# Patient Record
Sex: MALE | Race: WHITE | NOT HISPANIC OR LATINO | Employment: OTHER | URBAN - METROPOLITAN AREA
[De-identification: names, ages, dates, MRNs, and addresses within clinical notes are randomized per-mention and may not be internally consistent; named-entity substitution may affect disease eponyms.]

---

## 2021-09-15 PROCEDURE — U0005 INFEC AGEN DETEC AMPLI PROBE: HCPCS | Performed by: INTERNAL MEDICINE

## 2021-09-15 PROCEDURE — U0003 INFECTIOUS AGENT DETECTION BY NUCLEIC ACID (DNA OR RNA); SEVERE ACUTE RESPIRATORY SYNDROME CORONAVIRUS 2 (SARS-COV-2) (CORONAVIRUS DISEASE [COVID-19]), AMPLIFIED PROBE TECHNIQUE, MAKING USE OF HIGH THROUGHPUT TECHNOLOGIES AS DESCRIBED BY CMS-2020-01-R: HCPCS | Performed by: INTERNAL MEDICINE

## 2021-09-16 ENCOUNTER — LAB REQUISITION (OUTPATIENT)
Dept: LAB | Facility: HOSPITAL | Age: 46
End: 2021-09-16
Payer: MEDICARE

## 2021-09-16 DIAGNOSIS — Z03.818 ENCOUNTER FOR OBSERVATION FOR SUSPECTED EXPOSURE TO OTHER BIOLOGICAL AGENTS RULED OUT: ICD-10-CM

## 2021-09-16 LAB — SARS-COV-2 RNA RESP QL NAA+PROBE: NEGATIVE

## 2022-05-10 ENCOUNTER — OFFICE VISIT (OUTPATIENT)
Dept: URGENT CARE | Facility: MEDICAL CENTER | Age: 47
End: 2022-05-10
Payer: MEDICARE

## 2022-05-10 VITALS
OXYGEN SATURATION: 96 % | WEIGHT: 315 LBS | TEMPERATURE: 98 F | RESPIRATION RATE: 18 BRPM | HEIGHT: 76 IN | HEART RATE: 82 BPM | BODY MASS INDEX: 38.36 KG/M2

## 2022-05-10 DIAGNOSIS — L03.115 CELLULITIS OF RIGHT FOOT: ICD-10-CM

## 2022-05-10 DIAGNOSIS — S91.301A OPEN WOUND OF RIGHT FOOT, INITIAL ENCOUNTER: Primary | ICD-10-CM

## 2022-05-10 DIAGNOSIS — L03.115 CELLULITIS OF RIGHT LEG: ICD-10-CM

## 2022-05-10 PROCEDURE — G0463 HOSPITAL OUTPT CLINIC VISIT: HCPCS | Performed by: PHYSICIAN ASSISTANT

## 2022-05-10 PROCEDURE — 99213 OFFICE O/P EST LOW 20 MIN: CPT | Performed by: PHYSICIAN ASSISTANT

## 2022-05-10 RX ORDER — TRAZODONE HYDROCHLORIDE 50 MG/1
TABLET ORAL
COMMUNITY
Start: 2022-05-09

## 2022-05-10 RX ORDER — LEVOTHYROXINE SODIUM 0.03 MG/1
TABLET ORAL
COMMUNITY
Start: 2022-05-09

## 2022-05-10 RX ORDER — CITALOPRAM 40 MG/1
TABLET ORAL
COMMUNITY
Start: 2022-05-09

## 2022-05-10 RX ORDER — LOPERAMIDE HYDROCHLORIDE 2 MG/1
CAPSULE ORAL
COMMUNITY
Start: 2022-04-18

## 2022-05-10 RX ORDER — CLONAZEPAM 1 MG/1
1 TABLET ORAL 3 TIMES DAILY
COMMUNITY
Start: 2022-03-30

## 2022-05-10 RX ORDER — HYDROCHLOROTHIAZIDE 12.5 MG/1
TABLET ORAL
COMMUNITY
Start: 2022-03-05 | End: 2022-07-18

## 2022-05-10 RX ORDER — POTASSIUM CHLORIDE 20 MEQ/1
TABLET, EXTENDED RELEASE ORAL
COMMUNITY
Start: 2022-05-09 | End: 2022-07-18

## 2022-05-10 RX ORDER — CLINDAMYCIN HYDROCHLORIDE 300 MG/1
300 CAPSULE ORAL 3 TIMES DAILY
Qty: 30 CAPSULE | Refills: 0 | Status: SHIPPED | OUTPATIENT
Start: 2022-05-10 | End: 2022-05-15

## 2022-05-10 RX ORDER — MIRTAZAPINE 7.5 MG/1
TABLET, FILM COATED ORAL
COMMUNITY
Start: 2022-02-12

## 2022-05-10 RX ORDER — ROSUVASTATIN CALCIUM 10 MG/1
TABLET, COATED ORAL
COMMUNITY
Start: 2022-02-28

## 2022-05-10 RX ORDER — METOPROLOL TARTRATE 50 MG/1
50 TABLET, FILM COATED ORAL 2 TIMES DAILY
COMMUNITY
Start: 2022-05-09

## 2022-05-10 RX ORDER — FUROSEMIDE 40 MG/1
40 TABLET ORAL DAILY
COMMUNITY
Start: 2022-03-30

## 2022-05-10 RX ORDER — BUSPIRONE HYDROCHLORIDE 10 MG/1
TABLET ORAL
COMMUNITY
Start: 2022-05-09 | End: 2022-06-20

## 2022-05-10 RX ORDER — ACETAMINOPHEN 160 MG
2000 TABLET,DISINTEGRATING ORAL DAILY
COMMUNITY
Start: 2022-03-30

## 2022-05-10 RX ORDER — PANTOPRAZOLE SODIUM 40 MG/1
TABLET, DELAYED RELEASE ORAL
COMMUNITY
Start: 2022-05-09

## 2022-05-10 RX ORDER — DOXYCYCLINE HYCLATE 100 MG
TABLET ORAL
COMMUNITY
Start: 2022-02-21 | End: 2022-05-15

## 2022-05-10 RX ORDER — PALIPERIDONE 6 MG/1
TABLET, EXTENDED RELEASE ORAL
COMMUNITY
Start: 2022-05-09

## 2022-05-10 RX ORDER — PHENOL 1.4 %
10 AEROSOL, SPRAY (ML) MUCOUS MEMBRANE DAILY
Status: ON HOLD | COMMUNITY
Start: 2022-05-09 | End: 2022-07-20

## 2022-05-10 RX ORDER — DIVALPROEX SODIUM 500 MG/1
TABLET, DELAYED RELEASE ORAL
COMMUNITY
Start: 2022-05-09 | End: 2022-06-20

## 2022-05-10 NOTE — PATIENT INSTRUCTIONS
1  Over-the-counter acetaminophen as needed for pain  2  Elevate both legs frequently throughout the course of the day  3  Follow-up with wound care as soon as possible: They should be calling you for an appointment as a referral was placed on your behalf  4  Go to the ER immediately for any worsening symptoms

## 2022-05-10 NOTE — LETTER
May 10, 2022     Patient: Milady Leonard   YOB: 1975   Date of Visit: 5/10/2022       To Whom it May Concern:    Morgan Tam was seen in my clinic on 5/10/2022  He is not to be assigned job duties where he has to work around crease or get his right leg dressing soiled until cleared by [address]       If you have any questions or concerns, please don't hesitate to call           Sincerely,          Luis Felipe Mohan PA-C        CC: Milady Leonard

## 2022-05-10 NOTE — PROGRESS NOTES
[facility]        NAME: Jimmie Browne is a 48 y o  male  : 1975    MRN: [id number]  DATE: May 10, 2022  TIME: 2:48 PM    Assessment and Plan   Open wound of right foot, initial encounter [S91 301A]  1  Open wound of right foot, initial encounter  mupirocin (BACTROBAN) 2 % ointment    Ambulatory Referral to Wound Care   2  Cellulitis of right foot  clindamycin (CLEOCIN) 300 MG capsule    Ambulatory Referral to Wound Care   3  Cellulitis of right leg  clindamycin (CLEOCIN) 300 MG capsule    Ambulatory Referral to Wound Care         Patient Instructions   1  Over-the-counter acetaminophen as needed for pain  2  Elevate both legs frequently throughout the course of the day  3  Follow-up with wound care as soon as possible: They should be calling you for an appointment as a referral was placed on your behalf  4  Go to the ER immediately for any worsening symptoms  Chief Complaint     Chief Complaint   Patient presents with    Blister     Pt  with a blister to the right inner foot from his shoes rubbing  History of Present Illness       47-year-old male patient with a known history of edematous leg states that he was wearing tight-fitting sneakers yesterday and walked for a considerable distance and at the end of the day noticed that he had a blister on the medial right foot  Today he woke up and was much more sore  He states that the redness in his lower leg and foot is much worse since this happened  And he has increasing pain in the area  He notes that his edema is worse and his right leg is weeping  Review of Systems   Review of Systems   Constitutional: Negative for chills and fever  HENT: Negative for ear pain and sore throat  Eyes: Negative for pain and visual disturbance  Respiratory: Negative for cough and shortness of breath  Cardiovascular: Positive for leg swelling  Negative for chest pain and palpitations     Gastrointestinal: Negative for abdominal pain and vomiting  Genitourinary: Negative for dysuria and hematuria  Musculoskeletal: Negative for arthralgias and back pain  Skin: Positive for color change and wound  Negative for rash  Neurological: Negative for seizures and syncope  All other systems reviewed and are negative          Current Medications       Current Outpatient Medications:     busPIRone (BUSPAR) 10 mg tablet, , Disp: , Rfl:     Cholecalciferol (Vitamin D3) 50 MCG (2000 UT) capsule, Take 2,000 Units by mouth daily, Disp: , Rfl:     citalopram (CeleXA) 40 mg tablet, , Disp: , Rfl:     clonazePAM (KlonoPIN) 1 mg tablet, Take 1 mg by mouth 3 (three) times a day, Disp: , Rfl:     divalproex sodium (DEPAKOTE) 500 mg EC tablet, , Disp: , Rfl:     doxycycline hyclate (VIBRA-TABS) 100 mg tablet, , Disp: , Rfl:     furosemide (LASIX) 40 mg tablet, Take 40 mg by mouth daily, Disp: , Rfl:     hydrochlorothiazide (HYDRODIURIL) 12 5 mg tablet, , Disp: , Rfl:     levothyroxine 25 mcg tablet, , Disp: , Rfl:     loperamide (IMODIUM) 2 mg capsule, TAKE 2 CAPSULES BY MOUTH TWO TIMES DAILY AS NEEDED FOR DIARRHEA , Disp: , Rfl:     Melatonin 10 MG TABS, Take 10 mg by mouth daily, Disp: , Rfl:     metoprolol tartrate (LOPRESSOR) 50 mg tablet, Take 50 mg by mouth 2 (two) times a day, Disp: , Rfl:     mirtazapine (REMERON) 7 5 MG tablet, , Disp: , Rfl:     paliperidone (INVEGA) 6 MG 24 hr tablet, , Disp: , Rfl:     pantoprazole (PROTONIX) 40 mg tablet, , Disp: , Rfl:     potassium chloride (K-DUR,KLOR-CON) 20 mEq tablet, , Disp: , Rfl:     rosuvastatin (CRESTOR) 10 MG tablet, , Disp: , Rfl:     traZODone (DESYREL) 50 mg tablet, , Disp: , Rfl:     clindamycin (CLEOCIN) 300 MG capsule, Take 1 capsule (300 mg total) by mouth 3 (three) times a day for 10 days, Disp: 30 capsule, Rfl: 0    mupirocin (BACTROBAN) 2 % ointment, Apply topically 3 (three) times a day Apply to right foot wound with dressing changes 2-3 times daily until healed, Disp: 22 g, Rfl: 0    Current Allergies     Allergies as of 05/10/2022 - Reviewed 05/10/2022   Allergen Reaction Noted    Mesalamine GI Intolerance 04/06/2015            The following portions of the patient's history were reviewed and updated as appropriate: allergies, current medications, past family history, past medical history, past social history, past surgical history and problem list      Past Medical History:   Diagnosis Date    Bipolar affective disorder ([facility] [address] )     Cellulitis     Hyperlipidemia     Hypertension        No past surgical history on file  No family history on file  Medications have been verified  Objective   Pulse 82   Temp 98 °F (36 7 °C)   Resp 18   Ht 6' 4" (1 93 m)   Wt (!) 215 kg (473 lb)   SpO2 96%   BMI 57 58 kg/m²        Physical Exam     Physical Exam  Vitals and nursing note reviewed  Constitutional:       Appearance: Normal appearance  HENT:      Head: Normocephalic  Nose: Nose normal       Mouth/Throat:      Mouth: Mucous membranes are dry  Pharynx: Oropharynx is clear  Eyes:      Conjunctiva/sclera: Conjunctivae normal       Pupils: Pupils are equal, round, and reactive to light  Cardiovascular:      Rate and Rhythm: Normal rate and regular rhythm  Pulses: Normal pulses  Pulmonary:      Effort: Pulmonary effort is normal       Breath sounds: Normal breath sounds  Musculoskeletal:         General: Normal range of motion  Cervical back: Normal range of motion and neck supple  Skin:     Comments: Large blister noted to the right medial foot  Right leg is swollen, red, warm from the knee down with consider amount of weeping  Right leg has 3+ nonpitting edema  Left side has 2+ nonpitting edema without weeping or cellulitic changes  Neurological:      General: No focal deficit present  Mental Status: He is alert and oriented to person, place, and time     Psychiatric:         Mood and Affect: Mood normal  Behavior: Behavior normal          Note:   Right foot blister was treated with bacitracin and Xeroform dressing was applied  Leg was wrapped with gauze and overwrapped with Ace wrap  Patient knows to go to the ER for any worsening symptoms  He knows to change the dressings once to twice daily  He knows to follow up with wound care and referral has been issued

## 2022-05-14 ENCOUNTER — OFFICE VISIT (OUTPATIENT)
Dept: URGENT CARE | Facility: MEDICAL CENTER | Age: 47
End: 2022-05-14
Payer: MEDICARE

## 2022-05-14 ENCOUNTER — APPOINTMENT (EMERGENCY)
Dept: CT IMAGING | Facility: HOSPITAL | Age: 47
DRG: 603 | End: 2022-05-14
Payer: MEDICARE

## 2022-05-14 ENCOUNTER — HOSPITAL ENCOUNTER (INPATIENT)
Facility: HOSPITAL | Age: 47
LOS: 1 days | Discharge: HOME | DRG: 603 | End: 2022-05-15
Attending: EMERGENCY MEDICINE | Admitting: INTERNAL MEDICINE
Payer: MEDICARE

## 2022-05-14 ENCOUNTER — HOSPITAL ENCOUNTER (INPATIENT)
Dept: VASCULAR ULTRASOUND | Facility: HOSPITAL | Age: 47
Discharge: HOME | DRG: 603 | End: 2022-05-14
Payer: MEDICARE

## 2022-05-14 VITALS
DIASTOLIC BLOOD PRESSURE: 65 MMHG | OXYGEN SATURATION: 95 % | TEMPERATURE: 97.2 F | RESPIRATION RATE: 20 BRPM | WEIGHT: 315 LBS | BODY MASS INDEX: 57.82 KG/M2 | SYSTOLIC BLOOD PRESSURE: 136 MMHG | HEART RATE: 93 BPM

## 2022-05-14 DIAGNOSIS — L03.119 CELLULITIS OF LOWER EXTREMITY, UNSPECIFIED LATERALITY: Primary | ICD-10-CM

## 2022-05-14 DIAGNOSIS — L03.115 CELLULITIS OF RIGHT LOWER EXTREMITY: ICD-10-CM

## 2022-05-14 DIAGNOSIS — I89.0 LYMPHEDEMA: Primary | ICD-10-CM

## 2022-05-14 PROBLEM — E03.9 HYPOTHYROIDISM: Chronic | Status: ACTIVE | Noted: 2022-05-14

## 2022-05-14 PROBLEM — F31.9 BIPOLAR DISORDER (HCC): Chronic | Status: ACTIVE | Noted: 2022-05-14

## 2022-05-14 PROBLEM — M79.89 PAIN AND SWELLING OF LOWER EXTREMITY: Status: ACTIVE | Noted: 2022-05-14

## 2022-05-14 PROBLEM — E66.01 MORBID OBESITY (HCC): Chronic | Status: ACTIVE | Noted: 2022-05-14

## 2022-05-14 PROBLEM — M79.606 PAIN AND SWELLING OF LOWER EXTREMITY: Status: ACTIVE | Noted: 2022-05-14

## 2022-05-14 PROBLEM — K50.90 CROHN'S DISEASE (HCC): Chronic | Status: ACTIVE | Noted: 2022-05-14

## 2022-05-14 PROBLEM — I10 PRIMARY HYPERTENSION: Chronic | Status: ACTIVE | Noted: 2022-05-14

## 2022-05-14 PROBLEM — E78.5 DYSLIPIDEMIA: Chronic | Status: ACTIVE | Noted: 2022-05-14

## 2022-05-14 LAB
ALBUMIN SERPL BCP-MCNC: 3.1 G/DL (ref 3.5–5)
ALP SERPL-CCNC: 56 U/L (ref 34–104)
ALT SERPL W P-5'-P-CCNC: 20 U/L (ref 7–52)
ANION GAP SERPL CALCULATED.3IONS-SCNC: 7 MMOL/L (ref 4–13)
AST SERPL W P-5'-P-CCNC: 15 U/L (ref 13–39)
BASOPHILS # BLD AUTO: 0.03 THOUSANDS/ΜL (ref 0–0.1)
BASOPHILS NFR BLD AUTO: 1 % (ref 0–1)
BILIRUB SERPL-MCNC: 0.93 MG/DL (ref 0.2–1)
BUN SERPL-MCNC: 17 MG/DL (ref 5–25)
CALCIUM ALBUM COR SERPL-MCNC: 8.7 MG/DL (ref 8.3–10.1)
CALCIUM SERPL-MCNC: 8 MG/DL (ref 8.4–10.2)
CHLORIDE SERPL-SCNC: 104 MMOL/L (ref 96–108)
CK SERPL-CCNC: 62 U/L (ref 39–308)
CO2 SERPL-SCNC: 26 MMOL/L (ref 21–32)
CREAT SERPL-MCNC: 1.06 MG/DL (ref 0.6–1.3)
D DIMER PPP FEU-MCNC: 1.43 UG/ML FEU
EOSINOPHIL # BLD AUTO: 0.11 THOUSAND/ΜL (ref 0–0.61)
EOSINOPHIL NFR BLD AUTO: 2 % (ref 0–6)
ERYTHROCYTE [DISTWIDTH] IN BLOOD BY AUTOMATED COUNT: 13.2 % (ref 11.6–15.1)
GFR SERPL CREATININE-BSD FRML MDRD: 82 ML/MIN/1.73SQ M
GLUCOSE SERPL-MCNC: 99 MG/DL (ref 65–140)
HCT VFR BLD AUTO: 37.7 % (ref 36.5–49.3)
HGB BLD-MCNC: 12.2 G/DL (ref 12–17)
IMM GRANULOCYTES # BLD AUTO: 0.04 THOUSAND/UL (ref 0–0.2)
IMM GRANULOCYTES NFR BLD AUTO: 1 % (ref 0–2)
LACTATE SERPL-SCNC: 1.2 MMOL/L (ref 0.5–2)
LYMPHOCYTES # BLD AUTO: 1.66 THOUSANDS/ΜL (ref 0.6–4.47)
LYMPHOCYTES NFR BLD AUTO: 32 % (ref 14–44)
MCH RBC QN AUTO: 31 PG (ref 26.8–34.3)
MCHC RBC AUTO-ENTMCNC: 32.4 G/DL (ref 31.4–37.4)
MCV RBC AUTO: 96 FL (ref 82–98)
MONOCYTES # BLD AUTO: 1.03 THOUSAND/ΜL (ref 0.17–1.22)
MONOCYTES NFR BLD AUTO: 20 % (ref 4–12)
NEUTROPHILS # BLD AUTO: 2.38 THOUSANDS/ΜL (ref 1.85–7.62)
NEUTS SEG NFR BLD AUTO: 44 % (ref 43–75)
NRBC BLD AUTO-RTO: 0 /100 WBCS
PLATELET # BLD AUTO: 231 THOUSANDS/UL (ref 149–390)
PMV BLD AUTO: 10.4 FL (ref 8.9–12.7)
POTASSIUM SERPL-SCNC: 4 MMOL/L (ref 3.5–5.3)
PROT SERPL-MCNC: 6.4 G/DL (ref 6.4–8.4)
RBC # BLD AUTO: 3.94 MILLION/UL (ref 3.88–5.62)
SODIUM SERPL-SCNC: 137 MMOL/L (ref 135–147)
TSH SERPL DL<=0.05 MIU/L-ACNC: 2.03 UIU/ML (ref 0.45–4.5)
WBC # BLD AUTO: 5.25 THOUSAND/UL (ref 4.31–10.16)

## 2022-05-14 PROCEDURE — 87040 BLOOD CULTURE FOR BACTERIA: CPT | Performed by: EMERGENCY MEDICINE

## 2022-05-14 PROCEDURE — G1004 CDSM NDSC: HCPCS

## 2022-05-14 PROCEDURE — 73700 CT LOWER EXTREMITY W/O DYE: CPT

## 2022-05-14 PROCEDURE — 93970 EXTREMITY STUDY: CPT

## 2022-05-14 PROCEDURE — 82550 ASSAY OF CK (CPK): CPT | Performed by: EMERGENCY MEDICINE

## 2022-05-14 PROCEDURE — 80053 COMPREHEN METABOLIC PANEL: CPT | Performed by: EMERGENCY MEDICINE

## 2022-05-14 PROCEDURE — 84443 ASSAY THYROID STIM HORMONE: CPT | Performed by: INTERNAL MEDICINE

## 2022-05-14 PROCEDURE — 85379 FIBRIN DEGRADATION QUANT: CPT | Performed by: EMERGENCY MEDICINE

## 2022-05-14 PROCEDURE — 99285 EMERGENCY DEPT VISIT HI MDM: CPT

## 2022-05-14 PROCEDURE — 99285 EMERGENCY DEPT VISIT HI MDM: CPT | Performed by: EMERGENCY MEDICINE

## 2022-05-14 PROCEDURE — 93005 ELECTROCARDIOGRAM TRACING: CPT

## 2022-05-14 PROCEDURE — 96374 THER/PROPH/DIAG INJ IV PUSH: CPT

## 2022-05-14 PROCEDURE — 85025 COMPLETE CBC W/AUTO DIFF WBC: CPT | Performed by: EMERGENCY MEDICINE

## 2022-05-14 PROCEDURE — 83605 ASSAY OF LACTIC ACID: CPT | Performed by: EMERGENCY MEDICINE

## 2022-05-14 PROCEDURE — G0463 HOSPITAL OUTPT CLINIC VISIT: HCPCS | Performed by: PHYSICIAN ASSISTANT

## 2022-05-14 PROCEDURE — 36415 COLL VENOUS BLD VENIPUNCTURE: CPT | Performed by: EMERGENCY MEDICINE

## 2022-05-14 PROCEDURE — 99212 OFFICE O/P EST SF 10 MIN: CPT | Performed by: PHYSICIAN ASSISTANT

## 2022-05-14 PROCEDURE — 99223 1ST HOSP IP/OBS HIGH 75: CPT | Performed by: INTERNAL MEDICINE

## 2022-05-14 RX ORDER — OXYCODONE HYDROCHLORIDE 5 MG/1
5 TABLET ORAL EVERY 4 HOURS PRN
Status: DISCONTINUED | OUTPATIENT
Start: 2022-05-14 | End: 2022-05-15 | Stop reason: HOSPADM

## 2022-05-14 RX ORDER — LOPERAMIDE HYDROCHLORIDE 2 MG/1
4 CAPSULE ORAL 2 TIMES DAILY PRN
Status: DISCONTINUED | OUTPATIENT
Start: 2022-05-14 | End: 2022-05-15 | Stop reason: HOSPADM

## 2022-05-14 RX ORDER — PRAVASTATIN SODIUM 40 MG
40 TABLET ORAL
Status: DISCONTINUED | OUTPATIENT
Start: 2022-05-14 | End: 2022-05-15 | Stop reason: HOSPADM

## 2022-05-14 RX ORDER — DIVALPROEX SODIUM 500 MG/1
1500 TABLET, DELAYED RELEASE ORAL
Status: DISCONTINUED | OUTPATIENT
Start: 2022-05-14 | End: 2022-05-15 | Stop reason: HOSPADM

## 2022-05-14 RX ORDER — CLONAZEPAM 1 MG/1
1 TABLET ORAL 3 TIMES DAILY
Status: DISCONTINUED | OUTPATIENT
Start: 2022-05-14 | End: 2022-05-15 | Stop reason: HOSPADM

## 2022-05-14 RX ORDER — ACETAMINOPHEN 325 MG/1
650 TABLET ORAL EVERY 6 HOURS PRN
Status: DISCONTINUED | OUTPATIENT
Start: 2022-05-14 | End: 2022-05-15 | Stop reason: HOSPADM

## 2022-05-14 RX ORDER — CITALOPRAM 20 MG/1
40 TABLET ORAL DAILY
Status: DISCONTINUED | OUTPATIENT
Start: 2022-05-14 | End: 2022-05-15 | Stop reason: HOSPADM

## 2022-05-14 RX ORDER — POTASSIUM CHLORIDE 20 MEQ/1
20 TABLET, EXTENDED RELEASE ORAL DAILY
Status: DISCONTINUED | OUTPATIENT
Start: 2022-05-14 | End: 2022-05-15 | Stop reason: HOSPADM

## 2022-05-14 RX ORDER — LEVOTHYROXINE SODIUM 0.03 MG/1
25 TABLET ORAL
Status: DISCONTINUED | OUTPATIENT
Start: 2022-05-14 | End: 2022-05-15 | Stop reason: HOSPADM

## 2022-05-14 RX ORDER — TRAZODONE HYDROCHLORIDE 50 MG/1
50 TABLET ORAL
Status: DISCONTINUED | OUTPATIENT
Start: 2022-05-14 | End: 2022-05-15 | Stop reason: HOSPADM

## 2022-05-14 RX ORDER — PALIPERIDONE 3 MG/1
12 TABLET, EXTENDED RELEASE ORAL DAILY
Status: DISCONTINUED | OUTPATIENT
Start: 2022-05-14 | End: 2022-05-15 | Stop reason: HOSPADM

## 2022-05-14 RX ORDER — OXYCODONE HYDROCHLORIDE 5 MG/1
2.5 TABLET ORAL EVERY 4 HOURS PRN
Status: DISCONTINUED | OUTPATIENT
Start: 2022-05-14 | End: 2022-05-15 | Stop reason: HOSPADM

## 2022-05-14 RX ORDER — MELATONIN
2000 DAILY
Status: DISCONTINUED | OUTPATIENT
Start: 2022-05-14 | End: 2022-05-15 | Stop reason: HOSPADM

## 2022-05-14 RX ORDER — ENOXAPARIN SODIUM 100 MG/ML
60 INJECTION SUBCUTANEOUS EVERY 12 HOURS SCHEDULED
Status: DISCONTINUED | OUTPATIENT
Start: 2022-05-14 | End: 2022-05-15 | Stop reason: HOSPADM

## 2022-05-14 RX ORDER — METOPROLOL TARTRATE 50 MG/1
50 TABLET, FILM COATED ORAL 2 TIMES DAILY
Status: DISCONTINUED | OUTPATIENT
Start: 2022-05-14 | End: 2022-05-15 | Stop reason: HOSPADM

## 2022-05-14 RX ORDER — FUROSEMIDE 10 MG/ML
40 INJECTION INTRAMUSCULAR; INTRAVENOUS
Status: DISCONTINUED | OUTPATIENT
Start: 2022-05-14 | End: 2022-05-15 | Stop reason: HOSPADM

## 2022-05-14 RX ORDER — LANOLIN ALCOHOL/MO/W.PET/CERES
9 CREAM (GRAM) TOPICAL DAILY
Status: DISCONTINUED | OUTPATIENT
Start: 2022-05-14 | End: 2022-05-15 | Stop reason: HOSPADM

## 2022-05-14 RX ORDER — BUSPIRONE HYDROCHLORIDE 5 MG/1
20 TABLET ORAL 3 TIMES DAILY
Status: DISCONTINUED | OUTPATIENT
Start: 2022-05-14 | End: 2022-05-15 | Stop reason: HOSPADM

## 2022-05-14 RX ORDER — DIVALPROEX SODIUM 500 MG/1
1000 TABLET, DELAYED RELEASE ORAL DAILY
Status: DISCONTINUED | OUTPATIENT
Start: 2022-05-14 | End: 2022-05-15 | Stop reason: HOSPADM

## 2022-05-14 RX ORDER — PANTOPRAZOLE SODIUM 40 MG/1
40 TABLET, DELAYED RELEASE ORAL
Status: DISCONTINUED | OUTPATIENT
Start: 2022-05-15 | End: 2022-05-15 | Stop reason: HOSPADM

## 2022-05-14 RX ORDER — MORPHINE SULFATE 4 MG/ML
4 INJECTION, SOLUTION INTRAMUSCULAR; INTRAVENOUS ONCE
Status: COMPLETED | OUTPATIENT
Start: 2022-05-14 | End: 2022-05-14

## 2022-05-14 RX ADMIN — CLONAZEPAM 1 MG: 1 TABLET ORAL at 16:55

## 2022-05-14 RX ADMIN — OXYCODONE HYDROCHLORIDE 5 MG: 5 TABLET ORAL at 18:40

## 2022-05-14 RX ADMIN — Medication 9 MG: at 21:21

## 2022-05-14 RX ADMIN — POTASSIUM CHLORIDE 20 MEQ: 1500 TABLET, EXTENDED RELEASE ORAL at 13:57

## 2022-05-14 RX ADMIN — OXYCODONE HYDROCHLORIDE 5 MG: 5 TABLET ORAL at 13:58

## 2022-05-14 RX ADMIN — MORPHINE SULFATE 4 MG: 4 INJECTION INTRAVENOUS at 10:17

## 2022-05-14 RX ADMIN — TRAZODONE HYDROCHLORIDE 50 MG: 50 TABLET ORAL at 21:22

## 2022-05-14 RX ADMIN — BUSPIRONE HYDROCHLORIDE 20 MG: 10 TABLET ORAL at 21:22

## 2022-05-14 RX ADMIN — ENOXAPARIN SODIUM 60 MG: 60 INJECTION SUBCUTANEOUS at 13:58

## 2022-05-14 RX ADMIN — VANCOMYCIN HYDROCHLORIDE 2000 MG: 1 INJECTION, POWDER, LYOPHILIZED, FOR SOLUTION INTRAVENOUS at 11:18

## 2022-05-14 RX ADMIN — BUSPIRONE HYDROCHLORIDE 20 MG: 10 TABLET ORAL at 16:55

## 2022-05-14 RX ADMIN — OXYCODONE HYDROCHLORIDE 5 MG: 5 TABLET ORAL at 23:07

## 2022-05-14 RX ADMIN — FUROSEMIDE 40 MG: 10 INJECTION, SOLUTION INTRAMUSCULAR; INTRAVENOUS at 16:56

## 2022-05-14 RX ADMIN — CLONAZEPAM 1 MG: 1 TABLET ORAL at 21:22

## 2022-05-14 RX ADMIN — PRAVASTATIN SODIUM 40 MG: 40 TABLET ORAL at 16:56

## 2022-05-14 RX ADMIN — ENOXAPARIN SODIUM 60 MG: 60 INJECTION SUBCUTANEOUS at 21:21

## 2022-05-14 RX ADMIN — DIVALPROEX SODIUM 1500 MG: 500 TABLET, DELAYED RELEASE ORAL at 21:21

## 2022-05-14 NOTE — ASSESSMENT & PLAN NOTE
· Patient on furosemide 40 mg once a day  · Since patient has significant bilateral lower extremity edema, I will order for IV Lasix 40 mg twice a day, for now

## 2022-05-14 NOTE — PLAN OF CARE
Problem: Potential for Falls  Goal: Patient will remain free of falls  Description: INTERVENTIONS:  - Educate patient/family on patient safety including physical limitations  - Instruct patient to call for assistance with activity   - Consult OT/PT to assist with strengthening/mobility   - Keep Call bell within reach  - Keep bed low and locked with side rails adjusted as appropriate  - Keep care items and personal belongings within reach  - Initiate and maintain comfort rounds  - Make Fall Risk Sign visible to staff  - Offer Toileting every  Hours, in advance of need  - Initiate/Maintain alarm  - Obtain necessary fall risk management equipment:   - Apply yellow socks and bracelet for high fall risk patients  - Consider moving patient to room near nurses station  Outcome: Progressing     Problem: PAIN - ADULT  Goal: Verbalizes/displays adequate comfort level or baseline comfort level  Description: Interventions:  - Encourage patient to monitor pain and request assistance  - Assess pain using appropriate pain scale  - Administer analgesics based on type and severity of pain and evaluate response  - Implement non-pharmacological measures as appropriate and evaluate response  - Consider cultural and social influences on pain and pain management  - Notify physician/advanced practitioner if interventions unsuccessful or patient reports new pain  Outcome: Progressing     Problem: INFECTION - ADULT  Goal: Absence or prevention of progression during hospitalization  Description: INTERVENTIONS:  - Assess and monitor for signs and symptoms of infection  - Monitor lab/diagnostic results  - Monitor all insertion sites, i e  indwelling lines, tubes, and drains  - Monitor endotracheal if appropriate and nasal secretions for changes in amount and color  - [place] appropriate cooling/warming therapies per order  - Administer medications as ordered  - Instruct and encourage patient and family to use good hand hygiene technique  - Identify and instruct in appropriate isolation precautions for identified infection/condition  Outcome: Progressing  Goal: Absence of fever/infection during neutropenic period  Description: INTERVENTIONS:  - Monitor WBC    Outcome: Progressing     Problem: SAFETY ADULT  Goal: Patient will remain free of falls  Description: INTERVENTIONS:  - Educate patient/family on patient safety including physical limitations  - Instruct patient to call for assistance with activity   - Consult OT/PT to assist with strengthening/mobility   - Keep Call bell within reach  - Keep bed low and locked with side rails adjusted as appropriate  - Keep care items and personal belongings within reach  - Initiate and maintain comfort rounds  - Make Fall Risk Sign visible to staff  - Offer Toileting every  Hours, in advance of need  - Initiate/Maintain alarm  - Obtain necessary fall risk management equipment:   - Apply yellow socks and bracelet for high fall risk patients  - Consider moving patient to room near nurses station  Outcome: Progressing  Goal: Maintain or return to baseline ADL function  Description: INTERVENTIONS:  -  Assess patient's ability to carry out ADLs; assess patient's baseline for ADL function and identify physical deficits which impact ability to perform ADLs (bathing, care of mouth/teeth, toileting, grooming, dressing, etc )  - Assess/evaluate cause of self-care deficits   - Assess range of motion  - Assess patient's mobility; develop plan if impaired  - Assess patient's need for assistive devices and provide as appropriate  - Encourage maximum independence but intervene and supervise when necessary  - Involve family in performance of ADLs  - Assess for home care needs following discharge   - Consider OT consult to assist with ADL evaluation and planning for discharge  - Provide patient education as appropriate  Outcome: Progressing  Goal: Maintains/Returns to pre admission functional level  Description: INTERVENTIONS:  - Perform BMAT or MOVE assessment daily    - Set and communicate daily mobility goal to care team and patient/family/caregiver  - Collaborate with rehabilitation services on mobility goals if consulted  - Perform Range of Motion  times a day  - Reposition patient every  hours  - Dangle patient  times a day  - Stand patient  times a day  - Ambulate patient  times a day  - Out of bed to chair  times a day   - Out of bed for meals  times a day  - Out of bed for toileting  - Record patient progress and toleration of activity level   Outcome: Progressing     Problem: DISCHARGE PLANNING  Goal: Discharge to home or other facility with appropriate resources  Description: INTERVENTIONS:  - Identify barriers to discharge w/patient and caregiver  - Arrange for needed discharge resources and transportation as appropriate  - Identify discharge learning needs (meds, wound care, etc )  - Arrange for interpretive services to assist at discharge as needed  - Refer to Case Management Department for coordinating discharge planning if the patient needs post-hospital services based on physician/advanced practitioner order or complex needs related to functional status, cognitive ability, or social support system  Outcome: Progressing     Problem: Knowledge Deficit  Goal: Patient/family/caregiver demonstrates understanding of disease process, treatment plan, medications, and discharge instructions  Description: Complete learning assessment and assess knowledge base    Interventions:  - Provide teaching at level of understanding  - Provide teaching via preferred learning methods  Outcome: Progressing     Problem: RESPIRATORY - ADULT  Goal: Achieves optimal ventilation and oxygenation  Description: INTERVENTIONS:  - Assess for changes in respiratory status  - Assess for changes in mentation and behavior  - Position to facilitate oxygenation and minimize respiratory effort  - Oxygen administered by appropriate delivery if ordered  - Initiate smoking cessation education as indicated  - Encourage broncho-pulmonary hygiene including cough, deep breathe, Incentive Spirometry  - Assess the need for suctioning and aspirate as needed  - Assess and instruct to report SOB or any respiratory difficulty  - Respiratory Therapy support as indicated  Outcome: Progressing     Problem: SKIN/TISSUE INTEGRITY - ADULT  Goal: Skin Integrity remains intact(Skin Breakdown Prevention)  Description: Assess:  -Perform Javier assessment every   -Clean and moisturize skin every   -Inspect skin when repositioning, toileting, and assisting with ADLS  -Assess under medical devices such as  every   -Assess extremities for adequate circulation and sensation     Bed Management:  -Have minimal linens on bed & keep smooth, unwrinkled  -Change linens as needed when moist or perspiring  -Avoid sitting or lying in one position for more than  hours while in bed  -Keep HOB at degrees     Toileting:  -Offer bedside commode  -Assess for incontinence every   -Use incontinent care products after each incontinent episode such as     Activity:  -Mobilize patient  times a day  -Encourage activity and walks on unit  -Encourage or provide ROM exercises   -Turn and reposition patient every  Hours  -Use appropriate equipment to lift or move patient in bed  -Instruct/ Assist with weight shifting every  when out of bed in chair  -Consider limitation of chair time  hour intervals    Skin Care:  -Avoid use of baby powder, tape, friction and shearing, hot water or constrictive clothing  -Relieve pressure over bony prominences using   -Do not massage red bony areas    Next Steps:  -Teach patient strategies to minimize risks such as    -Consider consults to  interdisciplinary teams such as   Outcome: Progressing  Goal: Incision(s), wounds(s) or drain site(s) healing without S/S of infection  Description: INTERVENTIONS  - Assess and document dressing, incision, wound bed, drain sites and surrounding tissue  - Provide patient and family education  - Perform skin care/dressing changes every   Outcome: Progressing  Goal: Pressure injury heals and does not worsen  Description: Interventions:  - Implement low air loss mattress or specialty surface (Criteria met)  - Apply silicone foam dressing  - Instruct/assist with weight shifting every  minutes when in chair   - Limit chair time to  hour intervals  - Use special pressure reducing interventions such as  when in chair   - Apply fecal or urinary incontinence containment device   - Perform passive or active ROM every   - Turn and reposition patient & offload bony prominences every  hours   - Utilize friction reducing device or surface for transfers   - Consider consults to  interdisciplinary teams such as   - Use incontinent care products after each incontinent episode such as   - Consider nutrition services referral as needed  Outcome: Progressing

## 2022-05-14 NOTE — ASSESSMENT & PLAN NOTE
· Patient came in with pain and swelling of bilateral lower extremities, right greater than the left  Due to the pain, patient has been having difficulty sleeping  · Patient has been having recurrent lower extremity cellulitis for which this has been treated at [facility] with several antibiotics including IV cefazolin, IV vancomycin, on oral clindamycin and doxycycline as well as mupirocin ointment  Patient also was prescribed with Medrol Dosepak at [facility]  · Thought to be bacterial cellulitis in the emergency room and thus was given IV vancomycin  Hold off further antibiotic at this time  This was discussed with the patient and okay with this plan  · D-dimer was elevated: For duplex ultrasound to rule out DVTs  · CT scan of bilateral lower extremities:  Revealed cellulitis versus venous stasis changes  No signs of possibility of a deeper infection such as an abscess or necrotizing fascitis  · I have a suspicion that this is more of venous stasis skin changes/dermatitis, instead of a bacterial cellulitis  Patient did not have any fever chills  No leukocytosis on patient's CBC  Examination of bilateral lower extremities did not reveal abnormal warmth and instead, on palpation, it was cold with weeping fluids from significant bilateral lower extremity edema  · Check procalcitonin  · Follow-up blood culture results  · Monitor CBC with differential count as well as temperature  · I will also order for echocardiogram, to rule out CHF, contributing to patient's severe bilateral lower extremity edema as patient also complained of having shortness of breath on exertion  No EKG done in the emergency room, I will get 1    · Elevate bilateral lower extremities  · Ace wraps  · Wound care consult for local wound care  · Pain control  · IV Lasix for patient's significant bilateral lower extremity edema  Low-salt diet  · Infectious disease doctor consult    · PT OT evaluation and management

## 2022-05-14 NOTE — H&P
[facility]  H&P- Erin Barrera 1975, 48 y o  male MRN: [id number]  Unit/Bed#: ED 17 Encounter: [id number]  Primary Care Provider: Luci Sevilla DO   Date and time admitted to hospital: 5/14/2022  9:13 AM    * Pain and swelling of lower extremity  Assessment & Plan  · Patient came in with pain and swelling of bilateral lower extremities, right greater than the left  Due to the pain, patient has been having difficulty sleeping  · Patient has been having recurrent lower extremity cellulitis for which this has been treated at [facility] with several antibiotics including IV cefazolin, IV vancomycin, on oral clindamycin and doxycycline as well as mupirocin ointment  Patient also was prescribed with Medrol Dosepak at [facility]  · Thought to be bacterial cellulitis in the emergency room and thus was given IV vancomycin  Hold off further antibiotic at this time  This was discussed with the patient and okay with this plan  · D-dimer was elevated: For duplex ultrasound to rule out DVTs  · CT scan of bilateral lower extremities:  Revealed cellulitis versus venous stasis changes  No signs of possibility of a deeper infection such as an abscess or necrotizing fascitis  · I have a suspicion that this is more of venous stasis skin changes/dermatitis, instead of a bacterial cellulitis  Patient did not have any fever chills  No leukocytosis on patient's CBC  Examination of bilateral lower extremities did not reveal abnormal warmth and instead, on palpation, it was cold with weeping fluids from significant bilateral lower extremity edema  · Check procalcitonin  · Follow-up blood culture results  · Monitor CBC with differential count as well as temperature  · I will also order for echocardiogram, to rule out CHF, contributing to patient's severe bilateral lower extremity edema as patient also complained of having shortness of breath on exertion  No EKG done in the emergency room, I will get 1    · Elevate bilateral lower extremities  · Ace wraps  · Wound care consult for local wound care  · Pain control  · IV Lasix for patient's significant bilateral lower extremity edema  Low-salt diet  · Infectious disease doctor consult  · PT OT evaluation and management  Hypothyroidism  Assessment & Plan  · Check TSH with reflex free T4  · Continue present levothyroxine    Morbid obesity ([facility] )  Assessment & Plan  · Needs to lose weight  · Lifestyle modification changes counseling prior to discharge  Bipolar disorder ([place] [address] )  Assessment & Plan  · Stable  · Continue patient's psychiatric medications  Crohn's disease ([place] [address] )  Assessment & Plan  · With history of chronic diarrhea due to Crohn's disease  · Continue patient's Imodium on as needed basis  Dyslipidemia  Assessment & Plan  · Continue statin medication    Primary hypertension  Assessment & Plan  · Patient on furosemide 40 mg once a day  · Since patient has significant bilateral lower extremity edema, I will order for IV Lasix 40 mg twice a day, for now  VTE Pharmacologic Prophylaxis: VTE Score: 4 Moderate Risk (Score 3-4) - Pharmacological DVT Prophylaxis Ordered: enoxaparin (Lovenox)  Code Status: Level 1 - Full Code   Discussion with family: Patient declined call to   Anticipated Length of Stay: Patient will be admitted on an inpatient basis with an anticipated length of stay of greater than 2 midnights secondary to Above findings and plans       Total Time for Visit, including Counseling / Coordination of Care: 70 minutes Greater than 50% of this total time spent on direct patient counseling and coordination of care  Chief Complaint:  I did sleep last night, due to pains in my right leg      History of Present Illness:  Chelsey Wallace is a 48 y o  male with a PMH significant for obesity, recurrent cellulitis, hypertension, bipolar disorder, who presents with right leg pain  According to the patient, approximately 2 weeks ago, he started having pains on his right leg  This pain has been getting progressively worse  Overnight, he told me, that the pain was significant to the point that he was not able to get any sleep  He told me that the pain was like stabbing/throbbing and excruciating pain  Thus he went to an urgent care center and when the doctor there saw his legs, patient was advised to go to the nearest emergency room  According to the patient, he had been having history of recurrent cellulitis and was on different antibiotics  He has been managed with this cellulitis at [facility]  Patient admits to bilateral leg swelling and pains for some time now  From review of patient's records, patient's cellulitis had been treated with several antibiotics with IV cefazolin, IV vancomycin, oral clindamycin and doxycycline as well as mupirocin ointment  Patient was also on Medrol Dosepak in the past for this  Patient denies any fever or chills  No other complaints  Review of Systems:  Review of Systems    Ten point review systems done and they were negative except for the ones I mentioned in my history of present illness and positive for shortness of breath on exertion, usually happens when he climbs a flight of stairs or when he is carrying a heavy object, with occasional chest pains, which according the patient, he had been having for some time now; patient also admits to chronic diarrhea due to his Crohn's disease for which he takes Imodium on as needed basis  Patient denies any headaches, dizziness or any loss of consciousness  Patient denies any cough or colds  Patient denies any nausea, vomiting or abdominal pains  Patient denies any urinary problems  For the other symptoms, please see notes above        Past Medical and Surgical History:   Past Medical History:   Diagnosis Date    Bipolar affective disorder ([facility] [address] )     Cellulitis     Hyperlipidemia     Hypertension        History reviewed  No pertinent surgical history  Meds/Allergies:  Prior to Admission medications    Medication Sig Start Date End Date Taking? Authorizing Provider   busPIRone (BUSPAR) 10 mg tablet  5/9/22   Historical Provider, MD   Cholecalciferol (Vitamin D3) 50 MCG (2000 UT) capsule Take 2,000 Units by mouth daily 3/30/22   Historical Provider, MD   citalopram (CeleXA) 40 mg tablet  5/9/22   Historical Provider, MD   clindamycin (CLEOCIN) 300 MG capsule Take 1 capsule (300 mg total) by mouth 3 (three) times a day for 10 days 5/10/22 5/20/22  Brandon Toussaint PA-C   clonazePAM (KlonoPIN) 1 mg tablet Take 1 mg by mouth 3 (three) times a day 3/30/22   Historical Provider, MD   divalproex sodium (DEPAKOTE) 500 mg EC tablet  5/9/22   Historical Provider, MD   doxycycline hyclate (VIBRA-TABS) 100 mg tablet  2/21/22   Historical Provider, MD   furosemide (LASIX) 40 mg tablet Take 40 mg by mouth daily 3/30/22   Historical Provider, MD   hydrochlorothiazide (HYDRODIURIL) 12 5 mg tablet  3/5/22   Historical Provider, MD   levothyroxine 25 mcg tablet  5/9/22   Historical Provider, MD   loperamide (IMODIUM) 2 mg capsule TAKE 2 CAPSULES BY MOUTH TWO TIMES DAILY AS NEEDED FOR DIARRHEA   4/18/22   Historical Provider, MD   Melatonin 10 MG TABS Take 10 mg by mouth daily 5/9/22   Historical Provider, MD   metoprolol tartrate (LOPRESSOR) 50 mg tablet Take 50 mg by mouth 2 (two) times a day 5/9/22   Historical Provider, MD   mirtazapine (REMERON) 7 5 MG tablet  2/12/22   Historical Provider, MD   mupirocin (BACTROBAN) 2 % ointment Apply topically 3 (three) times a day Apply to right foot wound with dressing changes 2-3 times daily until healed 5/10/22   Brandon Toussaint PA-C   paliperidone (INVEGA) 6 MG 24 hr tablet  5/9/22   Historical Provider, MD   pantoprazole (PROTONIX) 40 mg tablet  5/9/22   Historical Provider, MD   potassium chloride (K-DUR,KLOR-CON) 20 mEq tablet  5/9/22 Historical Provider, MD   rosuvastatin (CRESTOR) 10 MG tablet  2/28/22   Historical Provider, MD   traZODone (DESYREL) 50 mg tablet  5/9/22   Historical Provider, MD     I have reviewed home medications using recent Epic encounter  Allergies: Allergies   Allergen Reactions    Mesalamine GI Intolerance     Pt has had severe diarrhea and abdominal pain on mESALAMINE       Social History:  Marital Status: Single   Substance Use History:   Social History     Substance and Sexual Activity   Alcohol Use Never     Social History     Tobacco Use   Smoking Status Former Smoker   Smokeless Tobacco Never Used     Social History     Substance and Sexual Activity   Drug Use Never       Family History:  History reviewed  No pertinent family history  Physical Exam:     Vitals:   Blood Pressure: 125/60 (05/14/22 0910)  Pulse: 63 (05/14/22 0910)  Temperature: 98 6 °F (37 °C) (05/14/22 0910)  Temp Source: Oral (05/14/22 0910)  Respirations: 18 (05/14/22 0910)  Weight - Scale: (!) 215 kg (475 lb) (05/14/22 0910)  SpO2: 97 % (05/14/22 0910)    Physical Exam  Vitals and nursing note reviewed  Constitutional:       General: He is not in acute distress  Appearance: He is obese  He is not ill-appearing, toxic-appearing or diaphoretic  HENT:      Head: Normocephalic and atraumatic  Mouth/Throat:      Mouth: Mucous membranes are moist       Pharynx: Oropharynx is clear  No oropharyngeal exudate or posterior oropharyngeal erythema  Eyes:      General: No scleral icterus  Right eye: No discharge  Left eye: No discharge  Conjunctiva/sclera: Conjunctivae normal    Cardiovascular:      Rate and Rhythm: Normal rate and regular rhythm  Heart sounds: Normal heart sounds  No murmur heard  No friction rub  No gallop  Pulmonary:      Effort: Pulmonary effort is normal  No respiratory distress  Breath sounds: Normal breath sounds  No stridor  No wheezing, rhonchi or rales     Chest:      Chest wall: No tenderness  Abdominal:      General: Bowel sounds are normal  There is no distension  Palpations: Abdomen is soft  Tenderness: There is no abdominal tenderness  There is no guarding  Musculoskeletal:         General: No tenderness  Right lower leg: Edema present  Left lower leg: Edema present  Skin:     General: Skin is warm  Coloration: Skin is not pale  Findings: Erythema present  No rash  Comments: Positive for erythema on bilateral lower extremities, right greater than the left; positive for chronic venous stasis skin changes on bilateral lower extremities, right greater than the left  Please see pictures below  Neurological:      General: No focal deficit present  Mental Status: He is alert and oriented to person, place, and time  Psychiatric:         Mood and Affect: Mood normal          Behavior: Behavior normal          Thought Content: Thought content normal                                   Additional Data:     Lab Results:  Results from last 7 days   Lab Units 05/14/22  1013   WBC Thousand/uL 5 25   HEMOGLOBIN g/dL 12 2   HEMATOCRIT % 37 7   PLATELETS Thousands/uL 231   NEUTROS PCT % 44   LYMPHS PCT % 32   MONOS PCT % 20*   EOS PCT % 2     Results from last 7 days   Lab Units 05/14/22  1013   SODIUM mmol/L 137   POTASSIUM mmol/L 4 0   CHLORIDE mmol/L 104   CO2 mmol/L 26   BUN mg/dL 17   CREATININE mg/dL 1 06   ANION GAP mmol/L 7   CALCIUM mg/dL 8 0*   ALBUMIN g/dL 3 1*   TOTAL BILIRUBIN mg/dL 0 93   ALK PHOS U/L 56   ALT U/L 20   AST U/L 15   GLUCOSE RANDOM mg/dL 99                 Results from last 7 days   Lab Units 05/14/22  1013   LACTIC ACID mmol/L 1 2       Imaging: Reviewed radiology reports from this admission including: CT of bilateral lower extremities  CT lower extremity wo contrast left   Final Result by Zain Darling MD (05/14 5459)      Findings in the lower leg consistent with cellulitis or venous stasis changes    No evidence of abscess or subcutaneous gas to indicate necrotizing fasciitis  Workstation performed: [id number]         CT lower extremity wo contrast right   Final Result by Vincent Escudero MD (05/14 1048)      Findings in the lower leg consistent with cellulitis or venous stasis changes  No evidence of abscess or subcutaneous gas to indicate necrotizing fasciitis  Workstation performed: [id number]         VAS lower limb venous duplex study, complete bilateral    (Results Pending)       EKG and Other Studies Reviewed on Admission:   · EKG: No EKG obtained  ** Please Note: This note has been constructed using a voice recognition system   **

## 2022-05-14 NOTE — ASSESSMENT & PLAN NOTE
· With history of chronic diarrhea due to Crohn's disease  · Continue patient's Imodium on as needed basis

## 2022-05-14 NOTE — PROGRESS NOTES
[facility]        NAME: Jimmie Browne is a 48 y o  male  : 1975    MRN: [id number]  DATE: May 14, 2022  TIME: 8:39 AM    Assessment and Plan   Lymphedema [I89 0]  1  Lymphedema  Transfer to other facility   2  Cellulitis of right lower extremity  Transfer to other facility         Patient Instructions     1  Patient transferred to [facility] ED for further evaluation/treatment      Chief Complaint     Chief Complaint   Patient presents with    Cellulitis     Pain in right leg, was seen last week here referred to wound care and given an antibiotic         History of Present Illness       Galo Mejia is a 47-year-old male presents for re-evaluation of his right lower leg  Patient reports he was seen 4 days prior for a lower leg cellulitis and edema  Patient started taking 300 mg of clindamycin 3 times a day  He reports his pain is worsened over the past 24 hours  Patient reports his pain is stabbing and constant", he rates pain as an 8 on a scale 1-10  Patient denies any systemic symptoms including fever, chills or body aches  Review of Systems   Review of Systems   Constitutional: Negative  HENT: Negative  Respiratory: Negative  Cardiovascular: Positive for leg swelling  Musculoskeletal: Positive for gait problem           Current Medications       Current Outpatient Medications:     busPIRone (BUSPAR) 10 mg tablet, , Disp: , Rfl:     Cholecalciferol (Vitamin D3) 50 MCG (2000 UT) capsule, Take 2,000 Units by mouth daily, Disp: , Rfl:     citalopram (CeleXA) 40 mg tablet, , Disp: , Rfl:     clindamycin (CLEOCIN) 300 MG capsule, Take 1 capsule (300 mg total) by mouth 3 (three) times a day for 10 days, Disp: 30 capsule, Rfl: 0    clonazePAM (KlonoPIN) 1 mg tablet, Take 1 mg by mouth 3 (three) times a day, Disp: , Rfl:     divalproex sodium (DEPAKOTE) 500 mg EC tablet, , Disp: , Rfl:     doxycycline hyclate (VIBRA-TABS) 100 mg tablet, , Disp: , Rfl:     furosemide (LASIX) 40 mg tablet, Take 40 mg by mouth daily, Disp: , Rfl:     hydrochlorothiazide (HYDRODIURIL) 12 5 mg tablet, , Disp: , Rfl:     levothyroxine 25 mcg tablet, , Disp: , Rfl:     loperamide (IMODIUM) 2 mg capsule, TAKE 2 CAPSULES BY MOUTH TWO TIMES DAILY AS NEEDED FOR DIARRHEA , Disp: , Rfl:     Melatonin 10 MG TABS, Take 10 mg by mouth daily, Disp: , Rfl:     metoprolol tartrate (LOPRESSOR) 50 mg tablet, Take 50 mg by mouth 2 (two) times a day, Disp: , Rfl:     mirtazapine (REMERON) 7 5 MG tablet, , Disp: , Rfl:     mupirocin (BACTROBAN) 2 % ointment, Apply topically 3 (three) times a day Apply to right foot wound with dressing changes 2-3 times daily until healed, Disp: 22 g, Rfl: 0    paliperidone (INVEGA) 6 MG 24 hr tablet, , Disp: , Rfl:     pantoprazole (PROTONIX) 40 mg tablet, , Disp: , Rfl:     potassium chloride (K-DUR,KLOR-CON) 20 mEq tablet, , Disp: , Rfl:     rosuvastatin (CRESTOR) 10 MG tablet, , Disp: , Rfl:     traZODone (DESYREL) 50 mg tablet, , Disp: , Rfl:     Current Allergies     Allergies as of 05/14/2022 - Reviewed 05/14/2022   Allergen Reaction Noted    Mesalamine GI Intolerance 04/06/2015            The following portions of the patient's history were reviewed and updated as appropriate: allergies, current medications, past family history, past medical history, past social history, past surgical history and problem list      Past Medical History:   Diagnosis Date    Bipolar affective disorder ([facility] [address] )     Cellulitis     Hyperlipidemia     Hypertension        History reviewed  No pertinent surgical history  No family history on file  Medications have been verified  Objective   /65   Pulse 93   Temp (!) 97 2 °F (36 2 °C)   Resp 20   Wt (!) 215 kg (475 lb)   SpO2 95%   BMI 57 82 kg/m²   No LMP for male patient  Physical Exam     Physical Exam  Constitutional:       General: He is not in acute distress  Appearance: Normal appearance  Cardiovascular:      Rate and Rhythm: Normal rate and regular rhythm  Heart sounds: Normal heart sounds  No murmur heard  Pulmonary:      Effort: Pulmonary effort is normal       Breath sounds: Normal breath sounds  Musculoskeletal:      Right lower leg: Swelling, tenderness and bony tenderness present  3+ Pitting Edema present  Left lower leg: Tenderness present  2+ Pitting Edema present  Skin:     Comments: There is considerable full bilateral lower leg edema and erythema from the inferior aspect of the right proximal tibial region to the toes  There is considerable weeping of the skin with diffuse tenderness throughout the entire lower extremity  Pulses were unable to be palpated  Neurological:      Mental Status: He is alert

## 2022-05-14 NOTE — ED PROVIDER NOTES
History  Chief Complaint   Patient presents with    Leg Swelling     Right lower leg and foot pain/swelling/wound for the last year - painful and cannot sleep     HPI    49-year-old male presenting to the emergency department with severe pain in bilateral lower extremities  Past medical history significant for chronic wounds on lower extremities for the past year, hypertension, Crohn's disease reportedly on steroids, bipolar disorder, hyperlipidemia  Patient reports that he has had several months of seeing wound care and has been admitted in the past for progressive cellulitis in the lower extremities  Patient has been on Ancef, cefepime, vancomycin, Medrol Dosepaks with [facility] and since then, patient has only continually gotten worse  Patient presents today because the redness and swelling has persisted to a point where he cannot sleep due to pain  Patient was sent by urgent care after their evaluation  Patient also complains of diarrhea which he states is chronic for his Crohn's  Denies otherwise fever, chills, chest pain, shortness of breath, nausea, vomiting, abdominal pain, urinary symptoms  Prior to Admission Medications   Prescriptions Last Dose Informant Patient Reported? Taking?    Cholecalciferol (Vitamin D3) 50 MCG (2000 UT) capsule   Yes No   Sig: Take 2,000 Units by mouth daily   Melatonin 10 MG TABS   Yes No   Sig: Take 10 mg by mouth daily   busPIRone (BUSPAR) 10 mg tablet   Yes No   citalopram (CeleXA) 40 mg tablet   Yes No   clindamycin (CLEOCIN) 300 MG capsule   No No   Sig: Take 1 capsule (300 mg total) by mouth 3 (three) times a day for 10 days   clonazePAM (KlonoPIN) 1 mg tablet   Yes No   Sig: Take 1 mg by mouth 3 (three) times a day   divalproex sodium (DEPAKOTE) 500 mg EC tablet   Yes No   doxycycline hyclate (VIBRA-TABS) 100 mg tablet   Yes No   furosemide (LASIX) 40 mg tablet   Yes No   Sig: Take 40 mg by mouth daily   hydrochlorothiazide (HYDRODIURIL) 12 5 mg tablet   Yes No   levothyroxine 25 mcg tablet   Yes No   loperamide (IMODIUM) 2 mg capsule   Yes No   Sig: TAKE 2 CAPSULES BY MOUTH TWO TIMES DAILY AS NEEDED FOR DIARRHEA    metoprolol tartrate (LOPRESSOR) 50 mg tablet   Yes No   Sig: Take 50 mg by mouth 2 (two) times a day   mirtazapine (REMERON) 7 5 MG tablet   Yes No   mupirocin (BACTROBAN) 2 % ointment   No No   Sig: Apply topically 3 (three) times a day Apply to right foot wound with dressing changes 2-3 times daily until healed   paliperidone (INVEGA) 6 MG 24 hr tablet   Yes No   pantoprazole (PROTONIX) 40 mg tablet   Yes No   potassium chloride (K-DUR,KLOR-CON) 20 mEq tablet   Yes No   rosuvastatin (CRESTOR) 10 MG tablet   Yes No   traZODone (DESYREL) 50 mg tablet   Yes No      Facility-Administered Medications: None       Past Medical History:   Diagnosis Date    Bipolar affective disorder (HCC)     Cellulitis     Hyperlipidemia     Hypertension        History reviewed  No pertinent surgical history  History reviewed  No pertinent family history  I have reviewed and agree with the history as documented  E-Cigarette/Vaping    E-Cigarette Use Never User      E-Cigarette/Vaping Substances     Social History     Tobacco Use    Smoking status: Former Smoker    Smokeless tobacco: Never Used   Vaping Use    Vaping Use: Never used   Substance Use Topics    Alcohol use: Never    Drug use: Never       Review of Systems   Constitutional: Negative for activity change, chills and fever  HENT: Negative for sneezing and sore throat  Eyes: Negative for pain  Respiratory: Negative for apnea, cough, choking, chest tightness, shortness of breath, wheezing and stridor  Cardiovascular: Positive for leg swelling  Negative for chest pain and palpitations  Gastrointestinal: Positive for diarrhea  Negative for abdominal distention, abdominal pain, anal bleeding, blood in stool, constipation, nausea, rectal pain and vomiting     Genitourinary: Negative for dysuria and hematuria  Musculoskeletal: Negative for back pain, gait problem, myalgias, neck pain and neck stiffness  Skin: Positive for rash and wound  Negative for color change and pallor  Neurological: Negative for dizziness, tremors, seizures, syncope, facial asymmetry, speech difficulty, weakness, light-headedness, numbness and headaches  Physical Exam  Physical Exam  Vitals and nursing note reviewed  Constitutional:       General: He is not in acute distress  Appearance: He is well-developed  He is not ill-appearing, toxic-appearing or diaphoretic  Comments: BMI 57   HENT:      Head: Normocephalic and atraumatic  Right Ear: External ear normal       Left Ear: External ear normal       Nose: Nose normal    Eyes:      General:         Right eye: No discharge  Left eye: No discharge  Conjunctiva/sclera: Conjunctivae normal       Pupils: Pupils are equal, round, and reactive to light  Cardiovascular:      Rate and Rhythm: Normal rate and regular rhythm  Heart sounds: Normal heart sounds  No friction rub  No gallop  Pulmonary:      Effort: Pulmonary effort is normal  No respiratory distress  Breath sounds: Normal breath sounds  No stridor  No wheezing or rales  Chest:      Chest wall: No tenderness  Abdominal:      General: Bowel sounds are normal       Palpations: Abdomen is soft  Tenderness: There is no abdominal tenderness  There is no guarding  Musculoskeletal:         General: No tenderness or deformity  Normal range of motion  Cervical back: Normal range of motion and neck supple  Right lower leg: Edema present  Left lower leg: Edema present  Skin:     General: Skin is warm and dry  Capillary Refill: Capillary refill takes less than 2 seconds  Findings: Erythema, lesion and rash present  Comments: Weeping serous fluid from lower extremities, malodorous    Please see media tab for images   Neurological:      Mental Status: He is alert and oriented to person, place, and time  Psychiatric:         Behavior: Behavior is cooperative           Vital Signs  ED Triage Vitals [05/14/22 0910]   Temperature Pulse Respirations Blood Pressure SpO2   98 6 °F (37 °C) 63 18 125/60 97 %      Temp Source Heart Rate Source Patient Position - Orthostatic VS BP Location FiO2 (%)   Oral Monitor -- -- --      Pain Score       7           Vitals:    05/14/22 0910   BP: 125/60   Pulse: 63         Visual Acuity  Visual Acuity    Flowsheet Row Most Recent Value   L Pupil Size (mm) 3   R Pupil Size (mm) 3          ED Medications  Medications   vancomycin (VANCOCIN) 2,000 mg in sodium chloride 0 9 % 500 mL IVPB (has no administration in time range)   morphine (PF) 4 mg/mL injection 4 mg (4 mg Intravenous Given 5/14/22 1017)       Diagnostic Studies  Results Reviewed     Procedure Component Value Units Date/Time    Comprehensive metabolic panel [[id number]]  (Abnormal) Collected: 05/14/22 1013    Lab Status: Final result Specimen: Blood from Arm, Left Updated: 05/14/22 1050     Sodium 137 mmol/L      Potassium 4 0 mmol/L      Chloride 104 mmol/L      CO2 26 mmol/L      ANION GAP 7 mmol/L      BUN 17 mg/dL      Creatinine 1 06 mg/dL      Glucose 99 mg/dL      Calcium 8 0 mg/dL      Corrected Calcium 8 7 mg/dL      AST 15 U/L      ALT 20 U/L      Alkaline Phosphatase 56 U/L      Total Protein 6 4 g/dL      Albumin 3 1 g/dL      Total Bilirubin 0 93 mg/dL      eGFR 82 ml/min/1 73sq m     Narrative:      Hans guidelines for Chronic Kidney Disease (CKD):     Stage 1 with normal or high GFR (GFR > 90 mL/min/1 73 square meters)    Stage 2 Mild CKD (GFR = 60-89 mL/min/1 73 square meters)    Stage 3A Moderate CKD (GFR = 45-59 mL/min/1 73 square meters)    Stage 3B Moderate CKD (GFR = 30-44 mL/min/1 73 square meters)    Stage 4 Severe CKD (GFR = 15-29 mL/min/1 73 square meters)    Stage 5 End Stage CKD (GFR <15 mL/min/1 73 square meters)  Note: GFR calculation is accurate only with a steady state creatinine    CK (with reflex to MB) [[id number]]  (Normal) Collected: 05/14/22 1013    Lab Status: Final result Specimen: Blood from Arm, Left Updated: 05/14/22 1050     Total CK 62 U/L     Lactic acid, plasma [[id number]]  (Normal) Collected: 05/14/22 1013    Lab Status: Final result Specimen: Blood from Arm, Left Updated: 05/14/22 1049     LACTIC ACID 1 2 mmol/L     Narrative:      Result may be elevated if tourniquet was used during collection  D-dimer, quantitative [[id number]]  (Abnormal) Collected: 05/14/22 1015    Lab Status: Final result Specimen: Blood from Arm, Right Updated: 05/14/22 1046     D-Dimer, Quant 1 43 ug/ml FEU     CBC and differential [[id number]]  (Abnormal) Collected: 05/14/22 1013    Lab Status: Final result Specimen: Blood from Arm, Left Updated: 05/14/22 1039     WBC 5 25 Thousand/uL      RBC 3 94 Million/uL      Hemoglobin 12 2 g/dL      Hematocrit 37 7 %      MCV 96 fL      MCH 31 0 pg      MCHC 32 4 g/dL      RDW 13 2 %      MPV 10 4 fL      Platelets 460 Thousands/uL      nRBC 0 /100 WBCs      Neutrophils Relative 44 %      Immat GRANS % 1 %      Lymphocytes Relative 32 %      Monocytes Relative 20 %      Eosinophils Relative 2 %      Basophils Relative 1 %      Neutrophils Absolute 2 38 Thousands/µL      Immature Grans Absolute 0 04 Thousand/uL      Lymphocytes Absolute 1 66 Thousands/µL      Monocytes Absolute 1 03 Thousand/µL      Eosinophils Absolute 0 11 Thousand/µL      Basophils Absolute 0 03 Thousands/µL     Blood culture #1 [[id number]] Collected: 05/14/22 1013    Lab Status: In process Specimen: Blood from Arm, Left Updated: 05/14/22 1020    Blood culture #2 [[id number]] Collected: 05/14/22 1013    Lab Status:  In process Specimen: Blood from Arm, Right Updated: 05/14/22 1020                 CT lower extremity wo contrast left   Final Result by Patricia Rizzo MD (05/14 1049)      Findings in the lower leg consistent with cellulitis or venous stasis changes  No evidence of abscess or subcutaneous gas to indicate necrotizing fasciitis  Workstation performed: [id number]         CT lower extremity wo contrast right   Final Result by Beck Templeton MD (05/14 1048)      Findings in the lower leg consistent with cellulitis or venous stasis changes  No evidence of abscess or subcutaneous gas to indicate necrotizing fasciitis  Workstation performed: [id number]         VAS lower limb venous duplex study, complete bilateral    (Results Pending)              Procedures  Procedures         ED Course  ED Course as of 05/14/22 1118   Sat May 14, 2022   266 40-year-old male presenting to the emergency department with severe bilateral lower leg pain, redness, swelling in the setting of immunosuppression  Vital signs on arrival within normal limits  Physical exam is remarkable for bilateral erythematous, edematous, weeping, malodorous lower extremities  See media tab for images  Differential diagnosis includes cellulitis, necrotizing fasciitis, abscess, osteomyelitis, DVT, sepsis  Lab work and imaging was ordered  Patient was amenable to this plan and is requesting pain control  4 mg IV morphine was ordered for the patient  1040 On reassessment, patient's pain has greatly improved  Lab work is still pending at this time  1044 WBC: 5 25   1051 CT lower extremity wo contrast right  Cellulitis noted   1052 CT lower extremity wo contrast left  Cellulitis noted   1058 D-Dimer, Quant(!): 1 43   1058 CBC, CMP, lactic acid, CK grossly unremarkable  1113 Case discussed with General Medicine for admission  1117 Given lack of fever, leukocytosis and nonresponse antibiotics in findings of cellulitis versus venous stasis changes on CT  Bilateral duplex was ordered                                 SBIRT 22yo+    Flowsheet Row Most Recent Value   SBIRT (23 yo +)    In order to provide better care to our patients, we are screening all of our patients for alcohol and drug use  Would it be okay to ask you these screening questions? Yes Filed at: 05/14/2022 1598   Initial Alcohol Screen: US AUDIT-C     1  How often do you have a drink containing alcohol? 0 Filed at: 05/14/2022 0912   2  How many drinks containing alcohol do you have on a typical day you are drinking? 0 Filed at: 05/14/2022 0912   3a  Male UNDER 65: How often do you have five or more drinks on one occasion? 0 Filed at: 05/14/2022 0912   3b  FEMALE Any Age, or MALE 65+: How often do you have 4 or more drinks on one occassion? 0 Filed at: 05/14/2022 0912   Audit-C Score 0 Filed at: 05/14/2022 5211   BISMARK: How many times in the past year have you    Used an illegal drug or used a prescription medication for non-medical reasons? Never Filed at: 05/14/2022 7534                    MDM    Disposition  Final diagnoses:   Cellulitis of lower extremity, unspecified laterality     Time reflects when diagnosis was documented in both MDM as applicable and the Disposition within this note     Time User Action Codes Description Comment    5/14/2022 11:06 AM Letta Prudent Add [L03 [id number]] Cellulitis of lower extremity, unspecified laterality       ED Disposition     ED Disposition   Admit    Condition   Stable    Date/Time   Sat May 14, 2022 11:05 AM    Comment   Case was discussed with Dr Judson Alvarez and the patient's admission status was agreed to be Admission Status: inpatient status to the service of Dr Judson Alvarez   Follow-up Information    None         Patient's Medications   Discharge Prescriptions    No medications on file       No discharge procedures on file      PDMP Review     None          ED Provider  Electronically Signed by           Dax Limon MD  05/14/22 8701

## 2022-05-15 ENCOUNTER — APPOINTMENT (INPATIENT)
Dept: NON INVASIVE DIAGNOSTICS | Facility: HOSPITAL | Age: 47
DRG: 603 | End: 2022-05-15
Payer: MEDICARE

## 2022-05-15 VITALS
DIASTOLIC BLOOD PRESSURE: 67 MMHG | SYSTOLIC BLOOD PRESSURE: 119 MMHG | TEMPERATURE: 97.6 F | HEART RATE: 69 BPM | HEIGHT: 76 IN | RESPIRATION RATE: 20 BRPM | WEIGHT: 315 LBS | BODY MASS INDEX: 38.36 KG/M2 | OXYGEN SATURATION: 97 %

## 2022-05-15 LAB
ANION GAP SERPL CALCULATED.3IONS-SCNC: 6 MMOL/L (ref 4–13)
AORTIC ROOT: 2.9 CM
ASCENDING AORTA: 3 CM
BASOPHILS # BLD AUTO: 0.02 THOUSANDS/ΜL (ref 0–0.1)
BASOPHILS NFR BLD AUTO: 1 % (ref 0–1)
BUN SERPL-MCNC: 17 MG/DL (ref 5–25)
CALCIUM SERPL-MCNC: 7.9 MG/DL (ref 8.4–10.2)
CHLORIDE SERPL-SCNC: 105 MMOL/L (ref 96–108)
CO2 SERPL-SCNC: 27 MMOL/L (ref 21–32)
CREAT SERPL-MCNC: 0.97 MG/DL (ref 0.6–1.3)
E WAVE DECELERATION TIME: 346 MS
EOSINOPHIL # BLD AUTO: 0.12 THOUSAND/ΜL (ref 0–0.61)
EOSINOPHIL NFR BLD AUTO: 4 % (ref 0–6)
ERYTHROCYTE [DISTWIDTH] IN BLOOD BY AUTOMATED COUNT: 13.4 % (ref 11.6–15.1)
FRACTIONAL SHORTENING: 31 (ref 28–44)
GFR SERPL CREATININE-BSD FRML MDRD: 91 ML/MIN/1.73SQ M
GLUCOSE SERPL-MCNC: 117 MG/DL (ref 65–140)
HCT VFR BLD AUTO: 38.1 % (ref 36.5–49.3)
HGB BLD-MCNC: 12.3 G/DL (ref 12–17)
IMM GRANULOCYTES # BLD AUTO: 0.03 THOUSAND/UL (ref 0–0.2)
IMM GRANULOCYTES NFR BLD AUTO: 1 % (ref 0–2)
INTERVENTRICULAR SEPTUM IN DIASTOLE (PARASTERNAL SHORT AXIS VIEW): 1.2 CM
INTERVENTRICULAR SEPTUM: 1.2 CM (ref 0.6–1.1)
LAAS-AP4: 22.8 CM2
LEFT ATRIUM SIZE: 4.3 CM
LEFT INTERNAL DIMENSION IN SYSTOLE: 3.7 CM (ref 2.1–4)
LEFT VENTRICULAR INTERNAL DIMENSION IN DIASTOLE: 5.4 CM (ref 3.5–6)
LEFT VENTRICULAR POSTERIOR WALL IN END DIASTOLE: 1.2 CM
LEFT VENTRICULAR STROKE VOLUME: 85 ML
LVSV (TEICH): 85 ML
LYMPHOCYTES # BLD AUTO: 1.28 THOUSANDS/ΜL (ref 0.6–4.47)
LYMPHOCYTES NFR BLD AUTO: 41 % (ref 14–44)
MCH RBC QN AUTO: 31 PG (ref 26.8–34.3)
MCHC RBC AUTO-ENTMCNC: 32.3 G/DL (ref 31.4–37.4)
MCV RBC AUTO: 96 FL (ref 82–98)
MONOCYTES # BLD AUTO: 0.57 THOUSAND/ΜL (ref 0.17–1.22)
MONOCYTES NFR BLD AUTO: 19 % (ref 4–12)
MV E'TISSUE VEL-SEP: 9 CM/S
MV PEAK A VEL: 0.67 M/S
MV PEAK E VEL: 63 CM/S
MV STENOSIS PRESSURE HALF TIME: 100 MS
MV VALVE AREA P 1/2 METHOD: 2.2
NEUTROPHILS # BLD AUTO: 1.05 THOUSANDS/ΜL (ref 1.85–7.62)
NEUTS SEG NFR BLD AUTO: 34 % (ref 43–75)
NRBC BLD AUTO-RTO: 0 /100 WBCS
PLATELET # BLD AUTO: 210 THOUSANDS/UL (ref 149–390)
PMV BLD AUTO: 10.2 FL (ref 8.9–12.7)
POTASSIUM SERPL-SCNC: 4.1 MMOL/L (ref 3.5–5.3)
PROCALCITONIN SERPL-MCNC: 0.06 NG/ML
RBC # BLD AUTO: 3.97 MILLION/UL (ref 3.88–5.62)
RIGHT ATRIUM AREA SYSTOLE A4C: 24.1 CM2
SL CV LV EF: 60
SL CV PED ECHO LEFT VENTRICLE DIASTOLIC VOLUME (MOD BIPLANE) 2D: 143 ML
SL CV PED ECHO LEFT VENTRICLE SYSTOLIC VOLUME (MOD BIPLANE) 2D: 57 ML
SODIUM SERPL-SCNC: 138 MMOL/L (ref 135–147)
WBC # BLD AUTO: 3.07 THOUSAND/UL (ref 4.31–10.16)

## 2022-05-15 PROCEDURE — C8929 TTE W OR WO FOL WCON,DOPPLER: HCPCS

## 2022-05-15 PROCEDURE — 85025 COMPLETE CBC W/AUTO DIFF WBC: CPT | Performed by: INTERNAL MEDICINE

## 2022-05-15 PROCEDURE — 93970 EXTREMITY STUDY: CPT | Performed by: SURGERY

## 2022-05-15 PROCEDURE — 99223 1ST HOSP IP/OBS HIGH 75: CPT | Performed by: INTERNAL MEDICINE

## 2022-05-15 PROCEDURE — 84145 PROCALCITONIN (PCT): CPT | Performed by: INTERNAL MEDICINE

## 2022-05-15 PROCEDURE — 80048 BASIC METABOLIC PNL TOTAL CA: CPT | Performed by: INTERNAL MEDICINE

## 2022-05-15 PROCEDURE — 99239 HOSP IP/OBS DSCHRG MGMT >30: CPT | Performed by: NURSE PRACTITIONER

## 2022-05-15 PROCEDURE — 93306 TTE W/DOPPLER COMPLETE: CPT | Performed by: INTERNAL MEDICINE

## 2022-05-15 RX ADMIN — BUSPIRONE HYDROCHLORIDE 20 MG: 10 TABLET ORAL at 07:58

## 2022-05-15 RX ADMIN — FUROSEMIDE 40 MG: 10 INJECTION, SOLUTION INTRAMUSCULAR; INTRAVENOUS at 08:01

## 2022-05-15 RX ADMIN — LOPERAMIDE HYDROCHLORIDE 4 MG: 2 CAPSULE ORAL at 07:58

## 2022-05-15 RX ADMIN — LEVOTHYROXINE SODIUM 25 MCG: 25 TABLET ORAL at 05:35

## 2022-05-15 RX ADMIN — METOPROLOL TARTRATE 50 MG: 50 TABLET, FILM COATED ORAL at 07:58

## 2022-05-15 RX ADMIN — PANTOPRAZOLE SODIUM 40 MG: 40 TABLET, DELAYED RELEASE ORAL at 05:35

## 2022-05-15 RX ADMIN — PALIPERIDONE 12 MG: 3 TABLET, EXTENDED RELEASE ORAL at 08:02

## 2022-05-15 RX ADMIN — POTASSIUM CHLORIDE 20 MEQ: 1500 TABLET, EXTENDED RELEASE ORAL at 07:58

## 2022-05-15 RX ADMIN — CITALOPRAM HYDROBROMIDE 40 MG: 20 TABLET ORAL at 07:58

## 2022-05-15 RX ADMIN — ENOXAPARIN SODIUM 60 MG: 60 INJECTION SUBCUTANEOUS at 07:59

## 2022-05-15 RX ADMIN — DIVALPROEX SODIUM 1000 MG: 500 TABLET, DELAYED RELEASE ORAL at 07:58

## 2022-05-15 RX ADMIN — CHOLECALCIFEROL TAB 25 MCG (1000 UNIT) 2000 UNITS: 25 TAB at 07:58

## 2022-05-15 RX ADMIN — PERFLUTREN 1.5 ML/MIN: 6.52 INJECTION, SUSPENSION INTRAVENOUS at 08:55

## 2022-05-15 RX ADMIN — OXYCODONE HYDROCHLORIDE 5 MG: 5 TABLET ORAL at 05:35

## 2022-05-15 RX ADMIN — CLONAZEPAM 1 MG: 1 TABLET ORAL at 07:58

## 2022-05-15 NOTE — PLAN OF CARE
Problem: Potential for Falls  Goal: Patient will remain free of falls  Description: INTERVENTIONS:  - Educate patient/family on patient safety including physical limitations  - Instruct patient to call for assistance with activity   - Consult OT/PT to assist with strengthening/mobility   - Keep Call bell within reach  - Keep bed low and locked with side rails adjusted as appropriate  - Keep care items and personal belongings within reach  - Initiate and maintain comfort rounds  - Make Fall Risk Sign visible to staff  - Apply yellow socks and bracelet for high fall risk patients  - Consider moving patient to room near nurses station  Outcome: Progressing     Problem: PAIN - ADULT  Goal: Verbalizes/displays adequate comfort level or baseline comfort level  Description: Interventions:  - Encourage patient to monitor pain and request assistance  - Assess pain using appropriate pain scale  - Administer analgesics based on type and severity of pain and evaluate response  - Implement non-pharmacological measures as appropriate and evaluate response  - Consider cultural and social influences on pain and pain management  - Notify physician/advanced practitioner if interventions unsuccessful or patient reports new pain  Outcome: Progressing     Problem: INFECTION - ADULT  Goal: Absence or prevention of progression during hospitalization  Description: INTERVENTIONS:  - Assess and monitor for signs and symptoms of infection  - Monitor lab/diagnostic results  - Monitor all insertion sites, i e  indwelling lines, tubes, and drains  - Monitor endotracheal if appropriate and nasal secretions for changes in amount and color  - [place] appropriate cooling/warming therapies per order  - Administer medications as ordered  - Instruct and encourage patient and family to use good hand hygiene technique  - Identify and instruct in appropriate isolation precautions for identified infection/condition  Outcome: Progressing  Goal: Absence of fever/infection during neutropenic period  Description: INTERVENTIONS:  - Monitor WBC    Outcome: Progressing     Problem: SAFETY ADULT  Goal: Patient will remain free of falls  Description: INTERVENTIONS:  - Educate patient/family on patient safety including physical limitations  - Instruct patient to call for assistance with activity   - Consult OT/PT to assist with strengthening/mobility   - Keep Call bell within reach  - Keep bed low and locked with side rails adjusted as appropriate  - Keep care items and personal belongings within reach  - Initiate and maintain comfort rounds  - Make Fall Risk Sign visible to staff  - Apply yellow socks and bracelet for high fall risk patients  - Consider moving patient to room near nurses station  Outcome: Progressing  Goal: Maintain or return to baseline ADL function  Description: INTERVENTIONS:  -  Assess patient's ability to carry out ADLs; assess patient's baseline for ADL function and identify physical deficits which impact ability to perform ADLs (bathing, care of mouth/teeth, toileting, grooming, dressing, etc )  - Assess/evaluate cause of self-care deficits   - Assess range of motion  - Assess patient's mobility; develop plan if impaired  - Assess patient's need for assistive devices and provide as appropriate  - Encourage maximum independence but intervene and supervise when necessary  - Involve family in performance of ADLs  - Assess for home care needs following discharge   - Consider OT consult to assist with ADL evaluation and planning for discharge  - Provide patient education as appropriate  Outcome: Progressing  Goal: Maintains/Returns to pre admission functional level  Description: INTERVENTIONS:  - Perform BMAT or MOVE assessment daily    - Set and communicate daily mobility goal to care team and patient/family/caregiver     - Collaborate with rehabilitation services on mobility goals if consulted  - Record patient progress and toleration of activity level Outcome: Progressing     Problem: DISCHARGE PLANNING  Goal: Discharge to home or other facility with appropriate resources  Description: INTERVENTIONS:  - Identify barriers to discharge w/patient and caregiver  - Arrange for needed discharge resources and transportation as appropriate  - Identify discharge learning needs (meds, wound care, etc )  - Arrange for interpretive services to assist at discharge as needed  - Refer to Case Management Department for coordinating discharge planning if the patient needs post-hospital services based on physician/advanced practitioner order or complex needs related to functional status, cognitive ability, or social support system  Outcome: Progressing     Problem: Knowledge Deficit  Goal: Patient/family/caregiver demonstrates understanding of disease process, treatment plan, medications, and discharge instructions  Description: Complete learning assessment and assess knowledge base    Interventions:  - Provide teaching at level of understanding  - Provide teaching via preferred learning methods  Outcome: Progressing     Problem: RESPIRATORY - ADULT  Goal: Achieves optimal ventilation and oxygenation  Description: INTERVENTIONS:  - Assess for changes in respiratory status  - Assess for changes in mentation and behavior  - Position to facilitate oxygenation and minimize respiratory effort  - Oxygen administered by appropriate delivery if ordered  - Initiate smoking cessation education as indicated  - Encourage broncho-pulmonary hygiene including cough, deep breathe, Incentive Spirometry  - Assess the need for suctioning and aspirate as needed  - Assess and instruct to report SOB or any respiratory difficulty  - Respiratory Therapy support as indicated  Outcome: Progressing     Problem: SKIN/TISSUE INTEGRITY - ADULT  Goal: Skin Integrity remains intact(Skin Breakdown Prevention)  Description: Assess:  -Inspect skin when repositioning, toileting, and assisting with ADLS  -Assess extremities for adequate circulation and sensation     Bed Management:  -Have minimal linens on bed & keep smooth, unwrinkled  -Change linens as needed when moist or perspiring    Toileting:  -Offer bedside commode      Activity:  -Encourage activity and walks on unit  -Encourage or provide ROM exercises   -Use appropriate equipment to lift or move patient in bed    Skin Care:  -Avoid use of baby powder, tape, friction and shearing, hot water or constrictive clothing  -Do not massage red bony areas      Goal: Incision(s), wounds(s) or drain site(s) healing without S/S of infection  Description: INTERVENTIONS  - Assess and document dressing, incision, wound bed, drain sites and surrounding tissue  - Provide patient and family education    Outcome: Progressing  Goal: Pressure injury heals and does not worsen  Description: Interventions:  - Implement low air loss mattress or specialty surface (Criteria met)  - Apply silicone foam dressing  - Apply fecal or urinary incontinence containment device   - Utilize friction reducing device or surface for transfers   - Consider nutrition services referral as needed  Outcome: Progressing

## 2022-05-15 NOTE — DISCHARGE SUMMARY
[facility]  1975, 48 y o  male MRN: [id number]  Unit/Bed#: S -01 Encounter: [id number]  Primary Care Provider: Simeon Mcgill DO   Date and time admitted to hospital: 5/14/2022  9:13 AM    * Pain and swelling of lower extremity  Assessment & Plan  Background: Patient came in with pain and swelling of bilateral lower extremities, right greater than the left  Due to the pain, patient has been having difficulty sleeping  Patient has been having recurrent lower extremity cellulitis for which this has been treated at [facility] with several antibiotics including IV cefazolin, IV vancomycin, on oral clindamycin and doxycycline as well as mupirocin ointment  Patient also was prescribed with Medrol Dosepak at [facility]  · Thought to be bacterial cellulitis in the emergency room and thus was given IV vancomycin  · Hold off further antibiotic at this time  This was discussed with the patient and okay with this plan  · D-dimer was elevated: Duplex negative   · CT scan of bilateral lower extremities:  Revealed cellulitis versus venous stasis changes  No signs of possibility of a deeper infection such as an abscess or necrotizing fascitis  · I have a suspicion that this is more of venous stasis skin changes/dermatitis, instead of a bacterial cellulitis  Patient did not have any fever chills  No leukocytosis on patient's CBC  Examination of bilateral lower extremities did not reveal abnormal warmth and instead, on palpation, it was cold with weeping fluids from significant bilateral lower extremity edema  · Procal negative   · Blood cultures pending  · Echocardiogram  · Elevate bilateral lower extremities  · Ace wraps/compression  · Wound care consult for local wound care  · Pain control    · Highly suspect element of dietary indiscretion and high sodium diet as recently began working at [email or web address]  · Infectious disease consulted; no need for antibiotic therapy at this time cleared for discharge from Infectious Disease standpoint  · Patient requesting VNA cm to follow    Hypothyroidism  Assessment & Plan  · TSH unremarkable; continue PTA dosing    Morbid obesity ([facility] [address] )  Assessment & Plan  · Aggressive lifestyle modifications recommended    Bipolar disorder ([facility] [address] )  Assessment & Plan  · Stable  · Continue patient's psychiatric medications  Crohn's disease ([facility] [address] )  Assessment & Plan  · With history of chronic diarrhea due to Crohn's disease  · Continue patient's Imodium on as needed basis  Dyslipidemia  Assessment & Plan  · Continue statin medication    Primary hypertension  Assessment & Plan  · Continue furosemide 40 mg once a day  · Since patient has significant bilateral lower extremity edema; status post IV lasix in ED       Discharging Physician / Practitioner: Gini Bernheim, [address]  PCP: Lorri Barba DO  Admission Date:   Admission Orders (From admission, onward)     Ordered        05/14/22 1114  Inpatient Admission  Once                      Discharge Date: 05/15/22    Medical Problems             Resolved Problems  Date Reviewed: 5/15/2022   None                 Consultations During Hospital Stay:  · IP CONSULT TO INFECTIOUS DISEASES    Procedures Performed:   VAS lower limb venous duplex study, complete bilateral Result Date: 5/15/2022  · Narrative:  [facility] REPORT CLINICAL: Indications: Patient presents with bilateral lower extremity edema x few years  Operative History: Patient denies previous cardiovascular surgeries Risk Factors The patient has history of HTN and Hyperlipidemia  CONCLUSION:  Impression: RIGHT LOWER LIMB: No evidence of acute or chronic deep vein thrombosis  No evidence of superficial thrombophlebitis noted  Doppler evaluation shows a normal response to augmentation maneuvers  Popliteal, posterior tibial and anterior tibial arterial Doppler waveforms are triphasic    LEFT LOWER LIMB: No evidence of acute or chronic deep vein thrombosis  No evidence of superficial thrombophlebitis noted  Doppler evaluation shows a normal response to augmentation maneuvers  Popliteal, posterior tibial and anterior tibial arterial Doppler waveforms are triphasic  Technical findings were given to Megan Sanchez MD  SIGNATURE: Electronically Signed by: Mayra Lopez on 2022-05-15 09:25:29 AM  · CT lower extremity wo contrast left Result Date: 5/14/2022  · Narrative: CT left lower extremity without IV contrast INDICATION: r/o Nec Fac, abscess  Leg swelling  COMPARISON: None  TECHNIQUE: CT examination of the above was performed  This examination was performed without intravenous contrast in the context of the critical nationwide Omnipaque shortage  This examination, like all CT scans performed in the [facility], was performed utilizing techniques to minimize radiation dose exposure, including the use of iterative reconstruction and automated exposure control software  Sagittal and coronal two dimensional reconstructed images were also submitted for interpretation  Rad dose  1310 mGy-cm FINDINGS: OSSEOUS STRUCTURES:  No fracture, dislocation or destructive osseous lesion  VISUALIZED MUSCULATURE:  Unremarkable  SOFT TISSUES:  Diffuse subcutaneous edema with skin thickening in the lower leg  No evidence of subcutaneous gas  No discrete collection to indicate abscess  OTHER PERTINENT FINDINGS:  None  Impression: Findings in the lower leg consistent with cellulitis or venous stasis changes  No evidence of abscess or subcutaneous gas to indicate necrotizing fasciitis  Workstation performed: [id number]   · CT lower extremity wo contrast right Result Date: 5/14/2022  · Narrative: CT right lower extremity without IV contrast INDICATION: r/o Nec Fac, abscess  Leg swelling  COMPARISON: None  TECHNIQUE: CT examination of the above was performed    This examination was performed without intravenous contrast in the context of the critical nationwide Omnipaque shortage  This examination, like all CT scans performed in the [facility], was performed utilizing techniques to minimize radiation dose exposure, including the use of iterative reconstruction and automated exposure control software  Sagittal and coronal two dimensional reconstructed images were also submitted for interpretation  Rad dose  1392 mGy-cm FINDINGS: OSSEOUS STRUCTURES:  No fracture, dislocation or destructive osseous lesion  VISUALIZED MUSCULATURE:  Unremarkable  SOFT TISSUES:  Diffuse subcutaneous edema with skin thickening in the lower leg  No evidence of subcutaneous gas  No discrete collection to indicate abscess  OTHER PERTINENT FINDINGS:  None  Impression: Findings in the lower leg consistent with cellulitis or venous stasis changes  No evidence of abscess or subcutaneous gas to indicate necrotizing fasciitis  Workstation performed: [id number]     Significant Findings / Test Results:   · As noted above    Incidental Findings:   · None    Test Results Pending at Discharge (will require follow up): · None     Outpatient Tests Requested:  · None    Complications:  None    Past Medical History:   Diagnosis Date    Bipolar affective disorder ([facility] [address] )     Cellulitis     Hyperlipidemia     Hypertension        Reason for Admission: Leg Swelling (Right lower leg and foot pain/swelling/wound for the last year - painful and cannot sleep)       Hospital Course:     Naya Tate is a 48 y o  male patient with past medical history as noted in table above who originally presented to the hospital on 5/14/2022 due to Leg Swelling (Right lower leg and foot pain/swelling/wound for the last year - painful and cannot sleep)   Patient presented to the emergency department as was thought to have cellulitic infection    Of note has very longstanding complicated history with multiple rounds of antibiotic therapy with [facility] as well  Was admitted in seen in collaboration with infectious disease who did not feel active cellulitic infection and more in relation to morbid obesity and venous stasis  Duplex negative  Recommended aggressive lifestyle modifications with low-sodium diet, compression stockings, and elevation when sitting  Patient did request home services however unfortunately did not qualify however was made aware of wound care center as needed by case management  He did have an echocardiogram completed however is already on diuretic therapy and recommended continuing diuretic therapy and close follow-up with PCP for results of echocardiogram     Please see above list of diagnoses and related plan for additional information  Condition at Discharge: stable     Discharge Day Visit / Exam:     Subjective:  Patient does report bilateral lower extremity discomfort however no different from his baseline he reports  Vitals: Blood Pressure: 119/67 (05/15/22 0820)  Pulse: 69 (05/15/22 0820)  Temperature: 97 6 °F (36 4 °C) (05/15/22 0809)  Temp Source: Oral (05/15/22 0809)  Respirations: 20 (05/15/22 0809)  Height: 6' 4" (193 cm) (05/15/22 0820)  Weight - Scale: (!) 215 kg (473 lb) (05/15/22 0820)  SpO2: 97 % (05/15/22 0809)  Exam:   Physical Exam  Constitutional:       Appearance: He is morbidly obese  He is not ill-appearing  Cardiovascular:      Rate and Rhythm: Normal rate  Pulses: Normal pulses  Pulmonary:      Effort: Pulmonary effort is normal  No respiratory distress  Breath sounds: Normal breath sounds  No wheezing, rhonchi or rales  Abdominal:      General: Bowel sounds are normal       Palpations: Abdomen is soft  Musculoskeletal:         General: Swelling present  Right lower leg: Edema present  Left lower leg: Edema present  Skin:     General: Skin is warm and dry  Capillary Refill: Capillary refill takes less than 2 seconds     Neurological:      Mental Status: He is alert and oriented to person, place, and time  Psychiatric:         Mood and Affect: Mood normal          Behavior: Behavior is cooperative  Discussion with Family: declined     Discharge instructions/Information to patient and family:   See after visit summary for information provided to patient and family  Provisions for Follow-Up Care:  See after visit summary for information related to follow-up care and any pertinent home health orders  Disposition:     Home    Discharge Statement:  I spent 45 minutes discharging the patient  This time was spent on the day of discharge  I had direct contact with the patient on the day of discharge  Greater than 50% of the total time was spent examining patient, answering all patient questions, arranging and discussing plan of care with patient as well as directly providing post-discharge instructions  Additional time then spent on discharge activities  Discharge Medications:  See after visit summary for reconciled discharge medications provided to patient and family        ** Please Note: This note has been constructed using a voice recognition system **

## 2022-05-15 NOTE — PLAN OF CARE
Problem: Potential for Falls  Goal: Patient will remain free of falls  Description: INTERVENTIONS:  - Educate patient/family on patient safety including physical limitations  - Instruct patient to call for assistance with activity   - Consult OT/PT to assist with strengthening/mobility   - Keep Call bell within reach  - Keep bed low and locked with side rails adjusted as appropriate  - Keep care items and personal belongings within reach  - Initiate and maintain comfort rounds  - Make Fall Risk Sign visible to staff  - Offer Toileting every  Hours, in advance of need  - Initiate/Maintain alarm  - Obtain necessary fall risk management equipment:   - Apply yellow socks and bracelet for high fall risk patients  - Consider moving patient to room near nurses station  Outcome: Progressing     Problem: PAIN - ADULT  Goal: Verbalizes/displays adequate comfort level or baseline comfort level  Description: Interventions:  - Encourage patient to monitor pain and request assistance  - Assess pain using appropriate pain scale  - Administer analgesics based on type and severity of pain and evaluate response  - Implement non-pharmacological measures as appropriate and evaluate response  - Consider cultural and social influences on pain and pain management  - Notify physician/advanced practitioner if interventions unsuccessful or patient reports new pain  Outcome: Progressing     Problem: INFECTION - ADULT  Goal: Absence or prevention of progression during hospitalization  Description: INTERVENTIONS:  - Assess and monitor for signs and symptoms of infection  - Monitor lab/diagnostic results  - Monitor all insertion sites, i e  indwelling lines, tubes, and drains  - Monitor endotracheal if appropriate and nasal secretions for changes in amount and color  - [place] appropriate cooling/warming therapies per order  - Administer medications as ordered  - Instruct and encourage patient and family to use good hand hygiene technique  - Identify and instruct in appropriate isolation precautions for identified infection/condition  Outcome: Progressing     Problem: SAFETY ADULT  Goal: Patient will remain free of falls  Description: INTERVENTIONS:  - Educate patient/family on patient safety including physical limitations  - Instruct patient to call for assistance with activity   - Consult OT/PT to assist with strengthening/mobility   - Keep Call bell within reach  - Keep bed low and locked with side rails adjusted as appropriate  - Keep care items and personal belongings within reach  - Initiate and maintain comfort rounds  - Make Fall Risk Sign visible to staff  - Offer Toileting every  Hours, in advance of need  - Initiate/Maintain alarm  - Obtain necessary fall risk management equipment:   - Apply yellow socks and bracelet for high fall risk patients  - Consider moving patient to room near nurses station  Outcome: Progressing  Goal: Maintain or return to baseline ADL function  Description: INTERVENTIONS:  -  Assess patient's ability to carry out ADLs; assess patient's baseline for ADL function and identify physical deficits which impact ability to perform ADLs (bathing, care of mouth/teeth, toileting, grooming, dressing, etc )  - Assess/evaluate cause of self-care deficits   - Assess range of motion  - Assess patient's mobility; develop plan if impaired  - Assess patient's need for assistive devices and provide as appropriate  - Encourage maximum independence but intervene and supervise when necessary  - Involve family in performance of ADLs  - Assess for home care needs following discharge   - Consider OT consult to assist with ADL evaluation and planning for discharge  - Provide patient education as appropriate  Outcome: Progressing  Goal: Maintains/Returns to pre admission functional level  Description: INTERVENTIONS:  - Perform BMAT or MOVE assessment daily    - Set and communicate daily mobility goal to care team and patient/family/caregiver  - Collaborate with rehabilitation services on mobility goals if consulted  - Perform Range of Motion  times a day  - Reposition patient every  hours  - Dangle patient  times a day  - Stand patient  times a day  - Ambulate patient  times a day  - Out of bed to chair  times a day   - Out of bed for meals  times a day  - Out of bed for toileting  - Record patient progress and toleration of activity level   Outcome: Progressing     Problem: DISCHARGE PLANNING  Goal: Discharge to home or other facility with appropriate resources  Description: INTERVENTIONS:  - Identify barriers to discharge w/patient and caregiver  - Arrange for needed discharge resources and transportation as appropriate  - Identify discharge learning needs (meds, wound care, etc )  - Arrange for interpretive services to assist at discharge as needed  - Refer to Case Management Department for coordinating discharge planning if the patient needs post-hospital services based on physician/advanced practitioner order or complex needs related to functional status, cognitive ability, or social support system  Outcome: Progressing     Problem: Knowledge Deficit  Goal: Patient/family/caregiver demonstrates understanding of disease process, treatment plan, medications, and discharge instructions  Description: Complete learning assessment and assess knowledge base    Interventions:  - Provide teaching at level of understanding  - Provide teaching via preferred learning methods  Outcome: Progressing     Problem: RESPIRATORY - ADULT  Goal: Achieves optimal ventilation and oxygenation  Description: INTERVENTIONS:  - Assess for changes in respiratory status  - Assess for changes in mentation and behavior  - Position to facilitate oxygenation and minimize respiratory effort  - Oxygen administered by appropriate delivery if ordered  - Initiate smoking cessation education as indicated  - Encourage broncho-pulmonary hygiene including cough, deep breathe, Incentive Spirometry  - Assess the need for suctioning and aspirate as needed  - Assess and instruct to report SOB or any respiratory difficulty  - Respiratory Therapy support as indicated  Outcome: Progressing     Problem: SKIN/TISSUE INTEGRITY - ADULT  Goal: Skin Integrity remains intact(Skin Breakdown Prevention)  Description: Assess:  -Perform Javier assessment every   -Clean and moisturize skin every   -Inspect skin when repositioning, toileting, and assisting with ADLS  -Assess under medical devices such as  every   -Assess extremities for adequate circulation and sensation     Bed Management:  -Have minimal linens on bed & keep smooth, unwrinkled  -Change linens as needed when moist or perspiring  -Avoid sitting or lying in one position for more than  hours while in bed  -Keep HOB at degrees     Toileting:  -Offer bedside commode  -Assess for incontinence every   -Use incontinent care products after each incontinent episode such as     Activity:  -Mobilize patient  times a day  -Encourage activity and walks on unit  -Encourage or provide ROM exercises   -Turn and reposition patient every  Hours  -Use appropriate equipment to lift or move patient in bed  -Instruct/ Assist with weight shifting every  when out of bed in chair  -Consider limitation of chair time  hour intervals    Skin Care:  -Avoid use of baby powder, tape, friction and shearing, hot water or constrictive clothing  -Relieve pressure over bony prominences using   -Do not massage red bony areas    Next Steps:  -Teach patient strategies to minimize risks such as    -Consider consults to  interdisciplinary teams such as   Outcome: Progressing  Goal: Incision(s), wounds(s) or drain site(s) healing without S/S of infection  Description: INTERVENTIONS  - Assess and document dressing, incision, wound bed, drain sites and surrounding tissue  - Provide patient and family education  - Perform skin care/dressing changes every   Outcome: Progressing  Goal: Pressure injury heals and does not worsen  Description: Interventions:  - Implement low air loss mattress or specialty surface (Criteria met)  - Apply silicone foam dressing  - Instruct/assist with weight shifting every  minutes when in chair   - Limit chair time to hour intervals  - Use special pressure reducing interventions such as  when in chair   - Apply fecal or urinary incontinence containment device   - Perform passive or active ROM every   - Turn and reposition patient & offload bony prominences every  hours   - Utilize friction reducing device or surface for transfers   - Consider consults to  interdisciplinary teams such as   - Use incontinent care products after each incontinent episode such as   - Consider nutrition services referral as needed  Outcome: Progressing     Problem: Prexisting or High Potential for Compromised Skin Integrity  Goal: Skin integrity is maintained or improved  Description: INTERVENTIONS:  - Identify patients at risk for skin breakdown  - Assess and monitor skin integrity  - Assess and monitor nutrition and hydration status  - Monitor labs   - Assess for incontinence   - Turn and reposition patient  - Assist with mobility/ambulation  - Relieve pressure over bony prominences  - Avoid friction and shearing  - Provide appropriate hygiene as needed including keeping skin clean and dry  - Evaluate need for skin moisturizer/barrier cream  - Collaborate with interdisciplinary team   - Patient/family teaching  - Consider wound care consult   Outcome: Progressing

## 2022-05-15 NOTE — ASSESSMENT & PLAN NOTE
· Continue furosemide 40 mg once a day    · Since patient has significant bilateral lower extremity edema; status post IV lasix in ED

## 2022-05-15 NOTE — PLAN OF CARE
Problem: Potential for Falls  Goal: Patient will remain free of falls  Description: INTERVENTIONS:  - Educate patient/family on patient safety including physical limitations  - Instruct patient to call for assistance with activity   - Consult OT/PT to assist with strengthening/mobility   - Keep Call bell within reach  - Keep bed low and locked with side rails adjusted as appropriate  - Keep care items and personal belongings within reach  - Initiate and maintain comfort rounds  - Make Fall Risk Sign visible to staff  - Offer Toileting every  Hours, in advance of need  - Initiate/Maintain alarm  - Obtain necessary fall risk management equipment:   - Apply yellow socks and bracelet for high fall risk patients  - Consider moving patient to room near nurses station  5/15/2022 1154 by August Louis RN  Outcome: Completed  5/15/2022 0924 by August Louis RN  Outcome: Progressing     Problem: PAIN - ADULT  Goal: Verbalizes/displays adequate comfort level or baseline comfort level  Description: Interventions:  - Encourage patient to monitor pain and request assistance  - Assess pain using appropriate pain scale  - Administer analgesics based on type and severity of pain and evaluate response  - Implement non-pharmacological measures as appropriate and evaluate response  - Consider cultural and social influences on pain and pain management  - Notify physician/advanced practitioner if interventions unsuccessful or patient reports new pain  5/15/2022 1154 by August Louis RN  Outcome: Completed  5/15/2022 0924 by August Louis RN  Outcome: Progressing     Problem: INFECTION - ADULT  Goal: Absence or prevention of progression during hospitalization  Description: INTERVENTIONS:  - Assess and monitor for signs and symptoms of infection  - Monitor lab/diagnostic results  - Monitor all insertion sites, i e  indwelling lines, tubes, and drains  - Monitor endotracheal if appropriate and nasal secretions for changes in amount and color  - [place] appropriate cooling/warming therapies per order  - Administer medications as ordered  - Instruct and encourage patient and family to use good hand hygiene technique  - Identify and instruct in appropriate isolation precautions for identified infection/condition  5/15/2022 1154 by Wali Fonseca RN  Outcome: Completed  5/15/2022 0924 by Wali Fonseca RN  Outcome: Progressing  Goal: Absence of fever/infection during neutropenic period  Description: INTERVENTIONS:  - Monitor WBC    Outcome: Completed     Problem: SAFETY ADULT  Goal: Patient will remain free of falls  Description: INTERVENTIONS:  - Educate patient/family on patient safety including physical limitations  - Instruct patient to call for assistance with activity   - Consult OT/PT to assist with strengthening/mobility   - Keep Call bell within reach  - Keep bed low and locked with side rails adjusted as appropriate  - Keep care items and personal belongings within reach  - Initiate and maintain comfort rounds  - Make Fall Risk Sign visible to staff  - Offer Toileting every  Hours, in advance of need  - Initiate/Maintain alarm  - Obtain necessary fall risk management equipment:   - Apply yellow socks and bracelet for high fall risk patients  - Consider moving patient to room near nurses station  5/15/2022 1154 by Wali Fonseca RN  Outcome: Completed  5/15/2022 0924 by Wali Fonseca RN  Outcome: Progressing  Goal: Maintain or return to baseline ADL function  Description: INTERVENTIONS:  -  Assess patient's ability to carry out ADLs; assess patient's baseline for ADL function and identify physical deficits which impact ability to perform ADLs (bathing, care of mouth/teeth, toileting, grooming, dressing, etc )  - Assess/evaluate cause of self-care deficits   - Assess range of motion  - Assess patient's mobility; develop plan if impaired  - Assess patient's need for assistive devices and provide as appropriate  - Encourage maximum independence but intervene and supervise when necessary  - Involve family in performance of ADLs  - Assess for home care needs following discharge   - Consider OT consult to assist with ADL evaluation and planning for discharge  - Provide patient education as appropriate  5/15/2022 1154 by Ronnell Villanueva RN  Outcome: Completed  5/15/2022 0924 by Ronnell Villanueva RN  Outcome: Progressing  Goal: Maintains/Returns to pre admission functional level  Description: INTERVENTIONS:  - Perform BMAT or MOVE assessment daily    - Set and communicate daily mobility goal to care team and patient/family/caregiver  - Collaborate with rehabilitation services on mobility goals if consulted  - Perform Range of Motion  times a day  - Reposition patient every  hours    - Dangle patient  times a day  - Stand patient  times a day  - Ambulate patient  times a day  - Out of bed to chair  times a day   - Out of bed for meals  times a day  - Out of bed for toileting  - Record patient progress and toleration of activity level   5/15/2022 1154 by Ronnell Villanueva RN  Outcome: Completed  5/15/2022 0924 by Ronnell Villanueva RN  Outcome: Progressing     Problem: DISCHARGE PLANNING  Goal: Discharge to home or other facility with appropriate resources  Description: INTERVENTIONS:  - Identify barriers to discharge w/patient and caregiver  - Arrange for needed discharge resources and transportation as appropriate  - Identify discharge learning needs (meds, wound care, etc )  - Arrange for interpretive services to assist at discharge as needed  - Refer to Case Management Department for coordinating discharge planning if the patient needs post-hospital services based on physician/advanced practitioner order or complex needs related to functional status, cognitive ability, or social support system  5/15/2022 1154 by Ronnell Villanueva RN  Outcome: Completed  5/15/2022 0924 by Ronnell Villanueva RN  Outcome: Progressing     Problem: Knowledge Deficit  Goal: Patient/family/caregiver demonstrates understanding of disease process, treatment plan, medications, and discharge instructions  Description: Complete learning assessment and assess knowledge base    Interventions:  - Provide teaching at level of understanding  - Provide teaching via preferred learning methods  5/15/2022 1154 by Marv Devi RN  Outcome: Completed  5/15/2022 0924 by Marv Devi RN  Outcome: Progressing     Problem: RESPIRATORY - ADULT  Goal: Achieves optimal ventilation and oxygenation  Description: INTERVENTIONS:  - Assess for changes in respiratory status  - Assess for changes in mentation and behavior  - Position to facilitate oxygenation and minimize respiratory effort  - Oxygen administered by appropriate delivery if ordered  - Initiate smoking cessation education as indicated  - Encourage broncho-pulmonary hygiene including cough, deep breathe, Incentive Spirometry  - Assess the need for suctioning and aspirate as needed  - Assess and instruct to report SOB or any respiratory difficulty  - Respiratory Therapy support as indicated  5/15/2022 1154 by Marv Devi RN  Outcome: Completed  5/15/2022 0924 by Marv Devi RN  Outcome: Progressing     Problem: SKIN/TISSUE INTEGRITY - ADULT  Goal: Skin Integrity remains intact(Skin Breakdown Prevention)  Description: Assess:  -Perform Javier assessment every   -Clean and moisturize skin every   -Inspect skin when repositioning, toileting, and assisting with ADLS  -Assess under medical devices such as  every   -Assess extremities for adequate circulation and sensation     Bed Management:  -Have minimal linens on bed & keep smooth, unwrinkled  -Change linens as needed when moist or perspiring  -Avoid sitting or lying in one position for more than  hours while in bed  -Keep HOB at degrees     Toileting:  -Offer bedside commode  -Assess for incontinence every   -Use incontinent care products after each incontinent episode such as     Activity:  -Mobilize patient  times a day  -Encourage activity and walks on unit  -Encourage or provide ROM exercises   -Turn and reposition patient every  Hours  -Use appropriate equipment to lift or move patient in bed  -Instruct/ Assist with weight shifting every  when out of bed in chair  -Consider limitation of chair time  hour intervals    Skin Care:  -Avoid use of baby powder, tape, friction and shearing, hot water or constrictive clothing  -Relieve pressure over bony prominences using   -Do not massage red bony areas    Next Steps:  -Teach patient strategies to minimize risks such as    -Consider consults to  interdisciplinary teams such as   5/15/2022 1154 by Angela Mackay RN  Outcome: Completed  5/15/2022 0924 by Angela Mackay RN  Outcome: Progressing  Goal: Incision(s), wounds(s) or drain site(s) healing without S/S of infection  Description: INTERVENTIONS  - Assess and document dressing, incision, wound bed, drain sites and surrounding tissue  - Provide patient and family education  - Perform skin care/dressing changes every  5/15/2022 1154 by Angela Mackay RN  Outcome: Completed  5/15/2022 0924 by Angela Mackay RN  Outcome: Progressing  Goal: Pressure injury heals and does not worsen  Description: Interventions:  - Implement low air loss mattress or specialty surface (Criteria met)  - Apply silicone foam dressing  - Instruct/assist with weight shifting every  minutes when in chair   - Limit chair time to  hour intervals  - Use special pressure reducing interventions such as  when in chair   - Apply fecal or urinary incontinence containment device   - Perform passive or active ROM every   - Turn and reposition patient & offload bony prominences every  hours   - Utilize friction reducing device or surface for transfers   - Consider consults to  interdisciplinary teams such as   - Use incontinent care products after each incontinent episode such as   - Consider nutrition services referral as needed  5/15/2022 1154 by Angela Mackay RN  Outcome: Completed  5/15/2022 0924 by Aniceto Fernández RN  Outcome: Progressing     Problem: Prexisting or High Potential for Compromised Skin Integrity  Goal: Skin integrity is maintained or improved  Description: INTERVENTIONS:  - Identify patients at risk for skin breakdown  - Assess and monitor skin integrity  - Assess and monitor nutrition and hydration status  - Monitor labs   - Assess for incontinence   - Turn and reposition patient  - Assist with mobility/ambulation  - Relieve pressure over bony prominences  - Avoid friction and shearing  - Provide appropriate hygiene as needed including keeping skin clean and dry  - Evaluate need for skin moisturizer/barrier cream  - Collaborate with interdisciplinary team   - Patient/family teaching  - Consider wound care consult   5/15/2022 1154 by Aniceto Fernández RN  Outcome: Completed  5/15/2022 0924 by Aniceto Fernández RN  Outcome: Progressing

## 2022-05-15 NOTE — CASE MANAGEMENT
Case Management Progress Note    Patient name Orlin Butcher  Location S /S -89 MRN [id number]  : 1975 Date 5/15/2022       LOS (days): 1  Geometric Mean LOS (GMLOS) (days):   Days to [facility]:        OBJECTIVE:        Current admission status: Inpatient  Preferred Pharmacy:   [place] [address] [place] [id number]  Phone: [phone] Fax: [phone]    Primary Care Provider: Miriam Rapp DO    Primary Insurance: MEDICARE  Secondary Insurance:     PROGRESS NOTE:  CM met with Pt to discuss his request for home RN services  CM discussed the home bound criteria to received [facility] services  Pt reported he is currently employed and drives and therefore, not home bound  CM advise the Pt of the wound care center information  Pt reported his car at the hospital to drive himself home upon d/c  CM made Dr White aware

## 2022-05-15 NOTE — ASSESSMENT & PLAN NOTE
Background: Patient came in with pain and swelling of bilateral lower extremities, right greater than the left  Due to the pain, patient has been having difficulty sleeping  Patient has been having recurrent lower extremity cellulitis for which this has been treated at [facility] with several antibiotics including IV cefazolin, IV vancomycin, on oral clindamycin and doxycycline as well as mupirocin ointment  Patient also was prescribed with Medrol Dosepak at [facility]  · Thought to be bacterial cellulitis in the emergency room and thus was given IV vancomycin  · Hold off further antibiotic at this time  This was discussed with the patient and okay with this plan  · D-dimer was elevated: Duplex negative   · CT scan of bilateral lower extremities:  Revealed cellulitis versus venous stasis changes  No signs of possibility of a deeper infection such as an abscess or necrotizing fascitis  · I have a suspicion that this is more of venous stasis skin changes/dermatitis, instead of a bacterial cellulitis  Patient did not have any fever chills  No leukocytosis on patient's CBC  Examination of bilateral lower extremities did not reveal abnormal warmth and instead, on palpation, it was cold with weeping fluids from significant bilateral lower extremity edema  · Procal negative   · Blood cultures pending  · Echocardiogram  · Elevate bilateral lower extremities  · Ace wraps/compression  · Wound care consult for local wound care  · Pain control    · Highly suspect element of dietary indiscretion and high sodium diet as recently began working at [employer]  · Infectious disease consulted; no need for antibiotic therapy at this time cleared for discharge from Infectious Disease standpoint  · Patient requesting VNA cm to follow

## 2022-05-15 NOTE — CONSULTS
Consultation - Infectious Disease   Elisa Moore 48 y o  male MRN: [id number]  Unit/Bed#: S -01 Encounter: [id number]      IMPRESSION & RECOMMENDATIONS:   Impression/Recommendations:  1  Chronic R>L chronic venous stasis with ulceration  Recent antibiotic, steroid treatment  No clinical exam evidence of cellulitis  Remains clinically stable without sepsis  MO likely significant contributing factor  Dopplers negative  Rec:  · Continue to follow closely off antibiotics  · Needs edema control and LWC  · Follow up TTE    2   MO   BMI 57  Risk factor for above  Rec:  · Needs weight loss    3  Crohns  No immunosuppression  4   Bipolar disorder  On multiple medications  The above impression and plan was discussed in detail with CRYSTAL Lee with [facility]  The patient is stable from an ID standpoint for D/C  Antibiotics:  None    Thank you for this consultation  We will follow along with you  HISTORY OF PRESENT ILLNESS:  Reason for Consult: Cellulitis    HPI: Elisa Moore is a 48 y o  male with morbid obesity and chronic lower extremity venous stasis  For the past 2 weeks he has had increased pain and swelling of his lower extremities  He has been treated at [facility] in urgent care with both antibiotics and steroids  Presented to our emergency department yesterday for ongoing symptoms  Upon presentation he was noted to be afebrile with a normal WBC  He was given a dose of antibiotics and has been observed off  There was a concern for noninfectious venous stasis  We are asked to comment on further evaluation and management  In performing this consult, I have reviewed prior admission and outpatient visit records in detail  REVIEW OF SYSTEMS:  Denies fevers, chills, sweats, nausea, vomiting, or diarrhea  A complete system-based review of systems is otherwise negative      PAST MEDICAL HISTORY:  Past Medical History:   Diagnosis Date    Bipolar affective disorder ([facility] [address] )     Cellulitis     Hyperlipidemia     Hypertension      History reviewed  No pertinent surgical history  FAMILY HISTORY:  Non-contributory    SOCIAL HISTORY:  Social History     Substance and Sexual Activity   Alcohol Use Never     Social History     Substance and Sexual Activity   Drug Use Never     Social History     Tobacco Use   Smoking Status Former Smoker   Smokeless Tobacco Never Used       ALLERGIES:  Allergies   Allergen Reactions    Mesalamine GI Intolerance     Pt has had severe diarrhea and abdominal pain on mESALAMINE       MEDICATIONS:  All current active medications have been reviewed  PHYSICAL EXAM:  Vitals:  Temp:  [97 4 °F (36 3 °C)-98 5 °F (36 9 °C)] 97 6 °F (36 4 °C)  HR:  [52-71] 69  Resp:  [18-20] 20  BP: (114-132)/(57-76) 119/67  SpO2:  [95 %-98 %] 97 %  Temp (24hrs), Av 8 °F (36 6 °C), Min:97 4 °F (36 3 °C), Max:98 5 °F (36 9 °C)  Current: Temperature: 97 6 °F (36 4 °C)     Physical Exam:  General:  Well-nourished, well-developed, in no acute distress  Eyes:  Conjunctive clear with no hemorrhages or effusions  Oropharynx:  No ulcers, no lesions  Neck:  Supple, no lymphadenopathy  Lungs:  Normal respiratory excursion, no accessory muscle use  Cardiac:  Regular rate and rhythm, extremities well perfused  Abdomen:  Soft, non-tender, non-distended  Extremities:  No peripheral cyanosis, clubbing; bilateral feet and calves with Ace wraps intact  Wound photos reviewed:  Chronic venous stasis changes and posterior right calf ulceration without cellulitis  Skin:  No rashes  Neurological:  Moves all four extremities spontaneously, sensation grossly intact    LABS, IMAGING, & OTHER STUDIES:  Lab Results:  I have personally reviewed pertinent labs    Results from last 7 days   Lab Units 05/15/22  0514 22  1013   POTASSIUM mmol/L 4 1 4 0   CHLORIDE mmol/L 105 104   CO2 mmol/L 27 26   BUN mg/dL 17 17   CREATININE mg/dL 0 97 1 06   EGFR ml/min/1 73sq m 91 82   CALCIUM mg/dL 7 9* 8 0*   AST U/L  --  15   ALT U/L  --  20   ALK PHOS U/L  --  56     Results from last 7 days   Lab Units 05/15/22  0514 05/14/22  1013   WBC Thousand/uL 3 07* 5 25   HEMOGLOBIN g/dL 12 3 12 2   PLATELETS Thousands/uL 210 231     Results from last 7 days   Lab Units 05/14/22  1013   BLOOD CULTURE  Received in Microbiology Lab  Culture in Progress  Received in Microbiology Lab  Culture in Progress  Imaging Studies:   I have personally reviewed pertinent imaging study reports and images in PACS  EKG, Pathology, and Other Studies:   I have personally reviewed pertinent reports

## 2022-05-15 NOTE — DISCHARGE INSTR - AVS FIRST PAGE
[address] [facility] [address]  Dept: [phone]    May 15, 2022     Patient: Jos Mcelroy   YOB: 1975   Date of Visit: 5/14/2022       To Whom it May Concern:    Marcos Durand is under my professional care  He was seen in the hospital from 5/14/2022   to 05/15/22  He may return to work on 5/17/2022  without limitations  Any further work excuses will need to be brought to the attention of his primary care doctor  If you have any questions or concerns, please don't hesitate to call           Sincerely,          BRETT Alfonso

## 2022-05-16 LAB
ATRIAL RATE: 57 BPM
P AXIS: 24 DEGREES
PR INTERVAL: 160 MS
QRS AXIS: 48 DEGREES
QRSD INTERVAL: 94 MS
QT INTERVAL: 490 MS
QTC INTERVAL: 476 MS
T WAVE AXIS: 36 DEGREES
VENTRICULAR RATE: 57 BPM

## 2022-05-16 PROCEDURE — 93010 ELECTROCARDIOGRAM REPORT: CPT | Performed by: INTERNAL MEDICINE

## 2022-05-19 LAB
BACTERIA BLD CULT: NORMAL
BACTERIA BLD CULT: NORMAL

## 2022-05-23 NOTE — PHYSICIAN ADVISOR
Current patient class: Inpatient  The patient is currently on Hospital Day: 2 at [address]      The patient was admitted to the hospital  on 5/14/22 at 11:14 AM for the following diagnosis:  Cellulitis of lower extremity, unspecified laterality [X60 [id number]]     After review of the relevant documentation, labs, vital signs and test results, this is a PROVIDER LIABLE case  In this particular case the patient was admitted to the hospital as an inpatient  The patient however failed to satisfy the 2 midnight benchmark and closer scrutiny of the case was warranted  After review of the patient presentation and relevant labs the patient was most appropriate for observation or outpatient class at the time of admission  Given that this patient has already been discharged prior to this review they become a provider liable case  Rationale is as follows: The patient is a 48 yrs   Male who presented to the ED at 5/14/2022  9:13 AM with a chief complaint of Leg Swelling (Right lower leg and foot pain/swelling/wound for the last year - painful and cannot sleep) Patient admitted with recurrent cellulitis  Ct scan c/w changes of cellulitis vs venous stasis  He was seen by ID who felt this was likely related to venous stasis  No further antibiotics recommended  He was admitted inpatient dishcharged on hospital day 2  Given stability, I recommend part b correction      The patients vitals on arrival were   ED Triage Vitals   Temperature Pulse Respirations Blood Pressure SpO2   05/14/22 0910 05/14/22 0910 05/14/22 0910 05/14/22 0910 05/14/22 0910   98 6 °F (37 °C) 63 18 125/60 97 %      Temp Source Heart Rate Source Patient Position - Orthostatic VS BP Location FiO2 (%)   05/14/22 0910 05/14/22 0910 05/14/22 1556 05/14/22 1556 --   Oral Monitor Sitting Left arm       Pain Score       05/14/22 0910       7           Past Medical History:   Diagnosis Date    Bipolar affective disorder ([facility] [address] )     Cellulitis     Hyperlipidemia     Hypertension      History reviewed  No pertinent surgical history  Consults have been placed to:   IP CONSULT TO INFECTIOUS DISEASES    Vitals:    05/14/22 1746 05/14/22 2105 05/15/22 0809 05/15/22 0820   BP: 130/76 132/73 119/67 119/67   BP Location: Right arm Right arm Left arm    Pulse: 60 61 71 69   Resp: 18 18 20    Temp: (!) 97 4 °F (36 3 °C) 97 6 °F (36 4 °C) 97 6 °F (36 4 °C)    TempSrc: Oral Oral Oral    SpO2: 97% 95% 97%    Weight:    (!) 215 kg (473 lb)   Height:    6' 4" (1 93 m)       Most recent labs:    No results for input(s): WBC, HGB, HCT, PLT, K, NA, CALCIUM, BUN, CREATININE, LIPASE, AMYLASE, INR, TROPONINI, CKTOTAL, AST, ALT, ALKPHOS, BILITOT in the last 72 hours  Scheduled Meds:  Continuous Infusions:No current facility-administered medications for this encounter  PRN Meds:      Surgical procedures (if appropriate):

## 2022-06-15 ENCOUNTER — HOSPITAL ENCOUNTER (EMERGENCY)
Facility: HOSPITAL | Age: 47
Discharge: HOME | End: 2022-06-15
Attending: EMERGENCY MEDICINE | Admitting: EMERGENCY MEDICINE
Payer: COMMERCIAL

## 2022-06-15 VITALS
DIASTOLIC BLOOD PRESSURE: 55 MMHG | BODY MASS INDEX: 57.58 KG/M2 | SYSTOLIC BLOOD PRESSURE: 116 MMHG | RESPIRATION RATE: 18 BRPM | WEIGHT: 315 LBS | TEMPERATURE: 100 F | HEART RATE: 98 BPM | OXYGEN SATURATION: 92 %

## 2022-06-15 DIAGNOSIS — I87.8 VENOUS STASIS OF BOTH LOWER EXTREMITIES: ICD-10-CM
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
## 2022-06-15 PROCEDURE — 99282 EMERGENCY DEPT VISIT SF MDM: CPT | Performed by: EMERGENCY MEDICINE

## 2022-06-15 PROCEDURE — 99283 EMERGENCY DEPT VISIT LOW MDM: CPT
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
## 2022-06-15 NOTE — ED NOTES
Discussed with [facility] regarding appropriateness of LYFT for patient  They state patient is appropriate for LYFT XL  Lyft called for patient at this time   [facility] 2005      Esther Cramer RN  06/15/22 3172

## 2022-06-16 ENCOUNTER — HOSPITAL ENCOUNTER (INPATIENT)
Facility: HOSPITAL | Age: 47
LOS: 3 days | Discharge: HOME HEALTH | DRG: 300 | End: 2022-06-20
Attending: EMERGENCY MEDICINE | Admitting: STUDENT IN AN ORGANIZED HEALTH CARE EDUCATION/TRAINING PROGRAM
Payer: MEDICARE

## 2022-06-16 DIAGNOSIS — L03.90 CELLULITIS: Primary | ICD-10-CM

## 2022-06-16 DIAGNOSIS — F31.9 BIPOLAR AFFECTIVE DISORDER, REMISSION STATUS UNSPECIFIED (HCC): Chronic | ICD-10-CM

## 2022-06-16 DIAGNOSIS — I87.2 VENOUS STASIS DERMATITIS OF BOTH LOWER EXTREMITIES: ICD-10-CM

## 2022-06-16 DIAGNOSIS — I87.2 VENOUS INSUFFICIENCY OF BOTH LOWER EXTREMITIES: ICD-10-CM

## 2022-06-16 DIAGNOSIS — S81.809A MULTIPLE OPEN WOUNDS OF LOWER LEG: ICD-10-CM

## 2022-06-16 DIAGNOSIS — R60.0 BILATERAL LOWER EXTREMITY EDEMA: ICD-10-CM

## 2022-06-16 DIAGNOSIS — B35.1 ONYCHOMYCOSIS: ICD-10-CM

## 2022-06-16 PROBLEM — Z59.9 FINANCIAL DIFFICULTIES: Status: ACTIVE | Noted: 2020-10-26

## 2022-06-16 PROBLEM — U07.1 COVID-19 IN IMMUNOCOMPROMISED PATIENT (HCC): Status: ACTIVE | Noted: 2021-12-06

## 2022-06-16 PROBLEM — D84.9 COVID-19 IN IMMUNOCOMPROMISED PATIENT (HCC): Status: RESOLVED | Noted: 2021-12-06 | Resolved: 2022-06-16

## 2022-06-16 PROBLEM — Z91.11 DIETARY NONCOMPLIANCE: Status: ACTIVE | Noted: 2021-12-03

## 2022-06-16 PROBLEM — F41.9 ANXIETY: Status: ACTIVE | Noted: 2022-03-28

## 2022-06-16 PROBLEM — R60.9 PERIPHERAL EDEMA: Status: ACTIVE | Noted: 2022-04-08

## 2022-06-16 PROBLEM — R73.03 PRE-DIABETES: Status: ACTIVE | Noted: 2019-03-04

## 2022-06-16 PROBLEM — G47.33 OSA ON CPAP: Status: ACTIVE | Noted: 2019-05-20

## 2022-06-16 PROBLEM — L03.115 BILATERAL CELLULITIS OF LOWER LEG: Status: ACTIVE | Noted: 2022-04-08

## 2022-06-16 PROBLEM — Z86.14 HISTORY OF MRSA INFECTION: Status: ACTIVE | Noted: 2019-03-04

## 2022-06-16 PROBLEM — D84.9 COVID-19 IN IMMUNOCOMPROMISED PATIENT (HCC): Status: ACTIVE | Noted: 2021-12-06

## 2022-06-16 PROBLEM — F41.1 GAD (GENERALIZED ANXIETY DISORDER): Status: ACTIVE | Noted: 2022-06-16

## 2022-06-16 PROBLEM — Z91.119 DIETARY NONCOMPLIANCE: Status: ACTIVE | Noted: 2021-12-03

## 2022-06-16 PROBLEM — K76.0 FATTY LIVER: Status: ACTIVE | Noted: 2019-03-04

## 2022-06-16 PROBLEM — R26.2 AMBULATORY DYSFUNCTION: Status: ACTIVE | Noted: 2021-07-23

## 2022-06-16 PROBLEM — U07.1 COVID-19 IN IMMUNOCOMPROMISED PATIENT (HCC): Status: RESOLVED | Noted: 2021-12-06 | Resolved: 2022-06-16

## 2022-06-16 PROBLEM — E53.8 FOLIC ACID DEFICIENCY: Status: ACTIVE | Noted: 2019-05-31

## 2022-06-16 PROBLEM — R63.2 BINGE EATING: Status: ACTIVE | Noted: 2019-05-31

## 2022-06-16 PROBLEM — Z99.89 OSA ON CPAP: Status: ACTIVE | Noted: 2019-05-20

## 2022-06-16 PROBLEM — G62.9 PERIPHERAL POLYNEUROPATHY: Status: ACTIVE | Noted: 2017-08-03

## 2022-06-16 PROBLEM — L03.116 BILATERAL CELLULITIS OF LOWER LEG: Status: ACTIVE | Noted: 2022-04-08

## 2022-06-16 LAB
ALBUMIN SERPL BCP-MCNC: 3.3 G/DL (ref 3–5.2)
ALP SERPL-CCNC: 66 U/L (ref 43–122)
ALT SERPL W P-5'-P-CCNC: 20 U/L
ANION GAP SERPL CALCULATED.3IONS-SCNC: 5 MMOL/L (ref 5–14)
APTT PPP: 29 SECONDS (ref 23–37)
AST SERPL W P-5'-P-CCNC: 38 U/L (ref 17–59)
BACTERIA UR QL AUTO: ABNORMAL /HPF
BASOPHILS # BLD AUTO: 0.04 THOUSANDS/ΜL (ref 0–0.1)
BASOPHILS NFR BLD AUTO: 1 % (ref 0–1)
BILIRUB SERPL-MCNC: 1.03 MG/DL
BILIRUB UR QL STRIP: ABNORMAL
BUN SERPL-MCNC: 25 MG/DL (ref 5–25)
CALCIUM ALBUM COR SERPL-MCNC: 8.8 MG/DL (ref 8.3–10.1)
CALCIUM SERPL-MCNC: 8.2 MG/DL (ref 8.4–10.2)
CHLORIDE SERPL-SCNC: 103 MMOL/L (ref 97–108)
CLARITY UR: CLEAR
CO2 SERPL-SCNC: 31 MMOL/L (ref 22–30)
COLOR UR: ABNORMAL
CREAT SERPL-MCNC: 1.02 MG/DL (ref 0.7–1.5)
EOSINOPHIL # BLD AUTO: 0.07 THOUSAND/ΜL (ref 0–0.61)
EOSINOPHIL NFR BLD AUTO: 1 % (ref 0–6)
ERYTHROCYTE [DISTWIDTH] IN BLOOD BY AUTOMATED COUNT: 13 % (ref 11.6–15.1)
GFR SERPL CREATININE-BSD FRML MDRD: 86 ML/MIN/1.73SQ M
GLUCOSE SERPL-MCNC: 108 MG/DL (ref 65–140)
GLUCOSE SERPL-MCNC: 122 MG/DL (ref 70–99)
GLUCOSE UR STRIP-MCNC: NEGATIVE MG/DL
HCT VFR BLD AUTO: 40.6 % (ref 36.5–49.3)
HGB BLD-MCNC: 12.8 G/DL (ref 12–17)
HGB UR QL STRIP.AUTO: NEGATIVE
HYALINE CASTS #/AREA URNS LPF: ABNORMAL /LPF
IMM GRANULOCYTES # BLD AUTO: 0.1 THOUSAND/UL (ref 0–0.2)
IMM GRANULOCYTES NFR BLD AUTO: 2 % (ref 0–2)
INR PPP: 1.06 (ref 0.84–1.19)
KETONES UR STRIP-MCNC: ABNORMAL MG/DL
LACTATE SERPL-SCNC: 1.5 MMOL/L (ref 0.7–2)
LEUKOCYTE ESTERASE UR QL STRIP: 25
LYMPHOCYTES # BLD AUTO: 1.87 THOUSANDS/ΜL (ref 0.6–4.47)
LYMPHOCYTES NFR BLD AUTO: 28 % (ref 14–44)
MCH RBC QN AUTO: 30.5 PG (ref 26.8–34.3)
MCHC RBC AUTO-ENTMCNC: 31.5 G/DL (ref 31.4–37.4)
MCV RBC AUTO: 97 FL (ref 82–98)
MONOCYTES # BLD AUTO: 1.64 THOUSAND/ΜL (ref 0.17–1.22)
MONOCYTES NFR BLD AUTO: 24 % (ref 4–12)
MUCOUS THREADS UR QL AUTO: ABNORMAL
NEUTROPHILS # BLD AUTO: 3.02 THOUSANDS/ΜL (ref 1.85–7.62)
NEUTS SEG NFR BLD AUTO: 44 % (ref 43–75)
NITRITE UR QL STRIP: NEGATIVE
NON-SQ EPI CELLS URNS QL MICRO: ABNORMAL /HPF
NRBC BLD AUTO-RTO: 0 /100 WBCS
PH UR STRIP.AUTO: 5 [PH]
PLATELET # BLD AUTO: 214 THOUSANDS/UL (ref 149–390)
PMV BLD AUTO: 9.5 FL (ref 8.9–12.7)
POTASSIUM SERPL-SCNC: 4.4 MMOL/L (ref 3.6–5)
PROCALCITONIN SERPL-MCNC: 0.09 NG/ML
PROT SERPL-MCNC: 6.9 G/DL (ref 5.9–8.4)
PROT UR STRIP-MCNC: ABNORMAL MG/DL
PROTHROMBIN TIME: 13.4 SECONDS (ref 11.6–14.5)
RBC # BLD AUTO: 4.19 MILLION/UL (ref 3.88–5.62)
RBC #/AREA URNS AUTO: ABNORMAL /HPF
SODIUM SERPL-SCNC: 139 MMOL/L (ref 137–147)
SP GR UR STRIP.AUTO: 1.02 (ref 1–1.04)
UROBILINOGEN UA: 8 MG/DL
WBC # BLD AUTO: 6.74 THOUSAND/UL (ref 4.31–10.16)
WBC #/AREA URNS AUTO: ABNORMAL /HPF

## 2022-06-16 PROCEDURE — 96374 THER/PROPH/DIAG INJ IV PUSH: CPT

## 2022-06-16 PROCEDURE — 84145 PROCALCITONIN (PCT): CPT | Performed by: EMERGENCY MEDICINE

## 2022-06-16 PROCEDURE — 94760 N-INVAS EAR/PLS OXIMETRY 1: CPT

## 2022-06-16 PROCEDURE — 85025 COMPLETE CBC W/AUTO DIFF WBC: CPT | Performed by: EMERGENCY MEDICINE

## 2022-06-16 PROCEDURE — 85730 THROMBOPLASTIN TIME PARTIAL: CPT | Performed by: EMERGENCY MEDICINE

## 2022-06-16 PROCEDURE — 36415 COLL VENOUS BLD VENIPUNCTURE: CPT | Performed by: EMERGENCY MEDICINE

## 2022-06-16 PROCEDURE — 99285 EMERGENCY DEPT VISIT HI MDM: CPT | Performed by: EMERGENCY MEDICINE

## 2022-06-16 PROCEDURE — 81003 URINALYSIS AUTO W/O SCOPE: CPT | Performed by: EMERGENCY MEDICINE

## 2022-06-16 PROCEDURE — 99285 EMERGENCY DEPT VISIT HI MDM: CPT

## 2022-06-16 PROCEDURE — 80053 COMPREHEN METABOLIC PANEL: CPT | Performed by: EMERGENCY MEDICINE

## 2022-06-16 PROCEDURE — 83605 ASSAY OF LACTIC ACID: CPT | Performed by: EMERGENCY MEDICINE

## 2022-06-16 PROCEDURE — 81001 URINALYSIS AUTO W/SCOPE: CPT | Performed by: EMERGENCY MEDICINE

## 2022-06-16 PROCEDURE — 87040 BLOOD CULTURE FOR BACTERIA: CPT | Performed by: EMERGENCY MEDICINE

## 2022-06-16 PROCEDURE — 94002 VENT MGMT INPAT INIT DAY: CPT

## 2022-06-16 PROCEDURE — 82948 REAGENT STRIP/BLOOD GLUCOSE: CPT

## 2022-06-16 PROCEDURE — 85610 PROTHROMBIN TIME: CPT | Performed by: EMERGENCY MEDICINE

## 2022-06-16 RX ORDER — LANOLIN ALCOHOL/MO/W.PET/CERES
9 CREAM (GRAM) TOPICAL
Status: DISCONTINUED | OUTPATIENT
Start: 2022-06-16 | End: 2022-06-20 | Stop reason: HOSPADM

## 2022-06-16 RX ORDER — MELATONIN
2000 DAILY
Status: DISCONTINUED | OUTPATIENT
Start: 2022-06-17 | End: 2022-06-20 | Stop reason: HOSPADM

## 2022-06-16 RX ORDER — CLONAZEPAM 1 MG/1
1 TABLET ORAL 3 TIMES DAILY
Status: DISCONTINUED | OUTPATIENT
Start: 2022-06-16 | End: 2022-06-20 | Stop reason: HOSPADM

## 2022-06-16 RX ORDER — SODIUM CHLORIDE 9 MG/ML
3 INJECTION INTRAVENOUS
Status: DISCONTINUED | OUTPATIENT
Start: 2022-06-16 | End: 2022-06-20 | Stop reason: HOSPADM

## 2022-06-16 RX ORDER — PRAVASTATIN SODIUM 40 MG
80 TABLET ORAL
Status: DISCONTINUED | OUTPATIENT
Start: 2022-06-17 | End: 2022-06-20 | Stop reason: HOSPADM

## 2022-06-16 RX ORDER — INSULIN LISPRO 100 [IU]/ML
4-20 INJECTION, SOLUTION INTRAVENOUS; SUBCUTANEOUS
Status: DISCONTINUED | OUTPATIENT
Start: 2022-06-17 | End: 2022-06-20 | Stop reason: HOSPADM

## 2022-06-16 RX ORDER — FUROSEMIDE 10 MG/ML
40 INJECTION INTRAMUSCULAR; INTRAVENOUS
Status: DISCONTINUED | OUTPATIENT
Start: 2022-06-17 | End: 2022-06-20 | Stop reason: HOSPADM

## 2022-06-16 RX ORDER — PALIPERIDONE 6 MG/1
6 TABLET, EXTENDED RELEASE ORAL DAILY
Status: DISCONTINUED | OUTPATIENT
Start: 2022-06-17 | End: 2022-06-20 | Stop reason: HOSPADM

## 2022-06-16 RX ORDER — LOPERAMIDE HYDROCHLORIDE 2 MG/1
2 CAPSULE ORAL 3 TIMES DAILY PRN
Status: DISCONTINUED | OUTPATIENT
Start: 2022-06-16 | End: 2022-06-17

## 2022-06-16 RX ORDER — LEVOTHYROXINE SODIUM 0.03 MG/1
25 TABLET ORAL
Status: DISCONTINUED | OUTPATIENT
Start: 2022-06-17 | End: 2022-06-20 | Stop reason: HOSPADM

## 2022-06-16 RX ORDER — HYDROCHLOROTHIAZIDE 12.5 MG/1
12.5 TABLET ORAL DAILY
Status: DISCONTINUED | OUTPATIENT
Start: 2022-06-17 | End: 2022-06-20 | Stop reason: HOSPADM

## 2022-06-16 RX ORDER — PANTOPRAZOLE SODIUM 40 MG/1
40 TABLET, DELAYED RELEASE ORAL
Status: DISCONTINUED | OUTPATIENT
Start: 2022-06-17 | End: 2022-06-20 | Stop reason: HOSPADM

## 2022-06-16 RX ORDER — MIRTAZAPINE 7.5 MG/1
7.5 TABLET, FILM COATED ORAL
Status: DISCONTINUED | OUTPATIENT
Start: 2022-06-16 | End: 2022-06-20 | Stop reason: HOSPADM

## 2022-06-16 RX ORDER — CITALOPRAM 20 MG/1
40 TABLET ORAL DAILY
Status: DISCONTINUED | OUTPATIENT
Start: 2022-06-17 | End: 2022-06-20 | Stop reason: HOSPADM

## 2022-06-16 RX ORDER — METOPROLOL TARTRATE 50 MG/1
50 TABLET, FILM COATED ORAL 2 TIMES DAILY
Status: DISCONTINUED | OUTPATIENT
Start: 2022-06-16 | End: 2022-06-20 | Stop reason: HOSPADM

## 2022-06-16 RX ORDER — ONDANSETRON 2 MG/ML
4 INJECTION INTRAMUSCULAR; INTRAVENOUS EVERY 6 HOURS PRN
Status: DISCONTINUED | OUTPATIENT
Start: 2022-06-16 | End: 2022-06-20 | Stop reason: HOSPADM

## 2022-06-16 RX ORDER — ACETAMINOPHEN 325 MG/1
650 TABLET ORAL EVERY 6 HOURS PRN
Status: DISCONTINUED | OUTPATIENT
Start: 2022-06-16 | End: 2022-06-20 | Stop reason: HOSPADM

## 2022-06-16 RX ORDER — MORPHINE SULFATE 4 MG/ML
4 INJECTION, SOLUTION INTRAMUSCULAR; INTRAVENOUS ONCE
Status: COMPLETED | OUTPATIENT
Start: 2022-06-16 | End: 2022-06-16

## 2022-06-16 RX ORDER — POTASSIUM CHLORIDE 20 MEQ/1
20 TABLET, EXTENDED RELEASE ORAL DAILY
Status: DISCONTINUED | OUTPATIENT
Start: 2022-06-17 | End: 2022-06-20 | Stop reason: HOSPADM

## 2022-06-16 RX ORDER — INSULIN LISPRO 100 [IU]/ML
4-20 INJECTION, SOLUTION INTRAVENOUS; SUBCUTANEOUS
Status: DISCONTINUED | OUTPATIENT
Start: 2022-06-16 | End: 2022-06-20 | Stop reason: HOSPADM

## 2022-06-16 RX ORDER — HEPARIN SODIUM 5000 [USP'U]/ML
5000 INJECTION, SOLUTION INTRAVENOUS; SUBCUTANEOUS EVERY 8 HOURS SCHEDULED
Status: DISCONTINUED | OUTPATIENT
Start: 2022-06-16 | End: 2022-06-20 | Stop reason: HOSPADM

## 2022-06-16 RX ORDER — CEFAZOLIN SODIUM 2 G/50ML
2000 SOLUTION INTRAVENOUS ONCE
Status: COMPLETED | OUTPATIENT
Start: 2022-06-16 | End: 2022-06-16

## 2022-06-16 RX ADMIN — METOPROLOL TARTRATE 50 MG: 50 TABLET, FILM COATED ORAL at 22:10

## 2022-06-16 RX ADMIN — HEPARIN SODIUM 5000 UNITS: 5000 INJECTION INTRAVENOUS; SUBCUTANEOUS at 22:16

## 2022-06-16 RX ADMIN — CLONAZEPAM 1 MG: 1 TABLET ORAL at 22:10

## 2022-06-16 RX ADMIN — CEFAZOLIN SODIUM 2000 MG: 2 SOLUTION INTRAVENOUS at 19:41

## 2022-06-16 RX ADMIN — MIRTAZAPINE 7.5 MG: 7.5 TABLET, FILM COATED ORAL at 22:10

## 2022-06-16 RX ADMIN — MORPHINE SULFATE 4 MG: 4 INJECTION INTRAVENOUS at 18:29
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
## 2022-06-16 NOTE — SEPSIS NOTE
Sepsis Note   Varsha Hampton 48 y o  male MRN: [id number]  Unit/Bed#: ED 10 Encounter: [id number]       qSOFA     [address] Name 06/16/22 1726                Altered mental status GCS < 15 --        Respiratory Rate > / =18 1        Systolic BP < / =063 0        Q Sofa Score 1                   Initial Sepsis Screening     Row Name 06/16/22 1735                Is the patient's history suggestive of a new or worsening infection? --        Suspected source of infection soft tissue  -GH        Are two or more of the following signs & symptoms of infection both present and new to the patient? Yes (Proceed)  -GH        Indicate SIRS criteria Tachypnea > 20 resp per min; Tachycardia > 90 bpm  -GH        If the answer is yes to both questions, suspicion of sepsis is present --        If severe sepsis is present AND tissue hypoperfusion perists in the hour after fluid resuscitation or lactate > 4, the patient meets criteria for SEPTIC SHOCK --        Are any of the following organ dysfunction criteria present within 6 hours of suspected infection and SIRS criteria that are NOT considered to be chronic conditions? --        Organ dysfunction --        Date of presentation of severe sepsis --        Time of presentation of severe sepsis --        Tissue hypoperfusion persists in the hour after crystalloid fluid administration, evidenced, by either: --        Was hypotension present within one hour of the conclusion of crystalloid fluid administration? --        Date of presentation of septic shock --        Time of presentation of septic shock --              User Key  (r) = Recorded By, (t) = Taken By, (c) = Cosigned By    [address] Name Provider Type    Armin Lim MD Physician            lactic 1 5, wbc 6 7  Sepsis stopped

## 2022-06-17 ENCOUNTER — APPOINTMENT (INPATIENT)
Dept: NON INVASIVE DIAGNOSTICS | Facility: HOSPITAL | Age: 47
DRG: 300 | End: 2022-06-17
Payer: MEDICARE

## 2022-06-17 PROBLEM — L03.119 CELLULITIS OF LOWER EXTREMITY: Status: ACTIVE | Noted: 2022-04-08

## 2022-06-17 LAB
ANION GAP SERPL CALCULATED.3IONS-SCNC: 5 MMOL/L (ref 5–14)
BASOPHILS # BLD AUTO: 0.06 THOUSANDS/ΜL (ref 0–0.1)
BASOPHILS NFR BLD AUTO: 1 % (ref 0–1)
BUN SERPL-MCNC: 24 MG/DL (ref 5–25)
CALCIUM SERPL-MCNC: 8.1 MG/DL (ref 8.4–10.2)
CHLORIDE SERPL-SCNC: 107 MMOL/L (ref 97–108)
CO2 SERPL-SCNC: 26 MMOL/L (ref 22–30)
CREAT SERPL-MCNC: 0.95 MG/DL (ref 0.7–1.5)
EOSINOPHIL # BLD AUTO: 0.24 THOUSAND/ΜL (ref 0–0.61)
EOSINOPHIL NFR BLD AUTO: 4 % (ref 0–6)
ERYTHROCYTE [DISTWIDTH] IN BLOOD BY AUTOMATED COUNT: 13.6 % (ref 11.6–15.1)
GFR SERPL CREATININE-BSD FRML MDRD: 94 ML/MIN/1.73SQ M
GLUCOSE P FAST SERPL-MCNC: 103 MG/DL (ref 70–99)
GLUCOSE SERPL-MCNC: 103 MG/DL (ref 70–99)
GLUCOSE SERPL-MCNC: 120 MG/DL (ref 65–140)
GLUCOSE SERPL-MCNC: 133 MG/DL (ref 65–140)
GLUCOSE SERPL-MCNC: 137 MG/DL (ref 65–140)
GLUCOSE SERPL-MCNC: 176 MG/DL (ref 65–140)
HCT VFR BLD AUTO: 43.4 % (ref 36.5–49.3)
HGB BLD-MCNC: 13.4 G/DL (ref 12–17)
IMM GRANULOCYTES # BLD AUTO: 0.07 THOUSAND/UL (ref 0–0.2)
IMM GRANULOCYTES NFR BLD AUTO: 1 % (ref 0–2)
LYMPHOCYTES # BLD AUTO: 1.98 THOUSANDS/ΜL (ref 0.6–4.47)
LYMPHOCYTES NFR BLD AUTO: 35 % (ref 14–44)
MCH RBC QN AUTO: 30.5 PG (ref 26.8–34.3)
MCHC RBC AUTO-ENTMCNC: 30.9 G/DL (ref 31.4–37.4)
MCV RBC AUTO: 99 FL (ref 82–98)
MONOCYTES # BLD AUTO: 1.63 THOUSAND/ΜL (ref 0.17–1.22)
MONOCYTES NFR BLD AUTO: 29 % (ref 4–12)
NEUTROPHILS # BLD AUTO: 1.72 THOUSANDS/ΜL (ref 1.85–7.62)
NEUTS SEG NFR BLD AUTO: 30 % (ref 43–75)
NRBC BLD AUTO-RTO: 0 /100 WBCS
PLATELET # BLD AUTO: 204 THOUSANDS/UL (ref 149–390)
PMV BLD AUTO: 10.1 FL (ref 8.9–12.7)
POTASSIUM SERPL-SCNC: 4 MMOL/L (ref 3.6–5)
RBC # BLD AUTO: 4.4 MILLION/UL (ref 3.88–5.62)
SODIUM SERPL-SCNC: 138 MMOL/L (ref 137–147)
WBC # BLD AUTO: 5.7 THOUSAND/UL (ref 4.31–10.16)

## 2022-06-17 PROCEDURE — 94003 VENT MGMT INPAT SUBQ DAY: CPT

## 2022-06-17 PROCEDURE — 93971 EXTREMITY STUDY: CPT

## 2022-06-17 PROCEDURE — 80048 BASIC METABOLIC PNL TOTAL CA: CPT | Performed by: PHYSICIAN ASSISTANT

## 2022-06-17 PROCEDURE — 85025 COMPLETE CBC W/AUTO DIFF WBC: CPT | Performed by: PHYSICIAN ASSISTANT

## 2022-06-17 PROCEDURE — 99223 1ST HOSP IP/OBS HIGH 75: CPT | Performed by: STUDENT IN AN ORGANIZED HEALTH CARE EDUCATION/TRAINING PROGRAM

## 2022-06-17 PROCEDURE — 82948 REAGENT STRIP/BLOOD GLUCOSE: CPT

## 2022-06-17 PROCEDURE — 93971 EXTREMITY STUDY: CPT | Performed by: SURGERY

## 2022-06-17 RX ORDER — DIVALPROEX SODIUM 500 MG/1
500 TABLET, DELAYED RELEASE ORAL
Status: DISCONTINUED | OUTPATIENT
Start: 2022-06-17 | End: 2022-06-20 | Stop reason: HOSPADM

## 2022-06-17 RX ORDER — BUSPIRONE HYDROCHLORIDE 10 MG/1
10 TABLET ORAL 3 TIMES DAILY
Status: DISCONTINUED | OUTPATIENT
Start: 2022-06-17 | End: 2022-06-20 | Stop reason: HOSPADM

## 2022-06-17 RX ORDER — CEFAZOLIN SODIUM 2 G/50ML
2000 SOLUTION INTRAVENOUS ONCE
Status: DISCONTINUED | OUTPATIENT
Start: 2022-06-17 | End: 2022-06-17

## 2022-06-17 RX ORDER — CEFAZOLIN SODIUM 2 G/50ML
2000 SOLUTION INTRAVENOUS EVERY 8 HOURS
Status: DISCONTINUED | OUTPATIENT
Start: 2022-06-17 | End: 2022-06-17

## 2022-06-17 RX ORDER — GABAPENTIN 100 MG/1
100 CAPSULE ORAL 3 TIMES DAILY
Status: DISCONTINUED | OUTPATIENT
Start: 2022-06-17 | End: 2022-06-20 | Stop reason: HOSPADM

## 2022-06-17 RX ORDER — NYSTATIN 100000 [USP'U]/G
POWDER TOPICAL 2 TIMES DAILY
Status: DISCONTINUED | OUTPATIENT
Start: 2022-06-17 | End: 2022-06-20 | Stop reason: HOSPADM

## 2022-06-17 RX ORDER — DIVALPROEX SODIUM 500 MG/1
1000 TABLET, DELAYED RELEASE ORAL EVERY 12 HOURS
Status: DISCONTINUED | OUTPATIENT
Start: 2022-06-17 | End: 2022-06-20 | Stop reason: HOSPADM

## 2022-06-17 RX ORDER — HYDROCODONE BITARTRATE AND ACETAMINOPHEN 5; 325 MG/1; MG/1
1 TABLET ORAL EVERY 6 HOURS PRN
Status: DISCONTINUED | OUTPATIENT
Start: 2022-06-17 | End: 2022-06-20 | Stop reason: HOSPADM

## 2022-06-17 RX ORDER — VANCOMYCIN HYDROCHLORIDE 1 G/20ML
INJECTION, POWDER, LYOPHILIZED, FOR SOLUTION INTRAVENOUS
Status: DISPENSED
Start: 2022-06-17 | End: 2022-06-17

## 2022-06-17 RX ORDER — LOPERAMIDE HYDROCHLORIDE 2 MG/1
4 CAPSULE ORAL 3 TIMES DAILY PRN
Status: DISCONTINUED | OUTPATIENT
Start: 2022-06-17 | End: 2022-06-20 | Stop reason: HOSPADM

## 2022-06-17 RX ADMIN — LOPERAMIDE HYDROCHLORIDE 2 MG: 2 CAPSULE ORAL at 09:16

## 2022-06-17 RX ADMIN — GABAPENTIN 100 MG: 100 CAPSULE ORAL at 16:42

## 2022-06-17 RX ADMIN — LOPERAMIDE HYDROCHLORIDE 2 MG: 2 CAPSULE ORAL at 09:30

## 2022-06-17 RX ADMIN — BUSPIRONE HYDROCHLORIDE 10 MG: 10 TABLET ORAL at 16:42

## 2022-06-17 RX ADMIN — PANTOPRAZOLE SODIUM 40 MG: 40 TABLET, DELAYED RELEASE ORAL at 05:57

## 2022-06-17 RX ADMIN — FUROSEMIDE 40 MG: 10 INJECTION, SOLUTION INTRAVENOUS at 08:07

## 2022-06-17 RX ADMIN — PALIPERIDONE 6 MG: 6 TABLET, EXTENDED RELEASE ORAL at 08:07

## 2022-06-17 RX ADMIN — PRAVASTATIN SODIUM 80 MG: 40 TABLET ORAL at 16:42

## 2022-06-17 RX ADMIN — GABAPENTIN 100 MG: 100 CAPSULE ORAL at 08:07

## 2022-06-17 RX ADMIN — METOPROLOL TARTRATE 50 MG: 50 TABLET, FILM COATED ORAL at 17:10

## 2022-06-17 RX ADMIN — BUSPIRONE HYDROCHLORIDE 10 MG: 10 TABLET ORAL at 20:19

## 2022-06-17 RX ADMIN — MUPIROCIN: 20 OINTMENT TOPICAL at 08:13

## 2022-06-17 RX ADMIN — Medication 2000 UNITS: at 08:06

## 2022-06-17 RX ADMIN — MIRTAZAPINE 7.5 MG: 7.5 TABLET, FILM COATED ORAL at 21:50

## 2022-06-17 RX ADMIN — HYDROCODONE BITARTRATE AND ACETAMINOPHEN 1 TABLET: 5; 325 TABLET ORAL at 20:19

## 2022-06-17 RX ADMIN — VANCOMYCIN HYDROCHLORIDE 2000 MG: 1 INJECTION, POWDER, LYOPHILIZED, FOR SOLUTION INTRAVENOUS at 21:52

## 2022-06-17 RX ADMIN — HYDROCODONE BITARTRATE AND ACETAMINOPHEN 1 TABLET: 5; 325 TABLET ORAL at 14:04

## 2022-06-17 RX ADMIN — VANCOMYCIN HYDROCHLORIDE 2000 MG: 1 INJECTION, POWDER, LYOPHILIZED, FOR SOLUTION INTRAVENOUS at 13:55

## 2022-06-17 RX ADMIN — HEPARIN SODIUM 5000 UNITS: 5000 INJECTION INTRAVENOUS; SUBCUTANEOUS at 13:55

## 2022-06-17 RX ADMIN — ACETAMINOPHEN 650 MG: 325 TABLET ORAL at 02:20

## 2022-06-17 RX ADMIN — BUSPIRONE HYDROCHLORIDE 10 MG: 10 TABLET ORAL at 08:07

## 2022-06-17 RX ADMIN — CLONAZEPAM 1 MG: 1 TABLET ORAL at 20:19

## 2022-06-17 RX ADMIN — LEVOTHYROXINE SODIUM 25 MCG: 25 TABLET ORAL at 05:57

## 2022-06-17 RX ADMIN — HEPARIN SODIUM 5000 UNITS: 5000 INJECTION INTRAVENOUS; SUBCUTANEOUS at 05:57

## 2022-06-17 RX ADMIN — DIVALPROEX SODIUM 1000 MG: 500 TABLET, DELAYED RELEASE ORAL at 09:16

## 2022-06-17 RX ADMIN — HYDROCODONE BITARTRATE AND ACETAMINOPHEN 1 TABLET: 5; 325 TABLET ORAL at 06:21

## 2022-06-17 RX ADMIN — CITALOPRAM HYDROBROMIDE 40 MG: 20 TABLET ORAL at 08:07

## 2022-06-17 RX ADMIN — HYDROCHLOROTHIAZIDE 12.5 MG: 12.5 TABLET ORAL at 08:06

## 2022-06-17 RX ADMIN — ACETAMINOPHEN 650 MG: 325 TABLET ORAL at 20:19

## 2022-06-17 RX ADMIN — NYSTATIN: 100000 POWDER TOPICAL at 17:02

## 2022-06-17 RX ADMIN — ACETAMINOPHEN 650 MG: 325 TABLET ORAL at 14:04

## 2022-06-17 RX ADMIN — VANCOMYCIN HYDROCHLORIDE 2000 MG: 1 INJECTION, POWDER, LYOPHILIZED, FOR SOLUTION INTRAVENOUS at 06:05

## 2022-06-17 RX ADMIN — POTASSIUM CHLORIDE 20 MEQ: 1500 TABLET, EXTENDED RELEASE ORAL at 08:07

## 2022-06-17 RX ADMIN — CLONAZEPAM 1 MG: 1 TABLET ORAL at 08:07

## 2022-06-17 RX ADMIN — METOPROLOL TARTRATE 50 MG: 50 TABLET, FILM COATED ORAL at 08:07

## 2022-06-17 RX ADMIN — CLONAZEPAM 1 MG: 1 TABLET ORAL at 16:42

## 2022-06-17 RX ADMIN — HEPARIN SODIUM 5000 UNITS: 5000 INJECTION INTRAVENOUS; SUBCUTANEOUS at 21:52

## 2022-06-17 RX ADMIN — DIVALPROEX SODIUM 500 MG: 500 TABLET, DELAYED RELEASE ORAL at 21:51

## 2022-06-17 RX ADMIN — FUROSEMIDE 40 MG: 10 INJECTION, SOLUTION INTRAVENOUS at 17:02

## 2022-06-17 RX ADMIN — GABAPENTIN 100 MG: 100 CAPSULE ORAL at 21:50

## 2022-06-17 RX ADMIN — DIVALPROEX SODIUM 1000 MG: 500 TABLET, DELAYED RELEASE ORAL at 21:51

## 2022-06-17 NOTE — RESPIRATORY THERAPY NOTE
cpap setup per pt setting and comfort  Cpap was set at 10cm  And on room air    Pt comfortable with setting sat 99%

## 2022-06-17 NOTE — ASSESSMENT & PLAN NOTE
Place on [facility] step 2 diet, encourage healthy weight loss and supportive care

## 2022-06-17 NOTE — ASSESSMENT & PLAN NOTE
· Will place on [facility] step 2 diet  · In light of infection will obtain Accu-Cheks AC and HS with Humalog correction dose a c   And HS

## 2022-06-17 NOTE — PLAN OF CARE
Problem: PAIN - ADULT  Goal: Verbalizes/displays adequate comfort level or baseline comfort level  Description: Interventions:  - Encourage patient to monitor pain and request assistance  - Assess pain using appropriate pain scale  - Administer analgesics based on type and severity of pain and evaluate response  - Implement non-pharmacological measures as appropriate and evaluate response  - Consider cultural and social influences on pain and pain management  - Notify physician/advanced practitioner if interventions unsuccessful or patient reports new pain  Outcome: Progressing     Problem: INFECTION - ADULT  Goal: Absence or prevention of progression during hospitalization  Description: INTERVENTIONS:  - Assess and monitor for signs and symptoms of infection  - Monitor lab/diagnostic results  - Monitor all insertion sites, i e  indwelling lines, tubes, and drains  - Monitor endotracheal if appropriate and nasal secretions for changes in amount and color  - [place] appropriate cooling/warming therapies per order  - Administer medications as ordered  - Instruct and encourage patient and family to use good hand hygiene technique  - Identify and instruct in appropriate isolation precautions for identified infection/condition  Outcome: Progressing     Problem: INFECTION - ADULT  Goal: Absence of fever/infection during neutropenic period  Description: INTERVENTIONS:  - Monitor WBC    Outcome: Progressing     Problem: SKIN/TISSUE INTEGRITY - ADULT  Goal: Skin Integrity remains intact(Skin Breakdown Prevention)  Description: Assess:  -Perform Javier assessment   -Clean and moisturize skin   -Inspect skin when repositioning, toileting, and assisting with ADLS  -Assess under medical devices    -Assess extremities for adequate circulation and sensation     Bed Management:  -Have minimal linens on bed & keep smooth, unwrinkled  -Change linens as needed when moist or perspiring  -Avoid sitting or lying in one position for more  hours while in bed  -Keep HOB at degrees     Toileting:  -Offer bedside commode  -Assess for incontinence   -Use incontinent care products after each incontinent episode     Activity:  -Mobilize patient times a day  -Encourage activity and walks on unit  -Encourage or provide ROM exercises   -Turn and reposition patient every  Hours  -Use appropriate equipment to lift or move patient in bed  -Instruct/ Assist with weight shifting every  when out of bed in chair  -Consider limitation of chair time  hour intervals    Skin Care:  -Avoid use of baby powder, tape, friction and shearing, hot water or constrictive clothing  -Relieve pressure over bony prominences   -Do not massage red bony areas    Next Steps:  -Teach patient strategies to minimize risks    -Consider consults to  interdisciplinary teams   Outcome: Progressing     Problem: SKIN/TISSUE INTEGRITY - ADULT  Goal: Incision(s), wounds(s) or drain site(s) healing without S/S of infection  Description: INTERVENTIONS  - Assess and document dressing, incision, wound bed, drain sites and surrounding tissue  - Provide patient and family education  - Perform skin care/dressing changes   Outcome: Progressing

## 2022-06-17 NOTE — ASSESSMENT & PLAN NOTE
· Placed in observation Medicine  · This is been an ongoing problem for patient with recent admission on 05/14/2022 at [facility] with subsequent discharged on 05/15/2022  · Patient was subsequently admitted to [facility] on 05/17/2022 with subsequent discharge on 05/20/2022  · Patient states that he was discharged to a nursing home and had been there for approximately 1 month before being discharged 3 days ago  · Supposed to follow with wound care as an outpatient  · Will give vancomycin due to history of MRSA with pharmacy to dose  · Likely venous stasis versus acute bacterial infection as patient is afebrile with no leukocytosis  · Will obtain procalcitonin level and consider cessation of antibiotics pending results  · Consult wound care

## 2022-06-17 NOTE — CASE MANAGEMENT
Case Management Assessment    Patient name Clifford Padron  Location 7T /7T -07 MRN [id number]  : 1975 Date 2022       Current Admission Date: 2022  Current Admission Diagnosis:Cellulitis of lower extremity   Patient Active Problem List    Diagnosis Date Noted    CORKY (generalized anxiety disorder) 2022    Pain and swelling of lower extremity 2022    Primary hypertension 2022    Dyslipidemia 2022    Crohn's disease ([facility] ) 2022    Bipolar disorder ([place] [address] ) 2022    Morbid obesity ([place] [address] ) 2022    Hypothyroidism 2022    Cellulitis of lower extremity 2022    Peripheral edema 2022    Anxiety 2022    Dietary noncompliance 2021    Ambulatory dysfunction 2021    Venous stasis dermatitis 2021    Venous insufficiency of lower extremity 2021    Financial difficulties 10/26/2020    Bilateral leg edema 2020    Binge eating     Folic acid deficiency     ALESSANDRA on CPAP 2019    Fatty liver 2019    History of MRSA infection 2019    Pre-diabetes 2019    Peripheral polyneuropathy 2017    Vitamin D deficiency 2015    Gastroesophageal reflux disease without esophagitis 2015    Crohn's disease of large intestine without complication ([place] [address] )     Cocaine dependence in remission ([place] [address] ) 2011      LOS (days): 0  Geometric Mean LOS (GMLOS) (days):   Days to GMLOS:     OBJECTIVE:              Current admission status: Inpatient       Preferred Pharmacy:   [address] [place] [address]  Phone: [phone] Fax: [phone]    Primary Care Provider: Norris Mcdonald DO    Primary Insurance: MEDICARE  Secondary Insurance:     ASSESSMENT:  Active Health Care Proxies    There are no active Health Care Proxies on file  Progress Note: CM was notified by Physical therapy at morning rounds that pt will need [facility]  services visiting PT/OT and nurse  CM was notified that pt is open with [place] [address] services  CM sent referral through [facility] for resumption of care, Pending acceptance and Start of care date  Transportation at discharge: Relative    CM department will continue to follow through pt's D/C

## 2022-06-17 NOTE — DISCHARGE INSTR - OTHER ORDERS
1  Apply hydraguard to b/l heels, buttocks and sacrum BID and PRN for prevention and protection  2  Apply skin nourishing cream the entire skin daily for moisture  3  Turn and reposition patient every  2 hours   4  Elevate heels off of bed with pillows to offload pressure   5  Apply EHOB waffle cushion to chair when OOB, if able  6  Cleanse b/l lower extremity wounds with soap and water, pat dry, and apply xeroform to the wounds beds (unfold until its a single layer)  Cover with maxorb and top with ABD pads  Secure the dressings with Kerlex wrap  Change every other day and as needed for soilage/dislodgement  May apply ACE wrap if patient desires - apply from base of the toes to the knee gatch  7  Elevate b/l lower extremities for edema management as patient tolerates  8  Cleanse neck and abdominal skin folds with soap and water, pat dry, and dust the skin lightly with nystatin powder BID  9  Follow-up with the [facility] wound care center as an outpatient - please call [phone] for an appointment

## 2022-06-17 NOTE — PROGRESS NOTES
Vancomycin IV Pharmacy-to-Dose Consultation    Eagle Parekh is a 48 y o  male who is currently receiving Vancomycin IV with management by the Pharmacy Consult service  Relevant clinical data and objective / subjective history reviewed  Vancomycin Assessment:  Indication: skin-soft tissue infection  Micro:               BC x 2: in process  Procalcitonin: 0 09 ()  Renal Function: at baseline on admission; SCr 1 02 (); CrCl~152 mL/min  Days of Therapy: 1  Goal Trough: 15-20 (appropriate for most indications)   Goal AUC(24h): 400-600      Vancomycin Plan:  New Dosin mg IV q8h   Next Level: trough  at 1330  Renal Function Monitoring: continue to monitor SCr and UOP; next BMP scheduled for  AM      Pharmacy will continue to follow closely for s/sx of nephrotoxicity, infusion reactions and appropriateness of therapy  BMP and CBC will be ordered per protocol  We will continue to follow the patient's culture results and clinical progress daily         Mahogany Carter, PharmD  Pharmacist

## 2022-06-17 NOTE — ASSESSMENT & PLAN NOTE
Continue pre-hospital Lopressor 50 mg p o  B i d , hydrochlorothiazide 12 5 mg p o  Daily and give Lasix 40 mg IV b i d

## 2022-06-17 NOTE — ASSESSMENT & PLAN NOTE
Continue pre-hospital Invega 6 mg p o  Daily, Celexa 40 mg p o  Daily, BuSpar 10 mg p o  T i d , Remeron 7 5 mg p o  Q h s  and Depakote 1000 mg p o  Q a m   And 1500 mg p o  Q h s

## 2022-06-17 NOTE — ASSESSMENT & PLAN NOTE
Continue pre-hospital Invega 6 mg p o  Daily, Celexa 40 mg p o  Daily, BuSpar 10 mg p o  T i d   And Remeron 7 5 mg p o  Q h s
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
## 2022-06-17 NOTE — NURSING NOTE
Patient this morning requested no have a regular carbohydrate tray  States, "I am not diabetic and want to eat what I want " Encouraged to make healthy choices regarding diet and No added salt and fluid restriction must remain  Patient's blood glucose tonight required insulin coverage  Initially patient voiced concerns that it was too high  However, he refused any sliding scale coverage  Attempted teaching about infection ability to increase blood glucose  Patient states, "I had a box of medicine for the infection last night " Teaching ineffective  No learning noted  Yelled at [address] [place] [address] about the bacitracin she was applying to his toes  "The wound care nurse told me that was the worst thing for my legs  That it would burn my skin  Take it off " Bactroban was left on the list by wound care nurse  Will continue to monitor

## 2022-06-17 NOTE — ASSESSMENT & PLAN NOTE
· This is been ongoing for from for the patient for the past year  · Had previously been on Lasix  · Will give Lasix 40 mg IV b i d   · Consult wound care in a m

## 2022-06-17 NOTE — WOUND OSTOMY CARE
Progress Note - Wound   Clifford Padron 48 y o  male MRN: [id number]  Unit/Bed#: 7T [facility] 707-01 Encounter: [id number]      Assessment:  Wound care consulted for assessment of lower extremity wounds  Patient admitted with cellulitis of the lower extremities  History of - obesity, chronic venous stasis, bipolar disorder, HTN, GERD, ALESSANDRA, and anxiety  Patient seen in bed, alert and oriented x 4, cooperative and agreeable for the assessment  Patient declined assess of his sacrum/buttocks, denies wounds  Patient is continent and ambulatory at baseline per self report  Patient does not follow at a wound center for his lower extremities and states he was recently at a nursing home  He wears ACE wraps for compression and has VNA to assist with his wound care  1  L neck - skin is intact, moist, and pink  Mild yeast odor  No open aspects  Will recommend nystatin powder  2  Mid abdominal pannus - skin is intact, moist, and pink  Mild yeast odor  No open aspects  Will recommend nystatin powder  3  LLE extremity - suspect underlying venous stasis  Wounds are nearly circumferential, partial thickness, and are all measured together  Wound beds are 100% moist red tissue  Edges fragile and attached with maceration  Neela-wounds/ remainder of skin to the LLE is intact with fragile pink denuded skin, adhered crusting/scabbing, and edema  4  RLE extremity - suspect underlying venous stasis with element of cellulitis  Wounds are circumferential, partial thickness, and are all measured together  Wound beds are 100% moist red tissue  Edges fragile and attached with maceration  Neela-wounds/ remainder of skin to the RLE is intact with fragile red denuded skin, adhered crusting/scabbing, and edema  Additionally, suspect there is an element of poor hygiene  No induration, fluctuance, odor, warmth/temperature differences, redness, or purulence noted to the above noted wounds and skin areas assessed  New dressings applied  Patient tolerated assessment well- denies pain and no s/s of non-verbal pain or discomfort observed during the encounter  Primary nurse aware of plan of care  See flow sheets for more detailed assessment findings  Educated patient on the importance of frequent offloading of pressure via turning, repositioning and weight-shifting  Verbalized understanding of plan of care and wound care  Will follow along  Discussed assessment findings, and plan of care/recommendations with Dr Elsy Barnett- including need for follow-up for patient and nystatin powder order obtained  Plan:   1  Apply hydraguard to b/l heels, buttocks and sacrum BID and PRN for prevention and protection  2  Apply skin nourishing cream the entire skin daily for moisture  3  Turn and reposition patient every  2 hours   4  Elevate heels off of bed with pillows to offload pressure   5  Apply EHOB waffle cushion to chair when OOB, if able  6  Cleanse b/l lower extremity wounds with soap and water, pat dry, and apply xeroform to the wounds beds (unfold until its a single layer)  Cover with maxorb and top with ABD pads  Secure the dressings with Kerlex wrap  Change every other day and as needed for soilage/dislodgement  May apply ACE wrap if patient desires - apply from base of the toes to the knee gatch  7  Elevate b/l lower extremities for edema management as patient tolerates  8  Cleanse neck and abdominal skin folds with soap and water, pat dry, and dust the skin lightly with nystatin powder BID  9  Follow-up with the [facility] wound care center as an outpatient - please call [phone] for an appointment  Objective:    Vitals: Blood pressure 117/68, pulse 68, temperature (!) 96 8 °F (36 °C), temperature source Temporal, resp  rate 20, height 6' 2" (1 88 m), weight (!) 212 kg (467 lb 2 5 oz), SpO2 96 %  ,Body mass index is 59 98 kg/m²          Wound 06/16/22 Pretibial Left (Active)   Wound Image     06/17/22 0808   Wound Description You red 06/17/22 0825   Neela-wound Assessment Intact;Fragile; Maceration;Yellow-brown;Edema; Erythema;Scaly 06/17/22 0825   Wound Length (cm) 14 cm 06/17/22 0825   Wound Width (cm) 26 cm 06/17/22 0825   Wound Depth (cm) 0 1 cm 06/17/22 0825   Wound Surface Area (cm^2) 364 cm^2 06/17/22 0825   Wound Volume (cm^3) 36 4 cm^3 06/17/22 0825   Calculated Wound Volume (cm^3) 36 4 cm^3 06/17/22 0825   Tunneling 0 cm 06/17/22 0825   Tunneling in depth located at 0 06/17/22 0825   Undermining 0 06/17/22 0825   Undermining is depth extending from 0 06/17/22 0825   Drainage Amount Moderate 06/17/22 0825   Drainage Description Serosanguineous; Yellow 06/17/22 0825   Non-staged Wound Description Partial thickness 06/17/22 0825   Treatments Cleansed;Elevated;Site care 06/17/22 0825   Dressing Calcium Alginate;ABD;Xeroform 06/17/22 0825   Wound packed? No 06/17/22 0825   Packing- # removed 0 06/17/22 0825   Packing- # inserted 0 06/17/22 0825   Dressing Changed New 06/17/22 0825   Patient Tolerance Tolerated well 06/17/22 0825   Dressing Status Clean;Dry; Intact 06/17/22 0825       Wound 06/16/22 Pretibial Right (Active)   Wound Image      06/17/22 0826   Wound Description Beefy red 06/17/22 0826   Neela-wound Assessment Intact; Maceration;Fragile;Erythema;Edema; Yellow-brown;Scaly 06/17/22 0826   Wound Length (cm) 45 cm 06/17/22 0826   Wound Width (cm) 30 cm 06/17/22 0826   Wound Depth (cm) 0 1 cm 06/17/22 0826   Wound Surface Area (cm^2) 1350 cm^2 06/17/22 0826   Wound Volume (cm^3) 135 cm^3 06/17/22 0826   Calculated Wound Volume (cm^3) 135 cm^3 06/17/22 0826   Tunneling 0 cm 06/17/22 0826   Tunneling in depth located at 0 06/17/22 0826   Undermining 0 06/17/22 0826   Undermining is depth extending from 0 06/17/22 0826   Drainage Amount Large 06/17/22 0826   Drainage Description Yellow;Serosanguineous 06/17/22 0826   Non-staged Wound Description Partial thickness 06/17/22 0826   Treatments Cleansed;Elevated;Site care 06/17/22 6802 Dressing ABD;Calcium Alginate;Xeroform 06/17/22 0826   Wound packed? No 06/17/22 0826   Packing- # removed 0 06/17/22 0826   Packing- # inserted 0 06/17/22 0826   Patient Tolerance Tolerated well 06/17/22 0826   Dressing Status Clean;Dry; Intact 06/17/22 0826             Tiger text with questions or concerns  Wound care will follow along weekly  AVS updated    Olga Hyde RN BSN CWON

## 2022-06-18 LAB
ANION GAP SERPL CALCULATED.3IONS-SCNC: 5 MMOL/L (ref 5–14)
BUN SERPL-MCNC: 23 MG/DL (ref 5–25)
CALCIUM SERPL-MCNC: 7.8 MG/DL (ref 8.4–10.2)
CHLORIDE SERPL-SCNC: 105 MMOL/L (ref 97–108)
CO2 SERPL-SCNC: 29 MMOL/L (ref 22–30)
CREAT SERPL-MCNC: 1.16 MG/DL (ref 0.7–1.5)
ERYTHROCYTE [DISTWIDTH] IN BLOOD BY AUTOMATED COUNT: 13.6 % (ref 11.6–15.1)
GFR SERPL CREATININE-BSD FRML MDRD: 74 ML/MIN/1.73SQ M
GLUCOSE SERPL-MCNC: 107 MG/DL (ref 70–99)
GLUCOSE SERPL-MCNC: 118 MG/DL (ref 65–140)
GLUCOSE SERPL-MCNC: 92 MG/DL (ref 65–140)
GLUCOSE SERPL-MCNC: 95 MG/DL (ref 65–140)
GLUCOSE SERPL-MCNC: 97 MG/DL (ref 65–140)
HCT VFR BLD AUTO: 38.4 % (ref 36.5–49.3)
HGB BLD-MCNC: 11.7 G/DL (ref 12–17)
MAGNESIUM SERPL-MCNC: 2.1 MG/DL (ref 1.6–2.3)
MCH RBC QN AUTO: 30.9 PG (ref 26.8–34.3)
MCHC RBC AUTO-ENTMCNC: 30.5 G/DL (ref 31.4–37.4)
MCV RBC AUTO: 101 FL (ref 82–98)
PLATELET # BLD AUTO: 199 THOUSANDS/UL (ref 149–390)
PMV BLD AUTO: 9.8 FL (ref 8.9–12.7)
POTASSIUM SERPL-SCNC: 4.2 MMOL/L (ref 3.6–5)
PROCALCITONIN SERPL-MCNC: 0.06 NG/ML
RBC # BLD AUTO: 3.79 MILLION/UL (ref 3.88–5.62)
SODIUM SERPL-SCNC: 139 MMOL/L (ref 137–147)
WBC # BLD AUTO: 4.51 THOUSAND/UL (ref 4.31–10.16)

## 2022-06-18 PROCEDURE — 94760 N-INVAS EAR/PLS OXIMETRY 1: CPT

## 2022-06-18 PROCEDURE — 82948 REAGENT STRIP/BLOOD GLUCOSE: CPT

## 2022-06-18 PROCEDURE — 94003 VENT MGMT INPAT SUBQ DAY: CPT

## 2022-06-18 PROCEDURE — 84145 PROCALCITONIN (PCT): CPT | Performed by: STUDENT IN AN ORGANIZED HEALTH CARE EDUCATION/TRAINING PROGRAM

## 2022-06-18 PROCEDURE — 80048 BASIC METABOLIC PNL TOTAL CA: CPT | Performed by: STUDENT IN AN ORGANIZED HEALTH CARE EDUCATION/TRAINING PROGRAM

## 2022-06-18 PROCEDURE — 83735 ASSAY OF MAGNESIUM: CPT | Performed by: STUDENT IN AN ORGANIZED HEALTH CARE EDUCATION/TRAINING PROGRAM

## 2022-06-18 PROCEDURE — 99232 SBSQ HOSP IP/OBS MODERATE 35: CPT | Performed by: INTERNAL MEDICINE

## 2022-06-18 PROCEDURE — 85027 COMPLETE CBC AUTOMATED: CPT | Performed by: STUDENT IN AN ORGANIZED HEALTH CARE EDUCATION/TRAINING PROGRAM

## 2022-06-18 RX ORDER — HYDROXYZINE HYDROCHLORIDE 25 MG/1
25 TABLET, FILM COATED ORAL EVERY 6 HOURS PRN
Status: DISCONTINUED | OUTPATIENT
Start: 2022-06-18 | End: 2022-06-20 | Stop reason: HOSPADM

## 2022-06-18 RX ORDER — CEFAZOLIN SODIUM 2 G/50ML
2000 SOLUTION INTRAVENOUS EVERY 8 HOURS
Status: DISCONTINUED | OUTPATIENT
Start: 2022-06-18 | End: 2022-06-19

## 2022-06-18 RX ADMIN — ACETAMINOPHEN 650 MG: 325 TABLET ORAL at 12:33

## 2022-06-18 RX ADMIN — BUSPIRONE HYDROCHLORIDE 10 MG: 10 TABLET ORAL at 08:33

## 2022-06-18 RX ADMIN — POTASSIUM CHLORIDE 20 MEQ: 1500 TABLET, EXTENDED RELEASE ORAL at 08:32

## 2022-06-18 RX ADMIN — PANTOPRAZOLE SODIUM 40 MG: 40 TABLET, DELAYED RELEASE ORAL at 05:14

## 2022-06-18 RX ADMIN — HYDROXYZINE HYDROCHLORIDE 25 MG: 25 TABLET, FILM COATED ORAL at 15:29

## 2022-06-18 RX ADMIN — MUPIROCIN: 20 OINTMENT TOPICAL at 15:30

## 2022-06-18 RX ADMIN — LEVOTHYROXINE SODIUM 25 MCG: 25 TABLET ORAL at 05:14

## 2022-06-18 RX ADMIN — MUPIROCIN: 20 OINTMENT TOPICAL at 08:34

## 2022-06-18 RX ADMIN — MIRTAZAPINE 7.5 MG: 7.5 TABLET, FILM COATED ORAL at 21:57

## 2022-06-18 RX ADMIN — HEPARIN SODIUM 5000 UNITS: 5000 INJECTION INTRAVENOUS; SUBCUTANEOUS at 13:23

## 2022-06-18 RX ADMIN — HYDROCODONE BITARTRATE AND ACETAMINOPHEN 1 TABLET: 5; 325 TABLET ORAL at 05:14

## 2022-06-18 RX ADMIN — CLONAZEPAM 1 MG: 1 TABLET ORAL at 15:30

## 2022-06-18 RX ADMIN — HYDROCODONE BITARTRATE AND ACETAMINOPHEN 1 TABLET: 5; 325 TABLET ORAL at 12:33

## 2022-06-18 RX ADMIN — ACETAMINOPHEN 650 MG: 325 TABLET ORAL at 05:14

## 2022-06-18 RX ADMIN — HEPARIN SODIUM 5000 UNITS: 5000 INJECTION INTRAVENOUS; SUBCUTANEOUS at 21:57

## 2022-06-18 RX ADMIN — DIVALPROEX SODIUM 1000 MG: 500 TABLET, DELAYED RELEASE ORAL at 21:58

## 2022-06-18 RX ADMIN — METOPROLOL TARTRATE 50 MG: 50 TABLET, FILM COATED ORAL at 08:32

## 2022-06-18 RX ADMIN — FUROSEMIDE 40 MG: 10 INJECTION, SOLUTION INTRAVENOUS at 08:33

## 2022-06-18 RX ADMIN — PALIPERIDONE 6 MG: 6 TABLET, EXTENDED RELEASE ORAL at 08:37

## 2022-06-18 RX ADMIN — NYSTATIN: 100000 POWDER TOPICAL at 08:33

## 2022-06-18 RX ADMIN — CLONAZEPAM 1 MG: 1 TABLET ORAL at 08:33

## 2022-06-18 RX ADMIN — CEFAZOLIN SODIUM 2000 MG: 2 SOLUTION INTRAVENOUS at 11:16

## 2022-06-18 RX ADMIN — NYSTATIN: 100000 POWDER TOPICAL at 17:01

## 2022-06-18 RX ADMIN — DIVALPROEX SODIUM 500 MG: 500 TABLET, DELAYED RELEASE ORAL at 21:57

## 2022-06-18 RX ADMIN — GABAPENTIN 100 MG: 100 CAPSULE ORAL at 21:57

## 2022-06-18 RX ADMIN — HEPARIN SODIUM 5000 UNITS: 5000 INJECTION INTRAVENOUS; SUBCUTANEOUS at 05:14

## 2022-06-18 RX ADMIN — GABAPENTIN 100 MG: 100 CAPSULE ORAL at 15:30

## 2022-06-18 RX ADMIN — CEFAZOLIN SODIUM 2000 MG: 2 SOLUTION INTRAVENOUS at 18:30

## 2022-06-18 RX ADMIN — VANCOMYCIN HYDROCHLORIDE 2000 MG: 1 INJECTION, POWDER, LYOPHILIZED, FOR SOLUTION INTRAVENOUS at 05:51

## 2022-06-18 RX ADMIN — BUSPIRONE HYDROCHLORIDE 10 MG: 10 TABLET ORAL at 21:57

## 2022-06-18 RX ADMIN — CITALOPRAM HYDROBROMIDE 40 MG: 20 TABLET ORAL at 08:32

## 2022-06-18 RX ADMIN — Medication 2000 UNITS: at 08:32

## 2022-06-18 RX ADMIN — BUSPIRONE HYDROCHLORIDE 10 MG: 10 TABLET ORAL at 15:30

## 2022-06-18 RX ADMIN — FUROSEMIDE 40 MG: 10 INJECTION, SOLUTION INTRAVENOUS at 15:30

## 2022-06-18 RX ADMIN — LOPERAMIDE HYDROCHLORIDE 4 MG: 2 CAPSULE ORAL at 18:38

## 2022-06-18 RX ADMIN — CLONAZEPAM 1 MG: 1 TABLET ORAL at 21:57

## 2022-06-18 RX ADMIN — PRAVASTATIN SODIUM 80 MG: 40 TABLET ORAL at 15:30

## 2022-06-18 RX ADMIN — GABAPENTIN 100 MG: 100 CAPSULE ORAL at 08:32

## 2022-06-18 RX ADMIN — DIVALPROEX SODIUM 1000 MG: 500 TABLET, DELAYED RELEASE ORAL at 09:19

## 2022-06-18 RX ADMIN — HYDROCHLOROTHIAZIDE 12.5 MG: 12.5 TABLET ORAL at 08:33

## 2022-06-18 NOTE — PROGRESS NOTES
[facility]  Progress Note - Radhariannette Mins 1975, 48 y o  male MRN: [id number]  Unit/Bed#: 7T [facility] 707-01 Encounter: [id number]  Primary Care Provider: Zoë Bowser DO   Date and time admitted to hospital: 6/16/2022  5:23 PM    * Cellulitis of lower extremity  Assessment & Plan  - longstanding history of bilateral lower extremity lymphedema  - recent hospitalizations in May at both [facility], and [facility]  - just recently discharged back to home from rehab facility  - returns with bilateral lower extremity swelling, erythema    - difficult to tell if this is just venous stasis dermatitis, however there could be a cellulitic component  - discontinue IV vancomycin; will continue on IV cefazolin for now  - if erythema improves with cefazolin will plan for full course of treatment  However if no changes clinically can probably discontinue altogether and monitor off antibiotics  Pre-diabetes  Assessment & Plan  - monitor on ISS  - consistent carb diet    Peripheral edema  Assessment & Plan  - chronic bilateral lower extremity lymphedema  - IV Lasix 40 mg b i d  For now  - can probably transition to p o  Lasix tomorrow especially if bump in creatinine    ALESSANDRA on CPAP  Assessment & Plan  - CPAP q h s  Hypothyroidism  Assessment & Plan  - continue levothyroxine    Morbid obesity ([facility] [address] )  Assessment & Plan  - BMI a 59; affects all levels of care, diet and lifestyle modifications recommended    Bipolar disorder ([facility] [address] )  Assessment & Plan  - mood stable on home Invega,Celexa, BuSpar, Remeron, Depakote    Primary hypertension  Assessment & Plan  - monitor on home metoprolol, HCTZ  - home Lasix changed to IV given lower extremity lymphedema        VTE Pharmacologic Prophylaxis:   Moderate Risk (Score 3-4) - Pharmacological DVT Prophylaxis Ordered: heparin  Patient Centered Rounds: I performed bedside rounds with nursing staff today    Discussions with Specialists or Other Care Team Provider: none  Education and Discussions with Family / Patient: Patient declined call to   Time Spent for Care: 30 minutes  More than 50% of total time spent on counseling and coordination of care as described above  Current Length of Stay: 1 day(s)  Current Patient Status: Inpatient   Certification Statement: The patient will continue to require additional inpatient hospital stay due to cellulitis, ambulatory dysfunction, discharge planning  Discharge Plan: Anticipate discharge in 48 hrs to TBD     Code Status: Level 1 - Full Code    Subjective:   Patient seen and examined  Following up for LE edema and cellulitis  Afebrile  Biggest complaint is itchiness, in his legs and back  Has some pain in the legs  Otherwise vital signs have been stable  No other new complaints  Objective:     Vitals:   Temp (24hrs), Av 2 °F (36 2 °C), Min:96 6 °F (35 9 °C), Max:97 6 °F (36 4 °C)    Temp:  [96 6 °F (35 9 °C)-97 6 °F (36 4 °C)] 96 6 °F (35 9 °C)  HR:  [65-68] 65  Resp:  [18-20] 20  BP: (110-135)/(60-64) 127/64  SpO2:  [92 %-96 %] 92 %  Body mass index is 59 98 kg/m²  Input and Output Summary (last 24 hours): Intake/Output Summary (Last 24 hours) at 2022 1026  Last data filed at 2022 0501  Gross per 24 hour   Intake 2040 83 ml   Output 1500 ml   Net 540 83 ml       PHYSICAL EXAM:    Vitals signs reviewed  Constitutional   Awake and cooperative  NAD  Head/Neck   Normocephalic  Atraumatic  HEENT   No scleral icterus  EOMI  Heart   Regular rate and rhythm  No murmers  Lungs   Clear to auscultation bilaterally  Respirations unlaboured  Abdomen   Soft  Nontender  Nondistended  Obese  Skin   Skin color normal  No rashes  Extremities   Bilateral lower extremity chronic lymphedema with venous stasis changes  There is also surrounding erythema which could be a cellulitic component  There is some sloughing of the skin and flaking     Neuro   Alert and oriented  No new deficits  Psych   Mood stable  Affect normal          Additional Data:     Labs:  Results from last 7 days   Lab Units 06/18/22  0443 06/17/22  0440   WBC Thousand/uL 4 51 5 70   HEMOGLOBIN g/dL 11 7* 13 4   HEMATOCRIT % 38 4 43 4   PLATELETS Thousands/uL 199 204   NEUTROS PCT %  --  30*   LYMPHS PCT %  --  35   MONOS PCT %  --  29*   EOS PCT %  --  4     Results from last 7 days   Lab Units 06/18/22  0443 06/17/22  0440 06/16/22  1824   SODIUM mmol/L 139   < > 139   POTASSIUM mmol/L 4 2   < > 4 4   CHLORIDE mmol/L 105   < > 103   CO2 mmol/L 29   < > 31*   BUN mg/dL 23   < > 25   CREATININE mg/dL 1 16   < > 1 02   ANION GAP mmol/L 5   < > 5   CALCIUM mg/dL 7 8*   < > 8 2*   ALBUMIN g/dL  --   --  3 3   TOTAL BILIRUBIN mg/dL  --   --  1 03   ALK PHOS U/L  --   --  66   ALT U/L  --   --  20   AST U/L  --   --  38   GLUCOSE RANDOM mg/dL 107*   < > 122*    < > = values in this interval not displayed  Results from last 7 days   Lab Units 06/16/22  1824   INR  1 06     Results from last 7 days   Lab Units 06/18/22  0527 06/17/22  2020 06/17/22  1540 06/17/22  1133 06/17/22  0535 06/16/22  2059   POC GLUCOSE mg/dl 97 137 176* 133 120 108         Results from last 7 days   Lab Units 06/18/22  0443 06/16/22  1824   LACTIC ACID mmol/L  --  1 5   PROCALCITONIN ng/ml 0 06 0 09       Lines/Drains:  Invasive Devices  Report    Peripheral Intravenous Line  Duration           Peripheral IV 06/16/22 Right Antecubital 1 day                      Imaging: No pertinent imaging reviewed  Recent Cultures (last 7 days):   Results from last 7 days   Lab Units 06/16/22  1824 06/16/22  1807   BLOOD CULTURE  No Growth at 24 hrs  No Growth at 24 hrs         Last 24 Hours Medication List:   Current Facility-Administered Medications   Medication Dose Route Frequency Provider Last Rate    acetaminophen  650 mg Oral Q6H PRN Beverley Quiroz PA-C      busPIRone  10 mg Oral TID Beverley Quiroz PA-C      cefazolin  2,000 mg Intravenous Q8H Yohana Phillip DO      cholecalciferol  2,000 Units Oral Daily Imtiaz Rogers      citalopram  40 mg Oral Daily Juliet Aponte PA-C      clonazePAM  1 mg Oral TID Juliet Aponte PA-C      divalproex sodium  1,000 mg Oral Q12H Juliet Aponte PA-C      divalproex sodium  500 mg Oral HS Juliet Aponte PA-C      furosemide  40 mg Intravenous BID (diuretic) Juliet Aponte PA-C      gabapentin  100 mg Oral TID Juliet Aponte PA-C      heparin (porcine)  5,000 Units Subcutaneous [facility] Juliet Aponte PA-C      hydrochlorothiazide  12 5 mg Oral Daily Juliet Aponte PA-C      HYDROcodone-acetaminophen  1 tablet Oral Q6H PRN Juliet Aponte PA-C      insulin lispro  4-20 Units Subcutaneous TID [facility] Juliet Aponte PA-C      insulin lispro  4-20 Units Subcutaneous HS Juliet Aponte PA-C      levothyroxine  25 mcg Oral Early Morning Juliet Aponte PA-C      loperamide  4 mg Oral TID PRN Faby Baird, DO      melatonin  9 mg Oral HS PRN Juliet Aponte PA-C      metoprolol tartrate  50 mg Oral BID Juliet Aponte PA-C      mirtazapine  7 5 mg Oral HS Juliet Aponte PA-C      mupirocin   Topical TID Juliet Aponte PA-C      nystatin   Topical BID Faby Dirk, DO      ondansetron  4 mg Intravenous Q6H PRN Juliet Aponte PA-C      paliperidone  6 mg Oral Daily Juliet Aponte PA-C      pantoprazole  40 mg Oral Early Morning Juliet Aponte PA-C      potassium chloride  20 mEq Oral Daily Juliet Aponte PA-C      pravastatin  80 mg Oral Daily With Guadalupe Erickson PA-C      sodium chloride (PF)  3 mL Intravenous Q1H PRN Farzana Tam MD          Today, Patient Was Seen By: Yohana Phillip DO    **Please Note: This note may have been constructed using a voice recognition system  **

## 2022-06-18 NOTE — PLAN OF CARE
Problem: Potential for Falls  Goal: Patient will remain free of falls  Description: INTERVENTIONS:  - Educate patient/family on patient safety including physical limitations  - Instruct patient to call for assistance with activity   - Consult OT/PT to assist with strengthening/mobility   - Keep Call bell within reach  - Keep bed low and locked with side rails adjusted as appropriate  - Keep care items and personal belongings within reach  - Initiate and maintain comfort rounds  - Make Fall Risk Sign visible to staff  - Apply yellow socks and bracelet for high fall risk patients  - Consider moving patient to room near nurses station  Outcome: Progressing     Problem: MOBILITY - ADULT  Goal: Maintain or return to baseline ADL function  Description: INTERVENTIONS:  -  Assess patient's ability to carry out ADLs; assess patient's baseline for ADL function and identify physical deficits which impact ability to perform ADLs (bathing, care of mouth/teeth, toileting, grooming, dressing, etc )  - Assess/evaluate cause of self-care deficits   - Assess range of motion  - Assess patient's mobility; develop plan if impaired  - Assess patient's need for assistive devices and provide as appropriate  - Encourage maximum independence but intervene and supervise when necessary  - Involve family in performance of ADLs  - Assess for home care needs following discharge   - Consider OT consult to assist with ADL evaluation and planning for discharge  - Provide patient education as appropriate  Outcome: Progressing     Problem: Prexisting or High Potential for Compromised Skin Integrity  Goal: Skin integrity is maintained or improved  Description: INTERVENTIONS:  - Identify patients at risk for skin breakdown  - Assess and monitor skin integrity  - Assess and monitor nutrition and hydration status  - Monitor labs   - Assess for incontinence   - Turn and reposition patient  - Assist with mobility/ambulation  - Relieve pressure over bony prominences  - Avoid friction and shearing  - Provide appropriate hygiene as needed including keeping skin clean and dry  - Evaluate need for skin moisturizer/barrier cream  - Collaborate with interdisciplinary team   - Patient/family teaching  - Consider wound care consult   Outcome: Progressing     Problem: PAIN - ADULT  Goal: Verbalizes/displays adequate comfort level or baseline comfort level  Description: Interventions:  - Encourage patient to monitor pain and request assistance  - Assess pain using appropriate pain scale  - Administer analgesics based on type and severity of pain and evaluate response  - Implement non-pharmacological measures as appropriate and evaluate response  - Consider cultural and social influences on pain and pain management  - Notify physician/advanced practitioner if interventions unsuccessful or patient reports new pain  Outcome: Progressing     Problem: INFECTION - ADULT  Goal: Absence or prevention of progression during hospitalization  Description: INTERVENTIONS:  - Assess and monitor for signs and symptoms of infection  - Monitor lab/diagnostic results  - Monitor all insertion sites, i e  indwelling lines, tubes, and drains  - Monitor endotracheal if appropriate and nasal secretions for changes in amount and color  - [place] appropriate cooling/warming therapies per order  - Administer medications as ordered  - Instruct and encourage patient and family to use good hand hygiene technique  - Identify and instruct in appropriate isolation precautions for identified infection/condition  Outcome: Progressing     Problem: INFECTION - ADULT  Goal: Absence of fever/infection during neutropenic period  Description: INTERVENTIONS:  - Monitor WBC    Outcome: Progressing     Problem: DISCHARGE PLANNING  Goal: Discharge to home or other facility with appropriate resources  Description: INTERVENTIONS:  - Identify barriers to discharge w/patient and caregiver  - Arrange for needed discharge resources and transportation as appropriate  - Identify discharge learning needs (meds, wound care, etc )  - Arrange for interpretive services to assist at discharge as needed  - Refer to Case Management Department for coordinating discharge planning if the patient needs post-hospital services based on physician/advanced practitioner order or complex needs related to functional status, cognitive ability, or social support system  Outcome: Progressing     Problem: Knowledge Deficit  Goal: Patient/family/caregiver demonstrates understanding of disease process, treatment plan, medications, and discharge instructions  Description: Complete learning assessment and assess knowledge base    Interventions:  - Provide teaching at level of understanding  - Provide teaching via preferred learning methods  Outcome: Progressing

## 2022-06-18 NOTE — CONSULTS
Consult - Podiatry   Ga Boo 48 y o  male MRN: [id number]  Unit/Bed#: 7T [facility] 707-01 Encounter: [id number]    Assessment/Plan     Assessment:  Bilateral lower extremity venous stasis ulcerations in the setting of lymphedema with possible cellulitis (though this appears to be more consistent with venous stasis changes than cellulitis clinically as well as considering his normal white count and procalcitonin)  The patient was evaluated and treat  We did discuss etiology and treatment  I reviewed the venous duplex ultrasound with no DVT bilaterally  We will continue dressings as ordered by wound care who was consulted on yesterday with xeroform/abd pads, kerlix and ace from toes to tibial tuberosity, with compression  May benefit from surepress for increased compression, I have ordered this to replace the ace bandages once available, fortunately he has palpabe dorsalis pedis pulse  Antibiotics per SLIM    Onychomycosis  -will benefit from at risk foot care    History of Present Illness     HPI:  Ga Boo is a 48 y o  male who presents with bilateral lower extremity venous stasis ulcerations  Patient has been treated for this and currently has a wound care nurse but he states he does not have a wound care doctor  Patient states that he is familiar with being diagnosed with lymphedema but he has not had lymphedema therapy or pumps  Patient was admitted for worsening wounds after being sent in by his visiting nurse 2 days ago  He was admitted to the hospital   He had a venous duplex which was negative for DVT bilaterally  He was seen by wound care who recommended Xeroform to bilateral lower extremities followed by ABDs Kerlix and Ace bandages  He does not typically use compression for his lower extremities  Internal Medicine has seen him and noted that he had possible cellulitis and started him on cefazolin  He states that he has had these wounds for many years but that they have been getting worse  He denies any nausea vomiting fever chills shortness of breath or chest pain    Consults  Review of Systems   Constitutional: Negative  HENT: Negative  Eyes: Negative  Respiratory: Negative  Cardiovascular: Negative  Gastrointestinal: Negative  Musculoskeletal:negative  Skin:b/l LE wounds as in HPI   Neurological: Negative  Psych: negative  Historical Information   Past Medical History:   Diagnosis Date    Bipolar affective disorder ([facility] [address] )     Cellulitis     Hyperlipidemia     Hypertension      History reviewed  No pertinent surgical history  Social History   Social History     Substance and Sexual Activity   Alcohol Use Never     Social History     Substance and Sexual Activity   Drug Use Never     Social History     Tobacco Use   Smoking Status Former Smoker   Smokeless Tobacco Never Used     Family History: History reviewed  No pertinent family history      Meds/Allergies   Medications Prior to Admission   Medication    busPIRone (BUSPAR) 10 mg tablet    Cholecalciferol (Vitamin D3) 50 MCG (2000 UT) capsule    citalopram (CeleXA) 40 mg tablet    loperamide (IMODIUM) 2 mg capsule    rosuvastatin (CRESTOR) 10 MG tablet    traZODone (DESYREL) 50 mg tablet    clonazePAM (KlonoPIN) 1 mg tablet    divalproex sodium (DEPAKOTE) 500 mg EC tablet    furosemide (LASIX) 40 mg tablet    hydrochlorothiazide (HYDRODIURIL) 12 5 mg tablet    levothyroxine 25 mcg tablet    Melatonin 10 MG TABS    metoprolol tartrate (LOPRESSOR) 50 mg tablet    mirtazapine (REMERON) 7 5 MG tablet    mupirocin (BACTROBAN) 2 % ointment    paliperidone (INVEGA) 6 MG 24 hr tablet    pantoprazole (PROTONIX) 40 mg tablet    potassium chloride (K-DUR,KLOR-CON) 20 mEq tablet     Allergies   Allergen Reactions    Mesalamine GI Intolerance     Pt has had severe diarrhea and abdominal pain on mESALAMINE       Objective   First Vitals:   Blood Pressure: 157/84 (06/16/22 1726)  Pulse: (!) 113 (06/16/22 1726)  Temperature: 99 1 °F (37 3 °C) (06/16/22 1726)  Temp Source: Tympanic (06/16/22 1726)  Respirations: 22 (06/16/22 1726)  Height: 6' 2" (188 cm) (06/16/22 2053)  Weight - Scale: (!) 213 kg (470 lb) (06/16/22 1726)  SpO2: 98 % (06/16/22 1726)    Current Vitals:   Blood Pressure: 127/64 (06/18/22 0723)  Pulse: 65 (06/18/22 0723)  Temperature: (!) 96 6 °F (35 9 °C) (06/18/22 0723)  Temp Source: Temporal (06/18/22 0723)  Respirations: 20 (06/18/22 0723)  Height: 6' 2" (188 cm) (06/16/22 2053)  Weight - Scale: (!) 211 kg (465 lb) (06/18/22 0600)  SpO2: 92 % (06/18/22 0723)        /64 (BP Location: Left arm)   Pulse 65   Temp (!) 96 6 °F (35 9 °C) (Temporal)   Resp 20   Ht 6' 2" (1 88 m)   Wt (!) 211 kg (465 lb)   SpO2 92%   BMI 59 70 kg/m²      General Appearance:    Alert, cooperative, no distress   Head:    Normocephalic, without obvious abnormality, atraumatic   Eyes:    PERRL, conjunctiva/corneas clear, EOM's intact        Nose:   Moist mucous membranes   Neck:   Supple, symmetrical, trachea midline   Back:     Symmetric   Lungs:     Respirations unlabored       Abdomen:     Soft, non-tender   Extremities: Dorsalis pedis 2/4 bilaterally, posterior tibial pulses nonpalpable secondary to significant edema, capillary refill time less than 3 seconds to all digits erythema with multiple draining areas to bilateral lower extremities from his toes to his tibial tuberosity  Pitting edema 3+ with Stemmer sign  Epicritic sensation is intact  No gross orthopedic deformity   Moderate serosanguineous drainage, no purulence, no malodor, no crepitus, mycotic toenails x10   Pulses:                    Lab Results:   Admission on 06/16/2022   Component Date Value    WBC 06/16/2022 6 74     RBC 06/16/2022 4 19     Hemoglobin 06/16/2022 12 8     Hematocrit 06/16/2022 40 6     MCV 06/16/2022 97     MCH 06/16/2022 30 5     MCHC 06/16/2022 31 5     RDW 06/16/2022 13 0     MPV 06/16/2022 9 5     Platelets 06/16/2022 214     nRBC 06/16/2022 0     Neutrophils Relative 06/16/2022 44     Immat GRANS % 06/16/2022 2     Lymphocytes Relative 06/16/2022 28     Monocytes Relative 06/16/2022 24 (A)    Eosinophils Relative 06/16/2022 1     Basophils Relative 06/16/2022 1     Neutrophils Absolute 06/16/2022 3 02     Immature Grans Absolute 06/16/2022 0 10     Lymphocytes Absolute 06/16/2022 1 87     Monocytes Absolute 06/16/2022 1 64 (A)    Eosinophils Absolute 06/16/2022 0 07     Basophils Absolute 06/16/2022 0 04     Sodium 06/16/2022 139     Potassium 06/16/2022 4 4     Chloride 06/16/2022 103     CO2 06/16/2022 31 (A)    ANION GAP 06/16/2022 5     BUN 06/16/2022 25     Creatinine 06/16/2022 1 02     Glucose 06/16/2022 122 (A)    Calcium 06/16/2022 8 2 (A)    Corrected Calcium 06/16/2022 8 8     AST 06/16/2022 38     ALT 06/16/2022 20     Alkaline Phosphatase 06/16/2022 66     Total Protein 06/16/2022 6 9     Albumin 06/16/2022 3 3     Total Bilirubin 06/16/2022 1 03     eGFR 06/16/2022 86     LACTIC ACID 06/16/2022 1 5     Protime 06/16/2022 13 4     INR 06/16/2022 1 06     PTT 06/16/2022 29     Procalcitonin 06/16/2022 0 09     Color, UA 06/16/2022 Brown (A)    Clarity, UA 06/16/2022 Clear     Specific Gravity, UA 06/16/2022 1 025     pH, UA 06/16/2022 5 0     Leukocytes, UA 06/16/2022 25 0 (A)    Nitrite, UA 06/16/2022 Negative     Protein, UA 06/16/2022 15 (Trace) (A)    Glucose, UA 06/16/2022 Negative     Ketones, UA 06/16/2022 15 (1+) (A)    Bilirubin, UA 06/16/2022 1 mg/dL (A)    Blood, UA 06/16/2022 Negative     UROBILINOGEN UA 06/16/2022 8 0 (A)    Blood Culture 06/16/2022 No Growth at 24 hrs   Blood Culture 06/16/2022 No Growth at 24 hrs       POC Glucose 06/16/2022 108     RBC, UA 06/16/2022 None Seen     WBC, UA 06/16/2022 1-2     Epithelial Cells 06/16/2022 Occasional     Bacteria, UA 06/16/2022 Occasional     Hyaline Casts, UA 06/16/2022 0-1 (A)    MUCUS THREADS 06/16/2022 Moderate (A)    Sodium 06/17/2022 138     Potassium 06/17/2022 4 0     Chloride 06/17/2022 107     CO2 06/17/2022 26     ANION GAP 06/17/2022 5     BUN 06/17/2022 24     Creatinine 06/17/2022 0 95     Glucose 06/17/2022 103 (A)    Glucose, Fasting 06/17/2022 103 (A)    Calcium 06/17/2022 8 1 (A)    eGFR 06/17/2022 94     WBC 06/17/2022 5 70     RBC 06/17/2022 4 40     Hemoglobin 06/17/2022 13 4     Hematocrit 06/17/2022 43 4     MCV 06/17/2022 99 (A)    MCH 06/17/2022 30 5     MCHC 06/17/2022 30 9 (A)    RDW 06/17/2022 13 6     MPV 06/17/2022 10 1     Platelets 99/61/4725 204     nRBC 06/17/2022 0     Neutrophils Relative 06/17/2022 30 (A)    Immat GRANS % 06/17/2022 1     Lymphocytes Relative 06/17/2022 35     Monocytes Relative 06/17/2022 29 (A)    Eosinophils Relative 06/17/2022 4     Basophils Relative 06/17/2022 1     Neutrophils Absolute 06/17/2022 1 72 (A)    Immature Grans Absolute 06/17/2022 0 07     Lymphocytes Absolute 06/17/2022 1 98     Monocytes Absolute 06/17/2022 1 63 (A)    Eosinophils Absolute 06/17/2022 0 24     Basophils Absolute 06/17/2022 0 06     POC Glucose 06/17/2022 120     POC Glucose 06/17/2022 133     POC Glucose 06/17/2022 176 (A)    POC Glucose 06/17/2022 137     Sodium 06/18/2022 139     Potassium 06/18/2022 4 2     Chloride 06/18/2022 105     CO2 06/18/2022 29     ANION GAP 06/18/2022 5     BUN 06/18/2022 23     Creatinine 06/18/2022 1 16     Glucose 06/18/2022 107 (A)    Calcium 06/18/2022 7 8 (A)    eGFR 06/18/2022 74     WBC 06/18/2022 4 51     RBC 06/18/2022 3 79 (A)    Hemoglobin 06/18/2022 11 7 (A)    Hematocrit 06/18/2022 38 4     MCV 06/18/2022 101 (A)    MCH 06/18/2022 30 9     MCHC 06/18/2022 30 5 (A)    RDW 06/18/2022 13 6     Platelets 60/21/5600 199     MPV 06/18/2022 9 8     Magnesium 06/18/2022 2 1     Procalcitonin 06/18/2022 0 06     POC Glucose 06/18/2022 97     POC Glucose 06/18/2022 95              Results from last 7 days   Lab Units 06/16/22  1824 06/16/22  1807   BLOOD CULTURE  No Growth at 24 hrs  No Growth at 24 hrs  Imaging: I have personally reviewed pertinent films in PACS  EKG, Pathology, and Other Studies: I have personally reviewed pertinent reports        Code Status: Level 1 - Full Code  Advance Directive and Living Will:      Power of :    POLST:

## 2022-06-18 NOTE — ASSESSMENT & PLAN NOTE
- chronic bilateral lower extremity lymphedema  - IV Lasix 40 mg b i d  For now  - can probably transition to p o   Lasix tomorrow especially if bump in creatinine

## 2022-06-18 NOTE — ASSESSMENT & PLAN NOTE
- longstanding history of bilateral lower extremity lymphedema  - recent hospitalizations in May at both [facility], and [facility]  - just recently discharged back to home from rehab facility  - returns with bilateral lower extremity swelling, erythema    - difficult to tell if this is just venous stasis dermatitis, however there could be a cellulitic component  - discontinue IV vancomycin; will continue on IV cefazolin for now  - if erythema improves with cefazolin will plan for full course of treatment  However if no changes clinically can probably discontinue altogether and monitor off antibiotics

## 2022-06-19 PROBLEM — S81.809A MULTIPLE OPEN WOUNDS OF LOWER LEG: Status: ACTIVE | Noted: 2022-04-08

## 2022-06-19 LAB
ANION GAP SERPL CALCULATED.3IONS-SCNC: 5 MMOL/L (ref 5–14)
BUN SERPL-MCNC: 19 MG/DL (ref 5–25)
CALCIUM SERPL-MCNC: 7.5 MG/DL (ref 8.4–10.2)
CHLORIDE SERPL-SCNC: 103 MMOL/L (ref 97–108)
CO2 SERPL-SCNC: 32 MMOL/L (ref 22–30)
CREAT SERPL-MCNC: 1.14 MG/DL (ref 0.7–1.5)
ERYTHROCYTE [DISTWIDTH] IN BLOOD BY AUTOMATED COUNT: 13.5 % (ref 11.6–15.1)
GFR SERPL CREATININE-BSD FRML MDRD: 75 ML/MIN/1.73SQ M
GLUCOSE SERPL-MCNC: 121 MG/DL (ref 65–140)
GLUCOSE SERPL-MCNC: 127 MG/DL (ref 65–140)
GLUCOSE SERPL-MCNC: 165 MG/DL (ref 65–140)
GLUCOSE SERPL-MCNC: 92 MG/DL (ref 70–99)
GLUCOSE SERPL-MCNC: 98 MG/DL (ref 65–140)
HCT VFR BLD AUTO: 37.3 % (ref 36.5–49.3)
HGB BLD-MCNC: 11.8 G/DL (ref 12–17)
MCH RBC QN AUTO: 30.3 PG (ref 26.8–34.3)
MCHC RBC AUTO-ENTMCNC: 31.6 G/DL (ref 31.4–37.4)
MCV RBC AUTO: 96 FL (ref 82–98)
PLATELET # BLD AUTO: 223 THOUSANDS/UL (ref 149–390)
PMV BLD AUTO: 10.1 FL (ref 8.9–12.7)
POTASSIUM SERPL-SCNC: 3.9 MMOL/L (ref 3.6–5)
RBC # BLD AUTO: 3.89 MILLION/UL (ref 3.88–5.62)
SODIUM SERPL-SCNC: 140 MMOL/L (ref 137–147)
WBC # BLD AUTO: 4.56 THOUSAND/UL (ref 4.31–10.16)

## 2022-06-19 PROCEDURE — 85027 COMPLETE CBC AUTOMATED: CPT | Performed by: INTERNAL MEDICINE

## 2022-06-19 PROCEDURE — 82948 REAGENT STRIP/BLOOD GLUCOSE: CPT

## 2022-06-19 PROCEDURE — 99232 SBSQ HOSP IP/OBS MODERATE 35: CPT | Performed by: INTERNAL MEDICINE

## 2022-06-19 PROCEDURE — 94003 VENT MGMT INPAT SUBQ DAY: CPT

## 2022-06-19 PROCEDURE — 80048 BASIC METABOLIC PNL TOTAL CA: CPT | Performed by: INTERNAL MEDICINE

## 2022-06-19 RX ADMIN — PALIPERIDONE 6 MG: 6 TABLET, EXTENDED RELEASE ORAL at 09:01

## 2022-06-19 RX ADMIN — HEPARIN SODIUM 5000 UNITS: 5000 INJECTION INTRAVENOUS; SUBCUTANEOUS at 13:02

## 2022-06-19 RX ADMIN — DIVALPROEX SODIUM 500 MG: 500 TABLET, DELAYED RELEASE ORAL at 21:26

## 2022-06-19 RX ADMIN — MIRTAZAPINE 7.5 MG: 7.5 TABLET, FILM COATED ORAL at 21:26

## 2022-06-19 RX ADMIN — NYSTATIN: 100000 POWDER TOPICAL at 08:22

## 2022-06-19 RX ADMIN — LOPERAMIDE HYDROCHLORIDE 4 MG: 2 CAPSULE ORAL at 08:22

## 2022-06-19 RX ADMIN — MUPIROCIN: 20 OINTMENT TOPICAL at 21:27

## 2022-06-19 RX ADMIN — HEPARIN SODIUM 5000 UNITS: 5000 INJECTION INTRAVENOUS; SUBCUTANEOUS at 21:26

## 2022-06-19 RX ADMIN — CLONAZEPAM 1 MG: 1 TABLET ORAL at 15:33

## 2022-06-19 RX ADMIN — MUPIROCIN: 20 OINTMENT TOPICAL at 08:28

## 2022-06-19 RX ADMIN — PRAVASTATIN SODIUM 80 MG: 40 TABLET ORAL at 15:33

## 2022-06-19 RX ADMIN — CITALOPRAM HYDROBROMIDE 40 MG: 20 TABLET ORAL at 08:21

## 2022-06-19 RX ADMIN — HYDROCHLOROTHIAZIDE 12.5 MG: 12.5 TABLET ORAL at 08:21

## 2022-06-19 RX ADMIN — HYDROXYZINE HYDROCHLORIDE 25 MG: 25 TABLET, FILM COATED ORAL at 15:34

## 2022-06-19 RX ADMIN — HYDROCODONE BITARTRATE AND ACETAMINOPHEN 1 TABLET: 5; 325 TABLET ORAL at 15:34

## 2022-06-19 RX ADMIN — CLONAZEPAM 1 MG: 1 TABLET ORAL at 08:21

## 2022-06-19 RX ADMIN — DIVALPROEX SODIUM 1000 MG: 500 TABLET, DELAYED RELEASE ORAL at 09:00

## 2022-06-19 RX ADMIN — Medication 2000 UNITS: at 08:21

## 2022-06-19 RX ADMIN — METOPROLOL TARTRATE 50 MG: 50 TABLET, FILM COATED ORAL at 17:07

## 2022-06-19 RX ADMIN — LEVOTHYROXINE SODIUM 25 MCG: 25 TABLET ORAL at 05:03

## 2022-06-19 RX ADMIN — HEPARIN SODIUM 5000 UNITS: 5000 INJECTION INTRAVENOUS; SUBCUTANEOUS at 05:03

## 2022-06-19 RX ADMIN — BUSPIRONE HYDROCHLORIDE 10 MG: 10 TABLET ORAL at 15:33

## 2022-06-19 RX ADMIN — HYDROCODONE BITARTRATE AND ACETAMINOPHEN 1 TABLET: 5; 325 TABLET ORAL at 04:52

## 2022-06-19 RX ADMIN — GABAPENTIN 100 MG: 100 CAPSULE ORAL at 21:26

## 2022-06-19 RX ADMIN — HYDROXYZINE HYDROCHLORIDE 25 MG: 25 TABLET, FILM COATED ORAL at 04:40

## 2022-06-19 RX ADMIN — GABAPENTIN 100 MG: 100 CAPSULE ORAL at 15:33

## 2022-06-19 RX ADMIN — BUSPIRONE HYDROCHLORIDE 10 MG: 10 TABLET ORAL at 21:26

## 2022-06-19 RX ADMIN — ACETAMINOPHEN 650 MG: 325 TABLET ORAL at 15:34

## 2022-06-19 RX ADMIN — POTASSIUM CHLORIDE 20 MEQ: 1500 TABLET, EXTENDED RELEASE ORAL at 08:21

## 2022-06-19 RX ADMIN — ACETAMINOPHEN 650 MG: 325 TABLET ORAL at 04:52

## 2022-06-19 RX ADMIN — MUPIROCIN: 20 OINTMENT TOPICAL at 15:36

## 2022-06-19 RX ADMIN — GABAPENTIN 100 MG: 100 CAPSULE ORAL at 08:21

## 2022-06-19 RX ADMIN — METOPROLOL TARTRATE 50 MG: 50 TABLET, FILM COATED ORAL at 08:21

## 2022-06-19 RX ADMIN — BUSPIRONE HYDROCHLORIDE 10 MG: 10 TABLET ORAL at 08:21

## 2022-06-19 RX ADMIN — FUROSEMIDE 40 MG: 10 INJECTION, SOLUTION INTRAVENOUS at 08:21

## 2022-06-19 RX ADMIN — CLONAZEPAM 1 MG: 1 TABLET ORAL at 21:26

## 2022-06-19 RX ADMIN — FUROSEMIDE 40 MG: 10 INJECTION, SOLUTION INTRAVENOUS at 15:34

## 2022-06-19 RX ADMIN — DIVALPROEX SODIUM 1000 MG: 500 TABLET, DELAYED RELEASE ORAL at 21:27

## 2022-06-19 RX ADMIN — CEFAZOLIN SODIUM 2000 MG: 2 SOLUTION INTRAVENOUS at 03:00

## 2022-06-19 RX ADMIN — PANTOPRAZOLE SODIUM 40 MG: 40 TABLET, DELAYED RELEASE ORAL at 05:04

## 2022-06-19 RX ADMIN — NYSTATIN: 100000 POWDER TOPICAL at 17:06

## 2022-06-19 NOTE — PROGRESS NOTES
[facility]  Progress Note - Andrea Posey 1975, 48 y o  male MRN: [id number]  Unit/Bed#: 7T [facility] 707-01 Encounter: [id number]  Primary Care Provider: Candida Rios DO   Date and time admitted to hospital: 6/16/2022  5:23 PM    * Multiple open wounds of lower leg  Assessment & Plan  - longstanding history of bilateral lower extremity lymphedema  - recent hospitalizations in May at both [facility] and [facility]  - just recently discharged back to home from rehab facility  - returns with bilateral lower extremity swelling, erythema    - difficult to tell if this is just venous stasis dermatitis, however there could be a cellulitic component  - no significant changes to the appearance of the legs on antiibiotics  Low suspicion for cellulitis given lack of leukocytosis, lack of fever, and negative procalcitonin  - cont with IV diuresis for LE lymphedema  - appreciate Podiatry recommendations  - patient will need extensive wound care and close follow-up as an outpatient to expect wound healing  Given the extensive nature of his lower extremity wounds, I am fearful that if patient returns home he will not get adequate care and these will get worse  Patient is reluctant to return to rehab given his recent experiences, however I am encouraging him to think about it  Case management to discuss this week  Pre-diabetes  Assessment & Plan  - monitor on ISS  - consistent carb diet    Peripheral edema  Assessment & Plan  - chronic bilateral lower extremity lymphedema  - IV Lasix 40 mg b i d  For now; monitor daily BMP     ALESSANDRA on CPAP  Assessment & Plan  - CPAP q h s      Gastroesophageal reflux disease without esophagitis  Assessment & Plan  - cont PPI    Hypothyroidism  Assessment & Plan  - continue levothyroxine    Morbid obesity ([facility] [address] )  Assessment & Plan  - BMI a 59; affects all levels of care, diet and lifestyle modifications recommended    Bipolar disorder [facility])  Assessment & Plan  - mood stable on home Invega,Celexa, BuSpar, Remeron, Depakote    Primary hypertension  Assessment & Plan  - monitor on home metoprolol, HCTZ  - home Lasix changed to IV given lower extremity lymphedema      VTE Pharmacologic Prophylaxis:   Moderate Risk (Score 3-4) - Pharmacological DVT Prophylaxis Ordered: heparin  Patient Centered Rounds: I performed bedside rounds with nursing staff today  Discussions with Specialists or Other Care Team Provider: none  Education and Discussions with Family / Patient: Patient declined call to   Time Spent for Care: 30 minutes  More than 50% of total time spent on counseling and coordination of care as described above  Current Length of Stay: 2 day(s)  Current Patient Status: Inpatient   Certification Statement: The patient will continue to require additional inpatient hospital stay due to cellulitis, ambulatory dysfunction, discharge planning  Discharge Plan: Anticipate discharge in 48 hrs to D     Code Status: Level 1 - Full Code    Subjective:   Patient seen and examined  Following up for LE edema and cellulitis  Patient resting comfortably in bed this morning  Said that he slept well  Really has no new complaints other than discomfort in his legs  Objective:     Vitals:   Temp (24hrs), Av 3 °F (36 8 °C), Min:97 3 °F (36 3 °C), Max:99 5 °F (37 5 °C)    Temp:  [97 3 °F (36 3 °C)-99 5 °F (37 5 °C)] 98 2 °F (36 8 °C)  HR:  [66-73] 70  Resp:  [19-20] 19  BP: (106-129)/(53-73) 129/73  SpO2:  [94 %-98 %] 94 %  Body mass index is 59 13 kg/m²  Input and Output Summary (last 24 hours): Intake/Output Summary (Last 24 hours) at 2022 0915  Last data filed at 2022 2019  Gross per 24 hour   Intake 1200 ml   Output 1725 ml   Net -525 ml       PHYSICAL EXAM:    Vitals signs reviewed  Constitutional   Awake and cooperative  NAD  Head/Neck   Normocephalic  Atraumatic  HEENT   No scleral icterus  EOMI     Heart Regular rate and rhythm  No murmers  Lungs   Clear to auscultation bilaterally  Respirations unlaboured  Abdomen   Soft  Nontender  Nondistended  Obese  Skin   Skin color normal  No rashes  Extremities   Significant bilateral lower extremity lymphedema with skin sloughing and erythema  No evidence of purulence  There is weeping present  Neuro   Alert and oriented  No new deficits  Psych   Mood stable  Affect normal          Additional Data:     Labs:  Results from last 7 days   Lab Units 06/19/22  0434 06/18/22  0443 06/17/22  0440   WBC Thousand/uL 4 56   < > 5 70   HEMOGLOBIN g/dL 11 8*   < > 13 4   HEMATOCRIT % 37 3   < > 43 4   PLATELETS Thousands/uL 223   < > 204   NEUTROS PCT %  --   --  30*   LYMPHS PCT %  --   --  35   MONOS PCT %  --   --  29*   EOS PCT %  --   --  4    < > = values in this interval not displayed  Results from last 7 days   Lab Units 06/19/22  0434 06/17/22  0440 06/16/22  1824   SODIUM mmol/L 140   < > 139   POTASSIUM mmol/L 3 9   < > 4 4   CHLORIDE mmol/L 103   < > 103   CO2 mmol/L 32*   < > 31*   BUN mg/dL 19   < > 25   CREATININE mg/dL 1 14   < > 1 02   ANION GAP mmol/L 5   < > 5   CALCIUM mg/dL 7 5*   < > 8 2*   ALBUMIN g/dL  --   --  3 3   TOTAL BILIRUBIN mg/dL  --   --  1 03   ALK PHOS U/L  --   --  66   ALT U/L  --   --  20   AST U/L  --   --  38   GLUCOSE RANDOM mg/dL 92   < > 122*    < > = values in this interval not displayed       Results from last 7 days   Lab Units 06/16/22  1824   INR  1 06     Results from last 7 days   Lab Units 06/19/22  0515 06/18/22 2026 06/18/22  1540 06/18/22  1111 06/18/22  0527 06/17/22  2020 06/17/22  1540 06/17/22  1133 06/17/22  0535 06/16/22 2059   POC GLUCOSE mg/dl 98 118 92 95 97 137 176* 133 120 108         Results from last 7 days   Lab Units 06/18/22 0443 06/16/22  1824   LACTIC ACID mmol/L  --  1 5   PROCALCITONIN ng/ml 0 06 0 09       Lines/Drains:  Invasive Devices  Report    Peripheral Intravenous Line  Duration Peripheral IV 06/16/22 Right Antecubital 2 days                      Imaging: No pertinent imaging reviewed  Recent Cultures (last 7 days):   Results from last 7 days   Lab Units 06/16/22  1824 06/16/22  1807   BLOOD CULTURE  No Growth at 48 hrs  No Growth at 48 hrs         Last 24 Hours Medication List:   Current Facility-Administered Medications   Medication Dose Route Frequency Provider Last Rate    acetaminophen  650 mg Oral Q6H PRN Eppie Ache, PA-C      busPIRone  10 mg Oral TID Eppie Ache, PA-C      cholecalciferol  2,000 Units Oral Daily Eppie Ache, PA-C      citalopram  40 mg Oral Daily Eppie Ache, PA-C      clonazePAM  1 mg Oral TID Eppie Ache, PA-C      divalproex sodium  1,000 mg Oral Q12H Eppie Ache, PA-C      divalproex sodium  500 mg Oral HS Eppie Ache, PA-C      furosemide  40 mg Intravenous BID (diuretic) Eppie Ache, PA-C      gabapentin  100 mg Oral TID Eppie Ache, PA-C      heparin (porcine)  5,000 Units Subcutaneous [facility] Eppie Ache, PA-C      hydrochlorothiazide  12 5 mg Oral Daily Eppie Ache, PA-C      HYDROcodone-acetaminophen  1 tablet Oral Q6H PRN Eppie Ache, PA-C      hydrOXYzine HCL  25 mg Oral Q6H PRN Olvin Phillip, DO      insulin lispro  4-20 Units Subcutaneous TID [facility] Eppie Ache, PA-C      insulin lispro  4-20 Units Subcutaneous HS Eppie Ache, PA-C      levothyroxine  25 mcg Oral Early Morning Eppie Ache, PA-C      loperamide  4 mg Oral TID PRN Ashley Burnett DO      melatonin  9 mg Oral HS PRN Eppie Ache, PA-C      metoprolol tartrate  50 mg Oral BID Eppie Ache, PA-C      mirtazapine  7 5 mg Oral HS Eppie Ache, PA-C      mupirocin   Topical TID Eppie Ache, PA-C      nystatin   Topical BID Ashley Burnett DO      ondansetron  4 mg Intravenous Q6H PRN Eppie Ache, PA-C      paliperidone  6 mg Oral Daily Eppie Ache, PA-C      pantoprazole  40 mg Oral Early Morning Eppie Ache, PA-C      potassium chloride  20 mEq Oral Daily Namita Bazzi PA-C      pravastatin  80 mg Oral Daily With Jamilah Zavala PA-C      sodium chloride (PF)  3 mL Intravenous Q1H PRN Tej Sanchez MD          Today, Patient Was Seen By: Patricia Phillip DO    **Please Note: This note may have been constructed using a voice recognition system  **

## 2022-06-19 NOTE — PLAN OF CARE
Problem: MOBILITY - ADULT  Goal: Maintain or return to baseline ADL function  Description: INTERVENTIONS:  -  Assess patient's ability to carry out ADLs; assess patient's baseline for ADL function and identify physical deficits which impact ability to perform ADLs (bathing, care of mouth/teeth, toileting, grooming, dressing, etc )  - Assess/evaluate cause of self-care deficits   - Assess range of motion  - Assess patient's mobility; develop plan if impaired  - Assess patient's need for assistive devices and provide as appropriate  - Encourage maximum independence but intervene and supervise when necessary  - Involve family in performance of ADLs  - Assess for home care needs following discharge   - Consider OT consult to assist with ADL evaluation and planning for discharge  - Provide patient education as appropriate  Outcome: Progressing     Problem: Prexisting or High Potential for Compromised Skin Integrity  Goal: Skin integrity is maintained or improved  Description: INTERVENTIONS:  - Identify patients at risk for skin breakdown  - Assess and monitor skin integrity  - Assess and monitor nutrition and hydration status  - Monitor labs   - Assess for incontinence   - Turn and reposition patient  - Assist with mobility/ambulation  - Relieve pressure over bony prominences  - Avoid friction and shearing  - Provide appropriate hygiene as needed including keeping skin clean and dry  - Evaluate need for skin moisturizer/barrier cream  - Collaborate with interdisciplinary team   - Patient/family teaching  - Consider wound care consult   Outcome: Progressing     Problem: PAIN - ADULT  Goal: Verbalizes/displays adequate comfort level or baseline comfort level  Description: Interventions:  - Encourage patient to monitor pain and request assistance  - Assess pain using appropriate pain scale  - Administer analgesics based on type and severity of pain and evaluate response  - Implement non-pharmacological measures as appropriate and evaluate response  - Consider cultural and social influences on pain and pain management  - Notify physician/advanced practitioner if interventions unsuccessful or patient reports new pain  Outcome: Progressing     Problem: INFECTION - ADULT  Goal: Absence or prevention of progression during hospitalization  Description: INTERVENTIONS:  - Assess and monitor for signs and symptoms of infection  - Monitor lab/diagnostic results  - Monitor all insertion sites, i e  indwelling lines, tubes, and drains  - Monitor endotracheal if appropriate and nasal secretions for changes in amount and color  - [place] appropriate cooling/warming therapies per order  - Administer medications as ordered  - Instruct and encourage patient and family to use good hand hygiene technique  - Identify and instruct in appropriate isolation precautions for identified infection/condition  Outcome: Progressing     Problem: INFECTION - ADULT  Goal: Absence of fever/infection during neutropenic period  Description: INTERVENTIONS:  - Monitor WBC    Outcome: Progressing     Problem: DISCHARGE PLANNING  Goal: Discharge to home or other facility with appropriate resources  Description: INTERVENTIONS:  - Identify barriers to discharge w/patient and caregiver  - Arrange for needed discharge resources and transportation as appropriate  - Identify discharge learning needs (meds, wound care, etc )  - Arrange for interpretive services to assist at discharge as needed  - Refer to Case Management Department for coordinating discharge planning if the patient needs post-hospital services based on physician/advanced practitioner order or complex needs related to functional status, cognitive ability, or social support system  Outcome: Progressing     Problem: Knowledge Deficit  Goal: Patient/family/caregiver demonstrates understanding of disease process, treatment plan, medications, and discharge instructions  Description: Complete learning assessment and assess knowledge base    Interventions:  - Provide teaching at level of understanding  - Provide teaching via preferred learning methods  Outcome: Progressing     Problem: SKIN/TISSUE INTEGRITY - ADULT  Goal: Incision(s), wounds(s) or drain site(s) healing without S/S of infection  Description: INTERVENTIONS  - Assess and document dressing, incision, wound bed, drain sites and surrounding tissue  - Provide patient and family education  - Perform skin care/dressing changes every day, PRN  Outcome: Progressing

## 2022-06-19 NOTE — ASSESSMENT & PLAN NOTE
- BP acceptable  - monitor on home metoprolol, HCTZ  - will be discharged on p o   Lasix for lower extremity lymphedema

## 2022-06-19 NOTE — ASSESSMENT & PLAN NOTE
- chronic bilateral lower extremity lymphedema  - Will switch to p o   Lasix daily  - outpatient wound care and follow-up with podiatry

## 2022-06-19 NOTE — ASSESSMENT & PLAN NOTE
- longstanding history of bilateral lower extremity lymphedema  - recent hospitalizations in May at both [facility], and [facility]  - just recently discharged back to home from rehab facility  - returns with bilateral lower extremity swelling, erythema    - difficult to tell if this is just venous stasis dermatitis, however there could be a cellulitic component  - Will switch to p o  Lasix daily for lower extremity edema  -since the patient has extensive venous stasis dermatitis and multiple open wounds, will give Bactrim for 5 days at the time of discharge  - appreciate Podiatry recommendations  - patient will need extensive wound care and close follow-up as an outpatient to expect wound healing     -PT and OT recommended home with home health  - on board for safe disposition

## 2022-06-20 VITALS
RESPIRATION RATE: 18 BRPM | DIASTOLIC BLOOD PRESSURE: 60 MMHG | WEIGHT: 315 LBS | HEART RATE: 77 BPM | OXYGEN SATURATION: 89 % | BODY MASS INDEX: 40.43 KG/M2 | HEIGHT: 74 IN | SYSTOLIC BLOOD PRESSURE: 107 MMHG | TEMPERATURE: 97.1 F

## 2022-06-20 LAB
ANION GAP SERPL CALCULATED.3IONS-SCNC: 4 MMOL/L (ref 5–14)
BUN SERPL-MCNC: 20 MG/DL (ref 5–25)
CALCIUM SERPL-MCNC: 7.6 MG/DL (ref 8.4–10.2)
CHLORIDE SERPL-SCNC: 100 MMOL/L (ref 97–108)
CO2 SERPL-SCNC: 34 MMOL/L (ref 22–30)
CREAT SERPL-MCNC: 1.09 MG/DL (ref 0.7–1.5)
ERYTHROCYTE [DISTWIDTH] IN BLOOD BY AUTOMATED COUNT: 13.2 % (ref 11.6–15.1)
GFR SERPL CREATININE-BSD FRML MDRD: 79 ML/MIN/1.73SQ M
GLUCOSE SERPL-MCNC: 140 MG/DL (ref 65–140)
GLUCOSE SERPL-MCNC: 96 MG/DL (ref 70–99)
GLUCOSE SERPL-MCNC: 97 MG/DL (ref 65–140)
GLUCOSE SERPL-MCNC: 99 MG/DL (ref 65–140)
HCT VFR BLD AUTO: 38.6 % (ref 36.5–49.3)
HGB BLD-MCNC: 12.1 G/DL (ref 12–17)
MCH RBC QN AUTO: 30.6 PG (ref 26.8–34.3)
MCHC RBC AUTO-ENTMCNC: 31.3 G/DL (ref 31.4–37.4)
MCV RBC AUTO: 98 FL (ref 82–98)
PLATELET # BLD AUTO: 217 THOUSANDS/UL (ref 149–390)
PMV BLD AUTO: 9.7 FL (ref 8.9–12.7)
POTASSIUM SERPL-SCNC: 3.8 MMOL/L (ref 3.6–5)
RBC # BLD AUTO: 3.95 MILLION/UL (ref 3.88–5.62)
SODIUM SERPL-SCNC: 138 MMOL/L (ref 137–147)
WBC # BLD AUTO: 4.1 THOUSAND/UL (ref 4.31–10.16)

## 2022-06-20 PROCEDURE — 82948 REAGENT STRIP/BLOOD GLUCOSE: CPT

## 2022-06-20 PROCEDURE — 80048 BASIC METABOLIC PNL TOTAL CA: CPT | Performed by: INTERNAL MEDICINE

## 2022-06-20 PROCEDURE — 97116 GAIT TRAINING THERAPY: CPT

## 2022-06-20 PROCEDURE — 97163 PT EVAL HIGH COMPLEX 45 MIN: CPT

## 2022-06-20 PROCEDURE — 85027 COMPLETE CBC AUTOMATED: CPT | Performed by: INTERNAL MEDICINE

## 2022-06-20 PROCEDURE — 99239 HOSP IP/OBS DSCHRG MGMT >30: CPT | Performed by: INTERNAL MEDICINE

## 2022-06-20 RX ORDER — HYDROCODONE BITARTRATE AND ACETAMINOPHEN 5; 325 MG/1; MG/1
1 TABLET ORAL EVERY 8 HOURS PRN
Qty: 15 TABLET | Refills: 0 | Status: SHIPPED | OUTPATIENT
Start: 2022-06-20 | End: 2022-06-30

## 2022-06-20 RX ORDER — BUSPIRONE HYDROCHLORIDE 10 MG/1
10 TABLET ORAL 3 TIMES DAILY
Qty: 90 TABLET | Refills: 0 | Status: SHIPPED | OUTPATIENT
Start: 2022-06-20 | End: 2022-07-20

## 2022-06-20 RX ORDER — GABAPENTIN 100 MG/1
100 CAPSULE ORAL 3 TIMES DAILY
Qty: 90 CAPSULE | Refills: 0 | Status: SHIPPED | OUTPATIENT
Start: 2022-06-20 | End: 2022-07-20

## 2022-06-20 RX ORDER — DIVALPROEX SODIUM 500 MG/1
1000 TABLET, DELAYED RELEASE ORAL EVERY 12 HOURS
Qty: 120 TABLET | Refills: 0 | Status: SHIPPED | OUTPATIENT
Start: 2022-06-20 | End: 2022-06-20 | Stop reason: SDUPTHER

## 2022-06-20 RX ORDER — NYSTATIN 100000 [USP'U]/G
POWDER TOPICAL 2 TIMES DAILY
Qty: 15 G | Refills: 0 | Status: SHIPPED | OUTPATIENT
Start: 2022-06-20

## 2022-06-20 RX ORDER — DIVALPROEX SODIUM 500 MG/1
500 TABLET, DELAYED RELEASE ORAL
Qty: 30 TABLET | Refills: 0 | Status: SHIPPED | OUTPATIENT
Start: 2022-06-20 | End: 2022-07-20

## 2022-06-20 RX ORDER — SULFAMETHOXAZOLE AND TRIMETHOPRIM 800; 160 MG/1; MG/1
1 TABLET ORAL EVERY 12 HOURS SCHEDULED
Status: DISCONTINUED | OUTPATIENT
Start: 2022-06-20 | End: 2022-06-20 | Stop reason: HOSPADM

## 2022-06-20 RX ORDER — SULFAMETHOXAZOLE AND TRIMETHOPRIM 800; 160 MG/1; MG/1
1 TABLET ORAL EVERY 12 HOURS SCHEDULED
Qty: 10 TABLET | Refills: 0 | Status: SHIPPED | OUTPATIENT
Start: 2022-06-20 | End: 2022-06-25

## 2022-06-20 RX ORDER — DIVALPROEX SODIUM 500 MG/1
1000 TABLET, DELAYED RELEASE ORAL 2 TIMES DAILY
Qty: 120 TABLET | Refills: 0 | Status: SHIPPED | OUTPATIENT
Start: 2022-06-20 | End: 2022-07-20

## 2022-06-20 RX ADMIN — BUSPIRONE HYDROCHLORIDE 10 MG: 10 TABLET ORAL at 08:18

## 2022-06-20 RX ADMIN — HYDROCODONE BITARTRATE AND ACETAMINOPHEN 1 TABLET: 5; 325 TABLET ORAL at 05:15

## 2022-06-20 RX ADMIN — HYDROCHLOROTHIAZIDE 12.5 MG: 12.5 TABLET ORAL at 08:18

## 2022-06-20 RX ADMIN — HYDROXYZINE HYDROCHLORIDE 25 MG: 25 TABLET, FILM COATED ORAL at 05:16

## 2022-06-20 RX ADMIN — DIVALPROEX SODIUM 1000 MG: 500 TABLET, DELAYED RELEASE ORAL at 09:03

## 2022-06-20 RX ADMIN — POTASSIUM CHLORIDE 20 MEQ: 1500 TABLET, EXTENDED RELEASE ORAL at 08:19

## 2022-06-20 RX ADMIN — LOPERAMIDE HYDROCHLORIDE 4 MG: 2 CAPSULE ORAL at 09:34

## 2022-06-20 RX ADMIN — PANTOPRAZOLE SODIUM 40 MG: 40 TABLET, DELAYED RELEASE ORAL at 05:16

## 2022-06-20 RX ADMIN — SULFAMETHOXAZOLE AND TRIMETHOPRIM 1 TABLET: 800; 160 TABLET ORAL at 10:42

## 2022-06-20 RX ADMIN — PALIPERIDONE 6 MG: 6 TABLET, EXTENDED RELEASE ORAL at 08:26

## 2022-06-20 RX ADMIN — ACETAMINOPHEN 650 MG: 325 TABLET ORAL at 05:16

## 2022-06-20 RX ADMIN — CLONAZEPAM 1 MG: 1 TABLET ORAL at 08:18

## 2022-06-20 RX ADMIN — HEPARIN SODIUM 5000 UNITS: 5000 INJECTION INTRAVENOUS; SUBCUTANEOUS at 05:15

## 2022-06-20 RX ADMIN — Medication 2000 UNITS: at 08:18

## 2022-06-20 RX ADMIN — GABAPENTIN 100 MG: 100 CAPSULE ORAL at 08:18

## 2022-06-20 RX ADMIN — CITALOPRAM HYDROBROMIDE 40 MG: 20 TABLET ORAL at 08:19

## 2022-06-20 RX ADMIN — LEVOTHYROXINE SODIUM 25 MCG: 25 TABLET ORAL at 05:18

## 2022-06-20 RX ADMIN — MUPIROCIN: 20 OINTMENT TOPICAL at 08:27

## 2022-06-20 RX ADMIN — NYSTATIN: 100000 POWDER TOPICAL at 08:26

## 2022-06-20 NOTE — DISCHARGE INSTRUCTIONS
Discharge instructions from hospitalist  1  Follow-up with your primary care physician in 1 week in regards to recent hospitalization  2  Take medications regularly    Changes in medications    Take Bactrim twice a day for 5 days  Take gabapentin 100 mg 3 times a day  Take Norco every 8 hours as needed for pain  3  Come back to the ER if symptoms recur or worsen  4  Activity as tolerated  5   Diet :  Heart healthy diet; information packet has more detailed information

## 2022-06-20 NOTE — PLAN OF CARE
Problem: MOBILITY - ADULT  Goal: Maintain or return to baseline ADL function  Description: INTERVENTIONS:  -  Assess patient's ability to carry out ADLs; assess patient's baseline for ADL function and identify physical deficits which impact ability to perform ADLs (bathing, care of mouth/teeth, toileting, grooming, dressing, etc )  - Assess/evaluate cause of self-care deficits   - Assess range of motion  - Assess patient's mobility; develop plan if impaired  - Assess patient's need for assistive devices and provide as appropriate  - Encourage maximum independence but intervene and supervise when necessary  - Involve family in performance of ADLs  - Assess for home care needs following discharge   - Consider OT consult to assist with ADL evaluation and planning for discharge  - Provide patient education as appropriate  Outcome: Progressing     Problem: Prexisting or High Potential for Compromised Skin Integrity  Goal: Skin integrity is maintained or improved  Description: INTERVENTIONS:  - Identify patients at risk for skin breakdown  - Assess and monitor skin integrity  - Assess and monitor nutrition and hydration status  - Monitor labs   - Assess for incontinence   - Turn and reposition patient  - Assist with mobility/ambulation  - Relieve pressure over bony prominences  - Avoid friction and shearing  - Provide appropriate hygiene as needed including keeping skin clean and dry  - Evaluate need for skin moisturizer/barrier cream  - Collaborate with interdisciplinary team   - Patient/family teaching  - Consider wound care consult   Outcome: Progressing     Problem: PAIN - ADULT  Goal: Verbalizes/displays adequate comfort level or baseline comfort level  Description: Interventions:  - Encourage patient to monitor pain and request assistance  - Assess pain using appropriate pain scale  - Administer analgesics based on type and severity of pain and evaluate response  - Implement non-pharmacological measures as appropriate and evaluate response  - Consider cultural and social influences on pain and pain management  - Notify physician/advanced practitioner if interventions unsuccessful or patient reports new pain  Outcome: Progressing     Problem: INFECTION - ADULT  Goal: Absence or prevention of progression during hospitalization  Description: INTERVENTIONS:  - Assess and monitor for signs and symptoms of infection  - Monitor lab/diagnostic results  - Monitor all insertion sites, i e  indwelling lines, tubes, and drains  - Monitor endotracheal if appropriate and nasal secretions for changes in amount and color  - [place] appropriate cooling/warming therapies per order  - Administer medications as ordered  - Instruct and encourage patient and family to use good hand hygiene technique  - Identify and instruct in appropriate isolation precautions for identified infection/condition  Outcome: Progressing     Problem: INFECTION - ADULT  Goal: Absence of fever/infection during neutropenic period  Description: INTERVENTIONS:  - Monitor WBC    Outcome: Progressing     Problem: DISCHARGE PLANNING  Goal: Discharge to home or other facility with appropriate resources  Description: INTERVENTIONS:  - Identify barriers to discharge w/patient and caregiver  - Arrange for needed discharge resources and transportation as appropriate  - Identify discharge learning needs (meds, wound care, etc )  - Arrange for interpretive services to assist at discharge as needed  - Refer to Case Management Department for coordinating discharge planning if the patient needs post-hospital services based on physician/advanced practitioner order or complex needs related to functional status, cognitive ability, or social support system  Outcome: Progressing     Problem: Knowledge Deficit  Goal: Patient/family/caregiver demonstrates understanding of disease process, treatment plan, medications, and discharge instructions  Description: Complete learning assessment and assess knowledge base    Interventions:  - Provide teaching at level of understanding  - Provide teaching via preferred learning methods  Outcome: Progressing

## 2022-06-20 NOTE — DISCHARGE SUMMARY
[facility]  Discharge- Milad Aguiar 1975, 48 y o  male MRN: [id number]  Unit/Bed#: 7T [facility] 707-01 Encounter: [id number]  Primary Care Provider: Maia Pabon DO   Date and time admitted to hospital: 6/16/2022  5:23 PM    * Multiple open wounds of lower leg  Assessment & Plan  - longstanding history of bilateral lower extremity lymphedema  - recent hospitalizations in May at both [facility], and [facility]  - just recently discharged back to home from rehab facility  - returns with bilateral lower extremity swelling, erythema    - difficult to tell if this is just venous stasis dermatitis, however there could be a cellulitic component  - Will switch to p o  Lasix daily for lower extremity edema  -since the patient has extensive venous stasis dermatitis and multiple open wounds, will give Bactrim for 5 days at the time of discharge  - appreciate Podiatry recommendations  - patient will need extensive wound care and close follow-up as an outpatient to expect wound healing  -PT and OT recommended home with home health  - on board for safe disposition    Pre-diabetes  Assessment & Plan  - monitor on ISS  - consistent carb diet    Peripheral edema  Assessment & Plan  - chronic bilateral lower extremity lymphedema  - Will switch to p o  Lasix daily  - outpatient wound care and follow-up with podiatry    ALESSANDRA on CPAP  Assessment & Plan  - CPAP q h s  Gastroesophageal reflux disease without esophagitis  Assessment & Plan  - cont PPI    Hypothyroidism  Assessment & Plan  - continue levothyroxine    Morbid obesity ([address] )  Assessment & Plan  - BMI a 59; affects all levels of care, diet and lifestyle modifications recommended    Bipolar disorder ([facility] )  Assessment & Plan  - mood stable on home Invega,Celexa, BuSpar, Remeron, Depakote    Primary hypertension  Assessment & Plan  - BP acceptable  - monitor on home metoprolol, HCTZ  - will be discharged on p o   Lasix for lower extremity lymphedema          Discharging Physician / Practitioner: Caroline Figueroa MD  PCP: Jose David Holm DO  Admission Date:   Admission Orders (From admission, onward)     Ordered        06/17/22 1024  Inpatient Admission  Once            06/16/22 1928  Place in Observation  Once                      Discharge Date: 06/20/22    Medical Problems             Resolved Problems  Date Reviewed: 6/20/2022   None                 Consultations During Hospital Stay:  · Podiatrist    Procedures Performed:   · Dressing change    Significant Findings / Test Results:   · None    Incidental Findings:   · None     Test Results Pending at Discharge (will require follow up):   · No     Outpatient Tests Requested:  · No    Complications:  No    Reason for Admission:  Bilateral lower extremity redness and swelling  Hospital Course:     Rizwana Hernandes is a 48 y o  male patient with PMH of chronic venous stasis and lymphedema, obesity, bipolar disorder, HTN, GERD, ALESSANDRA who originally presented to the hospital on 6/16/2022 due to bilateral lower extremity swelling and redness  Patient had recent hospitalization for similar symptoms  Patient was initially started on IV Lasix with some relief in symptoms  It is difficult to differentiate between venous stasis versus cellulitis  Patient vital sign has been stable  No leukocytosis  Since the patient has extensive lymphedema and multiple open wounds, patient was empirically given Bactrim at the time of discharge  Patient was seen by Podiatry and Wound Care  Patient will be discharged on p o  Lasix daily  Home visiting nurse will be set up for the patient for daily dressing change  Patient is recommended to follow up with PCP and Podiatry  Patient is clinically stable to be discharged today  Please see above list of diagnoses and related plan for additional information       Condition at Discharge: stable     Discharge Day Visit / Exam:     Subjective:  Patient was seen and examined at bedside  The patient denies any fever, chills,, chest pain, palpitation, shortness of breath, N/V, abd pain  Vitals: Blood Pressure: 107/60 (06/20/22 0717)  Pulse: 77 (06/20/22 0717)  Temperature: (!) 97 1 °F (36 2 °C) (06/20/22 0717)  Temp Source: Temporal (06/20/22 0717)  Respirations: 18 (06/20/22 0717)  Height: 6' 2" (188 cm) (06/16/22 2053)  Weight - Scale: (!) 206 kg (454 lb 2 4 oz) (06/20/22 0905)  SpO2: (!) 89 % (06/20/22 0717)  Exam:   Physical Exam  General: breathing well on room air, no acute distress  HEENT: NC/AT, PERRL, EOM - normal  Neck: Supple  Pulm/Chest: Normal chest wall expansion, clear breath sounds on both side, no wheezing/rhonchi or crackles appreciated  CVS: RRR, normal S1&S2, no murmur appreciated, capillary refill <2s  Abd: soft, non tender, non distended, bowel sounds +  MSK: move all 4 extremities spontaneously, multiple wounds, extensive venous stasis in bilateral lower extremities covered with dressing  Skin: warm  CNS: no acute focal neuro deficit    Discussion with Family:  Discussed with patient, patient declined call to     Discharge instructions/Information to patient and family:   See after visit summary for information provided to patient and family  Provisions for Follow-Up Care:  See after visit summary for information related to follow-up care and any pertinent home health orders  Disposition:     Home with VNA Services (Reminder: Complete face to face encounter)    For Discharges to [facility]:   · Not Applicable to this Patient - Not Applicable to this Patient    Planned Readmission: No     Discharge Statement:  I spent 45 minutes discharging the patient  This time was spent on the day of discharge  I had direct contact with the patient on the day of discharge   Greater than 50% of the total time was spent examining patient, answering all patient questions, arranging and discussing plan of care with patient as well as directly providing post-discharge instructions  Additional time then spent on discharge activities  Discharge Medications:  See after visit summary for reconciled discharge medications provided to patient and family        ** Please Note: This note has been constructed using a voice recognition system **

## 2022-06-20 NOTE — CASE MANAGEMENT
Case Management Discharge Planning Note    Patient name Erin Barrera  Location 7T [facility] 707/7T [facility] 766-29 MRN [id number]  : 1975 Date 2022       Current Admission Date: 2022  Current Admission Diagnosis:Multiple open wounds of lower leg   Patient Active Problem List    Diagnosis Date Noted    CORKY (generalized anxiety disorder) 2022    Pain and swelling of lower extremity 2022    Primary hypertension 2022    Dyslipidemia 2022    Crohn's disease ([facility] ) 2022    Bipolar disorder ([facility] ) 2022    Morbid obesity ([facility] ) 2022    Hypothyroidism 2022    Multiple open wounds of lower leg 2022    Peripheral edema 2022    Anxiety 2022    Dietary noncompliance 2021    Ambulatory dysfunction 2021    Venous stasis dermatitis 2021    Venous insufficiency of lower extremity 2021    Financial difficulties 10/26/2020    Bilateral leg edema 2020    Binge eating 15/06/0145    Folic acid deficiency     ALESSANDRA on CPAP 2019    Fatty liver 2019    History of MRSA infection 2019    Pre-diabetes 2019    Peripheral polyneuropathy 2017    Vitamin D deficiency 2015    Gastroesophageal reflux disease without esophagitis 2015    [facility] large [facility] )     Cocaine dependence in remission ([facility] ) 2011      LOS (days): 3  Geometric Mean LOS (GMLOS) (days): 3 20  Days to GMLOS:0 2     OBJECTIVE:  Risk of Unplanned Readmission Score: 27 13         Current admission status: Inpatient   Preferred Pharmacy:   [address] [place] [address]  Phone: [phone] Fax: [phone]    Primary Care Provider: Luci Sevilla DO    Primary Insurance: MEDICARE  Secondary Insurance:     DISCHARGE DETAILS:  CM was notified at morning rounds that pt is medically cleared to be discharged today  Pt will be returning back to his previous environment with [facility] Ph: [phone] with visiting nurse, PT/OT  Pt is meds to beds  Pharmacy called stated pt does not have a insurance card  Pt gave CM his insurance card  CM faxed it to the pharmacy  Pt has no co-payments  IMM Given (Date):: 06/20/22  IMM Given to[de-identified] Patient   Transportation: Lyft ride at 1330      CM department will continue to follow through pt's D/C

## 2022-06-20 NOTE — PHYSICAL THERAPY NOTE
Physical Therapy Evaluation    Patient's Name: Maria Ines Bermeo    Admitting Diagnosis  Cellulitis [L03 90]  Leg swelling [M79 89]  Bilateral lower extremity edema [R60 0]    Problem List  Patient Active Problem List   Diagnosis    Pain and swelling of lower extremity    Primary hypertension    Dyslipidemia    Crohn's disease ([place] [address] )    Bipolar disorder ([place] [address] )    Morbid obesity ([place] [address] )    Hypothyroidism    Ambulatory dysfunction    Anxiety    Multiple open wounds of lower leg    Bilateral leg edema    Binge eating    Cocaine dependence in remission ([place] [address] )    Crohn's disease of large intestine without complication (HCC)    Dietary noncompliance    Fatty liver    Financial difficulties    CORKY (generalized anxiety disorder)    Gastroesophageal reflux disease without esophagitis    History of MRSA infection    ALESSANDRA on CPAP    Peripheral edema    Peripheral polyneuropathy    Pre-diabetes    Venous insufficiency of lower extremity    Venous stasis dermatitis    Folic acid deficiency    Vitamin D deficiency       Past Medical History  Past Medical History:   Diagnosis Date    Bipolar affective disorder ([place] [address] )     Cellulitis     Hyperlipidemia     Hypertension        Past Surgical History  History reviewed  No pertinent surgical history      Recent Imaging  VAS lower limb venous duplex study, unilateral/limited   Final Result by Chelsy Kinney MD (06/17 1219)          Recent Vital Signs  Vitals:    06/19/22 0713 06/19/22 1506 06/19/22 2300 06/20/22 0717   BP: 129/73 114/52 121/74 107/60   BP Location: Left arm Left arm Left arm Left arm   Pulse: 70 70 (!) 54 77   Resp: 19 20 18 18   Temp: 98 2 °F (36 8 °C) 97 6 °F (36 4 °C) (!) 96 8 °F (36 °C) (!) 97 1 °F (36 2 °C)   TempSrc: Temporal Temporal Temporal Temporal   SpO2: 94% 93% 93% (!) 89%   Weight:       Height:            06/20/22 0850   PT Last Visit   PT Visit Date 06/20/22   Note Type   Note type Evaluation   Pain Assessment   Pain Assessment Tool 0-10   Pain Score 4   Pain Location/Orientation Orientation: Bilateral;Orientation: Lower; Location: Leg   Restrictions/Precautions   Weight Bearing Precautions Per Order No   Other Precautions Pain   Home Living   Type of [address] One level;Stairs to enter with rails; Performs ADLs on one level   Bathroom Accessibility Accessible   Prior Function   Level of [place] Independent with ADLs and functional mobility   Lives With Alone   ADL Assistance Independent   IADLs Independent   Falls in the last 6 months 0   General   Family/Caregiver Present No   Cognition   Overall Cognitive Status WFL   Arousal/Participation Alert   Orientation Level Oriented X4   Memory Within functional limits   Following Commands Follows all commands and directions without difficulty   RLE Assessment   RLE Assessment   (4/5)   LLE Assessment   LLE Assessment   (4/5)   Coordination   Movements are Fluid and Coordinated 0   Coordination and Movement Description slower antaglic movment due to LE pain and edema   Sensation X   Light Touch   RLE Light Touch Impaired   RLE Light Touch Comments decreased but reports does feel light touch   LLE Light Touch Impaired   LLE Light Touch Comments decreased but reports does feel light touch   Bed Mobility   Supine to Sit 6  Modified independent   Additional items Increased time required   Sit to Supine 6  Modified independent   Additional items Increased time required   Transfers   Sit to Stand 5  Supervision   Additional items Increased time required   Stand to Sit 5  Supervision   Additional items Increased time required   Additional Comments with RW   Ambulation/Elevation   Gait pattern Step through pattern;Decreased toe off;Decreased heel strike; Excessively slow;Decreased foot clearance   Gait Assistance 5  Supervision   Assistive Device None   Distance 50ft   Balance   Static Sitting Fair +   Dynamic Sitting Fair +   Static Standing Fair   Dynamic Standing Fair   Ambulatory Fair   Endurance Deficit Endurance Deficit Yes   Endurance Deficit Description reduced due to LE fatigue and pain   Activity Tolerance   Activity Tolerance Patient limited by fatigue   Medical Staff Made Aware spoke to CM   Nurse Made Aware spoke to RN   Assessment   Prognosis Good   Problem List Decreased strength;Decreased range of motion;Decreased endurance; Impaired balance;Decreased mobility; Decreased coordination; Impaired sensation;Pain;Decreased skin integrity   Barriers to Discharge Inaccessible home environment;Decreased caregiver support   Goals   Patient Goals to go home   Recommendation   PT Discharge Recommendation Home with home health rehabilitation   [phone]   Turning in Bed Without Bedrails 4   Lying on Back to Sitting on Edge of Flat Bed 4   Moving Bed to Chair 4   Standing Up From Chair 4   Walk in Room 4   Climb 3-5 Stairs 3   Basic Mobility Inpatient Raw Score 23   Basic Mobility Standardized Score 50 88   Highest Level Of Mobility   -HLM Goal 7: Walk 25 feet or more   -HLM Achieved 7: Walk 25 feet or more   Additional Treatment Session   Start Time 0915   End Time 0925   Treatment Assessment Pt participated in additional gait training with no AD in room, able to complete with more confidence and with more steady gait, ind with ambulation at this time  Also completed additional sit to stand transfers from bed and recliner with good stability and improving LE functional strengh with repeated sit to stand  Equipment Use none   End of Consult   Patient Position at End of Consult Supine; All needs within reach         ASSESSMENT                                                                                                                     Varsha Hampton is a 48 y o  male admitted to [facility] on 6/16/2022 for Multiple open wounds of lower leg  Pt  has a past medical history of Bipolar affective disorder ([address] ), Cellulitis, Hyperlipidemia, and Hypertension    PT was consulted and pt was seen on 6/20/2022 for mobility assessment and d/c planning  Pt presents supine in bed alert and agreeable to therapy  Impairments limiting pt at this time include decreased ROM, impaired balance, decreased endurance, decreased coordination, decreased IADLS, pain, decreased activity tolerance, decreased sensation, and decreased strength  Pt is currently functioning at a modified independent assistance level for bed mobility, independent level for transfers, independent level for ambulation with no assistive device  The patient's AM-PAC Basic Mobility Inpatient Short Form Raw Score is 23  A Raw score of greater than 16 suggests the patient may benefit from discharge to home  Please also refer to the recommendation of the Physical Therapist for safe discharge planning  Recommendations                                                                                                              Pt will benefit from home PT to address the above mentioned impairments in order to maximize recovery and increase functional independence when completing mobility and ADLs  See flow sheet for goals and POC       DME: None    Discharge Disposition:  Home with Home Physical Therapy      Courtney Haley PT, DPT

## 2022-06-22 LAB
BACTERIA BLD CULT: NORMAL
BACTERIA BLD CULT: NORMAL

## 2022-06-26 ENCOUNTER — HOSPITAL ENCOUNTER (EMERGENCY)
Facility: HOSPITAL | Age: 47
Discharge: HOME | End: 2022-06-26
Attending: EMERGENCY MEDICINE
Payer: MEDICARE

## 2022-06-26 VITALS
HEART RATE: 70 BPM | BODY MASS INDEX: 60.23 KG/M2 | OXYGEN SATURATION: 95 % | WEIGHT: 315 LBS | TEMPERATURE: 98.9 F | RESPIRATION RATE: 20 BRPM | DIASTOLIC BLOOD PRESSURE: 80 MMHG | SYSTOLIC BLOOD PRESSURE: 141 MMHG

## 2022-06-26 DIAGNOSIS — Z51.89 VISIT FOR WOUND CHECK: Primary | ICD-10-CM

## 2022-06-26 PROCEDURE — 99283 EMERGENCY DEPT VISIT LOW MDM: CPT

## 2022-06-26 PROCEDURE — 99284 EMERGENCY DEPT VISIT MOD MDM: CPT | Performed by: EMERGENCY MEDICINE

## 2022-06-26 RX ORDER — TRAMADOL HYDROCHLORIDE 50 MG/1
50 TABLET ORAL ONCE
Status: COMPLETED | OUTPATIENT
Start: 2022-06-26 | End: 2022-06-26

## 2022-06-26 RX ADMIN — TRAMADOL HYDROCHLORIDE 50 MG: 50 TABLET ORAL at 15:10

## 2022-06-26 NOTE — ED PROVIDER NOTES
History  Chief Complaint   Patient presents with    Wound Check     Pt reports that he needs a referral to receive wound care at home from visiting nurses  Pt reports that he was notified by the company that was providing wound care that they did not receive a referral and need one in order to continue coming  Patient is a 53-year-old male just seen and admitted by me who presents with a home nursing issue  Was admitted by me on 6/16/22 for b/l le cellulits, PVD  Discharged on 6/20/22  States issue/mixup with home nursing  Called PCP today and told they don't make referrals on the weekend and go to the ER  Has pain medication at home but states it makes him sleepy  C/o constant b/l le pain  No fever  Prior to Admission Medications   Prescriptions Last Dose Informant Patient Reported? Taking?    Cholecalciferol (Vitamin D3) 50 MCG (2000 UT) capsule   Yes No   Sig: Take 2,000 Units by mouth daily   HYDROcodone-acetaminophen (NORCO) 5-325 mg per tablet   No No   Sig: Take 1 tablet by mouth every 8 (eight) hours as needed for pain for up to 10 days Max Daily Amount: 3 tablets   Melatonin 10 MG TABS   Yes No   Sig: Take 10 mg by mouth daily   busPIRone (BUSPAR) 10 mg tablet   No No   Sig: Take 1 tablet (10 mg total) by mouth 3 (three) times a day   citalopram (CeleXA) 40 mg tablet   Yes No   clonazePAM (KlonoPIN) 1 mg tablet   Yes No   Sig: Take 1 mg by mouth 3 (three) times a day   divalproex sodium (DEPAKOTE) 500 mg EC tablet   No No   Sig: Take 1 tablet (500 mg total) by mouth daily at bedtime   divalproex sodium (DEPAKOTE) 500 mg EC tablet   No No   Sig: Take 2 tablets (1,000 mg total) by mouth 2 (two) times a day   furosemide (LASIX) 40 mg tablet   Yes No   Sig: Take 40 mg by mouth daily   gabapentin (NEURONTIN) 100 mg capsule   No No   Sig: Take 1 capsule (100 mg total) by mouth 3 (three) times a day   hydrochlorothiazide (HYDRODIURIL) 12 5 mg tablet   Yes No   levothyroxine 25 mcg tablet Yes No   loperamide (IMODIUM) 2 mg capsule   Yes No   Sig: TAKE 2 CAPSULES BY MOUTH TWO TIMES DAILY AS NEEDED FOR DIARRHEA    metoprolol tartrate (LOPRESSOR) 50 mg tablet   Yes No   Sig: Take 50 mg by mouth 2 (two) times a day   mirtazapine (REMERON) 7 5 MG tablet   Yes No   mupirocin (BACTROBAN) 2 % ointment   No No   Sig: Apply topically 3 (three) times a day Apply to right foot wound with dressing changes 2-3 times daily until healed   nystatin (MYCOSTATIN) powder   No No   Sig: Apply topically 2 (two) times a day   paliperidone (INVEGA) 6 MG 24 hr tablet   Yes No   pantoprazole (PROTONIX) 40 mg tablet   Yes No   potassium chloride (K-DUR,KLOR-CON) 20 mEq tablet   Yes No   Patient not taking: Reported on 6/16/2022   rosuvastatin (CRESTOR) 10 MG tablet   Yes No   sulfamethoxazole-trimethoprim (BACTRIM DS) 800-160 mg per tablet   No No   Sig: Take 1 tablet by mouth every 12 (twelve) hours for 10 doses   traZODone (DESYREL) 50 mg tablet   Yes No      Facility-Administered Medications: None       Past Medical History:   Diagnosis Date    Bipolar affective disorder (HCC)     Cellulitis     Hyperlipidemia     Hypertension        History reviewed  No pertinent surgical history  History reviewed  No pertinent family history  I have reviewed and agree with the history as documented  E-Cigarette/Vaping    E-Cigarette Use Never User      E-Cigarette/Vaping Substances     Social History     Tobacco Use    Smoking status: Former Smoker    Smokeless tobacco: Never Used   Vaping Use    Vaping Use: Never used   Substance Use Topics    Alcohol use: Never    Drug use: Never       Review of Systems   Constitutional: Negative  Negative for fever  HENT: Negative  Eyes: Negative  Respiratory: Negative  Negative for cough and shortness of breath  Cardiovascular: Positive for leg swelling  Gastrointestinal: Negative  Endocrine: Negative  Genitourinary: Negative  Musculoskeletal: Negative  Skin: Positive for wound  Allergic/Immunologic: Negative  Neurological: Negative  Hematological: Negative  Psychiatric/Behavioral: Negative  All other systems reviewed and are negative  Physical Exam  Physical Exam  Vitals and nursing note reviewed  Constitutional:       Appearance: Normal appearance  He is obese  HENT:      Head: Normocephalic and atraumatic  Nose: Nose normal       Mouth/Throat:      Mouth: Mucous membranes are moist       Pharynx: Oropharynx is clear  Eyes:      Conjunctiva/sclera: Conjunctivae normal       Pupils: Pupils are equal, round, and reactive to light  Cardiovascular:      Rate and Rhythm: Normal rate and regular rhythm  Pulses: Normal pulses  Heart sounds: Normal heart sounds  Pulmonary:      Effort: Pulmonary effort is normal       Breath sounds: Normal breath sounds  Abdominal:      General: Bowel sounds are normal       Palpations: Abdomen is soft  Musculoskeletal:         General: Swelling present  Cervical back: Normal range of motion and neck supple  Comments: 4+ edema b/l le to knee   Skin:     General: Skin is warm  Comments: Beefy red b/l le  Granulation tissue in place  No fluctance  Improved upon last visit on 6/16/22  Neurological:      General: No focal deficit present  Mental Status: He is alert and oriented to person, place, and time  Sensory: No sensory deficit     Psychiatric:         Mood and Affect: Mood normal          Behavior: Behavior normal          Vital Signs  ED Triage Vitals [06/26/22 1506]   Temperature Pulse Respirations Blood Pressure SpO2   98 9 °F (37 2 °C) 101 20 145/58 95 %      Temp Source Heart Rate Source Patient Position - Orthostatic VS BP Location FiO2 (%)   Tympanic Monitor -- -- --      Pain Score       --           Vitals:    06/26/22 1506   BP: 145/58   Pulse: 101         Visual Acuity      ED Medications  Medications   traMADol (ULTRAM) tablet 50 mg (50 mg Oral Given 6/26/22 1510)       Diagnostic Studies  Results Reviewed     None                 No orders to display              Procedures  Procedures         ED Course                               SBIRT 20yo+    Flowsheet Row Most Recent Value   SBIRT (25 yo +)    In order to provide better care to our patients, we are screening all of our patients for alcohol and drug use  Would it be okay to ask you these screening questions? No Filed at: 06/26/2022 1508                    [facility]  Number of Diagnoses or Management Options     Amount and/or Complexity of Data Reviewed  Obtain history from someone other than the patient: yes  Review and summarize past medical records: yes        Disposition  Final diagnoses:   Visit for wound check     Time reflects when diagnosis was documented in both MDM as applicable and the Disposition within this note     Time User Action Codes Description Comment    6/26/2022  3:31 PM Chiquita Guevara Add [Z51 89] Visit for wound check       ED Disposition     ED Disposition   Discharge    Condition   Stable    Date/Time   Sun Jun 26, 2022  3:46 PM    [facility] discharge to home/self care  Follow-up Information     Follow up With Specialties Details Why Contact Info Additional Information    [address] [facility] [id number]  [address] [place] [id number] [phone]          Patient's Medications   Discharge Prescriptions    No medications on file       No discharge procedures on file      PDMP Review       Value Time User    PDMP Reviewed  Yes 5/14/2022 12:10 PM Gunnar Mckeon MD          ED Provider  Electronically Signed by           Jo-Ann Rodriguez MD  06/26/22 7545

## 2022-07-14 ENCOUNTER — APPOINTMENT (EMERGENCY)
Dept: RADIOLOGY | Facility: HOSPITAL | Age: 47
DRG: 607 | End: 2022-07-14
Payer: MEDICARE

## 2022-07-14 ENCOUNTER — HOSPITAL ENCOUNTER (INPATIENT)
Facility: HOSPITAL | Age: 47
LOS: 4 days | Discharge: HOME HEALTH | DRG: 607 | End: 2022-07-18
Attending: EMERGENCY MEDICINE | Admitting: STUDENT IN AN ORGANIZED HEALTH CARE EDUCATION/TRAINING PROGRAM
Payer: MEDICARE

## 2022-07-14 DIAGNOSIS — N17.9 AKI (ACUTE KIDNEY INJURY) (HCC): ICD-10-CM

## 2022-07-14 DIAGNOSIS — G62.9 PERIPHERAL POLYNEUROPATHY: ICD-10-CM

## 2022-07-14 DIAGNOSIS — K50.10 CROHN'S DISEASE OF LARGE INTESTINE WITHOUT COMPLICATION (HCC): ICD-10-CM

## 2022-07-14 DIAGNOSIS — R19.7 DIARRHEA: ICD-10-CM

## 2022-07-14 DIAGNOSIS — I87.8 POOR VENOUS ACCESS: ICD-10-CM

## 2022-07-14 DIAGNOSIS — S81.809A OPEN WOUND OF LEG: ICD-10-CM

## 2022-07-14 DIAGNOSIS — Z59.9 FINANCIAL DIFFICULTIES: ICD-10-CM

## 2022-07-14 DIAGNOSIS — B37.2 CUTANEOUS CANDIDIASIS: ICD-10-CM

## 2022-07-14 DIAGNOSIS — R26.2 AMBULATORY DYSFUNCTION: ICD-10-CM

## 2022-07-14 DIAGNOSIS — L03.119 LOWER EXTREMITY CELLULITIS: Primary | ICD-10-CM

## 2022-07-14 DIAGNOSIS — R21 RASH OF BODY: ICD-10-CM

## 2022-07-14 PROBLEM — E87.20 LACTIC ACIDOSIS: Status: ACTIVE | Noted: 2022-07-14

## 2022-07-14 PROBLEM — E87.2 LACTIC ACIDOSIS: Status: ACTIVE | Noted: 2022-07-14

## 2022-07-14 LAB
ALBUMIN SERPL BCP-MCNC: 2 G/DL (ref 3.5–5)
ALP SERPL-CCNC: 87 U/L (ref 46–116)
ALT SERPL W P-5'-P-CCNC: 13 U/L (ref 12–78)
ANION GAP SERPL CALCULATED.3IONS-SCNC: 6 MMOL/L (ref 4–13)
ANISOCYTOSIS BLD QL SMEAR: PRESENT
APTT PPP: 27 SECONDS (ref 23–37)
AST SERPL W P-5'-P-CCNC: 26 U/L (ref 5–45)
BASOPHILS # BLD MANUAL: 0 THOUSAND/UL (ref 0–0.1)
BASOPHILS NFR MAR MANUAL: 0 % (ref 0–1)
BILIRUB SERPL-MCNC: 0.58 MG/DL (ref 0.2–1)
BUN SERPL-MCNC: 12 MG/DL (ref 5–25)
CALCIUM ALBUM COR SERPL-MCNC: 9.5 MG/DL (ref 8.3–10.1)
CALCIUM SERPL-MCNC: 7.9 MG/DL (ref 8.3–10.1)
CHLORIDE SERPL-SCNC: 105 MMOL/L (ref 100–108)
CO2 SERPL-SCNC: 29 MMOL/L (ref 21–32)
CREAT SERPL-MCNC: 1.42 MG/DL (ref 0.6–1.3)
EOSINOPHIL # BLD MANUAL: 0.06 THOUSAND/UL (ref 0–0.4)
EOSINOPHIL NFR BLD MANUAL: 1 % (ref 0–6)
ERYTHROCYTE [DISTWIDTH] IN BLOOD BY AUTOMATED COUNT: 13 % (ref 11.6–15.1)
GFR SERPL CREATININE-BSD FRML MDRD: 57 ML/MIN/1.73SQ M
GIANT PLATELETS BLD QL SMEAR: PRESENT
GLUCOSE SERPL-MCNC: 99 MG/DL (ref 65–140)
HCT VFR BLD AUTO: 41.4 % (ref 36.5–49.3)
HGB BLD-MCNC: 12.9 G/DL (ref 12–17)
INR PPP: 1.03 (ref 0.84–1.19)
LACTATE SERPL-SCNC: 3.1 MMOL/L (ref 0.5–2)
LG PLATELETS BLD QL SMEAR: PRESENT
LYMPHOCYTES # BLD AUTO: 2.36 THOUSAND/UL (ref 0.6–4.47)
LYMPHOCYTES # BLD AUTO: 39 % (ref 14–44)
MCH RBC QN AUTO: 30 PG (ref 26.8–34.3)
MCHC RBC AUTO-ENTMCNC: 31.2 G/DL (ref 31.4–37.4)
MCV RBC AUTO: 96 FL (ref 82–98)
MONOCYTES # BLD AUTO: 0.85 THOUSAND/UL (ref 0–1.22)
MONOCYTES NFR BLD: 14 % (ref 4–12)
MYELOCYTES NFR BLD MANUAL: 2 % (ref 0–1)
NEUTROPHILS # BLD MANUAL: 2.66 THOUSAND/UL (ref 1.85–7.62)
NEUTS BAND NFR BLD MANUAL: 1 % (ref 0–8)
NEUTS SEG NFR BLD AUTO: 43 % (ref 43–75)
PLATELET # BLD AUTO: 307 THOUSANDS/UL (ref 149–390)
PLATELET # BLD AUTO: 307 THOUSANDS/UL (ref 149–390)
PLATELET BLD QL SMEAR: ADEQUATE
PMV BLD AUTO: 10.2 FL (ref 8.9–12.7)
PMV BLD AUTO: 10.2 FL (ref 8.9–12.7)
POTASSIUM SERPL-SCNC: 5 MMOL/L (ref 3.5–5.3)
PROCALCITONIN SERPL-MCNC: 0.07 NG/ML
PROT SERPL-MCNC: 7 G/DL (ref 6.4–8.2)
PROTHROMBIN TIME: 13.5 SECONDS (ref 11.6–14.5)
RBC # BLD AUTO: 4.3 MILLION/UL (ref 3.88–5.62)
SODIUM SERPL-SCNC: 140 MMOL/L (ref 136–145)
WBC # BLD AUTO: 6.05 THOUSAND/UL (ref 4.31–10.16)

## 2022-07-14 PROCEDURE — 85730 THROMBOPLASTIN TIME PARTIAL: CPT | Performed by: EMERGENCY MEDICINE

## 2022-07-14 PROCEDURE — 96375 TX/PRO/DX INJ NEW DRUG ADDON: CPT

## 2022-07-14 PROCEDURE — 85025 COMPLETE CBC W/AUTO DIFF WBC: CPT | Performed by: EMERGENCY MEDICINE

## 2022-07-14 PROCEDURE — 80053 COMPREHEN METABOLIC PANEL: CPT | Performed by: EMERGENCY MEDICINE

## 2022-07-14 PROCEDURE — 84145 PROCALCITONIN (PCT): CPT | Performed by: EMERGENCY MEDICINE

## 2022-07-14 PROCEDURE — 85610 PROTHROMBIN TIME: CPT | Performed by: EMERGENCY MEDICINE

## 2022-07-14 PROCEDURE — 85049 AUTOMATED PLATELET COUNT: CPT | Performed by: PHYSICIAN ASSISTANT

## 2022-07-14 PROCEDURE — 99223 1ST HOSP IP/OBS HIGH 75: CPT | Performed by: STUDENT IN AN ORGANIZED HEALTH CARE EDUCATION/TRAINING PROGRAM

## 2022-07-14 PROCEDURE — 99285 EMERGENCY DEPT VISIT HI MDM: CPT | Performed by: EMERGENCY MEDICINE

## 2022-07-14 PROCEDURE — 85027 COMPLETE CBC AUTOMATED: CPT | Performed by: EMERGENCY MEDICINE

## 2022-07-14 PROCEDURE — 96360 HYDRATION IV INFUSION INIT: CPT

## 2022-07-14 PROCEDURE — 85007 BL SMEAR W/DIFF WBC COUNT: CPT | Performed by: EMERGENCY MEDICINE

## 2022-07-14 PROCEDURE — 73590 X-RAY EXAM OF LOWER LEG: CPT

## 2022-07-14 PROCEDURE — 36415 COLL VENOUS BLD VENIPUNCTURE: CPT | Performed by: EMERGENCY MEDICINE

## 2022-07-14 PROCEDURE — 96365 THER/PROPH/DIAG IV INF INIT: CPT

## 2022-07-14 PROCEDURE — 87040 BLOOD CULTURE FOR BACTERIA: CPT | Performed by: EMERGENCY MEDICINE

## 2022-07-14 PROCEDURE — 96374 THER/PROPH/DIAG INJ IV PUSH: CPT

## 2022-07-14 PROCEDURE — 83605 ASSAY OF LACTIC ACID: CPT | Performed by: EMERGENCY MEDICINE

## 2022-07-14 PROCEDURE — 99285 EMERGENCY DEPT VISIT HI MDM: CPT

## 2022-07-14 RX ORDER — FUROSEMIDE 40 MG/1
40 TABLET ORAL DAILY
Status: DISCONTINUED | OUTPATIENT
Start: 2022-07-15 | End: 2022-07-16

## 2022-07-14 RX ORDER — MORPHINE SULFATE 4 MG/ML
4 INJECTION, SOLUTION INTRAMUSCULAR; INTRAVENOUS ONCE
Status: COMPLETED | OUTPATIENT
Start: 2022-07-14 | End: 2022-07-14

## 2022-07-14 RX ORDER — TRAZODONE HYDROCHLORIDE 50 MG/1
50 TABLET ORAL
Status: DISCONTINUED | OUTPATIENT
Start: 2022-07-14 | End: 2022-07-18 | Stop reason: HOSPADM

## 2022-07-14 RX ORDER — PRAVASTATIN SODIUM 80 MG/1
80 TABLET ORAL
Status: DISCONTINUED | OUTPATIENT
Start: 2022-07-15 | End: 2022-07-18 | Stop reason: HOSPADM

## 2022-07-14 RX ORDER — HYDROCHLOROTHIAZIDE 25 MG/1
25 TABLET ORAL DAILY
Status: CANCELLED | OUTPATIENT
Start: 2022-07-15

## 2022-07-14 RX ORDER — MIRTAZAPINE 15 MG/1
7.5 TABLET, FILM COATED ORAL
Status: DISCONTINUED | OUTPATIENT
Start: 2022-07-14 | End: 2022-07-18 | Stop reason: HOSPADM

## 2022-07-14 RX ORDER — PANTOPRAZOLE SODIUM 40 MG/1
40 TABLET, DELAYED RELEASE ORAL
Status: DISCONTINUED | OUTPATIENT
Start: 2022-07-15 | End: 2022-07-18 | Stop reason: HOSPADM

## 2022-07-14 RX ORDER — LOPERAMIDE HYDROCHLORIDE 2 MG/1
2 CAPSULE ORAL 3 TIMES DAILY PRN
Status: DISCONTINUED | OUTPATIENT
Start: 2022-07-14 | End: 2022-07-18 | Stop reason: HOSPADM

## 2022-07-14 RX ORDER — METOPROLOL TARTRATE 50 MG/1
50 TABLET, FILM COATED ORAL 2 TIMES DAILY
Status: DISCONTINUED | OUTPATIENT
Start: 2022-07-14 | End: 2022-07-18 | Stop reason: HOSPADM

## 2022-07-14 RX ORDER — DIVALPROEX SODIUM 500 MG/1
500 TABLET, DELAYED RELEASE ORAL
Status: DISCONTINUED | OUTPATIENT
Start: 2022-07-14 | End: 2022-07-18 | Stop reason: HOSPADM

## 2022-07-14 RX ORDER — CITALOPRAM 20 MG/1
40 TABLET ORAL DAILY
Status: DISCONTINUED | OUTPATIENT
Start: 2022-07-15 | End: 2022-07-18 | Stop reason: HOSPADM

## 2022-07-14 RX ORDER — ACETAMINOPHEN 325 MG/1
650 TABLET ORAL EVERY 6 HOURS PRN
Status: DISCONTINUED | OUTPATIENT
Start: 2022-07-14 | End: 2022-07-18 | Stop reason: HOSPADM

## 2022-07-14 RX ORDER — BUSPIRONE HYDROCHLORIDE 10 MG/1
10 TABLET ORAL 3 TIMES DAILY
Status: DISCONTINUED | OUTPATIENT
Start: 2022-07-14 | End: 2022-07-18 | Stop reason: HOSPADM

## 2022-07-14 RX ORDER — PALIPERIDONE 3 MG/1
6 TABLET, EXTENDED RELEASE ORAL DAILY
Status: DISCONTINUED | OUTPATIENT
Start: 2022-07-15 | End: 2022-07-18 | Stop reason: HOSPADM

## 2022-07-14 RX ORDER — SODIUM CHLORIDE 9 MG/ML
3 INJECTION INTRAVENOUS
Status: DISCONTINUED | OUTPATIENT
Start: 2022-07-14 | End: 2022-07-18 | Stop reason: HOSPADM

## 2022-07-14 RX ORDER — LEVOTHYROXINE SODIUM 0.03 MG/1
25 TABLET ORAL
Status: DISCONTINUED | OUTPATIENT
Start: 2022-07-15 | End: 2022-07-18 | Stop reason: HOSPADM

## 2022-07-14 RX ORDER — DIVALPROEX SODIUM 500 MG/1
1000 TABLET, DELAYED RELEASE ORAL 2 TIMES DAILY
Status: DISCONTINUED | OUTPATIENT
Start: 2022-07-14 | End: 2022-07-18 | Stop reason: HOSPADM

## 2022-07-14 RX ORDER — ONDANSETRON 2 MG/ML
4 INJECTION INTRAMUSCULAR; INTRAVENOUS EVERY 6 HOURS PRN
Status: DISCONTINUED | OUTPATIENT
Start: 2022-07-14 | End: 2022-07-18 | Stop reason: HOSPADM

## 2022-07-14 RX ORDER — CLONAZEPAM 1 MG/1
1 TABLET ORAL 3 TIMES DAILY
Status: DISCONTINUED | OUTPATIENT
Start: 2022-07-14 | End: 2022-07-18 | Stop reason: HOSPADM

## 2022-07-14 RX ORDER — ENOXAPARIN SODIUM 100 MG/ML
40 INJECTION SUBCUTANEOUS DAILY
Status: DISCONTINUED | OUTPATIENT
Start: 2022-07-15 | End: 2022-07-18 | Stop reason: HOSPADM

## 2022-07-14 RX ORDER — GABAPENTIN 100 MG/1
100 CAPSULE ORAL 3 TIMES DAILY
Status: DISCONTINUED | OUTPATIENT
Start: 2022-07-14 | End: 2022-07-18 | Stop reason: HOSPADM

## 2022-07-14 RX ADMIN — METOPROLOL TARTRATE 50 MG: 50 TABLET, FILM COATED ORAL at 22:59

## 2022-07-14 RX ADMIN — DEXTROSE MONOHYDRATE 2000 MG: 5 INJECTION, SOLUTION INTRAVENOUS at 19:51

## 2022-07-14 RX ADMIN — DIVALPROEX SODIUM 1000 MG: 500 TABLET, DELAYED RELEASE ORAL at 22:58

## 2022-07-14 RX ADMIN — VANCOMYCIN HYDROCHLORIDE 2000 MG: 1 INJECTION, POWDER, LYOPHILIZED, FOR SOLUTION INTRAVENOUS at 20:46

## 2022-07-14 RX ADMIN — SODIUM CHLORIDE 1000 ML: 0.9 INJECTION, SOLUTION INTRAVENOUS at 19:53

## 2022-07-14 RX ADMIN — GABAPENTIN 100 MG: 100 CAPSULE ORAL at 22:59

## 2022-07-14 RX ADMIN — BUSPIRONE HYDROCHLORIDE 10 MG: 10 TABLET ORAL at 22:59

## 2022-07-14 RX ADMIN — CLONAZEPAM 1 MG: 1 TABLET ORAL at 22:59

## 2022-07-14 RX ADMIN — MORPHINE SULFATE 4 MG: 4 INJECTION INTRAVENOUS at 18:53

## 2022-07-14 RX ADMIN — MORPHINE SULFATE 2 MG: 2 INJECTION, SOLUTION INTRAMUSCULAR; INTRAVENOUS at 21:45

## 2022-07-14 RX ADMIN — DIVALPROEX SODIUM 500 MG: 500 TABLET, DELAYED RELEASE ORAL at 22:58

## 2022-07-14 RX ADMIN — MIRTAZAPINE 7.5 MG: 15 TABLET, FILM COATED ORAL at 22:59

## 2022-07-14 SDOH — ECONOMIC STABILITY - INCOME SECURITY: PROBLEM RELATED TO HOUSING AND ECONOMIC CIRCUMSTANCES, UNSPECIFIED: Z59.9

## 2022-07-14 NOTE — ED PROVIDER NOTES
History  Chief Complaint   Patient presents with    Leg Pain     Patient arrives via ems c/o of pain bilateral leg pain, swelling, oozing  Patient states ongoing for past 2 years, patient also c/o of rash on abd for past 5 months  49 yo M with h/o recurrent cellulitis presenting for evaluation of b/l LE weeping open wounds/erythema  Getting progressively worse, pain/open wounds to b/l lower legs  Reports history of similar, last admitted for same 6/16-6/20  Has wound care nurses coming to his home, was started on Clindamycin yesterday, but has only taken 1 dose so far  Denies fevers, chills, CP, SOB, abdominal pain  Does report loose nonbloody stools yesterday               Prior to Admission Medications   Prescriptions Last Dose Informant Patient Reported? Taking?    Cholecalciferol (Vitamin D3) 50 MCG (2000 UT) capsule   Yes No   Sig: Take 2,000 Units by mouth daily   Melatonin 10 MG TABS   Yes No   Sig: Take 10 mg by mouth daily   busPIRone (BUSPAR) 10 mg tablet   No No   Sig: Take 1 tablet (10 mg total) by mouth 3 (three) times a day   citalopram (CeleXA) 40 mg tablet   Yes No   clonazePAM (KlonoPIN) 1 mg tablet   Yes No   Sig: Take 1 mg by mouth 3 (three) times a day   divalproex sodium (DEPAKOTE) 500 mg EC tablet   No No   Sig: Take 1 tablet (500 mg total) by mouth daily at bedtime   divalproex sodium (DEPAKOTE) 500 mg EC tablet   No No   Sig: Take 2 tablets (1,000 mg total) by mouth 2 (two) times a day   furosemide (LASIX) 40 mg tablet   Yes No   Sig: Take 40 mg by mouth daily   gabapentin (NEURONTIN) 100 mg capsule   No No   Sig: Take 1 capsule (100 mg total) by mouth 3 (three) times a day   hydrochlorothiazide (HYDRODIURIL) 12 5 mg tablet   Yes No   levothyroxine 25 mcg tablet   Yes No   loperamide (IMODIUM) 2 mg capsule   Yes No   Sig: TAKE 2 CAPSULES BY MOUTH TWO TIMES DAILY AS NEEDED FOR DIARRHEA    metoprolol tartrate (LOPRESSOR) 50 mg tablet   Yes No   Sig: Take 50 mg by mouth 2 (two) times a day mirtazapine (REMERON) 7 5 MG tablet   Yes No   mupirocin (BACTROBAN) 2 % ointment   No No   Sig: Apply topically 3 (three) times a day Apply to right foot wound with dressing changes 2-3 times daily until healed   nystatin (MYCOSTATIN) powder   No No   Sig: Apply topically 2 (two) times a day   paliperidone (INVEGA) 6 MG 24 hr tablet   Yes No   pantoprazole (PROTONIX) 40 mg tablet   Yes No   potassium chloride (K-DUR,KLOR-CON) 20 mEq tablet   Yes No   Patient not taking: Reported on 6/16/2022   rosuvastatin (CRESTOR) 10 MG tablet   Yes No   traZODone (DESYREL) 50 mg tablet   Yes No      Facility-Administered Medications: None       Past Medical History:   Diagnosis Date    Bipolar affective disorder ([facility] [address] )     Cellulitis     Hyperlipidemia     Hypertension        History reviewed  No pertinent surgical history  History reviewed  No pertinent family history  I have reviewed and agree with the history as documented  E-Cigarette/Vaping    E-Cigarette Use Never User      E-Cigarette/Vaping Substances     Social History     Tobacco Use    Smoking status: Former Smoker    Smokeless tobacco: Never Used   Vaping Use    Vaping Use: Never used   Substance Use Topics    Alcohol use: Never    Drug use: Never       Review of Systems   Constitutional: Negative for chills, fever and unexpected weight change  HENT: Negative for ear pain, rhinorrhea and sore throat  Eyes: Negative for pain and visual disturbance  Respiratory: Negative for cough and shortness of breath  Cardiovascular: Positive for leg swelling  Negative for chest pain  Gastrointestinal: Negative for abdominal pain, constipation, diarrhea, nausea and vomiting  Endocrine: Negative for polydipsia, polyphagia and polyuria  Genitourinary: Negative for dysuria, frequency, hematuria and urgency  Musculoskeletal: Negative for back pain, myalgias and neck pain  Skin: Positive for color change, rash and wound     Allergic/Immunologic: Negative for environmental allergies and immunocompromised state  Neurological: Negative for dizziness, weakness, light-headedness, numbness and headaches  Psychiatric/Behavioral: Negative for agitation and confusion  All other systems reviewed and are negative  Physical Exam  Physical Exam  Vitals and nursing note reviewed  Constitutional:       General: He is not in acute distress  Appearance: He is well-developed  HENT:      Head: Normocephalic and atraumatic  Nose: Nose normal    Eyes:      Conjunctiva/sclera: Conjunctivae normal    Cardiovascular:      Rate and Rhythm: Normal rate and regular rhythm  Heart sounds: Normal heart sounds  Pulmonary:      Effort: Pulmonary effort is normal  No respiratory distress  Breath sounds: Normal breath sounds  No stridor  No wheezing or rales  Chest:      Chest wall: No tenderness  Abdominal:      General: There is no distension  Palpations: Abdomen is soft  Tenderness: There is no abdominal tenderness  There is no guarding or rebound  Musculoskeletal:         General: Swelling and tenderness present  No deformity  Cervical back: Normal range of motion and neck supple  Comments: See pictures below: b/l LE extending from below knees distally are weeping erythematous raw appearing wounds, tender to palpation, distally normal cap refill/skin warm pink   Skin:     General: Skin is warm and dry  Findings: No rash  Neurological:      Mental Status: He is alert and oriented to person, place, and time  Motor: No abnormal muscle tone  Coordination: Coordination normal    Psychiatric:         Thought Content:  Thought content normal          Judgment: Judgment normal                      Vital Signs  ED Triage Vitals   Temperature Pulse Respirations Blood Pressure SpO2   07/14/22 1818 07/14/22 1818 07/14/22 1818 07/14/22 1818 07/14/22 1818   98 8 °F (37 1 °C) 78 22 124/59 97 %      Temp Source Heart Rate Source Patient Position - Orthostatic VS BP Location FiO2 (%)   07/14/22 1818 07/14/22 1818 07/14/22 1818 07/14/22 1818 --   Oral Monitor Lying Right arm       Pain Score       07/14/22 1853       8           Vitals:    07/14/22 1818 07/14/22 2038   BP: 124/59 109/62   Pulse: 78 71   Patient Position - Orthostatic VS: Lying Lying         Visual Acuity      ED Medications  Medications   sodium chloride (PF) 0 9 % injection 3 mL (has no administration in time range)   morphine injection 2 mg (2 mg Intravenous Given 7/14/22 2145)   ceftriaxone (ROCEPHIN) 2 g/50 mL in dextrose IVPB (2,000 mg Intravenous New Bag 7/14/22 1951)   vancomycin (VANCOCIN) 2,000 mg in sodium chloride 0 9 % 500 mL IVPB (0 mg Intravenous Stopped 7/14/22 2046)   morphine injection 4 mg (4 mg Intravenous Given 7/14/22 1853)   sodium chloride 0 9 % bolus 1,000 mL (1,000 mL Intravenous New Bag 7/14/22 1953)       Diagnostic Studies  Results Reviewed     Procedure Component Value Units Date/Time    Clostridium difficile toxin by PCR with EIA [[id number]]     Lab Status: No result Specimen: Stool     Procalcitonin [[id number]]  (Normal) Collected: 07/14/22 1947    Lab Status: Final result Specimen: Blood from Arm, Left Updated: 07/14/22 2029     Procalcitonin 0 07 ng/ml     Protime-INR [[id number]]  (Normal) Collected: 07/14/22 1947    Lab Status: Final result Specimen: Blood from Arm, Right Updated: 07/14/22 2018     Protime 13 5 seconds      INR 1 03    APTT [[id number]]  (Normal) Collected: 07/14/22 1947    Lab Status: Final result Specimen: Blood from Arm, Right Updated: 07/14/22 2018     PTT 27 seconds     Blood culture #2 [[id number]] Collected: 07/14/22 1948    Lab Status:  In process Specimen: Blood from Arm, Right Updated: 07/14/22 1957    Manual Differential(PHLEBS Do Not Order) [[id number]]  (Abnormal) Collected: 07/14/22 1846    Lab Status: Final result Specimen: Blood from Arm, Left Updated: 07/14/22 1946     Segmented % 43 %      Bands % 1 % Lymphocytes % 39 %      Monocytes % 14 %      Eosinophils, % 1 %      Basophils % 0 %      Myelocytes % 2 %      Absolute Neutrophils 2 66 Thousand/uL      Lymphocytes Absolute 2 36 Thousand/uL      Monocytes Absolute 0 85 Thousand/uL      Eosinophils Absolute 0 06 Thousand/uL      Basophils Absolute 0 00 Thousand/uL      Total Counted --     Anisocytosis Present     Platelet Estimate Adequate     Giant PLTs Present     Large Platelet Present    Lactic Acid [[id number]]  (Abnormal) Collected: 07/14/22 1846    Lab Status: Final result Specimen: Blood from Arm, Left Updated: 07/14/22 1923     LACTIC ACID 3 1 mmol/L     Narrative:      Result may be elevated if tourniquet was used during collection      Lactic acid 2 Hours [[id number]]     Lab Status: No result Specimen: Blood     Comprehensive metabolic panel [[id number]]  (Abnormal) Collected: 07/14/22 1846    Lab Status: Final result Specimen: Blood from Arm, Left Updated: 07/14/22 1913     Sodium 140 mmol/L      Potassium 5 0 mmol/L      Chloride 105 mmol/L      CO2 29 mmol/L      ANION GAP 6 mmol/L      BUN 12 mg/dL      Creatinine 1 42 mg/dL      Glucose 99 mg/dL      Calcium 7 9 mg/dL      Corrected Calcium 9 5 mg/dL      AST 26 U/L      ALT 13 U/L      Alkaline Phosphatase 87 U/L      Total Protein 7 0 g/dL      Albumin 2 0 g/dL      Total Bilirubin 0 58 mg/dL      eGFR 57 ml/min/1 73sq m     Narrative:      Hans guidelines for Chronic Kidney Disease (CKD):     Stage 1 with normal or high GFR (GFR > 90 mL/min/1 73 square meters)    Stage 2 Mild CKD (GFR = 60-89 mL/min/1 73 square meters)    Stage 3A Moderate CKD (GFR = 45-59 mL/min/1 73 square meters)    Stage 3B Moderate CKD (GFR = 30-44 mL/min/1 73 square meters)    Stage 4 Severe CKD (GFR = 15-29 mL/min/1 73 square meters)    Stage 5 End Stage CKD (GFR <15 mL/min/1 73 square meters)  Note: GFR calculation is accurate only with a steady state creatinine    CBC and differential [[id number]]  (Abnormal) Collected: 07/14/22 1846    Lab Status: Final result Specimen: Blood from Arm, Left Updated: 07/14/22 1856     WBC 6 05 Thousand/uL      RBC 4 30 Million/uL      Hemoglobin 12 9 g/dL      Hematocrit 41 4 %      MCV 96 fL      MCH 30 0 pg      MCHC 31 2 g/dL      RDW 13 0 %      MPV 10 2 fL      Platelets 916 Thousands/uL     Narrative: This is an appended report  These results have been appended to a previously verified report  Blood culture #1 [[id number]] Collected: 07/14/22 1846    Lab Status: In process Specimen: Blood from Arm, Left Updated: 07/14/22 1852    UA w Reflex to Microscopic w Reflex to Culture [[id number]]     Lab Status: No result Specimen: Urine                  XR tibia fibula 2 views RIGHT    (Results Pending)   XR tibia fibula 2 views LEFT    (Results Pending)          X-rays interpreted by myself: no gas/free air, no osseous abn    Procedures  Procedures         ED Course  ED Course as of 07/14/22 2155   Thu Jul 14, 2022 1926 LACTIC ACID(!!): 3 1  Sepsis treatment already in process, however pt does not have SIRS  Will give IVF   1931 Creatinine(!): 1 42  1 09 3 weeks ago                            Initial Sepsis Screening     Row Name 07/14/22 1926                Is the patient's history suggestive of a new or worsening infection? Yes (Proceed)  -[facility]        Suspected source of infection soft tissue  -KH        Are two or more of the following signs & symptoms of infection both present and new to the patient?  No  -[facility]        Indicate SIRS criteria Tachypnea > 20 resp per min  -[facility]        If the answer is yes to both questions, suspicion of sepsis is present --        If severe sepsis is present AND tissue hypoperfusion perists in the hour after fluid resuscitation or lactate > 4, the patient meets criteria for SEPTIC SHOCK --        Are any of the following organ dysfunction criteria present within 6 hours of suspected infection and SIRS criteria that are NOT considered to be chronic conditions?  --        Organ dysfunction --        Date of presentation of severe sepsis --        Time of presentation of severe sepsis --        Tissue hypoperfusion persists in the hour after crystalloid fluid administration, evidenced, by either: --        Was hypotension present within one hour of the conclusion of crystalloid fluid administration? --        Date of presentation of septic shock --        Time of presentation of septic shock --              User Key  (r) = Recorded By, (t) = Taken By, (c) = Cosigned By    [address] Name Provider Type    [address] [place] [address] Physician                              MDM  Number of Diagnoses or Management Options  Lower extremity cellulitis  Open wound of leg  Diagnosis management comments: 49 yo M with b/l lower leg open wounds/concern for cellulitis as well- started on abx and admitted for further management       Amount and/or Complexity of Data Reviewed  Clinical lab tests: ordered and reviewed  Tests in the radiology section of CPT®: ordered and reviewed  Tests in the medicine section of CPT®: ordered and reviewed  Review and summarize past medical records: yes  Independent visualization of images, tracings, or specimens: yes        Disposition  Final diagnoses:   Lower extremity cellulitis   Open wound of leg - bilateral lower legs     Time reflects when diagnosis was documented in both MDM as applicable and the Disposition within this note     Time User Action Codes Description Comment    7/14/2022  8:33 PM Alveda Chol Add [L03 119] Lower extremity cellulitis     7/14/2022  8:33 PM Karon Judd Dr Open wound of leg     7/14/2022  8:33 PM Alveda Chol Modify [F77 064F] Open wound of leg bilateral lower legs    7/14/2022  9:32 PM Gagandeep Jimenez Add [R19 7] Diarrhea     7/14/2022  9:32 PM Ami Jimeenz Add [K50 10] Crohn's disease of large intestine without complication [facility])       ED Disposition     ED Disposition   Admit Condition   Stable    Date/Time   Thu Jul 14, 2022  8:32 PM    Comment   Case was discussed with JAMES and the patient's admission status was agreed to be Admission Status: inpatient status to the service of Dr Annette Jenkins   Follow-up Information    None         Patient's Medications   Discharge Prescriptions    No medications on file       No discharge procedures on file      PDMP Review       Value Time User    PDMP Reviewed  Yes 7/14/2022  8:46 PM Melba Merrill PA-C          ED Provider  Electronically Signed by           Lexis Reed DO  07/14/22 4062

## 2022-07-15 PROBLEM — E87.20 LACTIC ACIDOSIS: Status: RESOLVED | Noted: 2022-07-14 | Resolved: 2022-07-15

## 2022-07-15 PROBLEM — R21 RASH OF BODY: Status: ACTIVE | Noted: 2022-07-15

## 2022-07-15 PROBLEM — E87.2 LACTIC ACIDOSIS: Status: RESOLVED | Noted: 2022-07-14 | Resolved: 2022-07-15

## 2022-07-15 LAB
ANION GAP SERPL CALCULATED.3IONS-SCNC: 7 MMOL/L (ref 4–13)
BASOPHILS # BLD AUTO: 0.05 THOUSANDS/ΜL (ref 0–0.1)
BASOPHILS NFR BLD AUTO: 1 % (ref 0–1)
BUN SERPL-MCNC: 12 MG/DL (ref 5–25)
CALCIUM SERPL-MCNC: 7.5 MG/DL (ref 8.3–10.1)
CHLORIDE SERPL-SCNC: 105 MMOL/L (ref 100–108)
CO2 SERPL-SCNC: 29 MMOL/L (ref 21–32)
CREAT SERPL-MCNC: 1.54 MG/DL (ref 0.6–1.3)
EOSINOPHIL # BLD AUTO: 0.19 THOUSAND/ΜL (ref 0–0.61)
EOSINOPHIL NFR BLD AUTO: 4 % (ref 0–6)
ERYTHROCYTE [DISTWIDTH] IN BLOOD BY AUTOMATED COUNT: 13.3 % (ref 11.6–15.1)
GFR SERPL CREATININE-BSD FRML MDRD: 52 ML/MIN/1.73SQ M
GLUCOSE SERPL-MCNC: 132 MG/DL (ref 65–140)
HCT VFR BLD AUTO: 36.5 % (ref 36.5–49.3)
HGB BLD-MCNC: 11.2 G/DL (ref 12–17)
IMM GRANULOCYTES # BLD AUTO: 0.26 THOUSAND/UL (ref 0–0.2)
IMM GRANULOCYTES NFR BLD AUTO: 5 % (ref 0–2)
LACTATE SERPL-SCNC: 1.7 MMOL/L (ref 0.5–2)
LYMPHOCYTES # BLD AUTO: 1.56 THOUSANDS/ΜL (ref 0.6–4.47)
LYMPHOCYTES NFR BLD AUTO: 32 % (ref 14–44)
MCH RBC QN AUTO: 29.8 PG (ref 26.8–34.3)
MCHC RBC AUTO-ENTMCNC: 30.7 G/DL (ref 31.4–37.4)
MCV RBC AUTO: 97 FL (ref 82–98)
MONOCYTES # BLD AUTO: 0.91 THOUSAND/ΜL (ref 0.17–1.22)
MONOCYTES NFR BLD AUTO: 19 % (ref 4–12)
NEUTROPHILS # BLD AUTO: 1.95 THOUSANDS/ΜL (ref 1.85–7.62)
NEUTS SEG NFR BLD AUTO: 39 % (ref 43–75)
NRBC BLD AUTO-RTO: 0 /100 WBCS
PLATELET # BLD AUTO: 255 THOUSANDS/UL (ref 149–390)
PMV BLD AUTO: 10.5 FL (ref 8.9–12.7)
POTASSIUM SERPL-SCNC: 3.7 MMOL/L (ref 3.5–5.3)
RBC # BLD AUTO: 3.76 MILLION/UL (ref 3.88–5.62)
SODIUM SERPL-SCNC: 141 MMOL/L (ref 136–145)
WBC # BLD AUTO: 4.92 THOUSAND/UL (ref 4.31–10.16)

## 2022-07-15 PROCEDURE — 94002 VENT MGMT INPAT INIT DAY: CPT

## 2022-07-15 PROCEDURE — 99232 SBSQ HOSP IP/OBS MODERATE 35: CPT | Performed by: PHYSICIAN ASSISTANT

## 2022-07-15 PROCEDURE — 85025 COMPLETE CBC W/AUTO DIFF WBC: CPT | Performed by: PHYSICIAN ASSISTANT

## 2022-07-15 PROCEDURE — 83605 ASSAY OF LACTIC ACID: CPT | Performed by: EMERGENCY MEDICINE

## 2022-07-15 PROCEDURE — 80048 BASIC METABOLIC PNL TOTAL CA: CPT | Performed by: PHYSICIAN ASSISTANT

## 2022-07-15 PROCEDURE — 99222 1ST HOSP IP/OBS MODERATE 55: CPT | Performed by: INTERNAL MEDICINE

## 2022-07-15 PROCEDURE — 94660 CPAP INITIATION&MGMT: CPT

## 2022-07-15 RX ORDER — NYSTATIN 100000 [USP'U]/G
POWDER TOPICAL 2 TIMES DAILY
Status: DISCONTINUED | OUTPATIENT
Start: 2022-07-15 | End: 2022-07-18 | Stop reason: HOSPADM

## 2022-07-15 RX ADMIN — METOPROLOL TARTRATE 50 MG: 50 TABLET, FILM COATED ORAL at 09:00

## 2022-07-15 RX ADMIN — VANCOMYCIN HYDROCHLORIDE 2000 MG: 10 INJECTION, POWDER, LYOPHILIZED, FOR SOLUTION INTRAVENOUS at 09:14

## 2022-07-15 RX ADMIN — CEFEPIME HYDROCHLORIDE 2000 MG: 2 INJECTION, POWDER, FOR SOLUTION INTRAVENOUS at 16:25

## 2022-07-15 RX ADMIN — BUSPIRONE HYDROCHLORIDE 10 MG: 10 TABLET ORAL at 09:00

## 2022-07-15 RX ADMIN — GABAPENTIN 100 MG: 100 CAPSULE ORAL at 21:27

## 2022-07-15 RX ADMIN — GABAPENTIN 100 MG: 100 CAPSULE ORAL at 09:00

## 2022-07-15 RX ADMIN — GABAPENTIN 100 MG: 100 CAPSULE ORAL at 16:17

## 2022-07-15 RX ADMIN — DIVALPROEX SODIUM 500 MG: 500 TABLET, DELAYED RELEASE ORAL at 21:27

## 2022-07-15 RX ADMIN — BUSPIRONE HYDROCHLORIDE 10 MG: 10 TABLET ORAL at 21:27

## 2022-07-15 RX ADMIN — PALIPERIDONE 6 MG: 3 TABLET, EXTENDED RELEASE ORAL at 09:01

## 2022-07-15 RX ADMIN — VANCOMYCIN HYDROCHLORIDE 2000 MG: 10 INJECTION, POWDER, LYOPHILIZED, FOR SOLUTION INTRAVENOUS at 17:06

## 2022-07-15 RX ADMIN — DIVALPROEX SODIUM 1000 MG: 500 TABLET, DELAYED RELEASE ORAL at 09:00

## 2022-07-15 RX ADMIN — NYSTATIN: 100000 POWDER TOPICAL at 17:26

## 2022-07-15 RX ADMIN — PRAVASTATIN SODIUM 80 MG: 80 TABLET ORAL at 17:17

## 2022-07-15 RX ADMIN — PANTOPRAZOLE SODIUM 40 MG: 40 TABLET, DELAYED RELEASE ORAL at 05:00

## 2022-07-15 RX ADMIN — CITALOPRAM HYDROBROMIDE 40 MG: 20 TABLET ORAL at 09:00

## 2022-07-15 RX ADMIN — ENOXAPARIN SODIUM 40 MG: 40 INJECTION SUBCUTANEOUS at 09:00

## 2022-07-15 RX ADMIN — MORPHINE SULFATE 2 MG: 2 INJECTION, SOLUTION INTRAMUSCULAR; INTRAVENOUS at 09:11

## 2022-07-15 RX ADMIN — BUSPIRONE HYDROCHLORIDE 10 MG: 10 TABLET ORAL at 16:17

## 2022-07-15 RX ADMIN — MORPHINE SULFATE 2 MG: 2 INJECTION, SOLUTION INTRAMUSCULAR; INTRAVENOUS at 21:30

## 2022-07-15 RX ADMIN — NYSTATIN: 100000 POWDER TOPICAL at 11:47

## 2022-07-15 RX ADMIN — FUROSEMIDE 40 MG: 40 TABLET ORAL at 09:00

## 2022-07-15 RX ADMIN — DIVALPROEX SODIUM 1000 MG: 500 TABLET, DELAYED RELEASE ORAL at 21:27

## 2022-07-15 RX ADMIN — CLONAZEPAM 1 MG: 1 TABLET ORAL at 09:00

## 2022-07-15 RX ADMIN — CLONAZEPAM 1 MG: 1 TABLET ORAL at 21:27

## 2022-07-15 RX ADMIN — LEVOTHYROXINE SODIUM 25 MCG: 25 TABLET ORAL at 05:00

## 2022-07-15 RX ADMIN — METOPROLOL TARTRATE 50 MG: 50 TABLET, FILM COATED ORAL at 17:17

## 2022-07-15 RX ADMIN — MIRTAZAPINE 7.5 MG: 15 TABLET, FILM COATED ORAL at 21:27

## 2022-07-15 RX ADMIN — CEFEPIME HYDROCHLORIDE 2000 MG: 2 INJECTION, POWDER, FOR SOLUTION INTRAVENOUS at 08:32

## 2022-07-15 RX ADMIN — MORPHINE SULFATE 2 MG: 2 INJECTION, SOLUTION INTRAMUSCULAR; INTRAVENOUS at 17:25

## 2022-07-15 RX ADMIN — CLONAZEPAM 1 MG: 1 TABLET ORAL at 16:17

## 2022-07-15 NOTE — CONSULTS
Consultation - [address] [facility]  Ryann Aragon 48 y o  male MRN: [id number]  Unit/Bed#: [facility] 2 -01 Encounter: [id number]        ASSESSMENT/PLAN:   Crohn's disease    Patient states he was diagnosed with Crohn's disease greater than 20 years ago  He is currently maintained on Remicade infusions every 8 weeks  He describes alternating bowel habits with constipation and diarrhea  He states he had multiple episodes of diarrhea yesterday  C diff was ordered but has yet to be collected  He had a overall pretty unremarkable colonoscopy in 2020 with mild healed chronic inflammation  Would recommend stool culture as well as fecal calprotectin however it sounds as if his Crohn's disease is under good control and may have a component of irritable bowel syndrome   -follow-up on C diff  -check stool culture and fecal calprotectin    Consults    Reason for Consult / Principal Problem: Multiple open wounds of lower leg    HPI: Ryann Aragon is a 48y o  year old male with a PMH of HTN, hyperlipidemia, Crohn's disease, bipolar disorder who presented with worsening lower extremity wounds  We were asked to see him for a hx of Crohn's & diarrhea  Patient states he was diagnosed with Crohn's disease greater than 20 years ago  He is currently maintained on Remicade infusions every 8 weeks  He admits to alternating bowel habits between constipation and diarrhea  He states he had several episodes of diarrhea yesterday  He denies any rectal bleeding  He has occasional abdominal pain  He denies any upper GI symptoms of nausea, vomiting or GERD  He did have a colonoscopy in November of 2020 at [facility] showing normal appearing mucosa in the right colon, minimal quiescent changes in the transverse, prominent a vascularity mucosal scarring in the left colon and rectum as well as a small polyp  Biopsy showed mild chronic inflammation, polyp was hyperplastic    He has not followed up with GI since     Review of Systems: as per HPI  Review of Systems   All other systems reviewed and are negative  Historical Information   Past Medical History:   Diagnosis Date    Bipolar affective disorder ([facility] [address] )     Cellulitis     Hyperlipidemia     Hypertension      History reviewed  No pertinent surgical history  Social History   Social History     Substance and Sexual Activity   Alcohol Use Never     Social History     Substance and Sexual Activity   Drug Use Never     Social History     Tobacco Use   Smoking Status Former Smoker   Smokeless Tobacco Never Used     History reviewed  No pertinent family history      Meds/Allergies     Medications Prior to Admission   Medication    busPIRone (BUSPAR) 10 mg tablet    Cholecalciferol (Vitamin D3) 50 MCG (2000 UT) capsule    citalopram (CeleXA) 40 mg tablet    clonazePAM (KlonoPIN) 1 mg tablet    divalproex sodium (DEPAKOTE) 500 mg EC tablet    divalproex sodium (DEPAKOTE) 500 mg EC tablet    furosemide (LASIX) 40 mg tablet    gabapentin (NEURONTIN) 100 mg capsule    hydrochlorothiazide (HYDRODIURIL) 12 5 mg tablet    levothyroxine 25 mcg tablet    loperamide (IMODIUM) 2 mg capsule    Melatonin 10 MG TABS    metoprolol tartrate (LOPRESSOR) 50 mg tablet    mirtazapine (REMERON) 7 5 MG tablet    mupirocin (BACTROBAN) 2 % ointment    nystatin (MYCOSTATIN) powder    paliperidone (INVEGA) 6 MG 24 hr tablet    pantoprazole (PROTONIX) 40 mg tablet    potassium chloride (K-DUR,KLOR-CON) 20 mEq tablet    rosuvastatin (CRESTOR) 10 MG tablet    traZODone (DESYREL) 50 mg tablet     Current Facility-Administered Medications   Medication Dose Route Frequency    acetaminophen (TYLENOL) tablet 650 mg  650 mg Oral Q6H PRN    busPIRone (BUSPAR) tablet 10 mg  10 mg Oral TID    cefepime (MAXIPIME) 2,000 mg in dextrose 5 % 50 mL IVPB  2,000 mg Intravenous Q8H    citalopram (CeleXA) tablet 40 mg  40 mg Oral Daily    clonazePAM (KlonoPIN) tablet 1 mg  1 mg Oral TID    divalproex sodium (DEPAKOTE) EC tablet 1,000 mg  1,000 mg Oral BID    divalproex sodium (DEPAKOTE) EC tablet 500 mg  500 mg Oral HS    enoxaparin (LOVENOX) subcutaneous injection 40 mg  40 mg Subcutaneous Daily    furosemide (LASIX) tablet 40 mg  40 mg Oral Daily    gabapentin (NEURONTIN) capsule 100 mg  100 mg Oral TID    levothyroxine tablet 25 mcg  25 mcg Oral Early Morning    loperamide (IMODIUM) capsule 2 mg  2 mg Oral TID PRN    metoprolol tartrate (LOPRESSOR) tablet 50 mg  50 mg Oral BID    mirtazapine (REMERON) tablet 7 5 mg  7 5 mg Oral HS    morphine injection 2 mg  2 mg Intravenous Q4H PRN    ondansetron (ZOFRAN) injection 4 mg  4 mg Intravenous Q6H PRN    paliperidone (INVEGA) 24 hr tablet 6 mg  6 mg Oral Daily    pantoprazole (PROTONIX) EC tablet 40 mg  40 mg Oral Early Morning    pravastatin (PRAVACHOL) tablet 80 mg  80 mg Oral Daily With Dinner    sodium chloride (PF) 0 9 % injection 3 mL  3 mL Intravenous Q1H PRN    traZODone (DESYREL) tablet 50 mg  50 mg Oral HS PRN    vancomycin (VANCOCIN) 2,000 mg in sodium chloride 0 9 % 500 mL IVPB  15 mg/kg (Adjusted) Intravenous Q8H       Allergies   Allergen Reactions    Mesalamine GI Intolerance     Pt has had severe diarrhea and abdominal pain on mESALAMINE       Objective     Blood pressure 93/53, pulse 71, temperature (!) 97 2 °F (36 2 °C), resp  rate 18, weight (!) 209 kg (461 lb 10 3 oz), SpO2 90 %  No intake or output data in the 24 hours ending 07/15/22 1047    PHYSICAL EXAM     Physical Exam  Constitutional:       General: He is not in acute distress  Appearance: He is well-developed  He is obese  HENT:      Head: Normocephalic and atraumatic  Eyes:      Conjunctiva/sclera: Conjunctivae normal    Cardiovascular:      Rate and Rhythm: Normal rate and regular rhythm  Pulmonary:      Effort: Pulmonary effort is normal       Breath sounds: Normal breath sounds     Abdominal:      General: Bowel sounds are normal  There is no distension  Palpations: Abdomen is soft  Tenderness: There is no abdominal tenderness  Musculoskeletal:         General: Normal range of motion  Cervical back: Normal range of motion  Skin:     General: Skin is warm and dry  Findings: Erythema (Lower extremities bilaterally) present  Neurological:      Mental Status: He is alert and oriented to person, place, and time  Psychiatric:         Mood and Affect: Mood normal          Behavior: Behavior normal          Lab Results:   CBC:   Lab Results   Component Value Date    WBC 4 92 07/15/2022    HGB 11 2 (L) 07/15/2022    HCT 36 5 07/15/2022    MCV 97 07/15/2022     07/15/2022    MCH 29 8 07/15/2022    MCHC 30 7 (L) 07/15/2022    RDW 13 3 07/15/2022    MPV 10 5 07/15/2022    NRBC 0 07/15/2022   ,   CMP:   Lab Results   Component Value Date    K 3 7 07/15/2022     07/15/2022    CO2 29 07/15/2022    BUN 12 07/15/2022    CREATININE 1 54 (H) 07/15/2022    CALCIUM 7 5 (L) 07/15/2022    AST 26 07/14/2022    ALT 13 07/14/2022    ALKPHOS 87 07/14/2022    EGFR 52 07/15/2022   ,   Lipase: No results found for: LIPASE,  PT/INR:   Lab Results   Component Value Date    INR 1 03 07/14/2022   ,   Troponin: No results found for: TROPONINI,   Magnesium: No components found for: MAG,   Phosphorous: No results found for: PHOS  Imaging Studies: I have personally reviewed pertinent reports        Xray:    No acute osseous abnormality

## 2022-07-15 NOTE — WOUND OSTOMY CARE
Consult Note - Wound   Lucero Hernandez 48 y o  male MRN: [id number]  Unit/Bed#: [place] [address] -01 Encounter: [id number]      History and Present Illness:  48year old male presented to the hospital with bilateral lower extremity swelling, redness, pain, and weeping  Patient's history significant for lymphedema  He also reports he has Crohn's disease for 20 years with stool incontinence (wears brief at home)  Assessment Findings:   Patient agreeable to assessment  He is able to turn in bed independently  Continent of urine  Patient reports he can ambulate short distances, has a motorized wheelchair  1   Lower extremities managed by podiatry team--assisted podiatry team with dressing application at this time  2   Generalized rash with superimposed candidiasis/intertriginous dermatitis to abdominal, groin, umbilical, and neck folds--patient reports he has had a generalized rash for the last 5 months  He has not seen a dermatologist   Patient reports mild pruritis  Rash to neck, upper arms, back, chest, abdomen, sacrum, groin, and thighs  Scattered oval pink patches with flaky crusting  Abdominal, umbilical, gluteal, and groin folds more moist and erythematous with yeast odor  No open areas at this time  Anterior abdominal fold:    Back:    Sacrum/gluteal cleft:    Umbilicus:    Left neck/chest/arm:    Neck fold:    Right chest/abdomen:      Dr Nichole LUCAS made aware of assessment findings and recommendation for dermatology consultation and nystatin powder to skin folds  Wound Care Plan:   1-Turn/reposition every 2 hours while in bed and weight shift frequently while in chair for pressure re-distribution on skin  2-Elevate lower extremities as often as possible for edema management  Float heels off of bed/chair surface to offload pressure  3-Bariatric offloading air cushion in chair when out of bed    4-Moisturize skin daily with skin nourishing cream   5-Abdominal, neck, groin, gluteal folds--Cleanse skin folds with soap and water, pat dry well  Dust with nystatin powder twice daily  6-Lower extremities per podiatry team   7-Consider bariatric bed  Patient should be discharged with VNA and follow-up at the wound center on discharge  Wound care team to follow  Plan of care reviewed with primary RN      Rebecca PARRISHN, RN, [facility]

## 2022-07-15 NOTE — PROGRESS NOTES
[facility]  Progress Note - Jaron Joshi 1975, 48 y o  male MRN: [id number]  Unit/Bed#: [address] 214-01 Encounter: [id number]  Primary Care Provider: Marie Walsh DO   Date and time admitted to hospital: 7/14/2022  6:07 PM    * Multiple open wounds of lower leg  Assessment & Plan  Patient with longstanding history of bilateral lower extremity lymphedema and wounds to lower extremities  · With multiple recent admissions at [facility], [facility], and most recently [facility], discharged on 6/20  · Rehab was recommended however patient wanted to go home  · Now with worsening lower extremity wounds/associated cellulitis  · Has been taking clindamycin at home, will discontinue clindamycin  · Initiated on IV Rocephin in the ED  · Podiatry consultation, appreciate recommendations  · Continue IV cefepime and vancomycin given history of MRSA  · Pharmacy consultation  · PT/OT consulted  · Unfortunately patient is a very difficult stick, will need lab draws given vancomycin use as well as daily monitoring of labs  PICC line consent obtained, order placed    Rash of body  Assessment & Plan  With nonspecific slightly erythematous rash all over body  · Slightly pruritic  · Outpatient Derm follow-up    GERMANIA (acute kidney injury) ([facility] [address] )  Assessment & Plan  With baseline creatinine 0 8-1 1  · Most recent creatinine 1 54  · Suspect likely prerenal in the setting of dehydration  · Currently receiving IV fluids however will be cautious given patient's history of lymphedema  · BMP in a m  · Avoid hypotension, nephrotoxins  · Hold HCTZ, will continue Lasix given significant swelling lower extremities    ALESSANDRA on CPAP  Assessment & Plan  CPAP q h s      Gastroesophageal reflux disease without esophagitis  Assessment & Plan  Continue Protonix    Hypothyroidism  Assessment & Plan  Continue levothyroxine 25 mcg daily    Morbid obesity (HCC)  Assessment & Plan  BMI 59 19  · Counseled on weight loss    Bipolar disorder ([facility] [address] )  Assessment & Plan  Mood is stable  · Continue Celexa, BuSpar, Remeron, Depakote, Invega  · Review PDMP, patient is on Klonopin t i d  As well    Primary hypertension  Assessment & Plan  Continue metoprolol, Lasix, hold HCTZ    Lactic acidosis-resolved as of 7/15/2022  Assessment & Plan  Lactic acid elevated at 3 1 on admission now resolved  · Suspect likely secondary to current ongoing cellulitis infection  · Currently receiving IV fluids as well as IV antibiotics  · Will repeat          VTE Pharmacologic Prophylaxis:   Moderate Risk (Score 3-4) - Pharmacological DVT Prophylaxis Ordered: enoxaparin (Lovenox)  Patient Centered Rounds: I performed bedside rounds with nursing staff today  Discussions with Specialists or Other Care Team Provider:  Wound care    Education and Discussions with Family / Patient: Patient declined call to   Time Spent for Care: 20 minutes  More than 50% of total time spent on counseling and coordination of care as described above  Current Length of Stay: 1 day(s)  Current Patient Status: Inpatient   Certification Statement: The patient will continue to require additional inpatient hospital stay due to IV antibiotics  Discharge Plan: Anticipate discharge in 24-48 hrs to discharge location to be determined pending rehab evaluations  Code Status: Level 1 - Full Code    Subjective:   Patient seen resting in bed  Offers no complaints this time  Denies any overnight events  States that he is tired  Objective:     Vitals:   Temp (24hrs), Av 1 °F (36 7 °C), Min:97 2 °F (36 2 °C), Max:98 8 °F (37 1 °C)    Temp:  [97 2 °F (36 2 °C)-98 8 °F (37 1 °C)] 97 2 °F (36 2 °C)  HR:  [70-78] 71  Resp:  [18-22] 18  BP: ()/(53-62) 93/53  SpO2:  [87 %-100 %] 90 %  Body mass index is 59 27 kg/m²       Input and Output Summary (last 24 hours):   No intake or output data in the 24 hours ending 07/15/22 1116    Physical Exam:   Physical Exam  Vitals and nursing note reviewed  Constitutional:       General: He is not in acute distress  Appearance: Normal appearance  He is obese  He is not ill-appearing, toxic-appearing or diaphoretic  Eyes:      General: No scleral icterus  Conjunctiva/sclera: Conjunctivae normal    Cardiovascular:      Rate and Rhythm: Normal rate and regular rhythm  Heart sounds: No murmur heard  No friction rub  No gallop  Pulmonary:      Effort: Pulmonary effort is normal  No respiratory distress  Breath sounds: Normal breath sounds  No stridor  No wheezing, rhonchi or rales  Chest:      Chest wall: No tenderness  Abdominal:      General: Abdomen is flat  Bowel sounds are normal  There is no distension  Palpations: Abdomen is soft  Tenderness: There is no abdominal tenderness  Musculoskeletal:      Right lower leg: Edema present  Left lower leg: Edema present  Skin:     General: Skin is warm and dry  Coloration: Skin is not jaundiced or pale  Findings: Erythema present  Comments: Bilateral lower extremity erythema and warmth  Neurological:      General: No focal deficit present  Mental Status: He is alert and oriented to person, place, and time  Mental status is at baseline            Additional Data:     Labs:  Results from last 7 days   Lab Units 07/15/22  0455 07/14/22  1846   WBC Thousand/uL 4 92 6 05   HEMOGLOBIN g/dL 11 2* 12 9   HEMATOCRIT % 36 5 41 4   PLATELETS Thousands/uL 255 307  307   BANDS PCT %  --  1   NEUTROS PCT % 39*  --    LYMPHS PCT % 32  --    LYMPHO PCT %  --  39   MONOS PCT % 19*  --    MONO PCT %  --  14*   EOS PCT % 4 1     Results from last 7 days   Lab Units 07/15/22  0455 07/14/22  1846   SODIUM mmol/L 141 140   POTASSIUM mmol/L 3 7 5 0   CHLORIDE mmol/L 105 105   CO2 mmol/L 29 29   BUN mg/dL 12 12   CREATININE mg/dL 1 54* 1 42*   ANION GAP mmol/L 7 6   CALCIUM mg/dL 7 5* 7 9*   ALBUMIN g/dL  -- 2 0*   TOTAL BILIRUBIN mg/dL  --  0 58   ALK PHOS U/L  --  87   ALT U/L  --  13   AST U/L  --  26   GLUCOSE RANDOM mg/dL 132 99     Results from last 7 days   Lab Units 07/14/22 1947   INR  1 03             Results from last 7 days   Lab Units 07/15/22  0027 07/14/22 1947 07/14/22  1846   LACTIC ACID mmol/L 1 7  --  3 1*   PROCALCITONIN ng/ml  --  0 07  --        Lines/Drains:  Invasive Devices  Report    Peripheral Intravenous Line  Duration           Peripheral IV 07/14/22 Left Antecubital <1 day    Peripheral IV 07/14/22 Right Antecubital <1 day                      Imaging: No pertinent imaging reviewed  Recent Cultures (last 7 days):   Results from last 7 days   Lab Units 07/14/22 1948 07/14/22  1846   BLOOD CULTURE  Received in Microbiology Lab  Culture in Progress  Received in Microbiology Lab  Culture in Progress         Last 24 Hours Medication List:   Current Facility-Administered Medications   Medication Dose Route Frequency Provider Last Rate    acetaminophen  650 mg Oral Q6H PRN Frann Severe, PA-C      busPIRone  10 mg Oral TID Frann Severe, PA-C      cefepime  2,000 mg Intravenous [facility], PA-C 2,000 mg (07/15/22 6344)    citalopram  40 mg Oral Daily Frann Severe, PA-C      clonazePAM  1 mg Oral TID Frann Severe, PA-C      divalproex sodium  1,000 mg Oral BID Frann Severe, PA-C      divalproex sodium  500 mg Oral HS Frann Severe, PA-C      enoxaparin  40 mg Subcutaneous Daily Frann Severe, PA-C      furosemide  40 mg Oral Daily Frann Severe, [place]      gabapentin  100 mg Oral TID Frann Severe, PA-C      levothyroxine  25 mcg Oral Early Morning Frann Severe, [place]      loperamide  2 mg Oral TID PRN Frann Severe, PA-C      metoprolol tartrate  50 mg Oral BID Frann Severe, PA-C      mirtazapine  7 5 mg Oral HS Frann Severe, [place]      morphine injection  2 mg Intravenous Q4H PRN Frann Severe, PA-C      nystatin   Topical BID [facility] Severe, [place]      ondansetron  4 mg Intravenous Q6H PRN Zandra Cooks Slate, PA-C      paliperidone  6 mg Oral Daily Imtiaz Gustafson      pantoprazole  40 mg Oral Early Morning Imtiaz Gustafson      pravastatin  80 mg Oral Daily With Imtiaz Guillaume      sodium chloride (PF)  3 mL Intravenous Q1H PRN Michael Chavez DO      traZODone  50 mg Oral HS PRN NEISHA Gustafson      vancomycin  15 mg/kg (Adjusted) Intravenous Q8H NEISHA Gustafson 2,000 mg (07/15/22 0914)        Today, Patient Was Seen By: NEISHA Gustafson    **Please Note: This note may have been constructed using a voice recognition system  **

## 2022-07-15 NOTE — PLAN OF CARE
Problem: Nutrition/Hydration-ADULT  Goal: Nutrient/Hydration intake appropriate for improving, restoring or maintaining nutritional needs  Description: Monitor and assess patient's nutrition/hydration status for malnutrition  Collaborate with interdisciplinary team and initiate plan and interventions as ordered  Monitor patient's weight and dietary intake as ordered or per policy  Utilize nutrition screening tool and intervene as necessary  Determine patient's food preferences and provide high-protein, high-caloric foods as appropriate       INTERVENTIONS:  - Monitor oral intake, urinary output, labs, and treatment plans  - Assess nutrition and hydration status and recommend course of action  - Evaluate amount of meals eaten  - Assist patient with eating if necessary   - Allow adequate time for meals  - Recommend/ encourage appropriate diets, oral nutritional supplements, and vitamin/mineral supplements  - Order, calculate, and assess calorie counts as needed  - Recommend, monitor, and adjust tube feedings and TPN/PPN based on assessed needs  - Assess need for intravenous fluids  - Provide specific nutrition/hydration education as appropriate  - Include patient/family/caregiver in decisions related to nutrition  Outcome: Progressing     Problem: PAIN - ADULT  Goal: Verbalizes/displays adequate comfort level or baseline comfort level  Description: Interventions:  - Encourage patient to monitor pain and request assistance  - Assess pain using appropriate pain scale  - Administer analgesics based on type and severity of pain and evaluate response  - Implement non-pharmacological measures as appropriate and evaluate response  - Consider cultural and social influences on pain and pain management  - Notify physician/advanced practitioner if interventions unsuccessful or patient reports new pain  Outcome: Progressing     Problem: INFECTION - ADULT  Goal: Absence or prevention of progression during hospitalization  Description: INTERVENTIONS:  - Assess and monitor for signs and symptoms of infection  - Monitor lab/diagnostic results  - Monitor all insertion sites, i e  indwelling lines, tubes, and drains  - Monitor endotracheal if appropriate and nasal secretions for changes in amount and color  - [place] appropriate cooling/warming therapies per order  - Administer medications as ordered  - Instruct and encourage patient and family to use good hand hygiene technique  - Identify and instruct in appropriate isolation precautions for identified infection/condition  Outcome: Progressing     Problem: RESPIRATORY - ADULT  Goal: Achieves optimal ventilation and oxygenation  Description: INTERVENTIONS:  - Assess for changes in respiratory status  - Assess for changes in mentation and behavior  - Position to facilitate oxygenation and minimize respiratory effort  - Oxygen administered by appropriate delivery if ordered  - Initiate smoking cessation education as indicated  - Encourage broncho-pulmonary hygiene including cough, deep breathe, Incentive Spirometry  - Assess the need for suctioning and aspirate as needed  - Assess and instruct to report SOB or any respiratory difficulty  - Respiratory Therapy support as indicated  Outcome: Progressing     Problem: SKIN/TISSUE INTEGRITY - ADULT  Goal: Skin Integrity remains intact(Skin Breakdown Prevention)  Description: Assess:  -Perform Javier assessment every shift  -Clean and moisturize skin every shift  -Inspect skin when repositioning, toileting, and assisting with ADLS  -Assess under medical devices such as rica every shift  -Assess extremities for adequate circulation and sensation     Bed Management:  -Have minimal linens on bed & keep smooth, unwrinkled  -Change linens as needed when moist or perspiring  -Avoid sitting or lying in one position for more than 2 hours while in bed  -Keep HOB at 30 degrees     Toileting:  -Offer bedside commode  -Assess for incontinence every shift  -Use incontinent care products after each incontinent episode such as lotion    Activity:  -Mobilize patient 3 times a day  -Encourage activity and walks on unit  -Encourage or provide ROM exercises   -Turn and reposition patient every 2 Hours  -Use appropriate equipment to lift or move patient in bed  -Instruct/ Assist with weight shifting every 1 when out of bed in chair  -Consider limitation of chair time 1 hour intervals    Skin Care:  -Avoid use of baby powder, tape, friction and shearing, hot water or constrictive clothing  -Relieve pressure over bony prominences using allevyns  -Do not massage red bony areas    Next Steps:  -Teach patient strategies to minimize risks such as turning   -Consider consults to  interdisciplinary teams such as PT  Outcome: Progressing  Goal: Incision(s), wounds(s) or drain site(s) healing without S/S of infection  Description: INTERVENTIONS  - Assess and document dressing, incision, wound bed, drain sites and surrounding tissue  - Provide patient and family education  - Perform skin care/dressing changes every shift  Outcome: Progressing     Problem: MUSCULOSKELETAL - ADULT  Goal: Maintain or return mobility to safest level of function  Description: INTERVENTIONS:  - Assess patient's ability to carry out ADLs; assess patient's baseline for ADL function and identify physical deficits which impact ability to perform ADLs (bathing, care of mouth/teeth, toileting, grooming, dressing, etc )  - Assess/evaluate cause of self-care deficits   - Assess range of motion  - Assess patient's mobility  - Assess patient's need for assistive devices and provide as appropriate  - Encourage maximum independence but intervene and supervise when necessary  - Involve family in performance of ADLs  - Assess for home care needs following discharge   - Consider OT consult to assist with ADL evaluation and planning for discharge  - Provide patient education as appropriate  Outcome: Progressing  Goal: Maintain proper alignment of affected body part  Description: INTERVENTIONS:  - Support, maintain and protect limb and body alignment  - Provide patient/ family with appropriate education  Outcome: Progressing

## 2022-07-15 NOTE — ASSESSMENT & PLAN NOTE
Lactic acid elevated at 3 1  · Suspect likely secondary to current ongoing cellulitis infection  · Currently receiving IV fluids as well as IV antibiotics  · Will repeat

## 2022-07-15 NOTE — PLAN OF CARE
Problem: Nutrition/Hydration-ADULT  Goal: Nutrient/Hydration intake appropriate for improving, restoring or maintaining nutritional needs  Description: Monitor and assess patient's nutrition/hydration status for malnutrition  Collaborate with interdisciplinary team and initiate plan and interventions as ordered  Monitor patient's weight and dietary intake as ordered or per policy  Utilize nutrition screening tool and intervene as necessary  Determine patient's food preferences and provide high-protein, high-caloric foods as appropriate       INTERVENTIONS:  - Monitor oral intake, urinary output, labs, and treatment plans  - Assess nutrition and hydration status and recommend course of action  - Evaluate amount of meals eaten  - Assist patient with eating if necessary   - Allow adequate time for meals  - Recommend/ encourage appropriate diets, oral nutritional supplements, and vitamin/mineral supplements  - Order, calculate, and assess calorie counts as needed  - Recommend, monitor, and adjust tube feedings and TPN/PPN based on assessed needs  - Assess need for intravenous fluids  - Provide specific nutrition/hydration education as appropriate  - Include patient/family/caregiver in decisions related to nutrition  Outcome: Progressing     Problem: PAIN - ADULT  Goal: Verbalizes/displays adequate comfort level or baseline comfort level  Description: Interventions:  - Encourage patient to monitor pain and request assistance  - Assess pain using appropriate pain scale  - Administer analgesics based on type and severity of pain and evaluate response  - Implement non-pharmacological measures as appropriate and evaluate response  - Consider cultural and social influences on pain and pain management  - Notify physician/advanced practitioner if interventions unsuccessful or patient reports new pain  Outcome: Progressing     Problem: INFECTION - ADULT  Goal: Absence or prevention of progression during hospitalization  Description: INTERVENTIONS:  - Assess and monitor for signs and symptoms of infection  - Monitor lab/diagnostic results  - Monitor all insertion sites, i e  indwelling lines, tubes, and drains  - Monitor endotracheal if appropriate and nasal secretions for changes in amount and color  - [place] appropriate cooling/warming therapies per order  - Administer medications as ordered  - Instruct and encourage patient and family to use good hand hygiene technique  - Identify and instruct in appropriate isolation precautions for identified infection/condition  Outcome: Progressing     Problem: RESPIRATORY - ADULT  Goal: Achieves optimal ventilation and oxygenation  Description: INTERVENTIONS:  - Assess for changes in respiratory status  - Assess for changes in mentation and behavior  - Position to facilitate oxygenation and minimize respiratory effort  - Oxygen administered by appropriate delivery if ordered  - Initiate smoking cessation education as indicated  - Encourage broncho-pulmonary hygiene including cough, deep breathe, Incentive Spirometry  - Assess the need for suctioning and aspirate as needed  - Assess and instruct to report SOB or any respiratory difficulty  - Respiratory Therapy support as indicated  Outcome: Progressing     Problem: SKIN/TISSUE INTEGRITY - ADULT  Goal: Skin Integrity remains intact(Skin Breakdown Prevention)  Description: Assess:  -Perform Javier assessment every   -Clean and moisturize skin every   -Inspect skin when repositioning, toileting, and assisting with ADLS  -Assess under medical devices such as  every   -Assess extremities for adequate circulation and sensation     Bed Management:  -Have minimal linens on bed & keep smooth, unwrinkled  -Change linens as needed when moist or perspiring  -Avoid sitting or lying in one position for more than  hours while in bed  -Keep HOB at degrees     Toileting:  -Offer bedside commode  -Assess for incontinence every   -Use incontinent care products after each incontinent episode such as     Activity:  -Mobilize patient  times a day  -Encourage activity and walks on unit  -Encourage or provide ROM exercises   -Turn and reposition patient every  Hours  -Use appropriate equipment to lift or move patient in bed  -Instruct/ Assist with weight shifting every  when out of bed in chair  -Consider limitation of chair time  hour intervals    Skin Care:  -Avoid use of baby powder, tape, friction and shearing, hot water or constrictive clothing  -Relieve pressure over bony prominences using   -Do not massage red bony areas    Next Steps:  -Teach patient strategies to minimize risks such as    -Consider consults to  interdisciplinary teams such as   Outcome: Progressing  Goal: Incision(s), wounds(s) or drain site(s) healing without S/S of infection  Description: INTERVENTIONS  - Assess and document dressing, incision, wound bed, drain sites and surrounding tissue  - Provide patient and family education  - Perform skin care/dressing changes every   Outcome: Progressing     Problem: MUSCULOSKELETAL - ADULT  Goal: Maintain or return mobility to safest level of function  Description: INTERVENTIONS:  - Assess patient's ability to carry out ADLs; assess patient's baseline for ADL function and identify physical deficits which impact ability to perform ADLs (bathing, care of mouth/teeth, toileting, grooming, dressing, etc )  - Assess/evaluate cause of self-care deficits   - Assess range of motion  - Assess patient's mobility  - Assess patient's need for assistive devices and provide as appropriate  - Encourage maximum independence but intervene and supervise when necessary  - Involve family in performance of ADLs  - Assess for home care needs following discharge   - Consider OT consult to assist with ADL evaluation and planning for discharge  - Provide patient education as appropriate  Outcome: Progressing  Goal: Maintain proper alignment of affected body part  Description: INTERVENTIONS:  - Support, maintain and protect limb and body alignment  - Provide patient/ family with appropriate education  Outcome: Progressing     Problem: Prexisting or High Potential for Compromised Skin Integrity  Goal: Skin integrity is maintained or improved  Description: INTERVENTIONS:  - Identify patients at risk for skin breakdown  - Assess and monitor skin integrity  - Assess and monitor nutrition and hydration status  - Monitor labs   - Assess for incontinence   - Turn and reposition patient  - Assist with mobility/ambulation  - Relieve pressure over bony prominences  - Avoid friction and shearing  - Provide appropriate hygiene as needed including keeping skin clean and dry  - Evaluate need for skin moisturizer/barrier cream  - Collaborate with interdisciplinary team   - Patient/family teaching  - Consider wound care consult   Outcome: Progressing     Problem: MOBILITY - ADULT  Goal: Maintain or return to baseline ADL function  Description: INTERVENTIONS:  -  Assess patient's ability to carry out ADLs; assess patient's baseline for ADL function and identify physical deficits which impact ability to perform ADLs (bathing, care of mouth/teeth, toileting, grooming, dressing, etc )  - Assess/evaluate cause of self-care deficits   - Assess range of motion  - Assess patient's mobility; develop plan if impaired  - Assess patient's need for assistive devices and provide as appropriate  - Encourage maximum independence but intervene and supervise when necessary  - Involve family in performance of ADLs  - Assess for home care needs following discharge   - Consider OT consult to assist with ADL evaluation and planning for discharge  - Provide patient education as appropriate  Outcome: Progressing  Goal: Maintains/Returns to pre admission functional level  Description: INTERVENTIONS:  - Perform BMAT or MOVE assessment daily    - Set and communicate daily mobility goal to care team and patient/family/caregiver  - Collaborate with rehabilitation services on mobility goals if consulted  - Perform Range of Motion  times a day  - Reposition patient every  hours    - Dangle patient  times a day  - Stand patient  times a day  - Ambulate patient  times a day  - Out of bed to chair  times a day   - Out of bed for meals  times a day  - Out of bed for toileting  - Record patient progress and toleration of activity level   Outcome: Progressing

## 2022-07-15 NOTE — CASE MANAGEMENT
Case Management Discharge Planning Note    Patient name Juany Picket  Location [place] [facility] Pichardo* MRN [id number]  : 1975 Date 7/15/2022       Current Admission Date: 2022  Current Admission Diagnosis:Multiple open wounds of lower leg   Patient Active Problem List    Diagnosis Date Noted    Rash of body 07/15/2022    GERMANIA (acute kidney injury) ([facility] ) 2022    CORKY (generalized anxiety disorder) 2022    Pain and swelling of lower extremity 2022    Primary hypertension 2022    Dyslipidemia 2022    Crohn's disease ([facility] ) 2022    Bipolar disorder ([facility] ) 2022    Morbid obesity ([facility] ) 2022    Hypothyroidism 2022    Multiple open wounds of lower leg 2022    Peripheral edema 2022    Anxiety 2022    Dietary noncompliance 2021    Ambulatory dysfunction 2021    Venous stasis dermatitis 2021    Venous insufficiency of lower extremity 2021    Financial difficulties 10/26/2020    Bilateral leg edema 2020    Binge eating     Folic acid deficiency     ALESSANDRA on CPAP 2019    Fatty liver 2019    History of MRSA infection 2019    Pre-diabetes 2019    Peripheral polyneuropathy 2017    Vitamin D deficiency 2015    Gastroesophageal reflux disease without esophagitis 2015    Crohn's disease of large intestine without complication ([facility] )     Cocaine dependence in remission ([facility] ) 2011      LOS (days): 1  Geometric Mean LOS (GMLOS) (days): 3 20  Days to GMLOS:2 3     OBJECTIVE:  Risk of Unplanned Readmission Score: 35 24         Current admission status: Inpatient   Preferred Pharmacy:   [address]  Phone: [phone] Fax: [phone]    Primary Care Provider: Mary Kay La DO    Primary Insurance: MEDICARE  Secondary Insurance:     DISCHARGE DETAILS:              Requested [address]         Is the patient interested in [facility] at discharge?: Yes  [address] requested[de-identified] [address] Name[de-identified] Other ([address] [place] [address])  [address] Provider[de-identified] Other (comment)  Home Health Services Needed[de-identified] Wound/Ostomy Care  Homebound Criteria Met[de-identified] Immunosuppressed, Uses an Assist Device (i e  cane, walker, etc), Communicable Disease (h/o MRSA)  Supporting Clincal Findings[de-identified] Limited Endurance

## 2022-07-15 NOTE — QUICK NOTE
48year old male was admitted to our institution due to worsening bilateral lower extremity wounds concerning for cellulitis  Currently without any white count elevation or evidence of sepsis  - Continue IV antibiotic therapy  - Received IV fluids, Repeat Lactic acid  - Podiatry evaluation    Crohns disease  - Patient reports he is having an episode of "crohns"   After I asked him to elaborate further he claims he had multiple episodes of diarrhea yesterday  - Will have GI evaluate him   - C diff testing    Vitals:    07/14/22 1818 07/14/22 2038   BP: 124/59 109/62   BP Location: Right arm Right arm   Pulse: 78 71   Resp: 22 20   Temp: 98 8 °F (37 1 °C) 97 6 °F (36 4 °C)   TempSrc: Oral Oral   SpO2: 97% 100%   Weight: (!) 209 kg (460 lb 15 7 oz)      General: Not in respiratory distress  Eyes: EOMI  HENT: MMM  Chest: RRR  Respiratory: Clear breath sounds  Abdomen: Soft, non-tender, NABS  Extremities: Bilateral lower extremity swelling and erythema  Neuro: Oriented  Psych: Normal mood and affect  Skin: Warm and dry

## 2022-07-15 NOTE — ASSESSMENT & PLAN NOTE
Lactic acid elevated at 3 1 on admission now resolved  · Suspect likely secondary to current ongoing cellulitis infection  · Currently receiving IV fluids as well as IV antibiotics  · Will repeat

## 2022-07-15 NOTE — NURSING NOTE
Multiple attempts made to get lactic acid with no success (three RNs)  Charge RN notified  House supervisor contacted      Robyn Esteban RN

## 2022-07-15 NOTE — DISCHARGE INSTRUCTIONS
Dear Marlon Breaux,     [facility]  It is our hope that we were always able to exceed the expected standards for your care during your stay  You were hospitalized due to lower extremity cellulitis   You were cared for on the 2nd floor under the service of Dora Rojo DO with the [facility] who covers for your primary care physician (PCP), Juanis Wakefield DO, while you were hospitalized  If you have any questions or concerns related to this hospitalization, you may contact us at [phone]  For follow up as well as medication refills, we recommend that you follow up with your primary care physician  A registered nurse will reach out to you by phone within a few days after your discharge to answer any additional questions that you may have after going home  However, at this time we provide for you here, the most important instructions / recommendations at discharge:     Notable Medication Adjustments -   Discontinue hydrochlorothiazide until seen by PCP  Start Duricef antibiotics for an additional 5 days on discharge  Take Diflucan once a week starting next Saturday for 3 weeks  This will help treat cutaneous fungal infection  Continue to use nystatin powder  Testing Required after Discharge -   Check a BMP in 1 week  Incidental findings that Require Follow Up   None  Important Follow Up Information -   Please see PCP within 1 - 2 weeks   Other Instructions -   Please return to the hospital for worsening symptoms  Please review this entire after visit summary as additional general instructions including medication list, appointments, activity, diet, any pertinent wound care, and other additional recommendations from your care team that may be provided for you        Sincerely,     Dora Rojo DO

## 2022-07-15 NOTE — OCCUPATIONAL THERAPY NOTE
Occupational Therapy Screen         Patient Name: Ryann Aragon  CPCEU'G Date: 7/15/2022         07/15/22 1155   OT Last Visit   OT Visit Date 07/15/22   Note Type   Note type Cancelled Session   Cancel Reasons   (Pt declined therapy needs this hospital admission )   Additional Comments OT consult received and chart reviewed  Pt reports that he is independent with all mobility and ADLs, and has no acute therapy needs at this time  Not confirmed by RN staff  Please re-cosult if the patient has a change in functional status         Sola Vidal, OTR/L

## 2022-07-15 NOTE — PHYSICAL THERAPY NOTE
Physical Therapy Screen ( 15 minutes)    Patient Name: Juany Ponce    Today's Date: 7/15/2022     Problem List  Principal Problem:    Multiple open wounds of lower leg  Active Problems:    Primary hypertension    Bipolar disorder ([facility] )    Morbid obesity ([facility] )    Hypothyroidism    Gastroesophageal reflux disease without esophagitis    ALESSANDRA on CPAP    GERMANIA (acute kidney injury) ([facility] )    Rash of body       Past Medical History  Past Medical History:   Diagnosis Date    Bipolar affective disorder ([facility] )     Cellulitis     Hyperlipidemia     Hypertension         Past Surgical History  History reviewed  No pertinent surgical history  SUBJECT:   "I am fine, really I don't need therapy  I get up and move on my own "    OBJECTIVE:  Declining therapy evaluation  Subjective report of independence  Nursing unable to verify  ASSESSMENT: Charis Martin a 48 y  o  male with a PMH of bipolar disorder, hypertension, obesity, hypothyroidism, Chron's disease, ALESSANDRA on CPAP, lower extremity wounds who presents with worsening lower extremity wounds   The patient's presenting to the emergency department today for the evaluation of his bilateral lower extremity weeping open wounds and worsening erythema  Admitted with GERMANIA, LE open wounds, lactic acidosis  Prior to admission resides alone in apt  Independent prior to admission  PT consulted  Up and OOB as tolerated orders  Upon attempt to complete evaluation, patient declining therapy services and reporting independence  Much education on importance of continued mobility to prevent functional decline and role of rehab services for assistance with discharge planning/needs  Continues to report independence and no needs  Educated patient that if needs change/functional decline to notify MD       PLAN:    D/C PT  Please advise if needs/function changes that would warrant IPPT evaluation and re-consult as needed  RECOMMENDATION:  Patient currently declining therapy evaluation or needs beyond hospital     Kamari Reaves, PT

## 2022-07-15 NOTE — ASSESSMENT & PLAN NOTE
With nonspecific slightly erythematous rash all over body  · Slightly pruritic  · Outpatient Derm follow-up

## 2022-07-15 NOTE — NURSING NOTE
Patient's oxygen noted to be in 80s while asleep  Patient reports having used CPAP in the past  SLIM notified, CPAP order requested, per provider notes  2L nasal cannula applied until respiratory arrives with CPAP      Elbert Nagy RN

## 2022-07-15 NOTE — ASSESSMENT & PLAN NOTE
Patient with longstanding history of bilateral lower extremity lymphedema and wounds to lower extremities    · With multiple recent admissions at [facility], [facility], and most recently [facility], discharged on 6/20  · Rehab was recommended however patient wanted to go home  · Now with worsening lower extremity wounds/associated cellulitis  · Has been taking clindamycin at home, will discontinue clindamycin  · Initiated on IV cefepime and vancomycin given history of MRSA  · Podiatry consultation, appreciate recommendations  · Continue IV cefepime and vancomycin  · Pharmacy consultation  · PT/OT consulted

## 2022-07-15 NOTE — ASSESSMENT & PLAN NOTE
With baseline creatinine 0 8-1 1  · Most recent creatinine 1 42  · Suspect likely prerenal in the setting of dehydration  · Currently receiving IV fluids however will be cautious given patient's history of lymphedema  · BMP in a m    · Avoid hypotension, nephrotoxins  · Hold HCTZ, will continue Lasix given significant swelling lower extremities

## 2022-07-15 NOTE — ASSESSMENT & PLAN NOTE
Mood is stable  · Continue Celexa, BuSpar, Remeron, Depakote, Invega  · Review PDMP, patient is on Klonopin t i d   As well

## 2022-07-15 NOTE — H&P
Ralph 46  H&P- Ronny Garcia 1975, 48 y o  male MRN: [id number]  Unit/Bed#: ED 21 Encounter: [id number]  Primary Care Provider: Te Foster DO   Date and time admitted to hospital: 7/14/2022  6:07 PM    * Multiple open wounds of lower leg  Assessment & Plan  Patient with longstanding history of bilateral lower extremity lymphedema and wounds to lower extremities  · With multiple recent admissions at [facility], [facility], and most recently [facility], discharged on 6/20  · Rehab was recommended however patient wanted to go home  · Now with worsening lower extremity wounds/associated cellulitis  · Has been taking clindamycin at home, will discontinue clindamycin  · Initiated on IV cefepime and vancomycin given history of MRSA  · Podiatry consultation, appreciate recommendations  · Continue IV cefepime and vancomycin  · Pharmacy consultation  · PT/OT consulted    [place] (acute kidney injury) ([facility] [address] )  Assessment & Plan  With baseline creatinine 0 8-1 1  · Most recent creatinine 1 42  · Suspect likely prerenal in the setting of dehydration  · Currently receiving IV fluids however will be cautious given patient's history of lymphedema  · BMP in a m  · Avoid hypotension, nephrotoxins  · Hold HCTZ, will continue Lasix given significant swelling lower extremities    Lactic acidosis  Assessment & Plan  Lactic acid elevated at 3 1  · Suspect likely secondary to current ongoing cellulitis infection  · Currently receiving IV fluids as well as IV antibiotics  · Will repeat    ALESSANDRA on CPAP  Assessment & Plan  CPAP q h s      Gastroesophageal reflux disease without esophagitis  Assessment & Plan  Continue Protonix    Hypothyroidism  Assessment & Plan  Continue levothyroxine 25 mcg daily    Morbid obesity (HCC)  Assessment & Plan  BMI 59 19  · Counseled on weight loss    Bipolar disorder (HCC)  Assessment & Plan  Mood is stable  · Continue Celexa, BuSpar, Remeron, Depakote, Invega  · Review PDMP, patient is on Klonopin t i d  As well    Primary hypertension  Assessment & Plan  Continue metoprolol, Lasix, hold HCTZ      VTE Pharmacologic Prophylaxis:   Moderate Risk (Score 3-4) - Pharmacological DVT Prophylaxis Ordered: enoxaparin (Lovenox)  Code Status: Prior full code  Discussion with family: Patient declined call to   Anticipated Length of Stay: Patient will be admitted on an inpatient basis with an anticipated length of stay of greater than 2 midnights secondary to IV antibiotics  Total Time for Visit, including Counseling / Coordination of Care: 60 minutes Greater than 50% of this total time spent on direct patient counseling and coordination of care  Chief Complaint:  Lower extremity wounds    History of Present Illness:  Donis Solitario is a 48 y o  male with a PMH of bipolar disorder, hypertension, obesity, hypothyroidism, lower extremity wounds who presents with worsening lower extremity wounds  The patient's presenting to the emergency department today for the evaluation of his bilateral lower extremity weeping open wounds and worsening erythema  He reports that it has been getting progressively worse since discharge from [facility] on 06/20  He reports that he does have home health care coming every other day however they are not wound care  He states that they change his dressings every 2 days however by the time the change them, they are completely soaked through and very painful  He states that he thinks that there was a mixup with wound care orders as he has not been able to be seen by a wound care provider's since discharge  He denies any fevers or chills  He also reports a erythematous rash all over his body which has been present for approximately 5 months    He states that he does sleep on a plastic blow up mattress without sheets and is thinking that this is likely causing his rash/worsening his lower extremity wounds  Patient does report history of diarrhea yesterday without any blood noted  He does have a reported history of Crohn's disease  Review of Systems:  Review of Systems   Constitutional: Negative for chills and fever  HENT: Negative for ear pain and sore throat  Eyes: Negative for pain and visual disturbance  Respiratory: Negative for cough and shortness of breath  Cardiovascular: Negative for chest pain and palpitations  Gastrointestinal: Negative for abdominal pain and vomiting  Genitourinary: Negative for dysuria and hematuria  Musculoskeletal: Negative for arthralgias and back pain  Lower extremity pain   Skin: Positive for rash and wound  Neurological: Negative for seizures and syncope  Past Medical and Surgical History:   Past Medical History:   Diagnosis Date    Bipolar affective disorder ([facility] [address] )     Cellulitis     Hyperlipidemia     Hypertension        History reviewed  No pertinent surgical history  Meds/Allergies:  Prior to Admission medications    Medication Sig Start Date End Date Taking?  Authorizing Provider   busPIRone (BUSPAR) 10 mg tablet Take 1 tablet (10 mg total) by mouth 3 (three) times a day 6/20/22 7/20/22  Rocky Tirado MD   Cholecalciferol (Vitamin D3) 50 MCG (2000 UT) capsule Take 2,000 Units by mouth daily 3/30/22   Historical Provider, MD   citalopram (CeleXA) 40 mg tablet  5/9/22   Historical Provider, MD   clonazePAM (KlonoPIN) 1 mg tablet Take 1 mg by mouth 3 (three) times a day 3/30/22   Historical Provider, MD   divalproex sodium (DEPAKOTE) 500 mg EC tablet Take 1 tablet (500 mg total) by mouth daily at bedtime 6/20/22 7/20/22  Rocky Tirado MD   divalproex sodium (DEPAKOTE) 500 mg EC tablet Take 2 tablets (1,000 mg total) by mouth 2 (two) times a day 6/20/22 7/20/22  Rocky Tirado MD   furosemide (LASIX) 40 mg tablet Take 40 mg by mouth daily 3/30/22   Historical Provider, MD   gabapentin (NEURONTIN) 100 mg capsule Take 1 capsule (100 mg total) by mouth 3 (three) times a day 6/20/22 7/20/22  Kathy Morgan MD   hydrochlorothiazide (HYDRODIURIL) 12 5 mg tablet  3/5/22   Historical Provider, MD   levothyroxine 25 mcg tablet  5/9/22   Historical Provider, MD   loperamide (IMODIUM) 2 mg capsule TAKE 2 CAPSULES BY MOUTH TWO TIMES DAILY AS NEEDED FOR DIARRHEA  4/18/22   Historical Provider, MD   Melatonin 10 MG TABS Take 10 mg by mouth daily 5/9/22   Historical Provider, MD   metoprolol tartrate (LOPRESSOR) 50 mg tablet Take 50 mg by mouth 2 (two) times a day 5/9/22   Historical Provider, MD   mirtazapine (REMERON) 7 5 MG tablet  2/12/22   Historical Provider, MD   mupirocin (BACTROBAN) 2 % ointment Apply topically 3 (three) times a day Apply to right foot wound with dressing changes 2-3 times daily until healed 5/10/22   Emiliano Toussaint PA-C   nystatin (MYCOSTATIN) powder Apply topically 2 (two) times a day 6/20/22   Kathy Morgan MD   paliperidone (INVEGA) 6 MG 24 hr tablet  5/9/22   Historical Provider, MD   pantoprazole (PROTONIX) 40 mg tablet  5/9/22   Historical Provider, MD   potassium chloride (K-DUR,KLOR-CON) 20 mEq tablet  5/9/22   Historical Provider, MD   rosuvastatin (CRESTOR) 10 MG tablet  2/28/22   Historical Provider, MD   traZODone (DESYREL) 50 mg tablet  5/9/22   Historical Provider, MD     I have reviewed home medications with patient personally  Allergies:    Allergies   Allergen Reactions    Mesalamine GI Intolerance     Pt has had severe diarrhea and abdominal pain on mESALAMINE       Social History:  Marital Status:    Occupation:  Unknown  Patient Pre-hospital Living Situation: Apartment  Patient Pre-hospital Level of Mobility: unable to be assessed at time of evaluation  Patient Pre-hospital Diet Restrictions:  None  Substance Use History:   Social History     Substance and Sexual Activity   Alcohol Use Never     Social History     Tobacco Use   Smoking Status Former Smoker   Smokeless Tobacco Never Used     Social History     Substance and Sexual Activity   Drug Use Never       Family History:  History reviewed  No pertinent family history  Physical Exam:     Vitals:   Blood Pressure: 109/62 (07/14/22 2038)  Pulse: 71 (07/14/22 2038)  Temperature: 97 6 °F (36 4 °C) (07/14/22 2038)  Temp Source: Oral (07/14/22 2038)  Respirations: 20 (07/14/22 2038)  Weight - Scale: (!) 209 kg (460 lb 15 7 oz) (07/14/22 1818)  SpO2: 100 % (07/14/22 2038)    Physical Exam  Vitals and nursing note reviewed  Constitutional:       General: He is not in acute distress  Appearance: Normal appearance  He is obese  He is not ill-appearing, toxic-appearing or diaphoretic  Eyes:      General: No scleral icterus  Conjunctiva/sclera: Conjunctivae normal    Cardiovascular:      Rate and Rhythm: Normal rate and regular rhythm  Heart sounds: No murmur heard  No friction rub  No gallop  Pulmonary:      Effort: Pulmonary effort is normal  No respiratory distress  Breath sounds: Normal breath sounds  No stridor  No wheezing, rhonchi or rales  Chest:      Chest wall: No tenderness  Abdominal:      General: Abdomen is flat  Bowel sounds are normal  There is no distension  Palpations: Abdomen is soft  Tenderness: There is no abdominal tenderness  Musculoskeletal:      Right lower leg: Edema present  Left lower leg: Edema present  Skin:     General: Skin is warm and dry  Coloration: Skin is not jaundiced or pale  Findings: Erythema and rash present  Comments: With bilateral lower extremity cellulitis  From toes to knees  Weeping serous fluid  See media   Neurological:      General: No focal deficit present  Mental Status: He is alert and oriented to person, place, and time  Mental status is at baseline            Additional Data:     Lab Results:  Results from last 7 days   Lab Units 07/14/22  1846   WBC Thousand/uL 6 05   HEMOGLOBIN g/dL 12 9   HEMATOCRIT % 41 4   PLATELETS Thousands/uL 307 BANDS PCT % 1   LYMPHO PCT % 39   MONO PCT % 14*   EOS PCT % 1     Results from last 7 days   Lab Units 07/14/22  1846   SODIUM mmol/L 140   POTASSIUM mmol/L 5 0   CHLORIDE mmol/L 105   CO2 mmol/L 29   BUN mg/dL 12   CREATININE mg/dL 1 42*   ANION GAP mmol/L 6   CALCIUM mg/dL 7 9*   ALBUMIN g/dL 2 0*   TOTAL BILIRUBIN mg/dL 0 58   ALK PHOS U/L 87   ALT U/L 13   AST U/L 26   GLUCOSE RANDOM mg/dL 99     Results from last 7 days   Lab Units 07/14/22  1947   INR  1 03             Results from last 7 days   Lab Units 07/14/22  1947 07/14/22  1846   LACTIC ACID mmol/L  --  3 1*   PROCALCITONIN ng/ml 0 07  --        Imaging: Reviewed radiology reports from this admission including: xray(s)  XR tibia fibula 2 views RIGHT    (Results Pending)   XR tibia fibula 2 views LEFT    (Results Pending)       EKG and Other Studies Reviewed on Admission:   · EKG: No EKG obtained  ** Please Note: This note has been constructed using a voice recognition system   **

## 2022-07-15 NOTE — ASSESSMENT & PLAN NOTE
With baseline creatinine 0 8-1 1  · Most recent creatinine 1 54  · Suspect likely prerenal in the setting of dehydration  · Currently receiving IV fluids however will be cautious given patient's history of lymphedema  · BMP in a m    · Avoid hypotension, nephrotoxins  · Hold HCTZ, will continue Lasix given significant swelling lower extremities

## 2022-07-15 NOTE — PROGRESS NOTES
Vancomycin Assessment    Marlon Breaux is a 48 y o  male who is currently receiving vancomycin 2000mg IV once for skin-soft tissue infection  Relevant clinical data and objective history reviewed:  Creatinine   Date Value Ref Range Status   07/15/2022 1 54 (H) 0 60 - 1 30 mg/dL Final     Comment:     Standardized to IDMS reference method   07/14/2022 1 42 (H) 0 60 - 1 30 mg/dL Final     Comment:     Standardized to IDMS reference method   06/20/2022 1 09 0 70 - 1 50 mg/dL Final     Comment:     Standardized to IDMS reference method     BP 93/53   Pulse 71   Temp (!) 97 2 °F (36 2 °C)   Resp 18   Wt (!) 209 kg (461 lb 10 3 oz)   SpO2 90%   BMI 59 27 kg/m²   No intake/output data recorded  Lab Results   Component Value Date/Time    BUN 12 07/15/2022 04:55 AM    WBC 4 92 07/15/2022 04:55 AM    HGB 11 2 (L) 07/15/2022 04:55 AM    HCT 36 5 07/15/2022 04:55 AM    MCV 97 07/15/2022 04:55 AM     07/15/2022 04:55 AM     Temp Readings from Last 3 Encounters:   07/15/22 (!) 97 2 °F (36 2 °C)   06/26/22 98 9 °F (37 2 °C) (Tympanic)   06/20/22 (!) 97 1 °F (36 2 °C) (Temporal)     Vancomycin Days of Therapy: 2    Assessment/Plan  The patient is currently on vancomycin utilizing scheduled dosing based on adjusted body weight (due to obesity)  The patient received 2000mg IV once and after clinical evaluation will be changed to 2000mg IV Q8H  Pharmacy will also follow closely for s/sx of nephrotoxicity, infusion reactions, and appropriateness of therapy  BMP and CBC will be ordered per protocol  Plan for trough as patient approaches steady state, prior to the 5th  dose at approximately 0745 on 7/16  Due to infection severity, will target a trough of 15-20 (appropriate for most indications)   Pharmacy will continue to follow the patients culture results and clinical progress daily      Brian Josue, Jose CarlosD

## 2022-07-15 NOTE — DISCHARGE INSTR - OTHER ORDERS
Wound Care Plan:   1-Turn/reposition every 2 hours while in bed and weight shift frequently while in chair for pressure re-distribution on skin  2-Elevate lower extremities as often as possible for edema management  Float heels off of bed/chair surface to offload pressure  3-Bariatric offloading air cushion in chair when out of bed  4-Moisturize skin daily with skin nourishing cream   5-Abdominal, neck, groin, gluteal folds--Cleanse skin folds with soap and water, pat dry well  Dust with nystatin powder twice daily  6-Lower extremities per podiatry team     Follow-up at the [address]

## 2022-07-16 PROBLEM — B37.2 CUTANEOUS CANDIDIASIS: Status: ACTIVE | Noted: 2022-07-16

## 2022-07-16 LAB
ANION GAP SERPL CALCULATED.3IONS-SCNC: 5 MMOL/L (ref 4–13)
BILIRUB UR QL STRIP: ABNORMAL
BUN SERPL-MCNC: 17 MG/DL (ref 5–25)
CALCIUM SERPL-MCNC: 7.6 MG/DL (ref 8.3–10.1)
CHLORIDE SERPL-SCNC: 107 MMOL/L (ref 100–108)
CLARITY UR: ABNORMAL
CO2 SERPL-SCNC: 30 MMOL/L (ref 21–32)
COLOR UR: ABNORMAL
CREAT SERPL-MCNC: 1.57 MG/DL (ref 0.6–1.3)
ERYTHROCYTE [DISTWIDTH] IN BLOOD BY AUTOMATED COUNT: 13.2 % (ref 11.6–15.1)
GFR SERPL CREATININE-BSD FRML MDRD: 51 ML/MIN/1.73SQ M
GLUCOSE SERPL-MCNC: 98 MG/DL (ref 65–140)
GLUCOSE UR STRIP-MCNC: NEGATIVE MG/DL
HCT VFR BLD AUTO: 41.3 % (ref 36.5–49.3)
HGB BLD-MCNC: 12.8 G/DL (ref 12–17)
HGB UR QL STRIP.AUTO: NEGATIVE
KETONES UR STRIP-MCNC: ABNORMAL MG/DL
LEUKOCYTE ESTERASE UR QL STRIP: NEGATIVE
MCH RBC QN AUTO: 30.5 PG (ref 26.8–34.3)
MCHC RBC AUTO-ENTMCNC: 31 G/DL (ref 31.4–37.4)
MCV RBC AUTO: 98 FL (ref 82–98)
NITRITE UR QL STRIP: NEGATIVE
PH UR STRIP.AUTO: 6 [PH]
PLATELET # BLD AUTO: 232 THOUSANDS/UL (ref 149–390)
PMV BLD AUTO: 9.8 FL (ref 8.9–12.7)
POTASSIUM SERPL-SCNC: 4.4 MMOL/L (ref 3.5–5.3)
PROT UR STRIP-MCNC: NEGATIVE MG/DL
RBC # BLD AUTO: 4.2 MILLION/UL (ref 3.88–5.62)
SODIUM SERPL-SCNC: 142 MMOL/L (ref 136–145)
SP GR UR STRIP.AUTO: >=1.03 (ref 1–1.03)
UROBILINOGEN UR QL STRIP.AUTO: 0.2 E.U./DL
VANCOMYCIN TROUGH SERPL-MCNC: 27.5 UG/ML (ref 10–20)
WBC # BLD AUTO: 5 THOUSAND/UL (ref 4.31–10.16)

## 2022-07-16 PROCEDURE — 80048 BASIC METABOLIC PNL TOTAL CA: CPT | Performed by: PHYSICIAN ASSISTANT

## 2022-07-16 PROCEDURE — 80202 ASSAY OF VANCOMYCIN: CPT | Performed by: STUDENT IN AN ORGANIZED HEALTH CARE EDUCATION/TRAINING PROGRAM

## 2022-07-16 PROCEDURE — 87493 C DIFF AMPLIFIED PROBE: CPT | Performed by: STUDENT IN AN ORGANIZED HEALTH CARE EDUCATION/TRAINING PROGRAM

## 2022-07-16 PROCEDURE — 83993 ASSAY FOR CALPROTECTIN FECAL: CPT | Performed by: PHYSICIAN ASSISTANT

## 2022-07-16 PROCEDURE — 87505 NFCT AGENT DETECTION GI: CPT | Performed by: PHYSICIAN ASSISTANT

## 2022-07-16 PROCEDURE — 81003 URINALYSIS AUTO W/O SCOPE: CPT | Performed by: EMERGENCY MEDICINE

## 2022-07-16 PROCEDURE — 99232 SBSQ HOSP IP/OBS MODERATE 35: CPT | Performed by: STUDENT IN AN ORGANIZED HEALTH CARE EDUCATION/TRAINING PROGRAM

## 2022-07-16 PROCEDURE — 85027 COMPLETE CBC AUTOMATED: CPT | Performed by: PHYSICIAN ASSISTANT

## 2022-07-16 RX ORDER — FLUCONAZOLE 150 MG/1
150 TABLET ORAL WEEKLY
Status: DISCONTINUED | OUTPATIENT
Start: 2022-07-16 | End: 2022-07-16

## 2022-07-16 RX ORDER — FLUCONAZOLE 100 MG/1
200 TABLET ORAL DAILY
Status: DISCONTINUED | OUTPATIENT
Start: 2022-07-16 | End: 2022-07-16

## 2022-07-16 RX ADMIN — BUSPIRONE HYDROCHLORIDE 10 MG: 10 TABLET ORAL at 08:26

## 2022-07-16 RX ADMIN — MORPHINE SULFATE 2 MG: 2 INJECTION, SOLUTION INTRAMUSCULAR; INTRAVENOUS at 12:44

## 2022-07-16 RX ADMIN — MIRTAZAPINE 7.5 MG: 15 TABLET, FILM COATED ORAL at 21:37

## 2022-07-16 RX ADMIN — GABAPENTIN 100 MG: 100 CAPSULE ORAL at 17:50

## 2022-07-16 RX ADMIN — PALIPERIDONE 6 MG: 3 TABLET, EXTENDED RELEASE ORAL at 08:26

## 2022-07-16 RX ADMIN — DIVALPROEX SODIUM 1000 MG: 500 TABLET, DELAYED RELEASE ORAL at 08:26

## 2022-07-16 RX ADMIN — DIVALPROEX SODIUM 1000 MG: 500 TABLET, DELAYED RELEASE ORAL at 21:37

## 2022-07-16 RX ADMIN — GABAPENTIN 100 MG: 100 CAPSULE ORAL at 08:26

## 2022-07-16 RX ADMIN — BUSPIRONE HYDROCHLORIDE 10 MG: 10 TABLET ORAL at 17:50

## 2022-07-16 RX ADMIN — CEFEPIME HYDROCHLORIDE 2000 MG: 2 INJECTION, POWDER, FOR SOLUTION INTRAVENOUS at 01:25

## 2022-07-16 RX ADMIN — METOPROLOL TARTRATE 50 MG: 50 TABLET, FILM COATED ORAL at 17:50

## 2022-07-16 RX ADMIN — LEVOTHYROXINE SODIUM 25 MCG: 25 TABLET ORAL at 05:28

## 2022-07-16 RX ADMIN — METOPROLOL TARTRATE 50 MG: 50 TABLET, FILM COATED ORAL at 08:25

## 2022-07-16 RX ADMIN — NYSTATIN: 100000 POWDER TOPICAL at 17:51

## 2022-07-16 RX ADMIN — DIVALPROEX SODIUM 500 MG: 500 TABLET, DELAYED RELEASE ORAL at 21:37

## 2022-07-16 RX ADMIN — GABAPENTIN 100 MG: 100 CAPSULE ORAL at 21:37

## 2022-07-16 RX ADMIN — FUROSEMIDE 40 MG: 40 TABLET ORAL at 08:26

## 2022-07-16 RX ADMIN — MORPHINE SULFATE 2 MG: 2 INJECTION, SOLUTION INTRAMUSCULAR; INTRAVENOUS at 02:55

## 2022-07-16 RX ADMIN — CLONAZEPAM 1 MG: 1 TABLET ORAL at 17:50

## 2022-07-16 RX ADMIN — CLONAZEPAM 1 MG: 1 TABLET ORAL at 08:26

## 2022-07-16 RX ADMIN — CITALOPRAM HYDROBROMIDE 40 MG: 20 TABLET ORAL at 08:26

## 2022-07-16 RX ADMIN — PANTOPRAZOLE SODIUM 40 MG: 40 TABLET, DELAYED RELEASE ORAL at 05:28

## 2022-07-16 RX ADMIN — PRAVASTATIN SODIUM 80 MG: 80 TABLET ORAL at 17:50

## 2022-07-16 RX ADMIN — CEFEPIME HYDROCHLORIDE 2000 MG: 2 INJECTION, POWDER, FOR SOLUTION INTRAVENOUS at 16:52

## 2022-07-16 RX ADMIN — FLUCONAZOLE 400 MG: 100 TABLET ORAL at 17:50

## 2022-07-16 RX ADMIN — MORPHINE SULFATE 2 MG: 2 INJECTION, SOLUTION INTRAMUSCULAR; INTRAVENOUS at 21:51

## 2022-07-16 RX ADMIN — ENOXAPARIN SODIUM 40 MG: 40 INJECTION SUBCUTANEOUS at 08:25

## 2022-07-16 RX ADMIN — CLONAZEPAM 1 MG: 1 TABLET ORAL at 21:37

## 2022-07-16 RX ADMIN — VANCOMYCIN HYDROCHLORIDE 2000 MG: 10 INJECTION, POWDER, LYOPHILIZED, FOR SOLUTION INTRAVENOUS at 12:37

## 2022-07-16 RX ADMIN — BUSPIRONE HYDROCHLORIDE 10 MG: 10 TABLET ORAL at 21:37

## 2022-07-16 RX ADMIN — NYSTATIN: 100000 POWDER TOPICAL at 08:25

## 2022-07-16 RX ADMIN — VANCOMYCIN HYDROCHLORIDE 2000 MG: 10 INJECTION, POWDER, LYOPHILIZED, FOR SOLUTION INTRAVENOUS at 03:01

## 2022-07-16 RX ADMIN — CEFEPIME HYDROCHLORIDE 2000 MG: 2 INJECTION, POWDER, FOR SOLUTION INTRAVENOUS at 08:30

## 2022-07-16 NOTE — PLAN OF CARE
Problem: Nutrition/Hydration-ADULT  Goal: Nutrient/Hydration intake appropriate for improving, restoring or maintaining nutritional needs  Description: Monitor and assess patient's nutrition/hydration status for malnutrition  Collaborate with interdisciplinary team and initiate plan and interventions as ordered  Monitor patient's weight and dietary intake as ordered or per policy  Utilize nutrition screening tool and intervene as necessary  Determine patient's food preferences and provide high-protein, high-caloric foods as appropriate       INTERVENTIONS:  - Monitor oral intake, urinary output, labs, and treatment plans  - Assess nutrition and hydration status and recommend course of action  - Evaluate amount of meals eaten  - Assist patient with eating if necessary   - Allow adequate time for meals  - Recommend/ encourage appropriate diets, oral nutritional supplements, and vitamin/mineral supplements  - Order, calculate, and assess calorie counts as needed  - Recommend, monitor, and adjust tube feedings and TPN/PPN based on assessed needs  - Assess need for intravenous fluids  - Provide specific nutrition/hydration education as appropriate  - Include patient/family/caregiver in decisions related to nutrition  Outcome: Progressing     Problem: PAIN - ADULT  Goal: Verbalizes/displays adequate comfort level or baseline comfort level  Description: Interventions:  - Encourage patient to monitor pain and request assistance  - Assess pain using appropriate pain scale  - Administer analgesics based on type and severity of pain and evaluate response  - Implement non-pharmacological measures as appropriate and evaluate response  - Consider cultural and social influences on pain and pain management  - Notify physician/advanced practitioner if interventions unsuccessful or patient reports new pain  Outcome: Progressing     Problem: INFECTION - ADULT  Goal: Absence or prevention of progression during hospitalization  Description: INTERVENTIONS:  - Assess and monitor for signs and symptoms of infection  - Monitor lab/diagnostic results  - Monitor all insertion sites, i e  indwelling lines, tubes, and drains  - Monitor endotracheal if appropriate and nasal secretions for changes in amount and color  - [place] appropriate cooling/warming therapies per order  - Administer medications as ordered  - Instruct and encourage patient and family to use good hand hygiene technique  - Identify and instruct in appropriate isolation precautions for identified infection/condition  Outcome: Progressing     Problem: RESPIRATORY - ADULT  Goal: Achieves optimal ventilation and oxygenation  Description: INTERVENTIONS:  - Assess for changes in respiratory status  - Assess for changes in mentation and behavior  - Position to facilitate oxygenation and minimize respiratory effort  - Oxygen administered by appropriate delivery if ordered  - Initiate smoking cessation education as indicated  - Encourage broncho-pulmonary hygiene including cough, deep breathe, Incentive Spirometry  - Assess the need for suctioning and aspirate as needed  - Assess and instruct to report SOB or any respiratory difficulty  - Respiratory Therapy support as indicated  Outcome: Progressing     Problem: SKIN/TISSUE INTEGRITY - ADULT  Goal: Skin Integrity remains intact(Skin Breakdown Prevention)  Description: Assess:  -Perform Javier assessment every   -Clean and moisturize skin every   -Inspect skin when repositioning, toileting, and assisting with ADLS  -Assess under medical devices such as  every   -Assess extremities for adequate circulation and sensation     Bed Management:  -Have minimal linens on bed & keep smooth, unwrinkled  -Change linens as needed when moist or perspiring  -Avoid sitting or lying in one position for more than  hours while in bed  -Keep HOB at degrees     Toileting:  -Offer bedside commode  -Assess for incontinence every   -Use incontinent care products after each incontinent episode such as     Activity:  -Mobilize patient  times a day  -Encourage activity and walks on unit  -Encourage or provide ROM exercises   -Turn and reposition patient every  Hours  -Use appropriate equipment to lift or move patient in bed  -Instruct/ Assist with weight shifting every  when out of bed in chair  -Consider limitation of chair time  hour intervals    Skin Care:  -Avoid use of baby powder, tape, friction and shearing, hot water or constrictive clothing  -Relieve pressure over bony prominences using   -Do not massage red bony areas    Next Steps:  -Teach patient strategies to minimize risks such as    -Consider consults to  interdisciplinary teams such as   Outcome: Progressing  Goal: Incision(s), wounds(s) or drain site(s) healing without S/S of infection  Description: INTERVENTIONS  - Assess and document dressing, incision, wound bed, drain sites and surrounding tissue  - Provide patient and family education  - Perform skin care/dressing changes every   Outcome: Progressing     Problem: MUSCULOSKELETAL - ADULT  Goal: Maintain or return mobility to safest level of function  Description: INTERVENTIONS:  - Assess patient's ability to carry out ADLs; assess patient's baseline for ADL function and identify physical deficits which impact ability to perform ADLs (bathing, care of mouth/teeth, toileting, grooming, dressing, etc )  - Assess/evaluate cause of self-care deficits   - Assess range of motion  - Assess patient's mobility  - Assess patient's need for assistive devices and provide as appropriate  - Encourage maximum independence but intervene and supervise when necessary  - Involve family in performance of ADLs  - Assess for home care needs following discharge   - Consider OT consult to assist with ADL evaluation and planning for discharge  - Provide patient education as appropriate  Outcome: Progressing  Goal: Maintain proper alignment of affected body part  Description: INTERVENTIONS:  - Support, maintain and protect limb and body alignment  - Provide patient/ family with appropriate education  Outcome: Progressing     Problem: Prexisting or High Potential for Compromised Skin Integrity  Goal: Skin integrity is maintained or improved  Description: INTERVENTIONS:  - Identify patients at risk for skin breakdown  - Assess and monitor skin integrity  - Assess and monitor nutrition and hydration status  - Monitor labs   - Assess for incontinence   - Turn and reposition patient  - Assist with mobility/ambulation  - Relieve pressure over bony prominences  - Avoid friction and shearing  - Provide appropriate hygiene as needed including keeping skin clean and dry  - Evaluate need for skin moisturizer/barrier cream  - Collaborate with interdisciplinary team   - Patient/family teaching  - Consider wound care consult   Outcome: Progressing     Problem: MOBILITY - ADULT  Goal: Maintain or return to baseline ADL function  Description: INTERVENTIONS:  -  Assess patient's ability to carry out ADLs; assess patient's baseline for ADL function and identify physical deficits which impact ability to perform ADLs (bathing, care of mouth/teeth, toileting, grooming, dressing, etc )  - Assess/evaluate cause of self-care deficits   - Assess range of motion  - Assess patient's mobility; develop plan if impaired  - Assess patient's need for assistive devices and provide as appropriate  - Encourage maximum independence but intervene and supervise when necessary  - Involve family in performance of ADLs  - Assess for home care needs following discharge   - Consider OT consult to assist with ADL evaluation and planning for discharge  - Provide patient education as appropriate  Outcome: Progressing  Goal: Maintains/Returns to pre admission functional level  Description: INTERVENTIONS:  - Perform BMAT or MOVE assessment daily    - Set and communicate daily mobility goal to care team and patient/family/caregiver  - Collaborate with rehabilitation services on mobility goals if consulted  - Perform Range of Motion  times a day  - Reposition patient every  hours    - Dangle patient  times a day  - Stand patient  times a day  - Ambulate patient  times a day  - Out of bed to chair  times a day   - Out of bed for meals  times a day  - Out of bed for toileting  - Record patient progress and toleration of activity level   Outcome: Progressing     Problem: Potential for Falls  Goal: Patient will remain free of falls  Description: INTERVENTIONS:  - Educate patient/family on patient safety including physical limitations  - Instruct patient to call for assistance with activity   - Consult OT/PT to assist with strengthening/mobility   - Keep Call bell within reach  - Keep bed low and locked with side rails adjusted as appropriate  - Keep care items and personal belongings within reach  - Initiate and maintain comfort rounds  - Make Fall Risk Sign visible to staff  - Offer Toileting every  Hours, in advance of need  - Initiate/Maintain alarm  - Obtain necessary fall risk management equipment:   - Apply yellow socks and bracelet for high fall risk patients  - Consider moving patient to room near nurses station  Outcome: Progressing

## 2022-07-16 NOTE — PLAN OF CARE
Problem: Nutrition/Hydration-ADULT  Goal: Nutrient/Hydration intake appropriate for improving, restoring or maintaining nutritional needs  Description: Monitor and assess patient's nutrition/hydration status for malnutrition  Collaborate with interdisciplinary team and initiate plan and interventions as ordered  Monitor patient's weight and dietary intake as ordered or per policy  Utilize nutrition screening tool and intervene as necessary  Determine patient's food preferences and provide high-protein, high-caloric foods as appropriate       INTERVENTIONS:  - Monitor oral intake, urinary output, labs, and treatment plans  - Assess nutrition and hydration status and recommend course of action  - Evaluate amount of meals eaten  - Assist patient with eating if necessary   - Allow adequate time for meals  - Recommend/ encourage appropriate diets, oral nutritional supplements, and vitamin/mineral supplements  - Order, calculate, and assess calorie counts as needed  - Recommend, monitor, and adjust tube feedings and TPN/PPN based on assessed needs  - Assess need for intravenous fluids  - Provide specific nutrition/hydration education as appropriate  - Include patient/family/caregiver in decisions related to nutrition  Outcome: Progressing     Problem: PAIN - ADULT  Goal: Verbalizes/displays adequate comfort level or baseline comfort level  Description: Interventions:  - Encourage patient to monitor pain and request assistance  - Assess pain using appropriate pain scale  - Administer analgesics based on type and severity of pain and evaluate response  - Implement non-pharmacological measures as appropriate and evaluate response  - Consider cultural and social influences on pain and pain management  - Notify physician/advanced practitioner if interventions unsuccessful or patient reports new pain  Outcome: Progressing     Problem: INFECTION - ADULT  Goal: Absence or prevention of progression during hospitalization  Description: INTERVENTIONS:  - Assess and monitor for signs and symptoms of infection  - Monitor lab/diagnostic results  - Monitor all insertion sites, i e  indwelling lines, tubes, and drains  - Monitor endotracheal if appropriate and nasal secretions for changes in amount and color  - [place] appropriate cooling/warming therapies per order  - Administer medications as ordered  - Instruct and encourage patient and family to use good hand hygiene technique  - Identify and instruct in appropriate isolation precautions for identified infection/condition  Outcome: Progressing     Problem: RESPIRATORY - ADULT  Goal: Achieves optimal ventilation and oxygenation  Description: INTERVENTIONS:  - Assess for changes in respiratory status  - Assess for changes in mentation and behavior  - Position to facilitate oxygenation and minimize respiratory effort  - Oxygen administered by appropriate delivery if ordered  - Initiate smoking cessation education as indicated  - Encourage broncho-pulmonary hygiene including cough, deep breathe, Incentive Spirometry  - Assess the need for suctioning and aspirate as needed  - Assess and instruct to report SOB or any respiratory difficulty  - Respiratory Therapy support as indicated  Outcome: Progressing     Problem: SKIN/TISSUE INTEGRITY - ADULT  Goal: Skin Integrity remains intact(Skin Breakdown Prevention)  Description: Assess:  -Perform Javier assessment every shift  -Clean and moisturize skin every shift  -Inspect skin when repositioning, toileting, and assisting with ADLS  -Assess extremities for adequate circulation and sensation     Bed Management:  -Have minimal linens on bed & keep smooth, unwrinkled  -Change linens as needed when moist or perspiring  -Avoid sitting or lying in one position for more than 2 hours while in bed      Toileting:  -Offer bedside commode  -Assess for incontinence every 2 hours  -Use incontinent care products after each incontinent episode such as incontinence pads    Activity:  -Encourage activity and walks on unit  -Encourage or provide ROM exercises   -Turn and reposition patient every 2 Hours  -Use appropriate equipment to lift or move patient in bed      Skin Care:  -Avoid use of baby powder, tape, friction and shearing, hot water or constrictive clothing  -Relieve pressure over bony prominences using Alyven  -Do not massage red bony areas    Outcome: Progressing  Goal: Incision(s), wounds(s) or drain site(s) healing without S/S of infection  Description: INTERVENTIONS  - Assess and document dressing, incision, wound bed, drain sites and surrounding tissue  - Provide patient and family education  - Perform skin care/dressing changes every other day per provider order  Outcome: Progressing     Problem: MUSCULOSKELETAL - ADULT  Goal: Maintain or return mobility to safest level of function  Description: INTERVENTIONS:  - Assess patient's ability to carry out ADLs; assess patient's baseline for ADL function and identify physical deficits which impact ability to perform ADLs (bathing, care of mouth/teeth, toileting, grooming, dressing, etc )  - Assess/evaluate cause of self-care deficits   - Assess range of motion  - Assess patient's mobility  - Assess patient's need for assistive devices and provide as appropriate  - Encourage maximum independence but intervene and supervise when necessary  - Involve family in performance of ADLs  - Assess for home care needs following discharge   - Consider OT consult to assist with ADL evaluation and planning for discharge  - Provide patient education as appropriate  Outcome: Progressing  Goal: Maintain proper alignment of affected body part  Description: INTERVENTIONS:  - Support, maintain and protect limb and body alignment  - Provide patient/ family with appropriate education  Outcome: Progressing     Problem: Prexisting or High Potential for Compromised Skin Integrity  Goal: Skin integrity is maintained or improved  Description: INTERVENTIONS:  - Identify patients at risk for skin breakdown  - Assess and monitor skin integrity  - Assess and monitor nutrition and hydration status  - Monitor labs   - Assess for incontinence   - Turn and reposition patient  - Assist with mobility/ambulation  - Relieve pressure over bony prominences  - Avoid friction and shearing  - Provide appropriate hygiene as needed including keeping skin clean and dry  - Evaluate need for skin moisturizer/barrier cream  - Collaborate with interdisciplinary team   - Patient/family teaching  - Consider wound care consult   Outcome: Progressing     Problem: MOBILITY - ADULT  Goal: Maintain or return to baseline ADL function  Description: INTERVENTIONS:  -  Assess patient's ability to carry out ADLs; assess patient's baseline for ADL function and identify physical deficits which impact ability to perform ADLs (bathing, care of mouth/teeth, toileting, grooming, dressing, etc )  - Assess/evaluate cause of self-care deficits   - Assess range of motion  - Assess patient's mobility; develop plan if impaired  - Assess patient's need for assistive devices and provide as appropriate  - Encourage maximum independence but intervene and supervise when necessary  - Involve family in performance of ADLs  - Assess for home care needs following discharge   - Consider OT consult to assist with ADL evaluation and planning for discharge  - Provide patient education as appropriate  Outcome: Progressing  Goal: Maintains/Returns to pre admission functional level  Description: INTERVENTIONS:  - Perform BMAT or MOVE assessment daily    - Set and communicate daily mobility goal to care team and patient/family/caregiver     - Collaborate with rehabilitation services on mobility goals if consulted  - Out of bed for toileting  - Record patient progress and toleration of activity level   Outcome: Progressing     Problem: Potential for Falls  Goal: Patient will remain free of falls  Description: INTERVENTIONS:  - Educate patient/family on patient safety including physical limitations  - Instruct patient to call for assistance with activity   - Consult OT/PT to assist with strengthening/mobility   - Keep Call bell within reach  - Keep bed low and locked with side rails adjusted as appropriate  - Keep care items and personal belongings within reach  - Initiate and maintain comfort rounds  - Make Fall Risk Sign visible to staff  - Initiate/Maintain bed alarm  - Obtain necessary fall risk management equipment: Bedside commode  - Apply yellow socks and bracelet for high fall risk patients  - Consider moving patient to room near nurses station  Outcome: Progressing

## 2022-07-16 NOTE — ASSESSMENT & PLAN NOTE
With baseline creatinine 0 8-1 1  · Hold diuretics at this time, monitor renal functions  · Repeat BMP tmr

## 2022-07-16 NOTE — ASSESSMENT & PLAN NOTE
With nonspecific slightly erythematous rash all over body  · Patient reports heat rash due to summer that he occasional gets  · Recommend outpatient Derm follow-up

## 2022-07-16 NOTE — CASE MANAGEMENT
Case Management Progress Note    Patient name Dwight Victor  Location [facility] 2 Jodie Flor* MRN [id number]  : 1975 Date 2022       LOS (days): 2  Geometric Mean LOS (GMLOS) (days): 3 20  Days to GMLOS:1 6        OBJECTIVE:        Current admission status: Inpatient  Preferred Pharmacy:   [address]  Phone: [phone] Fax: [phone]    Primary Care Provider: Leigh Villavicencio DO    Primary Insurance: MEDICARE  Secondary Insurance:     PROGRESS NOTE: Pt's AVS updated with contact information for  Home Care

## 2022-07-16 NOTE — ASSESSMENT & PLAN NOTE
Patient with longstanding history of bilateral lower extremity lymphedema and wounds to lower extremities    · With multiple recent admissions at [facility], [facility], and most recently [facility], discharged on 6/20, declined rehab and sent home  · Now with worsening lower extremity wounds/associated cellulitis  · On clindamycin for 1 day prior to admission  · Podiatry consultation, appreciate recommendations  · Continue IV cefepime, D/C vancomycin  · Blood cultures neg 24 hours, patient did not have white count or documented fevers  · Possible home tomorrow with transition to PO abx

## 2022-07-16 NOTE — PROGRESS NOTES
[facility]  Progress Note - Sammy Muñoz 1975, 48 y o  male MRN: [id number]  Unit/Bed#: [address] 214-01 Encounter: [id number]  Primary Care Provider: Pinkey Canavan, DO   Date and time admitted to hospital: 7/14/2022  6:07 PM    * Multiple open wounds of lower leg  Assessment & Plan  Patient with longstanding history of bilateral lower extremity lymphedema and wounds to lower extremities  · With multiple recent admissions at [facility], [facility], and most recently [facility], discharged on 6/20, declined rehab and sent home  · Now with worsening lower extremity wounds/associated cellulitis  · On clindamycin for 1 day prior to admission  · Podiatry consultation, appreciate recommendations  · Continue IV cefepime, D/C vancomycin  · Blood cultures neg 24 hours, patient did not have white count or documented fevers  · Possible home tomorrow with transition to PO abx    Cutaneous candidiasis  Assessment & Plan  Patient with diffuse cutaneous candidiasis and intertrigo  Appreciate wound evaluation  Nystatin powder, start diflucan 400mg once weekly for 4 weeks    Rash of body  Assessment & Plan  With nonspecific slightly erythematous rash all over body  · Patient reports heat rash due to summer that he occasional gets  · Recommend outpatient Derm follow-up    [place] (acute kidney injury) ([facility] [address] )  Assessment & Plan  With baseline creatinine 0 8-1 1  · Hold diuretics at this time, monitor renal functions  · Repeat BMP tmr    ALESSANDRA on CPAP  Assessment & Plan  CPAP q h s      Hypothyroidism  Assessment & Plan  Continue levothyroxine 25 mcg daily    Morbid obesity (HCC)  Assessment & Plan  BMI 59 19  · Counseled on weight loss, dietary changes, exercise    Bipolar disorder (HCC)  Assessment & Plan  Mood is stable  · Continue Celexa, BuSpar, Remeron, Depakote, Invega  · Klonopin PRN    Primary hypertension  Assessment & Plan  Blood pressure with episodes of hypotension  Continue metoprolol  Hold lasix and HCTZ      VTE Pharmacologic Prophylaxis:   Pharmacologic: Enoxaparin (Lovenox)  Mechanical VTE Prophylaxis in Place: Yes    Patient Centered Rounds: I have performed bedside rounds with nursing staff today  Discussions with Specialists or Other Care Team Provider: podiatry    Education and Discussions with Family / Patient: patient    Time Spent for Care: 30 minutes  More than 50% of total time spent on counseling and coordination of care as described above  Current Length of Stay: 2 day(s)    Current Patient Status: Inpatient   Certification Statement: The patient will continue to require additional inpatient hospital stay due to monitor renal functions    Discharge Plan: 24-48 hours    Code Status: Level 1 - Full Code      Subjective:   No events overnight  Reports doing well with good appetite, sleeping well with cpap  Denies any fevers, chills or nausea, vomiting  Objective:     Vitals:   Temp (24hrs), Av 8 °F (36 6 °C), Min:97 3 °F (36 3 °C), Max:98 6 °F (37 °C)    Temp:  [97 3 °F (36 3 °C)-98 6 °F (37 °C)] 98 6 °F (37 °C)  HR:  [66-76] 66  Resp:  [18-20] 20  BP: ()/(43-56) 110/48  SpO2:  [86 %-100 %] 92 %  Body mass index is 59 41 kg/m²  Input and Output Summary (last 24 hours): Intake/Output Summary (Last 24 hours) at 2022 1632  Last data filed at 2022 1201  Gross per 24 hour   Intake 500 ml   Output 600 ml   Net -100 ml       Physical Exam:     Physical Exam  Vitals and nursing note reviewed  Constitutional:       General: He is not in acute distress  Appearance: He is obese  He is not ill-appearing  Eyes:      General: No scleral icterus  Conjunctiva/sclera: Conjunctivae normal    Cardiovascular:      Rate and Rhythm: Normal rate and regular rhythm  Pulmonary:      Effort: Pulmonary effort is normal  No respiratory distress  Abdominal:      General: Abdomen is flat  There is no distension  Palpations: Abdomen is soft  Musculoskeletal:      Right lower leg: Edema present  Left lower leg: Edema present  Skin:     General: Skin is warm and dry  Coloration: Skin is not jaundiced or pale  Findings: No erythema  Comments: Diffuse patches of erythema, nontender, skin folds consistent with candida   Neurological:      General: No focal deficit present  Mental Status: He is alert and oriented to person, place, and time  Mental status is at baseline  Additional Data:     Labs:    Results from last 7 days   Lab Units 07/16/22  1219 07/15/22  0455 07/14/22  1846   WBC Thousand/uL 5 00 4 92 6 05   HEMOGLOBIN g/dL 12 8 11 2* 12 9   HEMATOCRIT % 41 3 36 5 41 4   PLATELETS Thousands/uL 232 255 307  307   BANDS PCT %  --   --  1   NEUTROS PCT %  --  39*  --    LYMPHS PCT %  --  32  --    LYMPHO PCT %  --   --  39   MONOS PCT %  --  19*  --    MONO PCT %  --   --  14*   EOS PCT %  --  4 1     Results from last 7 days   Lab Units 07/16/22  1219 07/15/22  0455 07/14/22  1846   SODIUM mmol/L 142   < > 140   POTASSIUM mmol/L 4 4   < > 5 0   CHLORIDE mmol/L 107   < > 105   CO2 mmol/L 30   < > 29   BUN mg/dL 17   < > 12   CREATININE mg/dL 1 57*   < > 1 42*   ANION GAP mmol/L 5   < > 6   CALCIUM mg/dL 7 6*   < > 7 9*   ALBUMIN g/dL  --   --  2 0*   TOTAL BILIRUBIN mg/dL  --   --  0 58   ALK PHOS U/L  --   --  87   ALT U/L  --   --  13   AST U/L  --   --  26   GLUCOSE RANDOM mg/dL 98   < > 99    < > = values in this interval not displayed  Results from last 7 days   Lab Units 07/14/22  1947   INR  1 03             Results from last 7 days   Lab Units 07/15/22  0027 07/14/22 1947 07/14/22  1846   LACTIC ACID mmol/L 1 7  --  3 1*   PROCALCITONIN ng/ml  --  0 07  --            * I Have Reviewed All Lab Data Listed Above  * Additional Pertinent Lab Tests Reviewed:  All Labs For Current Hospital Admission Reviewed    Imaging:    XR tibia fibula 2 views LEFT    Result Date: 7/15/2022  Impression: No acute osseous abnormality  Workstation performed: [id number]     XR tibia fibula 2 views RIGHT    Result Date: 7/15/2022  Impression: No acute osseous abnormality  Workstation performed: [id number]       Recent Cultures (last 7 days):     Results from last 7 days   Lab Units 07/14/22 1948 07/14/22  1846   BLOOD CULTURE  No Growth at 24 hrs  No Growth at 24 hrs         Last 24 Hours Medication List:   Current Facility-Administered Medications   Medication Dose Route Frequency Provider Last Rate    acetaminophen  650 mg Oral Q6H PRN Rosamaria Mon, PA-C      busPIRone  10 mg Oral TID Rosamaria Mon, PA-C      cefepime  2,000 mg Intravenous Q8H Rosamaria Mon, PA-C 2,000 mg (07/16/22 0830)    citalopram  40 mg Oral Daily Rosamaria Mon, PA-C      clonazePAM  1 mg Oral TID Rosamaria Mon, PA-C      divalproex sodium  1,000 mg Oral BID Rosamaria Mon, PA-C      divalproex sodium  500 mg Oral HS Rosamaria Mon, PA-C      enoxaparin  40 mg Subcutaneous Daily Rosamaria Mon, [place]      fluconazole  400 mg Oral Weekly Rigoberto Smalls MD      gabapentin  100 mg Oral TID Rosamaria Mon, PA-C      levothyroxine  25 mcg Oral Early Morning [place], [place]      loperamide  2 mg Oral TID PRN Rosamaria Mon, PA-C      metoprolol tartrate  50 mg Oral BID Rosamaria Mon, PA-C      mirtazapine  7 5 mg Oral HS [place], [place]      morphine injection  2 mg Intravenous Q4H PRN Rosamaria Mon, PA-C      nystatin   Topical BID Rosamaria Mon, PA-C      ondansetron  4 mg Intravenous Q6H PRN Rosamaria Mon, PA-C      paliperidone  6 mg Oral Daily [facility], [place]      pantoprazole  40 mg Oral Early Morning [place], [place]      pravastatin  80 mg Oral Daily With [place], [place]      sodium chloride (PF)  3 mL Intravenous Q1H PRN Michael Barrera DO      traZODone  50 mg Oral HS PRN Rosamaria Mon, PA-C          Today, Patient Was Seen By: Rigoberto Smalls MD    ** Please Note: Dictation voice to text software may have been used in the creation of this document   **

## 2022-07-16 NOTE — ASSESSMENT & PLAN NOTE
Patient with diffuse cutaneous candidiasis and intertrigo  Appreciate wound evaluation  Nystatin powder, start diflucan 400mg once weekly for 4 weeks

## 2022-07-16 NOTE — CONSULTS
Vancomycin Assessment     Dwight Victor is a 48 y o  male who is currently receiving vancomycin for skin-soft tissue infection  Vancomycin has been discontinued, will close consult  Thank you

## 2022-07-16 NOTE — PROGRESS NOTES
Vancomycin Assessment    Grace Wynn is a 48 y o  male who is currently receiving vancomycin 2000mg IV q8h for skin-soft tissue infection     Relevant clinical data and objective history reviewed:  Creatinine   Date Value Ref Range Status   07/16/2022 1 57 (H) 0 60 - 1 30 mg/dL Final     Comment:     Standardized to IDMS reference method   07/15/2022 1 54 (H) 0 60 - 1 30 mg/dL Final     Comment:     Standardized to IDMS reference method   07/14/2022 1 42 (H) 0 60 - 1 30 mg/dL Final     Comment:     Standardized to IDMS reference method     BP (!) 83/43 (BP Location: Left arm)   Pulse 76   Temp (!) 97 3 °F (36 3 °C) (Oral)   Resp 18   Wt (!) 210 kg (462 lb 11 9 oz)   SpO2 98%   BMI 59 41 kg/m²   I/O last 3 completed shifts: In: 500 [P O :500]  Out: 350 [Urine:350]  Lab Results   Component Value Date/Time    BUN 17 07/16/2022 12:19 PM    WBC 5 00 07/16/2022 12:19 PM    HGB 12 8 07/16/2022 12:19 PM    HCT 41 3 07/16/2022 12:19 PM    MCV 98 07/16/2022 12:19 PM     07/16/2022 12:19 PM     Temp Readings from Last 3 Encounters:   07/16/22 (!) 97 3 °F (36 3 °C) (Oral)   06/26/22 98 9 °F (37 2 °C) (Tympanic)   06/20/22 (!) 97 1 °F (36 2 °C) (Temporal)     Vancomycin Days of Therapy: 3    Assessment/Plan  The patient is currently on vancomycin utilizing scheduled dosing  Baseline risks associated with therapy include: pre-existing renal impairment  The patient is receiving vancomycin 2000mg IV q8h with the most recent vancomycin level being at steady-state and supratherapeutic based on a goal of 15-20 (appropriate for most indications) ; therefore, after clinical evaluation will be changed to vancomycin 2000mg IV q12h   Pharmacy will continue to follow closely for s/sx of nephrotoxicity, infusion reactions, and appropriateness of therapy  BMP and CBC will be ordered per protocol  Plan for trough as patient approaches steady state, prior to the 4th  dose at approximately 1030 on 7-18-22   Pharmacy will continue to follow the patients culture results and clinical progress daily      Wendy Peacock, Pharmacist

## 2022-07-17 LAB
C DIFF TOX GENS STL QL NAA+PROBE: NEGATIVE
CAMPYLOBACTER DNA SPEC NAA+PROBE: NORMAL
SALMONELLA DNA SPEC QL NAA+PROBE: NORMAL
SHIGA TOXIN STX GENE SPEC NAA+PROBE: NORMAL
SHIGELLA DNA SPEC QL NAA+PROBE: NORMAL

## 2022-07-17 PROCEDURE — 99232 SBSQ HOSP IP/OBS MODERATE 35: CPT | Performed by: STUDENT IN AN ORGANIZED HEALTH CARE EDUCATION/TRAINING PROGRAM

## 2022-07-17 RX ORDER — FUROSEMIDE 40 MG/1
40 TABLET ORAL DAILY
Status: DISCONTINUED | OUTPATIENT
Start: 2022-07-18 | End: 2022-07-18

## 2022-07-17 RX ORDER — LOPERAMIDE HYDROCHLORIDE 2 MG/1
2 CAPSULE ORAL ONCE
Status: COMPLETED | OUTPATIENT
Start: 2022-07-17 | End: 2022-07-17

## 2022-07-17 RX ADMIN — CITALOPRAM HYDROBROMIDE 40 MG: 20 TABLET ORAL at 08:20

## 2022-07-17 RX ADMIN — CLONAZEPAM 1 MG: 1 TABLET ORAL at 15:56

## 2022-07-17 RX ADMIN — PALIPERIDONE 6 MG: 3 TABLET, EXTENDED RELEASE ORAL at 08:21

## 2022-07-17 RX ADMIN — BUSPIRONE HYDROCHLORIDE 10 MG: 10 TABLET ORAL at 15:57

## 2022-07-17 RX ADMIN — DIVALPROEX SODIUM 500 MG: 500 TABLET, DELAYED RELEASE ORAL at 22:25

## 2022-07-17 RX ADMIN — DIVALPROEX SODIUM 1000 MG: 500 TABLET, DELAYED RELEASE ORAL at 08:20

## 2022-07-17 RX ADMIN — CEFEPIME HYDROCHLORIDE 2000 MG: 2 INJECTION, POWDER, FOR SOLUTION INTRAVENOUS at 15:57

## 2022-07-17 RX ADMIN — GABAPENTIN 100 MG: 100 CAPSULE ORAL at 08:20

## 2022-07-17 RX ADMIN — MORPHINE SULFATE 2 MG: 2 INJECTION, SOLUTION INTRAMUSCULAR; INTRAVENOUS at 22:30

## 2022-07-17 RX ADMIN — LEVOTHYROXINE SODIUM 25 MCG: 25 TABLET ORAL at 06:13

## 2022-07-17 RX ADMIN — LOPERAMIDE HYDROCHLORIDE 2 MG: 2 CAPSULE ORAL at 00:34

## 2022-07-17 RX ADMIN — NYSTATIN 1 APPLICATION: 100000 POWDER TOPICAL at 18:06

## 2022-07-17 RX ADMIN — PANTOPRAZOLE SODIUM 40 MG: 40 TABLET, DELAYED RELEASE ORAL at 06:13

## 2022-07-17 RX ADMIN — ENOXAPARIN SODIUM 40 MG: 40 INJECTION SUBCUTANEOUS at 08:21

## 2022-07-17 RX ADMIN — BUSPIRONE HYDROCHLORIDE 10 MG: 10 TABLET ORAL at 08:20

## 2022-07-17 RX ADMIN — BUSPIRONE HYDROCHLORIDE 10 MG: 10 TABLET ORAL at 22:25

## 2022-07-17 RX ADMIN — CLONAZEPAM 1 MG: 1 TABLET ORAL at 22:25

## 2022-07-17 RX ADMIN — CEFEPIME HYDROCHLORIDE 2000 MG: 2 INJECTION, POWDER, FOR SOLUTION INTRAVENOUS at 08:24

## 2022-07-17 RX ADMIN — GABAPENTIN 100 MG: 100 CAPSULE ORAL at 22:25

## 2022-07-17 RX ADMIN — CLONAZEPAM 1 MG: 1 TABLET ORAL at 08:20

## 2022-07-17 RX ADMIN — PRAVASTATIN SODIUM 80 MG: 80 TABLET ORAL at 15:56

## 2022-07-17 RX ADMIN — METOPROLOL TARTRATE 50 MG: 50 TABLET, FILM COATED ORAL at 18:05

## 2022-07-17 RX ADMIN — DIVALPROEX SODIUM 1000 MG: 500 TABLET, DELAYED RELEASE ORAL at 22:25

## 2022-07-17 RX ADMIN — MIRTAZAPINE 7.5 MG: 15 TABLET, FILM COATED ORAL at 22:25

## 2022-07-17 RX ADMIN — NYSTATIN 1 APPLICATION: 100000 POWDER TOPICAL at 08:31

## 2022-07-17 RX ADMIN — CEFEPIME HYDROCHLORIDE 2000 MG: 2 INJECTION, POWDER, FOR SOLUTION INTRAVENOUS at 00:12

## 2022-07-17 RX ADMIN — GABAPENTIN 100 MG: 100 CAPSULE ORAL at 15:57

## 2022-07-17 RX ADMIN — CEFEPIME HYDROCHLORIDE 2000 MG: 2 INJECTION, POWDER, FOR SOLUTION INTRAVENOUS at 23:20

## 2022-07-17 RX ADMIN — METOPROLOL TARTRATE 50 MG: 50 TABLET, FILM COATED ORAL at 08:20

## 2022-07-17 NOTE — ASSESSMENT & PLAN NOTE
Patient with longstanding history of bilateral lower extremity lymphedema and wounds to lower extremities    · With multiple recent admissions at [facility], [address], and most recently [facility], discharged on 6/20, declined rehab and sent home  · Now with worsening lower extremity wounds/associated cellulitis  · On clindamycin for 1 day prior to admission  · Podiatry consultation, appreciate recommendations  · Continue IV cefepime, D/C vancomycin  · Blood cultures neg 48 hours, patient did not have white count or documented fevers  · Discussed discharge home, does not feel yet ready requesting another day to get things ready

## 2022-07-17 NOTE — PROGRESS NOTES
[address] [facility]  Progress Note - Ronny Garcia 1975, 48 y o  male MRN: [id number]  Unit/Bed#: [address] 87 214-01 Encounter: [id number]  Primary Care Provider: Te Foster DO   Date and time admitted to hospital: 7/14/2022  6:07 PM    * Multiple open wounds of lower leg  Assessment & Plan  Patient with longstanding history of bilateral lower extremity lymphedema and wounds to lower extremities  · With multiple recent admissions at [facility], [facility], and most recently [facility], discharged on 6/20, declined rehab and sent home  · Now with worsening lower extremity wounds/associated cellulitis  · On clindamycin for 1 day prior to admission  · Podiatry consultation, appreciate recommendations  · Continue IV cefepime, D/C vancomycin  · Blood cultures neg 48 hours, patient did not have white count or documented fevers  · Discussed discharge home, does not feel yet ready requesting another day to get things ready    Cutaneous candidiasis  Assessment & Plan  Patient with diffuse cutaneous candidiasis and intertrigo  Appreciate wound evaluation  Nystatin powder, start diflucan 400mg once weekly for 4 weeks    Rash of body  Assessment & Plan  With nonspecific slightly erythematous rash all over body  · Patient reports heat rash due to summer that he occasional gets  · Recommend outpatient Derm follow-up    [place] (acute kidney injury) ([facility] [address] )  Assessment & Plan  With baseline creatinine 0 8-1 1  · Unable to get labs drawn, difficult stick  · Will try to repeat BMP tmr    ALESSANDRA on CPAP  Assessment & Plan  CPAP q h s      Hypothyroidism  Assessment & Plan  Continue levothyroxine 25 mcg daily    Morbid obesity (HCC)  Assessment & Plan  BMI 59 19  · Counseled on weight loss, dietary changes, exercise    Bipolar disorder (HCC)  Assessment & Plan  Mood is stable  · Continue Celexa, BuSpar, Remeron, Depakote, Invega  · Klonopin PRN    Primary hypertension  Assessment & Plan  Blood pressure improved  Continue metoprolol  Resumed lasix and plan to discontinue HCTZ        VTE Pharmacologic Prophylaxis:   Pharmacologic: Enoxaparin (Lovenox)  Mechanical VTE Prophylaxis in Place: Yes    Patient Centered Rounds: I have performed bedside rounds with nursing staff today  Discussions with Specialists or Other Care Team Provider: None    Education and Discussions with Family / Patient: patient    Time Spent for Care: 30 minutes  More than 50% of total time spent on counseling and coordination of care as described above  Current Length of Stay: 3 day(s)    Current Patient Status: Inpatient   Certification Statement: The patient will continue to require additional inpatient hospital stay due to monitor renal functions    Discharge Plan: discharge home tmr, change abx to PO, recommend follow up with wound care and derm referral    Code Status: Level 1 - Full Code      Subjective:   No events overnight, no complaints  Denies fevers, chills, nausea or vomiting  Difficult stick with IV access today  Discussed discharge home but requesting additional day  Objective:     Vitals:   Temp (24hrs), Av 4 °F (36 9 °C), Min:98 1 °F (36 7 °C), Max:98 6 °F (37 °C)    Temp:  [98 1 °F (36 7 °C)-98 6 °F (37 °C)] 98 4 °F (36 9 °C)  HR:  [66-80] 80  Resp:  [18-20] 18  BP: (110-127)/(48-57) 125/57  SpO2:  [92 %-99 %] 94 %  Body mass index is 59 44 kg/m²  Input and Output Summary (last 24 hours): Intake/Output Summary (Last 24 hours) at 2022 1414  Last data filed at 2022 0100  Gross per 24 hour   Intake 1210 ml   Output 200 ml   Net 1010 ml       Physical Exam:     Physical Exam  Vitals and nursing note reviewed  Constitutional:       General: He is not in acute distress  Appearance: He is obese  He is not ill-appearing  Eyes:      General: No scleral icterus       Conjunctiva/sclera: Conjunctivae normal    Cardiovascular:      Rate and Rhythm: Normal rate and regular rhythm  Pulmonary:      Effort: Pulmonary effort is normal  No respiratory distress  Abdominal:      General: Abdomen is flat  There is no distension  Palpations: Abdomen is soft  Musculoskeletal:      Right lower leg: Edema present  Left lower leg: Edema present  Skin:     General: Skin is warm and dry  Coloration: Skin is not jaundiced or pale  Findings: No erythema  Comments: Diffuse patches of erythema, nontender, skin folds consistent with candida   Neurological:      General: No focal deficit present  Mental Status: He is alert and oriented to person, place, and time  Mental status is at baseline  Additional Data:     Labs:    Results from last 7 days   Lab Units 07/16/22  1219 07/15/22  0455 07/14/22  1846   WBC Thousand/uL 5 00 4 92 6 05   HEMOGLOBIN g/dL 12 8 11 2* 12 9   HEMATOCRIT % 41 3 36 5 41 4   PLATELETS Thousands/uL 232 255 307  307   BANDS PCT %  --   --  1   NEUTROS PCT %  --  39*  --    LYMPHS PCT %  --  32  --    LYMPHO PCT %  --   --  39   MONOS PCT %  --  19*  --    MONO PCT %  --   --  14*   EOS PCT %  --  4 1     Results from last 7 days   Lab Units 07/16/22  1219 07/15/22  0455 07/14/22  1846   SODIUM mmol/L 142   < > 140   POTASSIUM mmol/L 4 4   < > 5 0   CHLORIDE mmol/L 107   < > 105   CO2 mmol/L 30   < > 29   BUN mg/dL 17   < > 12   CREATININE mg/dL 1 57*   < > 1 42*   ANION GAP mmol/L 5   < > 6   CALCIUM mg/dL 7 6*   < > 7 9*   ALBUMIN g/dL  --   --  2 0*   TOTAL BILIRUBIN mg/dL  --   --  0 58   ALK PHOS U/L  --   --  87   ALT U/L  --   --  13   AST U/L  --   --  26   GLUCOSE RANDOM mg/dL 98   < > 99    < > = values in this interval not displayed       Results from last 7 days   Lab Units 07/14/22  1947   INR  1 03             Results from last 7 days   Lab Units 07/15/22  0027 07/14/22 1947 07/14/22  1846   LACTIC ACID mmol/L 1 7  --  3 1*   PROCALCITONIN ng/ml  --  0 07  --            * I Have Reviewed All Lab Data Listed Above  * Additional Pertinent Lab Tests Reviewed: [address] Admission Reviewed    Imaging:    None    Recent Cultures (last 7 days):     Results from last 7 days   Lab Units 07/16/22  0847 07/14/22  1948 07/14/22  1846   BLOOD CULTURE   --  No Growth at 48 hrs  No Growth at 48 hrs     C DIFF TOXIN B BY PCR  Negative  --   --        Last 24 Hours Medication List:   Current Facility-Administered Medications   Medication Dose Route Frequency Provider Last Rate    acetaminophen  650 mg Oral Q6H PRN Jennifer Delgado PA-C      busPIRone  10 mg Oral TID Jennifer Delgado PA-C      cefepime  2,000 mg Intravenous Q8H Jennifer Delgado PA-C 2,000 mg (07/17/22 0824)    citalopram  40 mg Oral Daily Jennifer Delgado PA-C      clonazePAM  1 mg Oral TID Jennifer Delgado PA-C      divalproex sodium  1,000 mg Oral BID Jennifer Delgado PA-C      divalproex sodium  500 mg Oral HS Jennifer Delgado PA-C      enoxaparin  40 mg Subcutaneous Daily Imtiaz Coleman      fluconazole  400 mg Oral Weekly MD Aquiles Sapp ON 7/18/2022] furosemide  40 mg Oral Daily Sukhi Garza MD      gabapentin  100 mg Oral TID Jennifer Delgado PA-C      levothyroxine  25 mcg Oral Early Morning Imtiaz Coleman      loperamide  2 mg Oral TID PRN Jennifer Delgado PA-C      metoprolol tartrate  50 mg Oral BID Jennifer Delgado PA-C      mirtazapine  7 5 mg Oral HS Imtiaz Coleman      morphine injection  2 mg Intravenous Q4H PRN Jennifer Delgado PA-C      nystatin   Topical BID Jennifer Delgado PA-C      ondansetron  4 mg Intravenous Q6H PRN Jennifer Delgado PA-C      paliperidone  6 mg Oral Daily Imtiaz Coleman      pantoprazole  40 mg Oral Early Morning Imtiaz Coleman      pravastatin  80 mg Oral Daily With [place], [place]      sodium chloride (PF)  3 mL Intravenous Q1H PRN Fayetta Flakes, DO      traZODone  50 mg Oral HS PRN Jennifer Delgado PA-C          Today, Patient Was Seen By: Sukhi Garza MD    ** Please Note: Dictation voice to text software may have been used in the creation of this document   **

## 2022-07-17 NOTE — ASSESSMENT & PLAN NOTE
With baseline creatinine 0 8-1 1  · Unable to get labs drawn, difficult stick  · Will try to repeat BMP tmr

## 2022-07-17 NOTE — PLAN OF CARE
Problem: Nutrition/Hydration-ADULT  Goal: Nutrient/Hydration intake appropriate for improving, restoring or maintaining nutritional needs  Description: Monitor and assess patient's nutrition/hydration status for malnutrition  Collaborate with interdisciplinary team and initiate plan and interventions as ordered  Monitor patient's weight and dietary intake as ordered or per policy  Utilize nutrition screening tool and intervene as necessary  Determine patient's food preferences and provide high-protein, high-caloric foods as appropriate       INTERVENTIONS:  - Monitor oral intake, urinary output, labs, and treatment plans  - Assess nutrition and hydration status and recommend course of action  - Evaluate amount of meals eaten  - Assist patient with eating if necessary   - Allow adequate time for meals  - Recommend/ encourage appropriate diets, oral nutritional supplements, and vitamin/mineral supplements  - Order, calculate, and assess calorie counts as needed  - Recommend, monitor, and adjust tube feedings and TPN/PPN based on assessed needs  - Assess need for intravenous fluids  - Provide specific nutrition/hydration education as appropriate  - Include patient/family/caregiver in decisions related to nutrition  Outcome: Progressing     Problem: PAIN - ADULT  Goal: Verbalizes/displays adequate comfort level or baseline comfort level  Description: Interventions:  - Encourage patient to monitor pain and request assistance  - Assess pain using appropriate pain scale  - Administer analgesics based on type and severity of pain and evaluate response  - Implement non-pharmacological measures as appropriate and evaluate response  - Consider cultural and social influences on pain and pain management  - Notify physician/advanced practitioner if interventions unsuccessful or patient reports new pain  Outcome: Progressing     Problem: INFECTION - ADULT  Goal: Absence or prevention of progression during hospitalization  Description: INTERVENTIONS:  - Assess and monitor for signs and symptoms of infection  - Monitor lab/diagnostic results  - Monitor all insertion sites, i e  indwelling lines, tubes, and drains  - Monitor endotracheal if appropriate and nasal secretions for changes in amount and color  - [place] appropriate cooling/warming therapies per order  - Administer medications as ordered  - Instruct and encourage patient and family to use good hand hygiene technique  - Identify and instruct in appropriate isolation precautions for identified infection/condition  Outcome: Progressing     Problem: RESPIRATORY - ADULT  Goal: Achieves optimal ventilation and oxygenation  Description: INTERVENTIONS:  - Assess for changes in respiratory status  - Assess for changes in mentation and behavior  - Position to facilitate oxygenation and minimize respiratory effort  - Oxygen administered by appropriate delivery if ordered  - Initiate smoking cessation education as indicated  - Encourage broncho-pulmonary hygiene including cough, deep breathe, Incentive Spirometry  - Assess the need for suctioning and aspirate as needed  - Assess and instruct to report SOB or any respiratory difficulty  - Respiratory Therapy support as indicated  Outcome: Progressing     Problem: SKIN/TISSUE INTEGRITY - ADULT  Goal: Skin Integrity remains intact(Skin Breakdown Prevention)  Description: Assess:  -Perform Javier assessment every Shift  -Clean and moisturize skin every Shift  -Inspect skin when repositioning, toileting, and assisting with ADLS  -Assess extremities for adequate circulation and sensation     Bed Management:  -Have minimal linens on bed & keep smooth, unwrinkled  -Change linens as needed when moist or perspiring  -Avoid sitting or lying in one position for more than 2 hours while in bed    Toileting:  -Offer bedside commode  -Assess for incontinence every 2 hours  -Use incontinent care products after each incontinent episode such as incontinence pads    Activity:  -Encourage activity and walks on unit  -Encourage or provide ROM exercises   -Turn and reposition patient every 2 Hours  -Use appropriate equipment to lift or move patient in bed    Skin Care:  -Avoid use of baby powder, tape, friction and shearing, hot water or constrictive clothing  -Do not massage red bony areas    Outcome: Progressing  Goal: Incision(s), wounds(s) or drain site(s) healing without S/S of infection  Description: INTERVENTIONS  - Assess and document dressing, incision, wound bed, drain sites and surrounding tissue  - Provide patient and family education  - Perform dressing changes every other day  Outcome: Progressing     Problem: MUSCULOSKELETAL - ADULT  Goal: Maintain or return mobility to safest level of function  Description: INTERVENTIONS:  - Assess patient's ability to carry out ADLs; assess patient's baseline for ADL function and identify physical deficits which impact ability to perform ADLs (bathing, care of mouth/teeth, toileting, grooming, dressing, etc )  - Assess/evaluate cause of self-care deficits   - Assess range of motion  - Assess patient's mobility  - Assess patient's need for assistive devices and provide as appropriate  - Encourage maximum independence but intervene and supervise when necessary  - Involve family in performance of ADLs  - Assess for home care needs following discharge   - Consider OT consult to assist with ADL evaluation and planning for discharge  - Provide patient education as appropriate  Outcome: Progressing  Goal: Maintain proper alignment of affected body part  Description: INTERVENTIONS:  - Support, maintain and protect limb and body alignment  - Provide patient/ family with appropriate education  Outcome: Progressing     Problem: Prexisting or High Potential for Compromised Skin Integrity  Goal: Skin integrity is maintained or improved  Description: INTERVENTIONS:  - Identify patients at risk for skin breakdown  - Assess and monitor skin integrity  - Assess and monitor nutrition and hydration status  - Monitor labs   - Assess for incontinence   - Turn and reposition patient  - Assist with mobility/ambulation  - Relieve pressure over bony prominences  - Avoid friction and shearing  - Provide appropriate hygiene as needed including keeping skin clean and dry  - Evaluate need for skin moisturizer/barrier cream  - Collaborate with interdisciplinary team   - Patient/family teaching  - Consider wound care consult   Outcome: Progressing     Problem: MOBILITY - ADULT  Goal: Maintain or return to baseline ADL function  Description: INTERVENTIONS:  -  Assess patient's ability to carry out ADLs; assess patient's baseline for ADL function and identify physical deficits which impact ability to perform ADLs (bathing, care of mouth/teeth, toileting, grooming, dressing, etc )  - Assess/evaluate cause of self-care deficits   - Assess range of motion  - Assess patient's mobility; develop plan if impaired  - Assess patient's need for assistive devices and provide as appropriate  - Encourage maximum independence but intervene and supervise when necessary  - Involve family in performance of ADLs  - Assess for home care needs following discharge   - Consider OT consult to assist with ADL evaluation and planning for discharge  - Provide patient education as appropriate  Outcome: Progressing  Goal: Maintains/Returns to pre admission functional level  Description: INTERVENTIONS:  - Perform BMAT or MOVE assessment daily    - Set and communicate daily mobility goal to care team and patient/family/caregiver     - Collaborate with rehabilitation services on mobility goals if consulted  - Out of bed for toileting  - Record patient progress and toleration of activity level   Outcome: Progressing     Problem: Potential for Falls  Goal: Patient will remain free of falls  Description: INTERVENTIONS:  - Educate patient/family on patient safety including physical limitations  - Instruct patient to call for assistance with activity   - Consult OT/PT to assist with strengthening/mobility   - Keep Call bell within reach  - Keep bed low and locked with side rails adjusted as appropriate  - Keep care items and personal belongings within reach  - Initiate and maintain comfort rounds  - Make Fall Risk Sign visible to staff  - Initiate/Maintain bed alarm  - Obtain necessary fall risk management equipment: bedside commode  - Apply yellow socks and bracelet for high fall risk patients  - Consider moving patient to room near nurses station  Outcome: Progressing

## 2022-07-18 VITALS
BODY MASS INDEX: 59.44 KG/M2 | OXYGEN SATURATION: 90 % | SYSTOLIC BLOOD PRESSURE: 116 MMHG | HEART RATE: 78 BPM | WEIGHT: 315 LBS | DIASTOLIC BLOOD PRESSURE: 44 MMHG | RESPIRATION RATE: 16 BRPM | TEMPERATURE: 97.9 F

## 2022-07-18 LAB
ANION GAP SERPL CALCULATED.3IONS-SCNC: 6 MMOL/L (ref 4–13)
BUN SERPL-MCNC: 21 MG/DL (ref 5–25)
CALCIUM SERPL-MCNC: 7.6 MG/DL (ref 8.3–10.1)
CHLORIDE SERPL-SCNC: 106 MMOL/L (ref 96–108)
CO2 SERPL-SCNC: 29 MMOL/L (ref 21–32)
CREAT SERPL-MCNC: 1.39 MG/DL (ref 0.6–1.3)
GFR SERPL CREATININE-BSD FRML MDRD: 59 ML/MIN/1.73SQ M
GLUCOSE SERPL-MCNC: 111 MG/DL (ref 65–140)
POTASSIUM SERPL-SCNC: 4.4 MMOL/L (ref 3.5–5.3)
SODIUM SERPL-SCNC: 141 MMOL/L (ref 135–147)

## 2022-07-18 PROCEDURE — 99239 HOSP IP/OBS DSCHRG MGMT >30: CPT | Performed by: INTERNAL MEDICINE

## 2022-07-18 PROCEDURE — 80048 BASIC METABOLIC PNL TOTAL CA: CPT | Performed by: STUDENT IN AN ORGANIZED HEALTH CARE EDUCATION/TRAINING PROGRAM

## 2022-07-18 RX ORDER — CEFADROXIL 500 MG/1
500 CAPSULE ORAL EVERY 12 HOURS SCHEDULED
Qty: 10 CAPSULE | Refills: 0 | Status: SHIPPED | OUTPATIENT
Start: 2022-07-18 | End: 2022-07-27

## 2022-07-18 RX ORDER — FLUCONAZOLE 150 MG/1
150 TABLET ORAL WEEKLY
Qty: 3 TABLET | Refills: 0 | Status: SHIPPED | OUTPATIENT
Start: 2022-07-23 | End: 2022-07-27

## 2022-07-18 RX ADMIN — CITALOPRAM HYDROBROMIDE 40 MG: 20 TABLET ORAL at 09:27

## 2022-07-18 RX ADMIN — BUSPIRONE HYDROCHLORIDE 10 MG: 10 TABLET ORAL at 09:27

## 2022-07-18 RX ADMIN — CEFEPIME HYDROCHLORIDE 2000 MG: 2 INJECTION, POWDER, FOR SOLUTION INTRAVENOUS at 09:27

## 2022-07-18 RX ADMIN — CLONAZEPAM 1 MG: 1 TABLET ORAL at 09:27

## 2022-07-18 RX ADMIN — DIVALPROEX SODIUM 1000 MG: 500 TABLET, DELAYED RELEASE ORAL at 09:27

## 2022-07-18 RX ADMIN — PALIPERIDONE 6 MG: 3 TABLET, EXTENDED RELEASE ORAL at 09:29

## 2022-07-18 RX ADMIN — ENOXAPARIN SODIUM 40 MG: 40 INJECTION SUBCUTANEOUS at 09:27

## 2022-07-18 RX ADMIN — PANTOPRAZOLE SODIUM 40 MG: 40 TABLET, DELAYED RELEASE ORAL at 06:14

## 2022-07-18 RX ADMIN — LEVOTHYROXINE SODIUM 25 MCG: 25 TABLET ORAL at 06:14

## 2022-07-18 RX ADMIN — GABAPENTIN 100 MG: 100 CAPSULE ORAL at 09:27

## 2022-07-18 RX ADMIN — METOPROLOL TARTRATE 50 MG: 50 TABLET, FILM COATED ORAL at 09:27

## 2022-07-18 NOTE — PROGRESS NOTES
Progress Note- Cristine Aragon 48 y o  male MRN: [id number]    Unit/Bed#: [place] [address] -01 Encounter: [id number]      Assessment and Plan:    1  Crohn's disease  History of Crohn's disease diagnosed more than 20 years ago, currently on Remicade infusions Q 8 weekly, is unsure who has been prescribing but he gets it at home  At present reports good tolerance to diet, denies any significant changes in his bowel habits as compared to baseline, denies any bleeding in stool, nausea or vomiting  Last colonoscopy in 2020, essentially unremarkable biopsy showed chronic colitis and of sigmoid polyp  At this time C diff and enteric panel have been tested and are negative  Fecal calprotectin is pending however low suspicion for active flare-up of Crohn's disease  At this time discussed at length that patient needs to be following up with outpatient Gastroenterology provider (last visit more than 2 years ago)  Offered to set him an appointment in outpatient IBD clinic, he is agreeable for the same  For now will recommend that he should continue his Imodium as needed for diarrhea up to 4 pills a day  Will also recommend to continue his home regimen of Remicade  Will set up an appointment with him in 4 weeks in [address] [place] [address]  Discussed that he needs of repeat colonoscopy soon  As an outpatient       ______________________________________________________________________    Subjective:     Patient seen and examined at bedside  Patient today reports that he is feeling well overall in terms of his gastrointestinal complaints  He does complain of 2 episodes of incontinence and loose stools however denies any pain, blood in stools, nausea, vomiting  Denies any inability to tolerate diet  denies any fever      Medication Administration - last 24 hours from 07/17/2022 1746 to 07/18/2022 1746       Date/Time Order Dose Route Action Action by     07/18/2022 0910 busPIRone (BUSPAR) tablet 10 mg 10 mg Oral Given Katie Hermosillo, RN 07/17/2022 2225 busPIRone (BUSPAR) tablet 10 mg 10 mg Oral Given Herbie Patel, RN     07/18/2022 9418 citalopram (CeleXA) tablet 40 mg 40 mg Oral Given Felicia Chauhan, RN     07/17/2022 2225 divalproex sodium (DEPAKOTE) EC tablet 500 mg 500 mg Oral Given Herbie Patel, RN     07/18/2022 0927 divalproex sodium (DEPAKOTE) EC tablet 1,000 mg 1,000 mg Oral Given Felicia Chauhan, RN     07/17/2022 2225 divalproex sodium (DEPAKOTE) EC tablet 1,000 mg 1,000 mg Oral Given Herbie Patel, RN     07/18/2022 0927 gabapentin (NEURONTIN) capsule 100 mg 100 mg Oral Given Felicia Chauhan, RN     07/17/2022 2225 gabapentin (NEURONTIN) capsule 100 mg 100 mg Oral Given Herbie Patel, RN     07/18/2022 0927 metoprolol tartrate (LOPRESSOR) tablet 50 mg 50 mg Oral Given Felicia Chauhan, RN     07/17/2022 1805 metoprolol tartrate (LOPRESSOR) tablet 50 mg 50 mg Oral Given Orest Do, RN     07/18/2022 2182 levothyroxine tablet 25 mcg 25 mcg Oral Given Herbie Patel, RN     07/17/2022 2225 mirtazapine (REMERON) tablet 7 5 mg 7 5 mg Oral Given Herbei Patel, RN     07/18/2022 0929 paliperidone (INVEGA) 24 hr tablet 6 mg 6 mg Oral Given Felicia Chauhan, RN     07/18/2022 0614 pantoprazole (PROTONIX) EC tablet 40 mg 40 mg Oral Given Herbie Patel, RN     07/18/2022 0927 clonazePAM (KlonoPIN) tablet 1 mg 1 mg Oral Given Felicia Chauhan, RN     07/17/2022 2225 clonazePAM (KlonoPIN) tablet 1 mg 1 mg Oral Given Herbie Patel, RN     07/18/2022 0927 enoxaparin (LOVENOX) subcutaneous injection 40 mg 40 mg Subcutaneous Given Felicia Chauhan, RN     07/17/2022 2230 morphine injection 2 mg 2 mg Intravenous Given Herbie Patel, RN     07/18/2022 0927 cefepime (MAXIPIME) 2,000 mg in dextrose 5 % 50 mL IVPB 2,000 mg Intravenous [facility] Felicia Chauhan RN     07/17/2022 4730 cefepime (MAXIPIME) 2,000 mg in dextrose 5 % 50 mL IVPB 2,000 mg Intravenous [facility] Herbie Patel RN     07/18/2022 7036 nystatin (MYCOSTATIN) powder   Topical Refused Felicia Chauhan RN     07/17/2022 5573 nystatin (MYCOSTATIN) powder 1 application Topical Given James De Paz RN          Objective:     Vitals: Blood pressure (!) 116/44, pulse 78, temperature 97 9 °F (36 6 °C), temperature source Oral, resp  rate 16, weight (!) 210 kg (462 lb 15 5 oz), SpO2 90 %  ,Body mass index is 59 44 kg/m²        Intake/Output Summary (Last 24 hours) at 7/18/2022 1746  Last data filed at 7/18/2022 0301  Gross per 24 hour   Intake 200 ml   Output 120 ml   Net 80 ml       Physical Exam:   General Appearance: Awake and alert, in no acute distress  Abdomen: Soft, non-tender, non-distended; bowel sounds normal; no masses or no organomegaly    Invasive Devices  Report    Peripheral Intravenous Line  Duration           Peripheral IV 07/14/22 Right Antecubital 3 days                Lab Results:  Admission on 07/14/2022, Discharged on 07/18/2022   Component Date Value    WBC 07/14/2022 6 05     RBC 07/14/2022 4 30     Hemoglobin 07/14/2022 12 9     Hematocrit 07/14/2022 41 4     MCV 07/14/2022 96     MCH 07/14/2022 30 0     MCHC 07/14/2022 31 2 (A)    RDW 07/14/2022 13 0     MPV 07/14/2022 10 2     Platelets 27/45/2083 307     Sodium 07/14/2022 140     Potassium 07/14/2022 5 0     Chloride 07/14/2022 105     CO2 07/14/2022 29     ANION GAP 07/14/2022 6     BUN 07/14/2022 12     Creatinine 07/14/2022 1 42 (A)    Glucose 07/14/2022 99     Calcium 07/14/2022 7 9 (A)    Corrected Calcium 07/14/2022 9 5     AST 07/14/2022 26     ALT 07/14/2022 13     Alkaline Phosphatase 07/14/2022 87     Total Protein 07/14/2022 7 0     Albumin 07/14/2022 2 0 (A)    Total Bilirubin 07/14/2022 0 58     eGFR 07/14/2022 57     LACTIC ACID 07/14/2022 3 1 (A)    Protime 07/14/2022 13 5     INR 07/14/2022 1 03     PTT 07/14/2022 27     Procalcitonin 07/14/2022 0 07     Color, UA 07/16/2022 Karen     Clarity, UA 07/16/2022 Slightly Cloudy     Specific Gravity, UA 07/16/2022 >=1 030     pH, UA 07/16/2022 6 0     Leukocytes, UA 07/16/2022 Negative     Nitrite, UA 07/16/2022 Negative     Protein, UA 07/16/2022 Negative     Glucose, UA 07/16/2022 Negative     Ketones, UA 07/16/2022 Trace (A)    Urobilinogen, UA 07/16/2022 0 2     Bilirubin, UA 07/16/2022 Interference- unable to analyze (A)    Occult Blood, UA 07/16/2022 Negative     Blood Culture 07/14/2022 No Growth at 72 hrs   Blood Culture 07/14/2022 No Growth at 72 hrs       Segmented % 07/14/2022 43     Bands % 07/14/2022 1     Lymphocytes % 07/14/2022 39     Monocytes % 07/14/2022 14 (A)    Eosinophils, % 07/14/2022 1     Basophils % 07/14/2022 0     Myelocytes % 07/14/2022 2 (A)    Absolute Neutrophils 07/14/2022 2 66     Lymphocytes Absolute 07/14/2022 2 36     Monocytes Absolute 07/14/2022 0 85     Eosinophils Absolute 07/14/2022 0 06     Basophils Absolute 07/14/2022 0 00     Anisocytosis 07/14/2022 Present     Platelet Estimate 92/24/5536 Adequate     Giant PLTs 07/14/2022 Present     Large Platelet 50/81/5382 Present     LACTIC ACID 07/15/2022 1 7     C difficile toxin by PCR 07/16/2022 Negative     Platelets 01/24/0195 307     MPV 07/14/2022 10 2     Sodium 07/15/2022 141     Potassium 07/15/2022 3 7     Chloride 07/15/2022 105     CO2 07/15/2022 29     ANION GAP 07/15/2022 7     BUN 07/15/2022 12     Creatinine 07/15/2022 1 54 (A)    Glucose 07/15/2022 132     Calcium 07/15/2022 7 5 (A)    eGFR 07/15/2022 52     WBC 07/15/2022 4 92     RBC 07/15/2022 3 76 (A)    Hemoglobin 07/15/2022 11 2 (A)    Hematocrit 07/15/2022 36 5     MCV 07/15/2022 97     MCH 07/15/2022 29 8     MCHC 07/15/2022 30 7 (A)    RDW 07/15/2022 13 3     MPV 07/15/2022 10 5     Platelets 93/28/2101 255     nRBC 07/15/2022 0     Neutrophils Relative 07/15/2022 39 (A)    Immat GRANS % 07/15/2022 5 (A)    Lymphocytes Relative 07/15/2022 32     Monocytes Relative 07/15/2022 19 (A)    Eosinophils Relative 07/15/2022 4     Basophils Relative 07/15/2022 1     Neutrophils Absolute 07/15/2022 1 95     Immature Grans Absolute 07/15/2022 0 26 (A)    Lymphocytes Absolute 07/15/2022 1 56     Monocytes Absolute 07/15/2022 0 91     Eosinophils Absolute 07/15/2022 0 19     Basophils Absolute 07/15/2022 0 05     Salmonella sp PCR 07/16/2022 None Detected     Shigella sp/Enteroinvasi* 07/16/2022 None Detected     Campylobacter sp (jejuni* 07/16/2022 None Detected     Shiga toxin 1/Shiga toxi* 07/16/2022 None Detected     WBC 07/16/2022 5 00     RBC 07/16/2022 4 20     Hemoglobin 07/16/2022 12 8     Hematocrit 07/16/2022 41 3     MCV 07/16/2022 98     MCH 07/16/2022 30 5     MCHC 07/16/2022 31 0 (A)    RDW 07/16/2022 13 2     Platelets 84/88/1058 232     MPV 07/16/2022 9 8     Sodium 07/16/2022 142     Potassium 07/16/2022 4 4     Chloride 07/16/2022 107     CO2 07/16/2022 30     ANION GAP 07/16/2022 5     BUN 07/16/2022 17     Creatinine 07/16/2022 1 57 (A)    Glucose 07/16/2022 98     Calcium 07/16/2022 7 6 (A)    eGFR 07/16/2022 51     Vancomycin Tr 07/16/2022 27 5 (A)    Sodium 07/18/2022 141     Potassium 07/18/2022 4 4     Chloride 07/18/2022 106     CO2 07/18/2022 29     ANION GAP 07/18/2022 6     BUN 07/18/2022 21     Creatinine 07/18/2022 1 39 (A)    Glucose 07/18/2022 111     Calcium 07/18/2022 7 6 (A)    eGFR 07/18/2022 59        Imaging Studies: I have personally reviewed pertinent imaging studies

## 2022-07-18 NOTE — ASSESSMENT & PLAN NOTE
Patient with longstanding history of bilateral lower extremity lymphedema and wounds to lower extremities    · With multiple recent admissions at [facility], [facility], and most recently [facility], discharged on 6/20, declined rehab and sent home  · Now with worsening lower extremity wounds/associated cellulitis  · On clindamycin for 1 day prior to admission  · Podiatry consultation, appreciate recommendations  · Patient was managed on IV cefepime during hospital stay  · Transition to cefadroxil for an additional 5 days  · Outpatient follow-up with wound care and VNA  · Outpatient follow-up with Podiatry

## 2022-07-18 NOTE — DISCHARGE SUMMARY
[facility]  Discharge- Ronny Garcia 1975, 48 y o  male MRN: [id number]  Unit/Bed#: [place] [address] -01 Encounter: [id number]  Primary Care Provider: Te Foster DO   Date and time admitted to hospital: 7/14/2022  6:07 PM    * Multiple open wounds of lower leg  Assessment & Plan  Patient with longstanding history of bilateral lower extremity lymphedema and wounds to lower extremities  · With multiple recent admissions at [facility], [facility], and most recently [facility], discharged on 6/20, declined rehab and sent home  · Now with worsening lower extremity wounds/associated cellulitis  · On clindamycin for 1 day prior to admission  · Podiatry consultation, appreciate recommendations  · Patient was managed on IV cefepime during hospital stay  · Transition to cefadroxil for an additional 5 days  · Outpatient follow-up with wound care and VNA  · Outpatient follow-up with Podiatry    Cutaneous candidiasis  Assessment & Plan  Patient with diffuse cutaneous candidiasis and intertrigo  Appreciate wound evaluation  Continue Nystatin powder,   In addition start diflucan 150 mg once weekly for 4 weeks    GERMANIA (acute kidney injury) ([facility] [address] )  Assessment & Plan  With baseline creatinine 0 8-1 1  · Resolving  · Recommend continuing Lasix to decrease burden of edema on lower extremity wounds  · Hold hydrochlorothiazide on discharge  · Check BMP in 1 week    ALESSANDRA on CPAP  Assessment & Plan  CPAP q h s      Gastroesophageal reflux disease without esophagitis  Assessment & Plan  Continue Protonix    Hypothyroidism  Assessment & Plan  Continue levothyroxine 25 mcg daily    Morbid obesity (HCC)  Assessment & Plan  BMI 59 19  · Counseled on weight loss, dietary changes, exercise    Bipolar disorder (HCC)  Assessment & Plan  Mood is stable  · Continue Celexa, BuSpar, Remeron, Depakote, Invega  · Klonopin PRN    Primary hypertension  Assessment & Plan  Blood pressure improved  Continue metoprolol and Lasix  Hold hydrochlorothiazide on discharge    Discharging Physician / Practitioner: Giovanny Garcia DO  PCP: Pinkey Canavan, DO  Admission Date:   Admission Orders (From admission, onward)     Ordered        07/14/22 2046  INPATIENT ADMISSION  Once                      Discharge Date: 07/18/22    Medical Problems             Resolved Problems  Date Reviewed: 7/17/2022          Resolved    Lactic acidosis 7/15/2022     Resolved by  Lisseth Lawson, [address] [place] [address] Stay: Podiatry, Gastroenterology     Procedures Performed: None    Significant Findings / Test Results:     XR tibia fibula 2 views LEFT    Result Date: 7/15/2022  Impression: No acute osseous abnormality  Workstation performed: [id number]     XR tibia fibula 2 views RIGHT    Result Date: 7/15/2022  Impression: No acute osseous abnormality  Workstation performed: [id number]       Incidental Findings: None    Test Results Pending at Discharge (will require follow up): None     Outpatient Tests Requested: BMP    Reason for Admission:     Hospital Course:     Sammy Muñoz is a 48 y o  male With past medical history of bipolar disorder, hypertension, obesity, hypothyroidism presents to the hospital with worsening of lower extremity wounds  Patient reported weeping open wounds with concern for worsening erythema     Patient had multiple recent admissions at [facility], [address], [address]  Most recently discharged 06/20  Patient has home health aides but reportedly did not have home wound care  Patient was admitted and started on IV antibiotics  Patient was seen by Podiatry who recommended local wound care, elevation offloading when nonambulatory  Patient's wounds remained stable during hospital stay  He did not develop SIRS or sepsis criteria  Patient will be discharged on oral antibiotic course    Referral to home VNA and wound care     Please see above list of diagnoses and related plan for additional information  Condition at Discharge: stable     Discharge Day Visit / Exam:     Subjective:    Patient seen and evaluated at bedside  No overnight events  He reports some discomfort in his lower extremity wounds  Exam:   Physical Exam  Vitals reviewed  Constitutional:       General: He is not in acute distress  Appearance: He is well-developed  He is obese  He is not ill-appearing, toxic-appearing or diaphoretic  HENT:      Head: Normocephalic and atraumatic  Mouth/Throat:      Mouth: Mucous membranes are moist    Eyes:      General: No scleral icterus  Conjunctiva/sclera: Conjunctivae normal    Cardiovascular:      Rate and Rhythm: Normal rate and regular rhythm  Heart sounds: Normal heart sounds  Pulmonary:      Effort: Pulmonary effort is normal  No respiratory distress  Breath sounds: Normal breath sounds  No wheezing or rales  Abdominal:      General: There is no distension  Palpations: Abdomen is soft  Tenderness: There is no abdominal tenderness  There is no guarding or rebound  Musculoskeletal:         General: No swelling, tenderness or deformity  Skin:     General: Skin is warm and dry  Comments: Bilateral lower extremity wounds with clean and dry dressings    Neurological:      General: No focal deficit present  Mental Status: He is alert  Mental status is at baseline  Psychiatric:         Mood and Affect: Mood normal          Behavior: Behavior normal          Thought Content: Thought content normal          Judgment: Judgment normal            Discharge instructions/Information to patient and family:   See after visit summary for information provided to patient and family  Provisions for Follow-Up Care:  See after visit summary for information related to follow-up care and any pertinent home health orders        Disposition:     Home with VNA     Discharge Statement:  I spent 60 minutes discharging the patient  This time was spent on the day of discharge  I had direct contact with the patient on the day of discharge  Greater than 50% of the total time was spent examining patient, answering all patient questions, arranging and discussing plan of care with patient as well as directly providing post-discharge instructions  Additional time then spent on discharge activities  Discharge Medications:  See after visit summary for reconciled discharge medications provided to patient and family        ** Please Note: This note has been constructed using a voice recognition system **

## 2022-07-18 NOTE — CASE MANAGEMENT
Case Management Discharge Planning Note    Patient name Bere Gaines  [facility] [address] Dexter Causey* MRN [id number]  : 1975 Date 2022       Current Admission Date: 2022  Current Admission Diagnosis:Multiple open wounds of lower leg   Patient Active Problem List    Diagnosis Date Noted    Cutaneous candidiasis 2022    Rash of body 07/15/2022    GERMANIA (acute kidney injury) ([facility] ) 2022    CORKY (generalized anxiety disorder) 2022    Pain and swelling of lower extremity 2022    Primary hypertension 2022    Dyslipidemia 2022    Crohn's disease ([facility] ) 2022    Bipolar disorder ([place] [address] ) 2022    Morbid obesity ([place] [address] ) 2022    Hypothyroidism 2022    Multiple open wounds of lower leg 2022    Peripheral edema 2022    Anxiety 2022    Dietary noncompliance 2021    Ambulatory dysfunction 2021    Venous stasis dermatitis 2021    Venous insufficiency of lower extremity 2021    Financial difficulties 10/26/2020    Bilateral leg edema 2020    Binge eating     Folic acid deficiency     ALESSANDRA on CPAP 2019    Fatty liver 2019    History of MRSA infection 2019    Pre-diabetes 2019    Peripheral polyneuropathy 2017    Vitamin D deficiency 2015    Gastroesophageal reflux disease without esophagitis 2015    Crohn's disease of large intestine without complication ([facility] )     Cocaine dependence in remission ([facility] ) 2011      LOS (days): 4  Geometric Mean LOS (GMLOS) (days): 2 90  Days to GMLOS:-0 9     OBJECTIVE:  Risk of Unplanned Readmission Score: 33 63         Current admission status: Inpatient   Preferred Pharmacy:   [facility], [address]  Phone: [phone] Fax: [phone]    Primary Care Provider: Aman Caal DO    Primary Insurance: MEDICARE  Secondary Insurance:     DISCHARGE DETAILS:       Requested [address] requested[de-identified] [facility] Agency Name[de-identified] Other ([address])  [address] Provider[de-identified] PCP  Home Health Services Needed[de-identified] Wound/Ostomy Care  Homebound Criteria Met[de-identified] Uses an Assist Device (i e  cane, walker, etc), Immunosuppressed  Supporting Clincal Findings[de-identified] Limited Endurance         Other Referral/Resources/Interventions Provided:  Interventions: [facility]         Treatment Team Recommendation: Home with [address]  Discharge Destination Plan[de-identified] Home with [place] at Discharge : [employer] Local Transportation ([address])  Dispatcher Contacted: Yes  Number/Name of Dispatcher: Ed        ETA of Transport (Time): [phone] by:  ()   Swati Johnson RN aware of LYFT  time as above  Provided Ed dispatcher at [facility] w/ RN # [phone] for [address]  to call upon arrival at hospital         [facility] w/  order faxed to GRACE SNYDER () as requested at [phone]

## 2022-07-18 NOTE — ASSESSMENT & PLAN NOTE
Patient with diffuse cutaneous candidiasis and intertrigo  Appreciate wound evaluation  Continue Nystatin powder,   In addition start diflucan 150 mg once weekly for 4 weeks

## 2022-07-18 NOTE — ASSESSMENT & PLAN NOTE
With baseline creatinine 0 8-1 1  · Resolving  · Recommend continuing Lasix to decrease burden of edema on lower extremity wounds  · Hold hydrochlorothiazide on discharge  · Check BMP in 1 week

## 2022-07-18 NOTE — PLAN OF CARE
Problem: Nutrition/Hydration-ADULT  Goal: Nutrient/Hydration intake appropriate for improving, restoring or maintaining nutritional needs  Description: Monitor and assess patient's nutrition/hydration status for malnutrition  Collaborate with interdisciplinary team and initiate plan and interventions as ordered  Monitor patient's weight and dietary intake as ordered or per policy  Utilize nutrition screening tool and intervene as necessary  Determine patient's food preferences and provide high-protein, high-caloric foods as appropriate       INTERVENTIONS:  - Monitor oral intake, urinary output, labs, and treatment plans  - Assess nutrition and hydration status and recommend course of action  - Evaluate amount of meals eaten  - Assist patient with eating if necessary   - Allow adequate time for meals  - Recommend/ encourage appropriate diets, oral nutritional supplements, and vitamin/mineral supplements  - Order, calculate, and assess calorie counts as needed  - Recommend, monitor, and adjust tube feedings and TPN/PPN based on assessed needs  - Assess need for intravenous fluids  - Provide specific nutrition/hydration education as appropriate  - Include patient/family/caregiver in decisions related to nutrition  Outcome: Progressing     Problem: PAIN - ADULT  Goal: Verbalizes/displays adequate comfort level or baseline comfort level  Description: Interventions:  - Encourage patient to monitor pain and request assistance  - Assess pain using appropriate pain scale  - Administer analgesics based on type and severity of pain and evaluate response  - Implement non-pharmacological measures as appropriate and evaluate response  - Consider cultural and social influences on pain and pain management  - Notify physician/advanced practitioner if interventions unsuccessful or patient reports new pain  Outcome: Progressing     Problem: INFECTION - ADULT  Goal: Absence or prevention of progression during hospitalization  Description: INTERVENTIONS:  - Assess and monitor for signs and symptoms of infection  - Monitor lab/diagnostic results  - Monitor all insertion sites, i e  indwelling lines, tubes, and drains  - Monitor endotracheal if appropriate and nasal secretions for changes in amount and color  - Atkinson appropriate cooling/warming therapies per order  - Administer medications as ordered  - Instruct and encourage patient and family to use good hand hygiene technique  - Identify and instruct in appropriate isolation precautions for identified infection/condition  Outcome: Progressing     Problem: RESPIRATORY - ADULT  Goal: Achieves optimal ventilation and oxygenation  Description: INTERVENTIONS:  - Assess for changes in respiratory status  - Assess for changes in mentation and behavior  - Position to facilitate oxygenation and minimize respiratory effort  - Oxygen administered by appropriate delivery if ordered  - Initiate smoking cessation education as indicated  - Encourage broncho-pulmonary hygiene including cough, deep breathe, Incentive Spirometry  - Assess the need for suctioning and aspirate as needed  - Assess and instruct to report SOB or any respiratory difficulty  - Respiratory Therapy support as indicated  Outcome: Progressing     Problem: SKIN/TISSUE INTEGRITY - ADULT  Goal: Skin Integrity remains intact(Skin Breakdown Prevention)  Description: Assess:  -Perform Javier assessment every shift  -Clean and moisturize skin every shift  -Inspect skin when repositioning, toileting, and assisting with ADLS  -Assess extremities for adequate circulation and sensation     Bed Management:  -Have minimal linens on bed & keep smooth, unwrinkled  -Change linens as needed when moist or perspiring  -Avoid sitting or lying in one position for more than 2 hours while in bed    Toileting:  -Offer bedside commode  -Assess for incontinence every 2 hrs  -Use incontinent care products after each incontinent episode such as incontinence pads    Activity:  -Encourage activity and walks on unit  -Encourage or provide ROM exercises   -Turn and reposition patient every 2 Hours, as pt allows  -Use appropriate equipment to lift or move patient in bed    Skin Care:  -Avoid use of baby powder, tape, friction and shearing, hot water or constrictive clothing  -Do not massage red bony areas    Outcome: Progressing  Goal: Incision(s), wounds(s) or drain site(s) healing without S/S of infection  Description: INTERVENTIONS  - Assess and document dressing, incision, wound bed, drain sites and surrounding tissue  - Provide patient and family education  - Perform skin care/dressing changes as ordered  Outcome: Progressing     Problem: MUSCULOSKELETAL - ADULT  Goal: Maintain or return mobility to safest level of function  Description: INTERVENTIONS:  - Assess patient's ability to carry out ADLs; assess patient's baseline for ADL function and identify physical deficits which impact ability to perform ADLs (bathing, care of mouth/teeth, toileting, grooming, dressing, etc )  - Assess/evaluate cause of self-care deficits   - Assess range of motion  - Assess patient's mobility  - Assess patient's need for assistive devices and provide as appropriate  - Encourage maximum independence but intervene and supervise when necessary  - Involve family in performance of ADLs  - Assess for home care needs following discharge   - Consider OT consult to assist with ADL evaluation and planning for discharge  - Provide patient education as appropriate  Outcome: Progressing  Goal: Maintain proper alignment of affected body part  Description: INTERVENTIONS:  - Support, maintain and protect limb and body alignment  - Provide patient/ family with appropriate education  Outcome: Progressing     Problem: Prexisting or High Potential for Compromised Skin Integrity  Goal: Skin integrity is maintained or improved  Description: INTERVENTIONS:  - Identify patients at risk for skin breakdown  - Assess and monitor skin integrity  - Assess and monitor nutrition and hydration status  - Monitor labs   - Assess for incontinence   - Turn and reposition patient  - Assist with mobility/ambulation  - Relieve pressure over bony prominences  - Avoid friction and shearing  - Provide appropriate hygiene as needed including keeping skin clean and dry  - Evaluate need for skin moisturizer/barrier cream  - Collaborate with interdisciplinary team   - Patient/family teaching  - Consider wound care consult   Outcome: Progressing     Problem: MOBILITY - ADULT  Goal: Maintain or return to baseline ADL function  Description: INTERVENTIONS:  -  Assess patient's ability to carry out ADLs; assess patient's baseline for ADL function and identify physical deficits which impact ability to perform ADLs (bathing, care of mouth/teeth, toileting, grooming, dressing, etc )  - Assess/evaluate cause of self-care deficits   - Assess range of motion  - Assess patient's mobility; develop plan if impaired  - Assess patient's need for assistive devices and provide as appropriate  - Encourage maximum independence but intervene and supervise when necessary  - Involve family in performance of ADLs  - Assess for home care needs following discharge   - Consider OT consult to assist with ADL evaluation and planning for discharge  - Provide patient education as appropriate  Outcome: Progressing  Goal: Maintains/Returns to pre admission functional level  Description: INTERVENTIONS:  - Perform BMAT or MOVE assessment daily    - Set and communicate daily mobility goal to care team and patient/family/caregiver     - Collaborate with rehabilitation services on mobility goals if consulted  - Out of bed for toileting  - Record patient progress and toleration of activity level   Outcome: Progressing     Problem: Potential for Falls  Goal: Patient will remain free of falls  Description: INTERVENTIONS:  - Educate patient/family on patient safety including physical limitations  - Instruct patient to call for assistance with activity   - Consult OT/PT to assist with strengthening/mobility   - Keep Call bell within reach  - Keep bed low and locked with side rails adjusted as appropriate  - Keep care items and personal belongings within reach  - Initiate and maintain comfort rounds  - Make Fall Risk Sign visible to staff  - Initiate/Maintain bed alarm  - Obtain necessary fall risk management equipment: bedside commode  - Apply yellow socks and bracelet for high fall risk patients  - Consider moving patient to room near nurses station  Outcome: Progressing

## 2022-07-19 ENCOUNTER — TELEPHONE (OUTPATIENT)
Dept: GASTROENTEROLOGY | Facility: MEDICAL CENTER | Age: 47
End: 2022-07-19

## 2022-07-19 ENCOUNTER — HOSPITAL ENCOUNTER (EMERGENCY)
Facility: HOSPITAL | Age: 47
DRG: 605 | End: 2022-07-20
Attending: EMERGENCY MEDICINE | Admitting: EMERGENCY MEDICINE
Payer: MEDICARE

## 2022-07-19 VITALS
OXYGEN SATURATION: 94 % | HEART RATE: 91 BPM | DIASTOLIC BLOOD PRESSURE: 51 MMHG | SYSTOLIC BLOOD PRESSURE: 106 MMHG | RESPIRATION RATE: 20 BRPM | TEMPERATURE: 98.6 F

## 2022-07-19 DIAGNOSIS — L03.116 BILATERAL CELLULITIS OF LOWER LEG: Primary | ICD-10-CM

## 2022-07-19 DIAGNOSIS — R53.1 WEAKNESS: ICD-10-CM

## 2022-07-19 DIAGNOSIS — L03.115 BILATERAL CELLULITIS OF LOWER LEG: Primary | ICD-10-CM

## 2022-07-19 DIAGNOSIS — N17.9 ACUTE KIDNEY INJURY (HCC): ICD-10-CM

## 2022-07-19 PROBLEM — Z99.89 OSA ON CPAP: Chronic | Status: ACTIVE | Noted: 2019-05-20

## 2022-07-19 PROBLEM — G47.33 OSA ON CPAP: Chronic | Status: ACTIVE | Noted: 2019-05-20

## 2022-07-19 LAB
ALBUMIN SERPL BCP-MCNC: 1.7 G/DL (ref 3.5–5)
ALP SERPL-CCNC: 71 U/L (ref 46–116)
ALT SERPL W P-5'-P-CCNC: 11 U/L (ref 12–78)
ANION GAP SERPL CALCULATED.3IONS-SCNC: 9 MMOL/L (ref 4–13)
APTT PPP: 22 SECONDS (ref 23–37)
AST SERPL W P-5'-P-CCNC: 48 U/L (ref 5–45)
BACTERIA BLD CULT: NORMAL
BASOPHILS # BLD MANUAL: 0 THOUSAND/UL (ref 0–0.1)
BASOPHILS NFR MAR MANUAL: 0 % (ref 0–1)
BILIRUB SERPL-MCNC: 0.82 MG/DL (ref 0.2–1)
BUN SERPL-MCNC: 21 MG/DL (ref 5–25)
CALCIUM ALBUM COR SERPL-MCNC: 9.8 MG/DL (ref 8.3–10.1)
CALCIUM SERPL-MCNC: 8 MG/DL (ref 8.3–10.1)
CHLORIDE SERPL-SCNC: 102 MMOL/L (ref 96–108)
CO2 SERPL-SCNC: 24 MMOL/L (ref 21–32)
CREAT SERPL-MCNC: 1.69 MG/DL (ref 0.6–1.3)
EOSINOPHIL # BLD MANUAL: 0.07 THOUSAND/UL (ref 0–0.4)
EOSINOPHIL NFR BLD MANUAL: 1 % (ref 0–6)
ERYTHROCYTE [DISTWIDTH] IN BLOOD BY AUTOMATED COUNT: 13 % (ref 11.6–15.1)
GFR SERPL CREATININE-BSD FRML MDRD: 46 ML/MIN/1.73SQ M
GLUCOSE SERPL-MCNC: 108 MG/DL (ref 65–140)
HCT VFR BLD AUTO: 35.8 % (ref 36.5–49.3)
HGB BLD-MCNC: 11.4 G/DL (ref 12–17)
INR PPP: 1.03 (ref 0.84–1.19)
LACTATE SERPL-SCNC: 1.7 MMOL/L (ref 0.5–2)
LYMPHOCYTES # BLD AUTO: 1.58 THOUSAND/UL (ref 0.6–4.47)
LYMPHOCYTES # BLD AUTO: 24 % (ref 14–44)
MCH RBC QN AUTO: 30.4 PG (ref 26.8–34.3)
MCHC RBC AUTO-ENTMCNC: 31.8 G/DL (ref 31.4–37.4)
MCV RBC AUTO: 96 FL (ref 82–98)
MONOCYTES # BLD AUTO: 0.92 THOUSAND/UL (ref 0–1.22)
MONOCYTES NFR BLD: 14 % (ref 4–12)
MYELOCYTES NFR BLD MANUAL: 2 % (ref 0–1)
NEUTROPHILS # BLD MANUAL: 3.89 THOUSAND/UL (ref 1.85–7.62)
NEUTS BAND NFR BLD MANUAL: 3 % (ref 0–8)
NEUTS SEG NFR BLD AUTO: 56 % (ref 43–75)
PLATELET # BLD AUTO: 200 THOUSANDS/UL (ref 149–390)
PLATELET BLD QL SMEAR: ADEQUATE
PMV BLD AUTO: 9.8 FL (ref 8.9–12.7)
POTASSIUM SERPL-SCNC: 5.4 MMOL/L (ref 3.5–5.3)
PROCALCITONIN SERPL-MCNC: 0.11 NG/ML
PROT SERPL-MCNC: 7.3 G/DL (ref 6.4–8.4)
PROTHROMBIN TIME: 13.5 SECONDS (ref 11.6–14.5)
RBC # BLD AUTO: 3.75 MILLION/UL (ref 3.88–5.62)
RBC MORPH BLD: NORMAL
SODIUM SERPL-SCNC: 135 MMOL/L (ref 135–147)
WBC # BLD AUTO: 6.6 THOUSAND/UL (ref 4.31–10.16)

## 2022-07-19 PROCEDURE — 96367 TX/PROPH/DG ADDL SEQ IV INF: CPT

## 2022-07-19 PROCEDURE — 93005 ELECTROCARDIOGRAM TRACING: CPT

## 2022-07-19 PROCEDURE — 36415 COLL VENOUS BLD VENIPUNCTURE: CPT

## 2022-07-19 PROCEDURE — 80053 COMPREHEN METABOLIC PANEL: CPT

## 2022-07-19 PROCEDURE — 96365 THER/PROPH/DIAG IV INF INIT: CPT

## 2022-07-19 PROCEDURE — 96375 TX/PRO/DX INJ NEW DRUG ADDON: CPT

## 2022-07-19 PROCEDURE — 85007 BL SMEAR W/DIFF WBC COUNT: CPT

## 2022-07-19 PROCEDURE — 96366 THER/PROPH/DIAG IV INF ADDON: CPT

## 2022-07-19 PROCEDURE — 99285 EMERGENCY DEPT VISIT HI MDM: CPT

## 2022-07-19 PROCEDURE — 83605 ASSAY OF LACTIC ACID: CPT

## 2022-07-19 PROCEDURE — 85025 COMPLETE CBC W/AUTO DIFF WBC: CPT

## 2022-07-19 PROCEDURE — 87040 BLOOD CULTURE FOR BACTERIA: CPT

## 2022-07-19 PROCEDURE — 99285 EMERGENCY DEPT VISIT HI MDM: CPT | Performed by: EMERGENCY MEDICINE

## 2022-07-19 PROCEDURE — 84145 PROCALCITONIN (PCT): CPT

## 2022-07-19 PROCEDURE — 85027 COMPLETE CBC AUTOMATED: CPT

## 2022-07-19 PROCEDURE — 85610 PROTHROMBIN TIME: CPT

## 2022-07-19 PROCEDURE — 85730 THROMBOPLASTIN TIME PARTIAL: CPT

## 2022-07-19 RX ORDER — DIVALPROEX SODIUM 250 MG/1
1000 TABLET, DELAYED RELEASE ORAL EVERY MORNING
Status: DISCONTINUED | OUTPATIENT
Start: 2022-07-20 | End: 2022-07-20 | Stop reason: HOSPADM

## 2022-07-19 RX ORDER — LOPERAMIDE HYDROCHLORIDE 2 MG/1
2 CAPSULE ORAL ONCE
Status: COMPLETED | OUTPATIENT
Start: 2022-07-19 | End: 2022-07-19

## 2022-07-19 RX ORDER — HYDROMORPHONE HCL/PF 1 MG/ML
1 SYRINGE (ML) INJECTION ONCE
Status: COMPLETED | OUTPATIENT
Start: 2022-07-19 | End: 2022-07-19

## 2022-07-19 RX ORDER — BUSPIRONE HYDROCHLORIDE 10 MG/1
10 TABLET ORAL 3 TIMES DAILY
Status: DISCONTINUED | OUTPATIENT
Start: 2022-07-19 | End: 2022-07-20 | Stop reason: HOSPADM

## 2022-07-19 RX ORDER — DIPHENHYDRAMINE HCL 25 MG
25 TABLET ORAL ONCE
Status: COMPLETED | OUTPATIENT
Start: 2022-07-19 | End: 2022-07-19

## 2022-07-19 RX ORDER — DIVALPROEX SODIUM 250 MG/1
1000 TABLET, DELAYED RELEASE ORAL ONCE
Status: COMPLETED | OUTPATIENT
Start: 2022-07-19 | End: 2022-07-19

## 2022-07-19 RX ORDER — HYDROMORPHONE HCL/PF 1 MG/ML
1 SYRINGE (ML) INJECTION EVERY 4 HOURS PRN
Status: DISCONTINUED | OUTPATIENT
Start: 2022-07-19 | End: 2022-07-20 | Stop reason: HOSPADM

## 2022-07-19 RX ORDER — VALPROIC ACID 250 MG/1
500 CAPSULE, LIQUID FILLED ORAL
Status: DISCONTINUED | OUTPATIENT
Start: 2022-07-19 | End: 2022-07-19 | Stop reason: CLARIF

## 2022-07-19 RX ORDER — VALPROIC ACID 250 MG/1
1000 CAPSULE, LIQUID FILLED ORAL EVERY 12 HOURS SCHEDULED
Status: DISCONTINUED | OUTPATIENT
Start: 2022-07-19 | End: 2022-07-19 | Stop reason: CLARIF

## 2022-07-19 RX ORDER — CLONAZEPAM 1 MG/1
1 TABLET ORAL 2 TIMES DAILY PRN
Status: DISCONTINUED | OUTPATIENT
Start: 2022-07-19 | End: 2022-07-20 | Stop reason: HOSPADM

## 2022-07-19 RX ORDER — DIVALPROEX SODIUM 500 MG/1
1500 TABLET, DELAYED RELEASE ORAL
Status: DISCONTINUED | OUTPATIENT
Start: 2022-07-20 | End: 2022-07-20 | Stop reason: HOSPADM

## 2022-07-19 RX ADMIN — BUSPIRONE HYDROCHLORIDE 10 MG: 10 TABLET ORAL at 21:57

## 2022-07-19 RX ADMIN — CLONAZEPAM 1 MG: 1 TABLET ORAL at 21:56

## 2022-07-19 RX ADMIN — VANCOMYCIN HYDROCHLORIDE 2000 MG: 10 INJECTION, POWDER, LYOPHILIZED, FOR SOLUTION INTRAVENOUS at 20:13

## 2022-07-19 RX ADMIN — DIVALPROEX SODIUM 1000 MG: 250 TABLET, DELAYED RELEASE ORAL at 23:45

## 2022-07-19 RX ADMIN — DIPHENHYDRAMINE HCL 25 MG: 25 TABLET, COATED ORAL at 23:45

## 2022-07-19 RX ADMIN — LOPERAMIDE HYDROCHLORIDE 2 MG: 2 CAPSULE ORAL at 21:58

## 2022-07-19 RX ADMIN — VALPROIC ACID 500 MG: 250 CAPSULE, LIQUID FILLED ORAL at 21:56

## 2022-07-19 RX ADMIN — SODIUM CHLORIDE 1000 ML: 0.9 INJECTION, SOLUTION INTRAVENOUS at 19:06

## 2022-07-19 RX ADMIN — HYDROMORPHONE HYDROCHLORIDE 1 MG: 1 INJECTION, SOLUTION INTRAMUSCULAR; INTRAVENOUS; SUBCUTANEOUS at 19:07

## 2022-07-19 RX ADMIN — CEFEPIME HYDROCHLORIDE 2000 MG: 2 INJECTION, POWDER, FOR SOLUTION INTRAVENOUS at 19:12

## 2022-07-19 RX ADMIN — LOPERAMIDE HYDROCHLORIDE 2 MG: 2 CAPSULE ORAL at 23:44

## 2022-07-19 NOTE — TELEPHONE ENCOUNTER
----- Message from Opal Auguste MD sent at 7/18/2022  5:35 PM EDT -----  Hi,    Can you help this patient set up an appointment with Dr Phyllis Garcia or Dr Sue Mcallister or in IBD clinic? Thank you

## 2022-07-19 NOTE — ED ATTENDING ATTESTATION
7/19/2022  Lexis CODY, DO, saw and evaluated the patient  I have discussed the patient with the resident/non-physician practitioner and agree with the resident's/non-physician practitioner's findings, Plan of Care, and MDM as documented in the resident's/non-physician practitioner's note, except where noted  All available labs and Radiology studies were reviewed  I was present for key portions of any procedure(s) performed by the resident/non-physician practitioner and I was immediately available to provide assistance  At this point I agree with the current assessment done in the Emergency Department  I have conducted an independent evaluation of this patient a history and physical is as follows:    47 yo M h/o LE edema with chronic wounds and recurrent cellulitis presenting with b/l LE pain/wounds/erythema  Pt was just admitted 4/14- 4/18 for same, treated with IV abx and transitioned to Cefadroxil po, just discharged yesterday  He states since being home the area of erythema continues to move proximally, when admitted it was confined to below b/l knees and now extends to b/l thighs  C/o severe pain, weeping open wounds     Was discharged home with VNA and wound care    MDM: 47 yo M with b/l LE edema/wounds/cellulitis- analgesia, sepsis workup, IV abx, readmit        ED Course         Critical Care Time  Procedures

## 2022-07-19 NOTE — ED NOTES
Mr. Burkett is a 65 y.o. year old Black or  male who received a  - brain death kidney transplant on 16. Mr. Burkett had  ESRD HD due to HTN  and congenital absent left kidney who was on dialysis since 6/16/10 until receiving [facility] increased risk DDRT (pumped kidney, Campath induction, KDPI 36%, WIT 30 minutes, CIT 14 hours and 14 minutes, donor RPR positive thus patient completed PCN x3, CMV D+/R+) on 16. His baseline creatinine is 1.4-1.6 mg/dL. He takes mycophenolate mofetil, prednisone and tacrolimus  For Immunosuppression.    Post Transplant Course: As per Dr Mcclain  - Pt had DIVINE with suspected rejection on 17.  Kid bx  revealed AVR  Had thymo x 5-7 doses. DSA neg.  -  - He was rebiopsied on 17 which showed acute tubular injury and vacuolization, with + myoglobulin and negative heme.  - Recurrent UTI  Mr Alin Burkett is admitted to Hospital Medicine secondary to an ablation that he will have by Dr Mcmanus.his Cr is 1.5 mg/dL. He is having his ablation today. Renal function is stable. Encourage hydration. KTM cx for Kidney allograft assessment and IS management.   Stool cleaned off pt at this time, given clean gown and blankets     Debby Ferro  07/19/22 [id number]

## 2022-07-19 NOTE — ED PROVIDER NOTES
History  Chief Complaint   Patient presents with    Weakness - Generalized     Patient arrives via EMS co bilateral leg weakness and fecal incontinence  Patient's home nurse sent him in for failure to thrive  Patient has bilateral weeping cellulitis and was recently discharged from the hospital yesterday  Patient is a 49 y/o M with PMH BPD, morbid obesity, chronic lower extremity wounds presenting via EMS with generalized weakness  Patient discharged from hospital yesterday, admission for management of worsening lower extremity wounds  Patient declined rehab and was discharged to home  Today home nurse found him with b/l leg weakness and fecal incontinence  Patient states he has a history of Crohn's and had an episode of diarrhea in front of his home nurse today, this is unusual for him  States he is supposed to take two capsules of immodium which he was not receiving while in the hospital  He also complains of significant b/l leg pain for which he has only taken tylenol for  He reports he has been able to ambulate as usual since returning from the hospital, though states he did feel weak when the home nurse was present  Feels his leg wounds are getting worse, spreading upwards and more red  Denies fevers, chills, CP, SOB, abdominal pain  Has been taking prescribed antibiotic  Prior to Admission Medications   Prescriptions Last Dose Informant Patient Reported? Taking?    Cholecalciferol (Vitamin D3) 50 MCG (2000 UT) capsule   Yes Yes   Sig: Take 2,000 Units by mouth daily   Melatonin 10 MG TABS   Yes Yes   Sig: Take 10 mg by mouth daily   busPIRone (BUSPAR) 10 mg tablet   No Yes   Sig: Take 1 tablet (10 mg total) by mouth 3 (three) times a day   cefadroxil (DURICEF) 500 mg capsule   No Yes   Sig: Take 1 capsule (500 mg total) by mouth every 12 (twelve) hours for 5 days   citalopram (CeleXA) 40 mg tablet   Yes Yes   clonazePAM (KlonoPIN) 1 mg tablet   Yes Yes   Sig: Take 1 mg by mouth 3 (three) times a day   divalproex sodium (DEPAKOTE) 500 mg EC tablet   No Yes   Sig: Take 1 tablet (500 mg total) by mouth daily at bedtime   divalproex sodium (DEPAKOTE) 500 mg EC tablet   No Yes   Sig: Take 2 tablets (1,000 mg total) by mouth 2 (two) times a day   fluconazole (DIFLUCAN) 150 mg tablet   No Yes   Sig: Take 1 tablet (150 mg total) by mouth once a week for 3 doses   furosemide (LASIX) 40 mg tablet   Yes Yes   Sig: Take 40 mg by mouth daily   gabapentin (NEURONTIN) 100 mg capsule   No Yes   Sig: Take 1 capsule (100 mg total) by mouth 3 (three) times a day   levothyroxine 25 mcg tablet   Yes Yes   loperamide (IMODIUM) 2 mg capsule   Yes Yes   Sig: TAKE 2 CAPSULES BY MOUTH TWO TIMES DAILY AS NEEDED FOR DIARRHEA    metoprolol tartrate (LOPRESSOR) 50 mg tablet   Yes Yes   Sig: Take 50 mg by mouth 2 (two) times a day   mirtazapine (REMERON) 7 5 MG tablet   Yes Yes   mupirocin (BACTROBAN) 2 % ointment   No Yes   Sig: Apply topically 3 (three) times a day Apply to right foot wound with dressing changes 2-3 times daily until healed   nystatin (MYCOSTATIN) powder   No Yes   Sig: Apply topically 2 (two) times a day   paliperidone (INVEGA) 6 MG 24 hr tablet   Yes Yes   pantoprazole (PROTONIX) 40 mg tablet   Yes Yes   rosuvastatin (CRESTOR) 10 MG tablet   Yes Yes   traZODone (DESYREL) 50 mg tablet   Yes Yes      Facility-Administered Medications: None       Past Medical History:   Diagnosis Date    Bipolar affective disorder (HCC)     Cellulitis     Hyperlipidemia     Hypertension        History reviewed  No pertinent surgical history  History reviewed  No pertinent family history  I have reviewed and agree with the history as documented      E-Cigarette/Vaping    E-Cigarette Use Never User      E-Cigarette/Vaping Substances     Social History     Tobacco Use    Smoking status: Former Smoker    Smokeless tobacco: Never Used   Vaping Use    Vaping Use: Never used   Substance Use Topics    Alcohol use: Never    Drug use: Never        Review of Systems   Constitutional: Negative for chills and fever  HENT: Negative for ear pain and sore throat  Eyes: Negative for pain and visual disturbance  Respiratory: Negative for cough and shortness of breath  Cardiovascular: Negative for chest pain and palpitations  Gastrointestinal: Negative for abdominal pain and vomiting  Genitourinary: Negative for dysuria and hematuria  Musculoskeletal: Negative for arthralgias and back pain  Skin: Positive for rash and wound  Negative for color change  Neurological: Positive for weakness  Negative for seizures and syncope  All other systems reviewed and are negative  Physical Exam  ED Triage Vitals [07/19/22 1657]   Temperature Pulse Respirations Blood Pressure SpO2   98 5 °F (36 9 °C) (!) 126 22 129/74 98 %      Temp Source Heart Rate Source Patient Position - Orthostatic VS BP Location FiO2 (%)   Oral Monitor Lying Left arm --      Pain Score       6             Orthostatic Vital Signs  Vitals:    07/19/22 1823 07/19/22 2000 07/19/22 2026 07/19/22 2130   BP: 138/64 126/58 125/58 127/56   Pulse: 105 92 95 95   Patient Position - Orthostatic VS: Lying Lying Lying Lying       Physical Exam  Vitals and nursing note reviewed  Constitutional:       General: He is not in acute distress  Appearance: He is well-developed  He is obese  He is not toxic-appearing  HENT:      Head: Normocephalic and atraumatic  Right Ear: External ear normal       Left Ear: External ear normal       Nose: Nose normal       Mouth/Throat:      Pharynx: Oropharynx is clear  No oropharyngeal exudate or posterior oropharyngeal erythema  Eyes:      Extraocular Movements: Extraocular movements intact  Conjunctiva/sclera: Conjunctivae normal       Pupils: Pupils are equal, round, and reactive to light  Cardiovascular:      Rate and Rhythm: Normal rate and regular rhythm  Pulses: Normal pulses  Heart sounds: Normal heart sounds  No murmur heard  No friction rub  No gallop  Pulmonary:      Effort: Pulmonary effort is normal  No respiratory distress  Breath sounds: Normal breath sounds  No wheezing, rhonchi or rales  Abdominal:      General: Abdomen is flat  Palpations: Abdomen is soft  Tenderness: There is no abdominal tenderness  There is no guarding or rebound  Musculoskeletal:         General: Tenderness present  Normal range of motion  Cervical back: Normal range of motion  No rigidity  Right lower leg: Edema present  Left lower leg: Edema present  Skin:     General: Skin is warm and dry  Capillary Refill: Capillary refill takes less than 2 seconds  Findings: Erythema and rash present  Comments: Diffuse patchy erythematous plaques throughout body  B/l lower extremity erythematous wounds extending from ankle to above the knee  Compared to prior photos, erythema appears to now extend past the knees   Neurological:      General: No focal deficit present  Mental Status: He is alert and oriented to person, place, and time     Psychiatric:         Mood and Affect: Mood normal          ED Medications  Medications   vancomycin (VANCOCIN) 2,000 mg in sodium chloride 0 9 % 500 mL IVPB (2,000 mg Intravenous New Bag 7/19/22 2013)   cefepime (MAXIPIME) 2 g/50 mL dextrose IVPB (has no administration in time range)   busPIRone (BUSPAR) tablet 10 mg (10 mg Oral Given 7/19/22 2157)   clonazePAM (KlonoPIN) tablet 1 mg (1 mg Oral Given 7/19/22 2156)   valproic acid (DEPAKENE) capsule 500 mg (500 mg Oral Given 7/19/22 2156)   HYDROmorphone (DILAUDID) injection 1 mg (has no administration in time range)   valproic acid (DEPAKENE) capsule 1,000 mg (has no administration in time range)   loperamide (IMODIUM) capsule 2 mg (has no administration in time range)   cefepime (MAXIPIME) 2 g/50 mL dextrose IVPB (0 mg Intravenous Stopped 7/19/22 2013)   HYDROmorphone (DILAUDID) injection 1 mg (1 mg Intravenous Given 7/19/22 1907)   sodium chloride 0 9 % bolus 1,000 mL (1,000 mL Intravenous New Bag 7/19/22 1906)   loperamide (IMODIUM) capsule 2 mg (2 mg Oral Given 7/19/22 2158)       Diagnostic Studies  Results Reviewed     Procedure Component Value Units Date/Time    Manual Differential(PHLEBS Do Not Order) [[id number]]  (Abnormal) Collected: 07/19/22 1906    Lab Status: Final result Specimen: Blood from Arm, Right Updated: 07/19/22 2019     Segmented % 56 %      Bands % 3 %      Lymphocytes % 24 %      Monocytes % 14 %      Eosinophils, % 1 %      Basophils % 0 %      Myelocytes % 2 %      Absolute Neutrophils 3 89 Thousand/uL      Lymphocytes Absolute 1 58 Thousand/uL      Monocytes Absolute 0 92 Thousand/uL      Eosinophils Absolute 0 07 Thousand/uL      Basophils Absolute 0 00 Thousand/uL      Total Counted --     RBC Morphology Normal     Platelet Estimate Adequate    Lactic acid [[id number]]  (Normal) Collected: 07/19/22 1847    Lab Status: Final result Specimen: Blood from Arm, Right Updated: 07/19/22 1930     LACTIC ACID 1 7 mmol/L     Narrative:      Result may be elevated if tourniquet was used during collection  CBC and differential [[id number]]  (Abnormal) Collected: 07/19/22 1906    Lab Status: Final result Specimen: Blood from Arm, Right Updated: 07/19/22 1922     WBC 6 60 Thousand/uL      RBC 3 75 Million/uL      Hemoglobin 11 4 g/dL      Hematocrit 35 8 %      MCV 96 fL      MCH 30 4 pg      MCHC 31 8 g/dL      RDW 13 0 %      MPV 9 8 fL      Platelets 217 Thousands/uL     Narrative: This is an appended report  These results have been appended to a previously verified report      Procalcitonin [[id number]]  (Normal) Collected: 07/19/22 1830    Lab Status: Final result Specimen: Blood from Arm, Right Updated: 07/19/22 1908     Procalcitonin 0 11 ng/ml     Protime-INR [[id number]]  (Normal) Collected: 07/19/22 1830    Lab Status: Final result Specimen: Blood from Arm, Right Updated: 07/19/22 1905     Protime 13 5 seconds      INR 1 03    APTT [[id number]]  (Abnormal) Collected: 07/19/22 1830    Lab Status: Final result Specimen: Blood from Arm, Right Updated: 07/19/22 1905     PTT 22 seconds     Comprehensive metabolic panel [[id number]]  (Abnormal) Collected: 07/19/22 1830    Lab Status: Final result Specimen: Blood from Arm, Right Updated: 07/19/22 1902     Sodium 135 mmol/L      Potassium 5 4 mmol/L      Chloride 102 mmol/L      CO2 24 mmol/L      ANION GAP 9 mmol/L      BUN 21 mg/dL      Creatinine 1 69 mg/dL      Glucose 108 mg/dL      Calcium 8 0 mg/dL      Corrected Calcium 9 8 mg/dL      AST 48 U/L      ALT 11 U/L      Alkaline Phosphatase 71 U/L      Total Protein 7 3 g/dL      Albumin 1 7 g/dL      Total Bilirubin 0 82 mg/dL      eGFR 46 ml/min/1 73sq m     Narrative:      [place] guidelines for Chronic Kidney Disease (CKD):     Stage 1 with normal or high GFR (GFR > 90 mL/min/1 73 square meters)    Stage 2 Mild CKD (GFR = 60-89 mL/min/1 73 square meters)    Stage 3A Moderate CKD (GFR = 45-59 mL/min/1 73 square meters)    Stage 3B Moderate CKD (GFR = 30-44 mL/min/1 73 square meters)    Stage 4 Severe CKD (GFR = 15-29 mL/min/1 73 square meters)    Stage 5 End Stage CKD (GFR <15 mL/min/1 73 square meters)  Note: GFR calculation is accurate only with a steady state creatinine    Blood culture #1 [[id number]] Collected: 07/19/22 1830    Lab Status: In process Specimen: Blood from Arm, Right Updated: 07/19/22 1858    Blood culture #2 [[id number]] Collected: 07/19/22 1805    Lab Status:  In process Specimen: Blood from Arm, Left Updated: 07/19/22 1816    Wound culture and Gram stain [[id number]]     Lab Status: No result Specimen: Wound     UA w Reflex to Microscopic w Reflex to Culture [[id number]]     Lab Status: No result Specimen: Urine                  No orders to display         Procedures  Procedures      ED Course  ED Course as of 07/19/22 2209 Tue Jul 19, 2022 1731 ECG 12 lead  Procedure Note: EKG  Date/Time: 07/19/22 5:31 PM   Interpreted by: Vi Kaye MD  Indications / Diagnosis: sepsis w/u  ECG reviewed by me, the ED Provider: yes   The EKG demonstrates:  Rhythm: sinus tachycardia  Intervals: normal intervals  Axis: normal axis  QRS/Blocks: normal QRS  ST Changes: No acute ST Changes, no STD/TARA  T wave inversions in III now present       1845 Difficulty obtaining IV access with U/S resulting in delay in labs, abx, and fluids  Pt open to transfer to [facility] pending workup   1930 LACTIC ACID: 1 7 [id number] Spoke with [facility], they will reach out to [facility] at [facility] and get back to us   2021 Vahe Polanco for transport to [facility] under Dr Ankush Coleman med surg   2045 LASHONDA signed    2105 Pickup time 0800 tomorrow morning                             SBIRT 20yo+    Flowsheet Row Most Recent Value   SBIRT (23 yo +)    In order to provide better care to our patients, we are screening all of our patients for alcohol and drug use  Would it be okay to ask you these screening questions? No Filed at: 07/19/2022 2138                MDM  Number of Diagnoses or Management Options  Acute kidney injury ([facility] )  Bilateral cellulitis of lower leg  Weakness  Diagnosis management comments: 47 y/o M presenting with b/l leg wounds, severe pain, weakness  Most likely deconditioning from recent hospital admission  Wounds appear worse from prior images  Admission for worsening cellulitis, weakness  Pt to be transferred to [facility] due to lack of capacity at [facility]  LASHONDA signed, accepted for transfer at 0800  Home medications ordered for the night         Disposition  Final diagnoses:   Bilateral cellulitis of lower leg   Weakness   Acute kidney injury ([facility] )     Time reflects when diagnosis was documented in both MDM as applicable and the Disposition within this note     Time User Action Codes Description Comment    7/19/2022  8:31 PM Akanksha Em Add [L03 116,  L03 115] Bilateral cellulitis of lower leg     7/19/2022  8:31 PM Chelsy Cozier Add [R53 1] Weakness     7/19/2022  8:31 PM Chelsy Cozier Add [N17 0] Acute kidney injury ([place]) with acute tubular necrosis (ATN) ([facility] )     7/19/2022  8:31 PM Chelsy Cozier Remove [N17 0] Acute kidney injury ([place]) with acute tubular necrosis (ATN) ([facility] )     7/19/2022  8:32 PM Chelsy Cozier Add [N17 9] Acute kidney injury [facility])       ED Disposition     ED Disposition   Transfer to Another Facility-In Network    Condition   --    Date/Time   Tue Jul 19, 2022  8:31 PM    Comment   Taina Kapadia should be transferred out to [facility]             MD Mark Mcmahon Most Recent Value   Patient Condition The patient has been stabilized such that within reasonable medical probability, no material deterioration of the patient condition or the condition of the unborn child(james) is likely to result from the transfer   Reason for Transfer No bed available at level of patient's needs   Benefits of Transfer Specialized equipment and/or services available at the receiving facility (Include comment)________________________   Risks of Transfer Potential deterioration of medical condition, Loss of IV, Increased discomfort during transfer, Possible worsening of condition or death during transfer   Accepting Physician Dr Ariel Lambert Name, Lashaun Sanchez   Sending MD Dr Panda An   Provider Certification General risk, such as traffic hazards, adverse weather conditions, rough terrain or turbulence, possible failure of equipment (including vehicle or aircraft), or consequences of actions of persons outside the control of the transport personnel, Unanticipated needs of medical equipment and personnel during transport, Risk of worsening condition, The possibility of a transport vehicle being unavailable      RN Documentation    Shravan Alvares NameLashaun Follow-up Information    None         Patient's Medications   Discharge Prescriptions    No medications on file     No discharge procedures on file  PDMP Review       Value Time User    PDMP Reviewed  Yes 7/14/2022  8:46 PM Edith Donaldson PA-C           ED Provider  Attending physically available and evaluated Roann Tsai  I managed the patient along with the ED Attending      Electronically Signed by         Stephane Zuñiga MD  07/19/22 6555

## 2022-07-20 ENCOUNTER — HOSPITAL ENCOUNTER (INPATIENT)
Facility: HOSPITAL | Age: 47
LOS: 7 days | Discharge: NURSING, REHAB OR LONG-TERM CARE | DRG: 605 | End: 2022-07-27
Attending: INTERNAL MEDICINE | Admitting: INTERNAL MEDICINE
Payer: MEDICARE

## 2022-07-20 DIAGNOSIS — M79.606 PAIN AND SWELLING OF LOWER EXTREMITY, UNSPECIFIED LATERALITY: Primary | ICD-10-CM

## 2022-07-20 DIAGNOSIS — R21 RASH OF BODY: ICD-10-CM

## 2022-07-20 DIAGNOSIS — B37.2 CUTANEOUS CANDIDIASIS: ICD-10-CM

## 2022-07-20 DIAGNOSIS — M79.89 PAIN AND SWELLING OF LOWER EXTREMITY, UNSPECIFIED LATERALITY: Primary | ICD-10-CM

## 2022-07-20 DIAGNOSIS — I87.2 VENOUS INSUFFICIENCY OF LOWER EXTREMITY, UNSPECIFIED LATERALITY: ICD-10-CM

## 2022-07-20 DIAGNOSIS — S81.809D MULTIPLE OPEN WOUNDS OF LOWER LEG, UNSPECIFIED LATERALITY, SUBSEQUENT ENCOUNTER: ICD-10-CM

## 2022-07-20 LAB
ANION GAP SERPL CALCULATED.3IONS-SCNC: 4 MMOL/L (ref 5–14)
ATRIAL RATE: 122 BPM
BACTERIA BLD CULT: NORMAL
BUN SERPL-MCNC: 24 MG/DL (ref 5–25)
CALCIUM SERPL-MCNC: 7.6 MG/DL (ref 8.4–10.2)
CALPROTECTIN STL-MCNT: 332 UG/G (ref 0–120)
CHLORIDE SERPL-SCNC: 106 MMOL/L (ref 96–108)
CO2 SERPL-SCNC: 26 MMOL/L (ref 21–32)
CREAT SERPL-MCNC: 1.38 MG/DL (ref 0.7–1.5)
GFR SERPL CREATININE-BSD FRML MDRD: 60 ML/MIN/1.73SQ M
GLUCOSE SERPL-MCNC: 108 MG/DL (ref 70–99)
GLUCOSE SERPL-MCNC: 113 MG/DL (ref 65–140)
P AXIS: 45 DEGREES
POTASSIUM SERPL-SCNC: 3.7 MMOL/L (ref 3.5–5.3)
PR INTERVAL: 140 MS
QRS AXIS: 41 DEGREES
QRSD INTERVAL: 82 MS
QT INTERVAL: 286 MS
QTC INTERVAL: 407 MS
SODIUM SERPL-SCNC: 136 MMOL/L (ref 135–147)
T WAVE AXIS: -1 DEGREES
VENTRICULAR RATE: 122 BPM

## 2022-07-20 PROCEDURE — 99223 1ST HOSP IP/OBS HIGH 75: CPT | Performed by: INTERNAL MEDICINE

## 2022-07-20 PROCEDURE — 82948 REAGENT STRIP/BLOOD GLUCOSE: CPT

## 2022-07-20 PROCEDURE — 80048 BASIC METABOLIC PNL TOTAL CA: CPT | Performed by: PHYSICIAN ASSISTANT

## 2022-07-20 PROCEDURE — 93010 ELECTROCARDIOGRAM REPORT: CPT | Performed by: INTERNAL MEDICINE

## 2022-07-20 RX ORDER — DIPHENHYDRAMINE HCL 25 MG
25 TABLET ORAL EVERY 6 HOURS PRN
Status: DISCONTINUED | OUTPATIENT
Start: 2022-07-20 | End: 2022-07-27 | Stop reason: HOSPADM

## 2022-07-20 RX ORDER — PANTOPRAZOLE SODIUM 40 MG/1
40 TABLET, DELAYED RELEASE ORAL
Status: DISCONTINUED | OUTPATIENT
Start: 2022-07-20 | End: 2022-07-27 | Stop reason: HOSPADM

## 2022-07-20 RX ORDER — ACETAMINOPHEN 325 MG/1
650 TABLET ORAL EVERY 4 HOURS PRN
Status: DISCONTINUED | OUTPATIENT
Start: 2022-07-20 | End: 2022-07-27 | Stop reason: HOSPADM

## 2022-07-20 RX ORDER — HEPARIN SODIUM 5000 [USP'U]/ML
7500 INJECTION, SOLUTION INTRAVENOUS; SUBCUTANEOUS EVERY 8 HOURS SCHEDULED
Status: DISCONTINUED | OUTPATIENT
Start: 2022-07-20 | End: 2022-07-27 | Stop reason: HOSPADM

## 2022-07-20 RX ORDER — CEFEPIME HYDROCHLORIDE 2 G/1
2000 INJECTION, POWDER, FOR SOLUTION INTRAVENOUS EVERY 12 HOURS
Status: DISCONTINUED | OUTPATIENT
Start: 2022-07-20 | End: 2022-07-20

## 2022-07-20 RX ORDER — LEVOTHYROXINE SODIUM 0.03 MG/1
25 TABLET ORAL
Status: DISCONTINUED | OUTPATIENT
Start: 2022-07-20 | End: 2022-07-27 | Stop reason: HOSPADM

## 2022-07-20 RX ORDER — PRAVASTATIN SODIUM 40 MG
80 TABLET ORAL
Status: DISCONTINUED | OUTPATIENT
Start: 2022-07-20 | End: 2022-07-27 | Stop reason: HOSPADM

## 2022-07-20 RX ORDER — MIRTAZAPINE 7.5 MG/1
7.5 TABLET, FILM COATED ORAL
Status: DISCONTINUED | OUTPATIENT
Start: 2022-07-20 | End: 2022-07-27 | Stop reason: HOSPADM

## 2022-07-20 RX ORDER — LOPERAMIDE HYDROCHLORIDE 2 MG/1
4 CAPSULE ORAL 3 TIMES DAILY PRN
Status: DISCONTINUED | OUTPATIENT
Start: 2022-07-20 | End: 2022-07-27 | Stop reason: HOSPADM

## 2022-07-20 RX ORDER — CEFEPIME HYDROCHLORIDE 2 G/50ML
2000 INJECTION, SOLUTION INTRAVENOUS EVERY 12 HOURS
Status: DISCONTINUED | OUTPATIENT
Start: 2022-07-20 | End: 2022-07-20

## 2022-07-20 RX ORDER — METOPROLOL TARTRATE 50 MG/1
50 TABLET, FILM COATED ORAL 2 TIMES DAILY
Status: DISCONTINUED | OUTPATIENT
Start: 2022-07-20 | End: 2022-07-27 | Stop reason: HOSPADM

## 2022-07-20 RX ORDER — DIVALPROEX SODIUM 500 MG/1
1000 TABLET, DELAYED RELEASE ORAL DAILY
Status: DISCONTINUED | OUTPATIENT
Start: 2022-07-20 | End: 2022-07-27 | Stop reason: HOSPADM

## 2022-07-20 RX ORDER — PALIPERIDONE 6 MG/1
6 TABLET, EXTENDED RELEASE ORAL DAILY
Status: DISCONTINUED | OUTPATIENT
Start: 2022-07-20 | End: 2022-07-27 | Stop reason: HOSPADM

## 2022-07-20 RX ORDER — BUSPIRONE HYDROCHLORIDE 10 MG/1
10 TABLET ORAL 3 TIMES DAILY
Status: DISCONTINUED | OUTPATIENT
Start: 2022-07-20 | End: 2022-07-27 | Stop reason: HOSPADM

## 2022-07-20 RX ORDER — CLONAZEPAM 1 MG/1
1 TABLET ORAL 3 TIMES DAILY
Status: DISCONTINUED | OUTPATIENT
Start: 2022-07-20 | End: 2022-07-27 | Stop reason: HOSPADM

## 2022-07-20 RX ORDER — GABAPENTIN 300 MG/1
300 CAPSULE ORAL 3 TIMES DAILY
Status: DISCONTINUED | OUTPATIENT
Start: 2022-07-20 | End: 2022-07-27 | Stop reason: HOSPADM

## 2022-07-20 RX ORDER — MELATONIN
2000 DAILY
Status: DISCONTINUED | OUTPATIENT
Start: 2022-07-20 | End: 2022-07-27 | Stop reason: HOSPADM

## 2022-07-20 RX ORDER — FUROSEMIDE 40 MG/1
40 TABLET ORAL DAILY
Status: DISCONTINUED | OUTPATIENT
Start: 2022-07-20 | End: 2022-07-27 | Stop reason: HOSPADM

## 2022-07-20 RX ORDER — TRAZODONE HYDROCHLORIDE 50 MG/1
50 TABLET ORAL
Status: DISCONTINUED | OUTPATIENT
Start: 2022-07-20 | End: 2022-07-27 | Stop reason: HOSPADM

## 2022-07-20 RX ORDER — DIVALPROEX SODIUM 500 MG/1
1500 TABLET, DELAYED RELEASE ORAL
Status: DISCONTINUED | OUTPATIENT
Start: 2022-07-20 | End: 2022-07-27 | Stop reason: HOSPADM

## 2022-07-20 RX ORDER — OXYCODONE HYDROCHLORIDE 10 MG/1
10 TABLET ORAL EVERY 4 HOURS PRN
Status: DISCONTINUED | OUTPATIENT
Start: 2022-07-20 | End: 2022-07-27 | Stop reason: HOSPADM

## 2022-07-20 RX ORDER — NYSTATIN 100000 [USP'U]/G
POWDER TOPICAL 2 TIMES DAILY
Status: DISCONTINUED | OUTPATIENT
Start: 2022-07-20 | End: 2022-07-27 | Stop reason: HOSPADM

## 2022-07-20 RX ORDER — CITALOPRAM 20 MG/1
40 TABLET ORAL DAILY
Status: DISCONTINUED | OUTPATIENT
Start: 2022-07-20 | End: 2022-07-27 | Stop reason: HOSPADM

## 2022-07-20 RX ORDER — GABAPENTIN 100 MG/1
100 CAPSULE ORAL 3 TIMES DAILY
Status: DISCONTINUED | OUTPATIENT
Start: 2022-07-20 | End: 2022-07-20

## 2022-07-20 RX ORDER — HYDROMORPHONE HCL/PF 1 MG/ML
1 SYRINGE (ML) INJECTION EVERY 4 HOURS PRN
Status: DISCONTINUED | OUTPATIENT
Start: 2022-07-20 | End: 2022-07-27 | Stop reason: HOSPADM

## 2022-07-20 RX ORDER — ONDANSETRON 2 MG/ML
4 INJECTION INTRAMUSCULAR; INTRAVENOUS EVERY 6 HOURS PRN
Status: DISCONTINUED | OUTPATIENT
Start: 2022-07-20 | End: 2022-07-27 | Stop reason: HOSPADM

## 2022-07-20 RX ADMIN — Medication 2000 UNITS: at 08:30

## 2022-07-20 RX ADMIN — GABAPENTIN 300 MG: 300 CAPSULE ORAL at 16:39

## 2022-07-20 RX ADMIN — CEFEPIME HYDROCHLORIDE 2000 MG: 2 INJECTION, SOLUTION INTRAVENOUS at 09:09

## 2022-07-20 RX ADMIN — TRAZODONE HYDROCHLORIDE 50 MG: 50 TABLET ORAL at 22:05

## 2022-07-20 RX ADMIN — GABAPENTIN 300 MG: 300 CAPSULE ORAL at 08:36

## 2022-07-20 RX ADMIN — NYSTATIN: 100000 POWDER TOPICAL at 17:18

## 2022-07-20 RX ADMIN — LOPERAMIDE HYDROCHLORIDE 4 MG: 2 CAPSULE ORAL at 20:10

## 2022-07-20 RX ADMIN — BUSPIRONE HYDROCHLORIDE 10 MG: 10 TABLET ORAL at 20:10

## 2022-07-20 RX ADMIN — BUSPIRONE HYDROCHLORIDE 10 MG: 10 TABLET ORAL at 08:31

## 2022-07-20 RX ADMIN — GABAPENTIN 300 MG: 300 CAPSULE ORAL at 20:10

## 2022-07-20 RX ADMIN — CLONAZEPAM 1 MG: 1 TABLET ORAL at 20:10

## 2022-07-20 RX ADMIN — HEPARIN SODIUM 7500 UNITS: 5000 INJECTION INTRAVENOUS; SUBCUTANEOUS at 01:14

## 2022-07-20 RX ADMIN — HEPARIN SODIUM 7500 UNITS: 5000 INJECTION INTRAVENOUS; SUBCUTANEOUS at 14:45

## 2022-07-20 RX ADMIN — SILVER SULFADIAZINE: 10 CREAM TOPICAL at 11:11

## 2022-07-20 RX ADMIN — PANTOPRAZOLE SODIUM 40 MG: 40 TABLET, DELAYED RELEASE ORAL at 05:29

## 2022-07-20 RX ADMIN — OXYCODONE HYDROCHLORIDE 15 MG: 10 TABLET ORAL at 20:09

## 2022-07-20 RX ADMIN — CLONAZEPAM 1 MG: 1 TABLET ORAL at 16:39

## 2022-07-20 RX ADMIN — FUROSEMIDE 40 MG: 40 TABLET ORAL at 08:31

## 2022-07-20 RX ADMIN — MIRTAZAPINE 7.5 MG: 7.5 TABLET, FILM COATED ORAL at 01:14

## 2022-07-20 RX ADMIN — PRAVASTATIN SODIUM 80 MG: 40 TABLET ORAL at 16:39

## 2022-07-20 RX ADMIN — METOPROLOL TARTRATE 50 MG: 50 TABLET, FILM COATED ORAL at 17:19

## 2022-07-20 RX ADMIN — TRAZODONE HYDROCHLORIDE 50 MG: 50 TABLET ORAL at 01:14

## 2022-07-20 RX ADMIN — HEPARIN SODIUM 7500 UNITS: 5000 INJECTION INTRAVENOUS; SUBCUTANEOUS at 05:28

## 2022-07-20 RX ADMIN — OXYCODONE HYDROCHLORIDE 10 MG: 10 TABLET ORAL at 14:44

## 2022-07-20 RX ADMIN — METOPROLOL TARTRATE 50 MG: 50 TABLET, FILM COATED ORAL at 08:31

## 2022-07-20 RX ADMIN — DIVALPROEX SODIUM 1500 MG: 500 TABLET, DELAYED RELEASE ORAL at 22:05

## 2022-07-20 RX ADMIN — GABAPENTIN 300 MG: 300 CAPSULE ORAL at 01:14

## 2022-07-20 RX ADMIN — DIVALPROEX SODIUM 1000 MG: 500 TABLET, DELAYED RELEASE ORAL at 08:30

## 2022-07-20 RX ADMIN — CLONAZEPAM 1 MG: 1 TABLET ORAL at 08:31

## 2022-07-20 RX ADMIN — LEVOTHYROXINE SODIUM 25 MCG: 25 TABLET ORAL at 05:29

## 2022-07-20 RX ADMIN — DIPHENHYDRAMINE HCL 25 MG: 25 TABLET ORAL at 09:16

## 2022-07-20 RX ADMIN — DIPHENHYDRAMINE HCL 25 MG: 25 TABLET ORAL at 20:09

## 2022-07-20 RX ADMIN — OXYCODONE HYDROCHLORIDE 10 MG: 10 TABLET ORAL at 05:29

## 2022-07-20 RX ADMIN — HEPARIN SODIUM 7500 UNITS: 5000 INJECTION INTRAVENOUS; SUBCUTANEOUS at 22:05

## 2022-07-20 RX ADMIN — MIRTAZAPINE 7.5 MG: 7.5 TABLET, FILM COATED ORAL at 22:05

## 2022-07-20 RX ADMIN — OXYCODONE HYDROCHLORIDE 15 MG: 10 TABLET ORAL at 01:14

## 2022-07-20 RX ADMIN — NYSTATIN: 100000 POWDER TOPICAL at 08:29

## 2022-07-20 RX ADMIN — CITALOPRAM HYDROBROMIDE 40 MG: 20 TABLET ORAL at 08:30

## 2022-07-20 RX ADMIN — BUSPIRONE HYDROCHLORIDE 10 MG: 10 TABLET ORAL at 16:39

## 2022-07-20 RX ADMIN — PALIPERIDONE 6 MG: 6 TABLET, EXTENDED RELEASE ORAL at 08:36

## 2022-07-20 RX ADMIN — HYDROMORPHONE HYDROCHLORIDE 1 MG: 1 INJECTION, SOLUTION INTRAMUSCULAR; INTRAVENOUS; SUBCUTANEOUS at 07:36

## 2022-07-20 NOTE — PLAN OF CARE
Problem: PAIN - ADULT  Goal: Verbalizes/displays adequate comfort level or baseline comfort level  Description: Interventions:  - Encourage patient to monitor pain and request assistance  - Assess pain using appropriate pain scale  - Administer analgesics based on type and severity of pain and evaluate response  - Implement non-pharmacological measures as appropriate and evaluate response  - Consider cultural and social influences on pain and pain management  - Notify physician/advanced practitioner if interventions unsuccessful or patient reports new pain  Outcome: Progressing     Problem: INFECTION - ADULT  Goal: Absence or prevention of progression during hospitalization  Description: INTERVENTIONS:  - Assess and monitor for signs and symptoms of infection  - Monitor lab/diagnostic results  - Monitor all insertion sites, i e  indwelling lines, tubes, and drains  - Monitor endotracheal if appropriate and nasal secretions for changes in amount and color  - [place] appropriate cooling/warming therapies per order  - Administer medications as ordered  - Instruct and encourage patient and family to use good hand hygiene technique  - Identify and instruct in appropriate isolation precautions for identified infection/condition  Outcome: Progressing    Problem: Prexisting or High Potential for Compromised Skin Integrity  Goal: Skin integrity is maintained or improved  Description: INTERVENTIONS:  - Identify patients at risk for skin breakdown  - Assess and monitor skin integrity  - Assess and monitor nutrition and hydration status  - Monitor labs   - Assess for incontinence   - Turn and reposition patient  - Assist with mobility/ambulation  - Relieve pressure over bony prominences  - Avoid friction and shearing  - Provide appropriate hygiene as needed including keeping skin clean and dry  - Evaluate need for skin moisturizer/barrier cream  - Collaborate with interdisciplinary team   - Patient/family teaching  - Consider wound care consult   Outcome: Progressing

## 2022-07-20 NOTE — CONSULTS
Consult Note- Podiatry   Ryann Aragon 48 y o  male MRN: [id number]  Unit/Bed#: 7T [facility] 714-01 Encounter: [id number]    Assessment/Plan     Assessment:  1  B/l LE venous stasis dermatitis with open wounds  2  Lymphedema      Plan:  - -pt eval and managed    - Number and complexity of problems addressed:  1 undiagnosed new problem with uncertain prognosis   as shown    - Amount/complexity of data reviewed and analyzed:     Category 1: prior patient notes were analyzed today before evaluating and managing patient  All PMH were discussed with pt today  - Risk of complications: moderate risk of morbidity from additional testing or treatment involved with this patient, which includes but not limited to:    - discussed anatomy, condition, treatment plan and options  They were instructed on proper foot care  The patient was seen today for greater than total of  45-59 minutes     This is total time spent today involving both face-to-face time and non face-to-face time  This time spent includes  reviewing their past medical history  , performing a medically appropriate examination and evaluation of the patient, counseling and educating the patient,  documenting all findings in EMR, and independently interpreting results and communicating results with  patient     and discussing their condition and treatment options, risks, and potential complications  I have discussed the findings of this examination with the patient  The discussion included a complete verbal explanation of the examination results, diagnosis and planned treatment(s)  A schedule for future care needs was explained  The patient has verbalized the understanding of these instructions at this time  If any questions should arise after returning home I have encouraged the patient to feel free to call the office  · No acute intervention  Patient's stasis changes and open wounds appear stable   Less likely that the erythema is cellulitic given bilateral appearance that is unchanged from last podiatry evaluation in June and lack of SIRS criteria  · Local wound care consisting of dermagran to right leg plaque, calamine lotion, compressive dressing  Wound care instructions placed  · Discussed the venous stasis and lymphedema in detail  Stressed the importance of compression, elevation, and outpatient follow-up on proper edema control and to prevent ongoing progression of stasis changes  · Elevation and offloading on green foam wedges or pillows when non-ambulatory  · Rest of care per primary team     - continue home nursing outpt  - follow up with me outpt in wound care center  - All questions and concerns addressed  - Podiatry signing off, thank you for the consult  History of Present Illness     HPI:  Natanael Monahan is a 48 y o  male who presents with b/l leg wounds  I was seeing patient during his admission at [facility]  Was supposed to have outpt appt with me today in the wound care center  He does have home nursing come 3x week to assist with dressing changes and is supposed to see Dr Arturo Beasley in wound care at [facility] on 7/19/22  He was told in the past that he has lymphedema but was not prescribed compression or pumps  He does not use compression at home due to difficulty applying wraps himself  He has noticed progressive changes to the wounds appearance and the amount of skin changes/edema over the past few months  He denies any purulent drainage  He denies any recent acute changes in LE pain  He denies nausea, vomiting, fever chills, cough, SOB, chest pain  Inpatient consult to Podiatry  Consult performed by: Diana Gar DPM  Consult ordered by: Keesha Rudd PA-C        Review of Systems   Constitutional: Negative  HENT: Negative  Eyes: Negative  Respiratory: Negative  Cardiovascular: Negative  Gastrointestinal: Negative  Musculoskeletal: Negative   Skin: wounds b/l legs   Neurological: Negative  Historical Information   Past Medical History:   Diagnosis Date    [facility] affective disorder [facility] [address] )     Cellulitis     Crohn's disease of large intestine ([facility] [address] )     Hyperlipidemia     Hypertension     Scarlet fever      Past Surgical History:   Procedure Laterality Date    ABDOMINAL SURGERY      abdominal mass    FOOT SURGERY      SKIN SURGERY      mrsa abcess removed    TONSILLECTOMY       Social History   Social History     Substance and Sexual Activity   Alcohol Use Never     Social History     Substance and Sexual Activity   Drug Use Never     Social History     Tobacco Use   Smoking Status Former Smoker   Smokeless Tobacco Never Used     Family History:   Family History   Problem Relation Age of Onset    Heart disease Mother     Failure to thrive Father        Meds/Allergies   Medications Prior to Admission   Medication    busPIRone (BUSPAR) 10 mg tablet    cefadroxil (DURICEF) 500 mg capsule    Cholecalciferol (Vitamin D3) 50 MCG (2000 UT) capsule    citalopram (CeleXA) 40 mg tablet    clonazePAM (KlonoPIN) 1 mg tablet    divalproex sodium (DEPAKOTE) 500 mg EC tablet    divalproex sodium (DEPAKOTE) 500 mg EC tablet    [START ON 7/23/2022] fluconazole (DIFLUCAN) 150 mg tablet    furosemide (LASIX) 40 mg tablet    gabapentin (NEURONTIN) 100 mg capsule    levothyroxine 25 mcg tablet    loperamide (IMODIUM) 2 mg capsule    metoprolol tartrate (LOPRESSOR) 50 mg tablet    mirtazapine (REMERON) 7 5 MG tablet    nystatin (MYCOSTATIN) powder    paliperidone (INVEGA) 6 MG 24 hr tablet    pantoprazole (PROTONIX) 40 mg tablet    rosuvastatin (CRESTOR) 10 MG tablet    traZODone (DESYREL) 50 mg tablet     Allergies   Allergen Reactions    Mesalamine GI Intolerance     Pt has had severe diarrhea and abdominal pain on mESALAMINE       Objective   First Vitals:   Blood Pressure: 142/80 (07/20/22 0031)  Pulse: 94 (07/20/22 0031)  Temperature: 97 5 °F (36 4 °C) (07/20/22 0031)  Temp Source: Temporal (07/20/22 0031)  Respirations: 19 (07/20/22 0031)  Height: 6' 4" (193 cm) (07/20/22 0031)  Weight - Scale: (!) 210 kg (463 lb 3 oz) (07/20/22 0031)  SpO2: 95 % (07/20/22 0031)    Current Vitals:   Blood Pressure: 122/80 (07/20/22 0700)  Pulse: 84 (07/20/22 0700)  Temperature: (!) 96 6 °F (35 9 °C) (07/20/22 0700)  Temp Source: Temporal (07/20/22 0700)  Respirations: 18 (07/20/22 0700)  Height: 6' 4" (193 cm) (07/20/22 0031)  Weight - Scale: (!) 210 kg (463 lb 3 oz) (07/20/22 0031)  SpO2: 93 % (07/20/22 0700)    /80 (BP Location: Right arm)   Pulse 84   Temp (!) 96 6 °F (35 9 °C) (Temporal)   Resp 18   Ht 6' 4" (1 93 m)   Wt (!) 210 kg (463 lb 3 oz)   SpO2 93%   BMI 56 38 kg/m²     General Appearance:    Alert, cooperative, no distress   Head:    Normocephalic, without obvious abnormality, atraumatic   Eyes:    PERRL, conjunctiva/corneas clear, EOM's intact            Nose:   Moist mucous membranes, no drainage or sinus tenderness   Throat:   No tenderness, no exudates   Neck:   Supple, symmetrical, trachea midline, no JVD   Back:     Symmetric, no CVA tenderness   Lungs:     Clear to auscultation bilaterally, respirations unlabored   Chest wall:    No tenderness or deformity   Heart:    Regular rate and rhythm, S1 and S2 normal, no murmur, rub   or gallop   Abdomen:     Soft, non-tender, bowel sounds active all four quadrants,     no masses, no organomegaly     Extremities:   MMT is 4/5 to all compartments of the LE, +4/4 edema B/L, Digital ROM is intact,    Pulses:   R DP is +1/4, R PT is +1/4, L DP is +1/4, L PT is +1/4, CFT< 3sec to all digits  Thin/shiny skin noted to the B/L LE, pigmentary changes to B/L LE  Absent digital hair growth b/l  Nail thickening b/l  Skin:   Scaly, shiny, erythematous changes to the bilateral LE consistent with chronic stasis dermatitis unchanged from pictures in 06/2022   On the right anterior leg there is a large papillomatous plaque that is well-adhered without any underlying fluctuance or bogginess       Vesicular rash to the arms, thighs, trunk, neck and stomach  Neurologic:   CNII-XII intact  Normal strength, sensation and reflexes       Throughout  Gross sensation is intact   Protective sensation is diminished       Lab Results:   Admission on 07/20/2022   Component Date Value    Sodium 07/20/2022 136     Potassium 07/20/2022 3 7     Chloride 07/20/2022 106     CO2 07/20/2022 26     ANION GAP 07/20/2022 4 (A)    BUN 07/20/2022 24     Creatinine 07/20/2022 1 38     Glucose 07/20/2022 108 (A)    Calcium 07/20/2022 7 6 (A)    eGFR 07/20/2022 60     POC Glucose 07/20/2022 113    Admission on 07/19/2022, Discharged on 07/20/2022   Component Date Value    Ventricular Rate 07/19/2022 122     Atrial Rate 07/19/2022 122     ME Interval 07/19/2022 140     QRSD Interval 07/19/2022 82     QT Interval 07/19/2022 286     QTC Interval 07/19/2022 407     P Axis 07/19/2022 45     QRS Axis 07/19/2022 41     T Wave Axis 07/19/2022 -1     WBC 07/19/2022 6 60     RBC 07/19/2022 3 75 (A)    Hemoglobin 07/19/2022 11 4 (A)    Hematocrit 07/19/2022 35 8 (A)    MCV 07/19/2022 96     MCH 07/19/2022 30 4     MCHC 07/19/2022 31 8     RDW 07/19/2022 13 0     MPV 07/19/2022 9 8     Platelets 23/65/4482 200     Sodium 07/19/2022 135     Potassium 07/19/2022 5 4 (A)    Chloride 07/19/2022 102     CO2 07/19/2022 24     ANION GAP 07/19/2022 9     BUN 07/19/2022 21     Creatinine 07/19/2022 1 69 (A)    Glucose 07/19/2022 108     Calcium 07/19/2022 8 0 (A)    Corrected Calcium 07/19/2022 9 8     AST 07/19/2022 48 (A)    ALT 07/19/2022 11 (A)    Alkaline Phosphatase 07/19/2022 71     Total Protein 07/19/2022 7 3     Albumin 07/19/2022 1 7 (A)    Total Bilirubin 07/19/2022 0 82     eGFR 07/19/2022 46     LACTIC ACID 07/19/2022 1 7     Procalcitonin 07/19/2022 0 11     Protime 07/19/2022 13 5     INR 07/19/2022 1 03     PTT 07/19/2022 22 (A)  Blood Culture 07/19/2022 Received in Microbiology Lab  Culture in Progress   Blood Culture 07/19/2022 Received in Microbiology Lab  Culture in Progress   Segmented % 07/19/2022 56     Bands % 07/19/2022 3     Lymphocytes % 07/19/2022 24     Monocytes % 07/19/2022 14 (A)    Eosinophils, % 07/19/2022 1     Basophils % 07/19/2022 0     Myelocytes % 07/19/2022 2 (A)    Absolute Neutrophils 07/19/2022 3 89     Lymphocytes Absolute 07/19/2022 1 58     Monocytes Absolute 07/19/2022 0 92     Eosinophils Absolute 07/19/2022 0 07     Basophils Absolute 07/19/2022 0 00     RBC Morphology 07/19/2022 Normal     Platelet Estimate 62/67/0840 Adequate      Imaging: I have personally reviewed pertinent films in PACS  EKG, Pathology, and Other Studies: I have personally reviewed pertinent reports        Code Status: Level 1 - Full Code  Advance Directive and Living Will:      Power of :    POLST:

## 2022-07-20 NOTE — WOUND OSTOMY CARE
Consult Note - Wound   Natanael Flood 48 y o  male MRN: [id number]  Unit/Bed#: 7T [facility] 714-01 Encounter: [id number]        History and Present Illness:  History of longstanding bilateral lower extremity lymphedema and wounds  Recently admitted 7/14-7/18  Pt was to follow up with Podiatry,wound care and VNA  Pt BMI 56 38  Patient was seen this am by Podiatry and recommendations for lower extremity wounds  Diffuse cutaneous candidiiasis and intertrigo  Patient is receiving Diflucan 150mg weekly for 4 weeks which was started at [address] [employer] next dose due 7/23/22  Assessment Findings:   1)Diffuse cutaneous candidiasis and intertrigo to bilateral groin, abdomen, pannus chest and back  Rash is decreased is ruddiness in comparison with photo of 7/15  Areas are dry, intact without odor or skin loss  2)Bilateral lower extremities dressings intact  Followed by Podiatry  Wounds:               Wound 07/20/22 Groin (Active)   Wound Image   07/20/22 0928   Wound Description Dry; Intact; Beefy red 07/20/22 0928   Wound Length (cm) 14 cm 07/20/22 0928   Wound Width (cm) 28 cm 07/20/22 0928   Wound Surface Area (cm^2) 392 cm^2 07/20/22 0928   Drainage Amount None 07/20/22 0928   Treatments Other (Comment) 07/20/22 0928       Wound 07/20/22 Abdomen Anterior (Active)   Wound Image   07/20/22 0929   Wound Description Clean;Dry; Intact;Fragile;Pink 07/20/22 0929   Wound Length (cm) 18 cm 07/20/22 0929   Wound Width (cm) 25 cm 07/20/22 0929   Wound Surface Area (cm^2) 450 cm^2 07/20/22 0929       Wound 07/20/22 Coccyx (Active)   Wound Image   07/20/22 0930   Wound Description Clean;Dry; Intact; Beefy red;Brown 07/20/22 0930   Wound Care Plan:   1-Turn/reposition every 2 hours while in bed and weight shift frequently while in chair for pressure re-distribution on skin  2-Elevate lower extremities as often as possible for edema management  Float heels off of bed/chair surface to offload pressure    3-Bariatric offloading air cushion in chair when out of bed  4-Moisturize skin daily with skin nourishing cream   5-Abdominal, neck, groin, gluteal folds--Cleanse skin folds with soap and water, pat dry well  Dust with nystatin powder twice daily  6-Lower extremities per podiatry team   7-Consider bariatric bed  Patient should be discharged with VNA and follow-up at the wound center on discharge      Erich Blackmon

## 2022-07-20 NOTE — ASSESSMENT & PLAN NOTE
· Patient with increased weakness, was recently hospitalized for 4 days  · PT/OT eval for d/c needs  · CM for rehab   · Patient agreeable for rehab

## 2022-07-20 NOTE — ASSESSMENT & PLAN NOTE
· Patient with longstanding history of bilateral lower extremity lymphedema and wounds    · With multiple admissions to [facility], sacral her, [facility]  · Patient was admitted 7/14-7/18 secondary to wounds of the lower extremities and was receiving IV cefepime and restarted in the emergency room (he was discharged home on cefadroxil for 5 days)  · He was to follow up with Podiatry, Wound care and VNA  · Continue abx and consider Podiatry consult if no improvement

## 2022-07-20 NOTE — H&P
[facility]  H&P- Pankaj Bocanegra 1975, 48 y o  male MRN: [id number]  Unit/Bed#: 7T [facility] 714-01 Encounter: [id number]  Primary Care Provider: Marcel Spence DO   Date and time admitted to hospital: 7/20/2022 12:14 AM    * Multiple open wounds of lower leg  Assessment & Plan  · Patient with longstanding history of bilateral lower extremity lymphedema and wounds    · With multiple admissions to [facility], [facility], [facility], [facility]  · Patient was admitted 7/14-7/18 secondary to wounds of the lower extremities and was receiving IV cefepime and restarted in the emergency room (he was discharged home on cefadroxil for 5 days)  · He was to follow up with Podiatry, Wound care and VNA  · Continue abx and consider Podiatry consult if no improvement    Weakness  Assessment & Plan  · Patient with increased weakness, was recently hospitalized for 4 days  · PT/OT eval for d/c needs  · CM for rehab   · Patient agreeable for rehab    GERMANIA (acute kidney injury) ([facility] )  Assessment & Plan  · Baseline creatinine 0 8-1 1  · Lasix was continued the hydrochlorothiazide was on hold at discharge for recent hospital stay [facility] on 07/18  · Had 1 L IV fluids at [facility] ED - will order AM BMP   · Monitor labs  · Consider nephrology consult    Cutaneous candidiasis  Assessment & Plan  · Patient diagnosed with diffuse cutaneous candidiasis and intertrigo  · Seen by wound care  · Recommended nystatin powder  · Diflucan 150 mg weekly for 4 weeks was started at [facility], next dose due 7/23    ALESSANDRA on CPAP  Assessment & Plan  · Patient declined cpap with nurse when admitting him    Gastroesophageal reflux disease without esophagitis  Assessment & Plan  · Continue PPI    Hypothyroidism  Assessment & Plan  · Continue levothyroxine    Morbid obesity ([facility] )  Assessment & Plan  · BMI greater than 50  · Healthy diet recommended    Bipolar disorder [facility])  Assessment & Plan  · Continue home BuSpar, Celexa, Depakote, Remeron, Invega    Crohn's disease ([facility] )  Assessment & Plan  · Patient with recent antibiotic use, had negative stool studies collected on 07/16  · Patient with episode of incontinent stool on 07/19, Consider GI consult if diarrhea persists    Primary hypertension  Assessment & Plan  · Continue metoprolol and Lasix  · Hydrochlorothiazide on hold  · Monitor blood pressure    VTE Pharmacologic Prophylaxis: VTE Score: 5 High Risk (Score >/= 5) - Pharmacological DVT Prophylaxis Ordered: heparin  Code Status:  Full code  Discussion with patient    Anticipated Length of Stay: Patient will be admitted on an inpatient basis with an anticipated length of stay of greater than 2 midnights secondary to IV abx, d/c planning  Chief Complaint: leg wounds, weakness, incontinent stool    History of Present Illness:  Ryann Aragon is a 48 y o  male with a PMH of bipolar disorder, hypertension, morbid obesity, hypothyroidism, lymphedema with chronic lower extremity wounds who presents with worsening leg wounds, weakness and incontinent stool  Patient was hospitalized from 7/14-7/18 at [place] [address] he was on IV cefepime and transition to oral antibiotics for additional 5 more days at discharge he also had Diflucan added for 4 week course  Patient reports today visiting nurse was there and he had episode of incontinent stool and also significant bilateral lower extremity leg pain with worsening leg wounds and weakness  Patient reports that he had increased redness on his leg from when he was discharged, notes burning pain on his skin and a stabbing sensation in his ankle  He reports needs pain meds  He also reports bout of incontinent stool while his nurse was there today to do wound care, he normally takes imodium regularly and did not receive when he was in the hospital   He had f/u appointment with wound care for Tuesday 7/19 and then rescheduled to Wednesday 7/20  He also reports he is out of supplies when the nurses come and he needs them to be ordered when he is discharged  He is frustrated that he has been dealing with this for 2 years  He notes that he is in and out of hospitals recently and has not been able to get to his follow ups because he is back in the hospital      ED treatment: 1 L IVF, Cefepime 2000mg, vanco 2000mg, Dilaudid 1mg    Review of Systems:  Review of Systems   Constitutional: Positive for chills  Negative for fever  HENT: Positive for rhinorrhea  Negative for sore throat and trouble swallowing  Eyes: Negative for discharge and redness  Respiratory: Negative for cough and shortness of breath  Cardiovascular: Negative for chest pain  Gastrointestinal: Positive for diarrhea  Negative for abdominal pain, nausea and vomiting  Genitourinary: Negative for dysuria and hematuria  Musculoskeletal: Negative for back pain and neck pain  Skin: Positive for color change, rash and wound  Neurological: Positive for dizziness  Negative for weakness and headaches  Psychiatric/Behavioral: Negative for agitation and confusion  Past Medical and Surgical History:   Past Medical History:   Diagnosis Date    [facility]     Cellulitis     Cro[facility]'s disease of large intestine [facility]     Hyperlipidemia     Hypertension     Scarlet fever        Past Surgical History:   Procedure Laterality Date    ABDOMINAL SURGERY      abdominal mass    FOOT SURGERY      SKIN SURGERY      mrsa abcess removed    TONSILLECTOMY         Meds/Allergies:  Prior to Admission medications    Medication Sig Start Date End Date Taking?  Authorizing Provider   busPIRone (BUSPAR) 10 mg tablet Take 1 tablet (10 mg total) by mouth 3 (three) times a day 6/20/22 7/20/22  Gissell Sanchez MD   cefadroxil (DURICEF) 500 mg capsule Take 1 capsule (500 mg total) by mouth every 12 (twelve) hours for 5 days 7/18/22 7/23/22  Lawson Aguillon DO   Cholecalciferol (Vitamin D3) 50 MCG (2000 UT) capsule Take 2,000 Units by mouth daily 3/30/22   Historical Provider, MD   citalopram (CeleXA) 40 mg tablet  5/9/22   Historical Provider, MD   clonazePAM (KlonoPIN) 1 mg tablet Take 1 mg by mouth 3 (three) times a day 3/30/22   Historical Provider, MD   divalproex sodium (DEPAKOTE) 500 mg EC tablet Take 1 tablet (500 mg total) by mouth daily at bedtime 6/20/22 7/20/22  Felipe Rodriguez MD   divalproex sodium (DEPAKOTE) 500 mg EC tablet Take 2 tablets (1,000 mg total) by mouth 2 (two) times a day 6/20/22 7/20/22  Felipe Rodriguez MD   fluconazole (DIFLUCAN) 150 mg tablet Take 1 tablet (150 mg total) by mouth once a week for 3 doses 7/23/22 8/7/22  Mardelle Pancoast, DO   furosemide (LASIX) 40 mg tablet Take 40 mg by mouth daily 3/30/22   Historical Provider, MD   gabapentin (NEURONTIN) 100 mg capsule Take 1 capsule (100 mg total) by mouth 3 (three) times a day 6/20/22 7/20/22  Felipe Rodriguez MD   levothyroxine 25 mcg tablet  5/9/22   Historical Provider, MD   loperamide (IMODIUM) 2 mg capsule TAKE 2 CAPSULES BY MOUTH TWO TIMES DAILY AS NEEDED FOR DIARRHEA   4/18/22   Historical Provider, MD   Melatonin 10 MG TABS Take 10 mg by mouth daily 5/9/22   Historical Provider, MD   metoprolol tartrate (LOPRESSOR) 50 mg tablet Take 50 mg by mouth 2 (two) times a day 5/9/22   Historical Provider, MD   mirtazapine (REMERON) 7 5 MG tablet  2/12/22   Historical Provider, MD   mupirocin (BACTROBAN) 2 % ointment Apply topically 3 (three) times a day Apply to right foot wound with dressing changes 2-3 times daily until healed 5/10/22   Aspen Toussaint PA-C   nystatin (MYCOSTATIN) powder Apply topically 2 (two) times a day 6/20/22   Felipe Rodriguez MD   paliperidone (INVEGA) 6 MG 24 hr tablet  5/9/22   Historical Provider, MD   pantoprazole (PROTONIX) 40 mg tablet  5/9/22   Historical Provider, MD   rosuvastatin (CRESTOR) 10 MG tablet  2/28/22   Historical Provider, MD   traZODone (DESYREL) 50 mg tablet  5/9/22 Historical Provider, MD     I have reviewed home medications with patient personally  Allergies: Allergies   Allergen Reactions    Mesalamine GI Intolerance     Pt has had severe diarrhea and abdominal pain on mESALAMINE       Social History:  Marital Status:    Occupation: [employer]  Patient Pre-hospital Living Situation: Resides in a rented room  Patient Pre-hospital Level of Mobility: walks  Patient Pre-hospital Diet Restrictions: low salt  Substance Use History:   Social History     Substance and Sexual Activity   Alcohol Use Never     Social History     Tobacco Use   Smoking Status Former Smoker   Smokeless Tobacco Never Used     Social History     Substance and Sexual Activity   Drug Use Never       Family History:  No family history on file  Physical Exam:     Vitals:   Blood Pressure: 142/80 (07/20/22 0031)  Pulse: 94 (07/20/22 0031)  Temperature: 97 5 °F (36 4 °C) (07/20/22 0031)  Temp Source: Temporal (07/20/22 0031)  Respirations: 19 (07/20/22 0031)  SpO2: 95 % (07/20/22 0031)    Physical Exam  Vitals reviewed  Constitutional:       General: He is not in acute distress  Appearance: Normal appearance  Comments: Morbidly obese middle aged male   HENT:      Head: Normocephalic and atraumatic  Right Ear: External ear normal       Left Ear: External ear normal       Nose: Nose normal    Eyes:      General:         Right eye: No discharge  Left eye: No discharge  Conjunctiva/sclera: Conjunctivae normal    Neck:      Comments: Able to turn head side to side without difficulty  Cardiovascular:      Comments: Rate 94  Pulmonary:      Effort: Pulmonary effort is normal       Comments: RR 19, 95% RA, speaking in full sentences without difficulty  Abdominal:      Comments: Red abdominal skin folds   Musculoskeletal:      Comments: Ambulates independently   Skin:     Comments: Rash inside arms, abdomen, legs, back  Bilateral legs are wrapped    Scrotal redness Neurological:      Mental Status: He is alert and oriented to person, place, and time  Mental status is at baseline  Psychiatric:         Mood and Affect: Mood normal          Behavior: Behavior normal          Thought Content: Thought content normal          Judgment: Judgment normal           Additional Data:     Lab Results:  Results from last 7 days   Lab Units 07/19/22  1906 07/16/22  1219 07/15/22  0455   WBC Thousand/uL 6 60   < > 4 92   HEMOGLOBIN g/dL 11 4*   < > 11 2*   HEMATOCRIT % 35 8*   < > 36 5   PLATELETS Thousands/uL 200   < > 255   BANDS PCT % 3  --   --    NEUTROS PCT %  --   --  39*   LYMPHS PCT %  --   --  32   LYMPHO PCT % 24  --   --    MONOS PCT %  --   --  19*   MONO PCT % 14*  --   --    EOS PCT % 1  --  4    < > = values in this interval not displayed  Results from last 7 days   Lab Units 07/19/22  1830   SODIUM mmol/L 135   POTASSIUM mmol/L 5 4*   CHLORIDE mmol/L 102   CO2 mmol/L 24   BUN mg/dL 21   CREATININE mg/dL 1 69*   ANION GAP mmol/L 9   CALCIUM mg/dL 8 0*   ALBUMIN g/dL 1 7*   TOTAL BILIRUBIN mg/dL 0 82   ALK PHOS U/L 71   ALT U/L 11*   AST U/L 48*   GLUCOSE RANDOM mg/dL 108     Results from last 7 days   Lab Units 07/19/22  1830   INR  1 03             Results from last 7 days   Lab Units 07/19/22  1847 07/19/22  1830 07/15/22  0027 07/14/22  1947 07/14/22  1846   LACTIC ACID mmol/L 1 7  --  1 7  --  3 1*   PROCALCITONIN ng/ml  --  0 11  --  0 07  --      ** Please Note: This note has been constructed using a voice recognition system   **

## 2022-07-20 NOTE — ED NOTES
Transport Info as follows:   Going to [address] [place] [address] 714   P/u 0800 via [facility]  Accepting: Dr Dave Avelar  Number for report: 48 Janis Collazo RN  07/19/22 2049

## 2022-07-20 NOTE — ASSESSMENT & PLAN NOTE
· Patient with recent antibiotic use, had negative stool studies collected on 07/16  · Patient with episode of incontinent stool on 07/19, Consider GI consult if diarrhea persists

## 2022-07-20 NOTE — ASSESSMENT & PLAN NOTE
· Patient diagnosed with diffuse cutaneous candidiasis and intertrigo  · Seen by wound care  · Recommended nystatin powder  · Diflucan 150 mg weekly for 4 weeks was started at [address] [address], next dose due 7/23

## 2022-07-20 NOTE — DISCHARGE INSTR - OTHER ORDERS
Skin care plans:  1-Hydraguard to bilateral  heels BID and PRN  2-Elevate heels to offload pressure  3-Ehob bariatic  cushion in chair when out of bed  4-Moisturize skin daily with skin nourishing cream   5-Turn/reposition q2h or when medically stable for pressure re-distribution on skin  6  Consider bariatric bed low air loss   7  Cleanse with soap and water then pat dry abdominal fold , groin , buttocks , breast area Apply yolis antifungal cream bid and prn   8  Cleanse with soap and water then apply nystatin powder to the neck area   9  Right and left lower legs - cleanse with soap and water then pat dry dust with nystatin powder then apply maxorb to open areas then top with a ABD and oksana change daily   Patient should be discharged with VNA and follow-up at the wound center on discharge

## 2022-07-20 NOTE — ASSESSMENT & PLAN NOTE
· Baseline creatinine 0 8-1 1  · Lasix was continued the hydrochlorothiazide was on hold at discharge for recent hospital stay [facility] on 07/18  · Had 1 L IV fluids at [facility] ED - will order AM BMP   · Monitor labs  · Consider nephrology consult

## 2022-07-21 PROCEDURE — 99232 SBSQ HOSP IP/OBS MODERATE 35: CPT | Performed by: INTERNAL MEDICINE

## 2022-07-21 PROCEDURE — 97163 PT EVAL HIGH COMPLEX 45 MIN: CPT

## 2022-07-21 PROCEDURE — 97167 OT EVAL HIGH COMPLEX 60 MIN: CPT

## 2022-07-21 PROCEDURE — 97535 SELF CARE MNGMENT TRAINING: CPT

## 2022-07-21 PROCEDURE — 97116 GAIT TRAINING THERAPY: CPT

## 2022-07-21 RX ADMIN — HEPARIN SODIUM 7500 UNITS: 5000 INJECTION INTRAVENOUS; SUBCUTANEOUS at 15:40

## 2022-07-21 RX ADMIN — GABAPENTIN 300 MG: 300 CAPSULE ORAL at 08:28

## 2022-07-21 RX ADMIN — CLONAZEPAM 1 MG: 1 TABLET ORAL at 15:40

## 2022-07-21 RX ADMIN — CLONAZEPAM 1 MG: 1 TABLET ORAL at 21:47

## 2022-07-21 RX ADMIN — DIVALPROEX SODIUM 1500 MG: 500 TABLET, DELAYED RELEASE ORAL at 21:48

## 2022-07-21 RX ADMIN — NYSTATIN: 100000 POWDER TOPICAL at 08:28

## 2022-07-21 RX ADMIN — CITALOPRAM HYDROBROMIDE 40 MG: 20 TABLET ORAL at 08:28

## 2022-07-21 RX ADMIN — GABAPENTIN 300 MG: 300 CAPSULE ORAL at 15:40

## 2022-07-21 RX ADMIN — MIRTAZAPINE 7.5 MG: 7.5 TABLET, FILM COATED ORAL at 21:47

## 2022-07-21 RX ADMIN — HEPARIN SODIUM 7500 UNITS: 5000 INJECTION INTRAVENOUS; SUBCUTANEOUS at 05:39

## 2022-07-21 RX ADMIN — NYSTATIN: 100000 POWDER TOPICAL at 19:00

## 2022-07-21 RX ADMIN — BUSPIRONE HYDROCHLORIDE 10 MG: 10 TABLET ORAL at 21:47

## 2022-07-21 RX ADMIN — CLONAZEPAM 1 MG: 1 TABLET ORAL at 08:28

## 2022-07-21 RX ADMIN — Medication 2000 UNITS: at 08:27

## 2022-07-21 RX ADMIN — METOPROLOL TARTRATE 50 MG: 50 TABLET, FILM COATED ORAL at 18:58

## 2022-07-21 RX ADMIN — HEPARIN SODIUM 7500 UNITS: 5000 INJECTION INTRAVENOUS; SUBCUTANEOUS at 21:48

## 2022-07-21 RX ADMIN — METOPROLOL TARTRATE 50 MG: 50 TABLET, FILM COATED ORAL at 08:28

## 2022-07-21 RX ADMIN — BUSPIRONE HYDROCHLORIDE 10 MG: 10 TABLET ORAL at 15:40

## 2022-07-21 RX ADMIN — GABAPENTIN 300 MG: 300 CAPSULE ORAL at 21:47

## 2022-07-21 RX ADMIN — PRAVASTATIN SODIUM 80 MG: 40 TABLET ORAL at 15:40

## 2022-07-21 RX ADMIN — PANTOPRAZOLE SODIUM 40 MG: 40 TABLET, DELAYED RELEASE ORAL at 05:39

## 2022-07-21 RX ADMIN — LEVOTHYROXINE SODIUM 25 MCG: 25 TABLET ORAL at 05:39

## 2022-07-21 RX ADMIN — SILVER SULFADIAZINE: 10 CREAM TOPICAL at 08:31

## 2022-07-21 RX ADMIN — DIVALPROEX SODIUM 1000 MG: 500 TABLET, DELAYED RELEASE ORAL at 08:28

## 2022-07-21 RX ADMIN — BUSPIRONE HYDROCHLORIDE 10 MG: 10 TABLET ORAL at 08:27

## 2022-07-21 RX ADMIN — TRAZODONE HYDROCHLORIDE 50 MG: 50 TABLET ORAL at 21:47

## 2022-07-21 RX ADMIN — PALIPERIDONE 6 MG: 6 TABLET, EXTENDED RELEASE ORAL at 08:28

## 2022-07-21 RX ADMIN — FUROSEMIDE 40 MG: 40 TABLET ORAL at 08:28

## 2022-07-21 NOTE — ASSESSMENT & PLAN NOTE
· Patient with longstanding history of bilateral lower extremity lymphedema and wounds  · With multiple admissions to [facility], [facility], [facility], [facility] [address]  · Patient was admitted 7/14-7/18 secondary to wounds of the lower extremities and was receiving IV cefepime and restarted in the emergency room (he was discharged home on cefadroxil for 5 days)  · He was to follow up with Podiatry, Wound care and VNA  · Podiatry on both no intervention needed, due to venous statis      · Less likely to be cellulitis - hold off IV antibiotic  · Continue pain control  · Patient is medically clear

## 2022-07-21 NOTE — PLAN OF CARE
Problem: PHYSICAL THERAPY ADULT  Goal: Performs mobility at highest level of function for planned discharge setting  See evaluation for individualized goals  Description: Treatment/Interventions: ADL retraining, Functional transfer training, Elevations, LE strengthening/ROM, Therapeutic exercise, Endurance training, Patient/family training, Equipment eval/education, Bed mobility, Gait training, Spoke to nursing, OT, Spoke to case management  Equipment Recommended: Claudia Foreman       See flowsheet documentation for full assessment, interventions and recommendations  Outcome: Progressing  Note: Prognosis: Good  Problem List: Decreased strength, Decreased endurance, Impaired balance, Decreased mobility, Decreased coordination, Impaired sensation, Obesity, Decreased skin integrity, Pain     Barriers to Discharge: Inaccessible home environment, Decreased caregiver support     PT Discharge Recommendation: Post acute rehabilitation services    See flowsheet documentation for full assessment

## 2022-07-21 NOTE — PHYSICAL THERAPY NOTE
Physical Therapy Evaluation    Patient's Name: Jaron Joshi    Admitting Diagnosis  Cellulitis [L03 90]    Problem List  Patient Active Problem List   Diagnosis    Pain and swelling of lower extremity    Primary hypertension    Dyslipidemia    Crohn's disease ([place] [address] )    Bipolar disorder ([place] [address] )    Morbid obesity ([place] [address] )    Hypothyroidism    Ambulatory dysfunction    Anxiety    Multiple open wounds of lower leg    Bilateral leg edema    Binge eating    Cocaine dependence in remission ([place] [address] )    Crohn's disease of large intestine without complication (HCC)    Dietary noncompliance    Fatty liver    Financial difficulties    CORKY (generalized anxiety disorder)    Gastroesophageal reflux disease without esophagitis    History of MRSA infection    ALESSANDRA on CPAP    Peripheral edema    Peripheral polyneuropathy    Pre-diabetes    Venous insufficiency of lower extremity    Venous stasis dermatitis    Folic acid deficiency    Vitamin D deficiency    GERMANIA (acute kidney injury) ([place] [address] )    Rash of body    Cutaneous candidiasis    Weakness       Past Medical History  Past Medical History:   Diagnosis Date    Bipolar affective disorder ([place] [address] )     Cellulitis     Crohn's disease of large intestine (HCC)     Hyperlipidemia     Hypertension     Scarlet fever        Past Surgical History  Past Surgical History:   Procedure Laterality Date    ABDOMINAL SURGERY      abdominal mass    FOOT SURGERY      SKIN SURGERY      mrsa abcess removed    TONSILLECTOMY         Recent Imaging  No orders to display       Recent Vital Signs  Vitals:    07/20/22 1515 07/20/22 1719 07/20/22 2327 07/21/22 0731   BP: 118/68 131/61 124/81 113/50   BP Location: Right arm  Left arm Right arm   Pulse: 69 78 73 74   Resp: 18  18 18   Temp: 97 5 °F (36 4 °C)  (!) 97 4 °F (36 3 °C) (!) 95 9 °F (35 5 °C)   TempSrc: Temporal  Temporal Temporal   SpO2: 94%  92% 94%   Weight:       Height:            07/21/22 1031   PT Last Visit   PT Visit Date 07/21/22   Note Type   Note type Evaluation   Pain Assessment   Pain Assessment Tool 0-10   Pain Score 6   Pain Location/Orientation Orientation: Bilateral;Location: Leg   Restrictions/Precautions   Weight Bearing Precautions Per Order No   Other Precautions Fall Risk;Pain   Home Living   Type of [address] level;Stairs to enter with rails; Able to live on main level with bedroom/bathroom   Additional Comments Per pt, renting room at boarding house, common areas among tennants   Prior Function   Level of [place] Independent with ADLs and functional mobility   Lives With Alone   ADL Assistance Independent   IADLs Independent   Falls in the last 6 months 0   Comments Pt reports he is independent with ADLs and IADls however, reports possible assistance from [address] [place] [address] - unclear of what assistance will be provided   Cognition   Arousal/Participation Lethargic   Attention Attends with cues to redirect   Orientation Level Oriented X4   Memory Within functional limits   Following Commands Follows one step commands without difficulty   Comments Fatigue/lethargy impacting overall performance   Strength RLE   RLE Overall Strength 3+/5   Strength LLE   LLE Overall Strength 3+/5   Coordination   Movements are Fluid and Coordinated 0   Coordination and Movement Description unsteadiness with gait   Sensation X   Light Touch   RLE Light Touch Impaired   RLE Light Touch Comments tingling and numbness to bottom of feet   LLE Light Touch Impaired   LLE Light Touch Comments tingling and numbness to bottom of feet   Bed Mobility   Supine to Sit 3  Moderate assistance   Additional items Assist x 1;Bedrails; Increased time required;Verbal cues;LE management   Sit to Supine 3  Moderate assistance   Additional items Assist x 1;Bedrails; Increased time required;Verbal cues;LE management   Transfers   Sit to Stand 3  Moderate assistance   Additional items Assist x 1; Armrests; Increased time required;Verbal cues   Stand to Sit 3  Moderate assistance Additional items Assist x 1; Armrests; Increased time required;Verbal cues   Additional Comments HHA   Ambulation/Elevation   Gait pattern Step through pattern;Decreased toe off;Decreased heel strike; Excessively slow; Short stride;Decreased foot clearance; Wide VESNA; Improper Weight shift   Gait Assistance 3  Moderate assist   Additional items Assist x 1;Verbal cues; Tactile cues   Assistive Device   (HHA)   Distance 25ft   Balance   Static Sitting Fair   Dynamic Sitting Fair   Static Standing Fair -   Dynamic Standing Poor +   Ambulatory Poor +   Endurance Deficit   Endurance Deficit Yes   Endurance Deficit Description fatigue with transfers and ambulation as well as LE pain   Activity Tolerance   Activity Tolerance Patient limited by fatigue;Patient limited by pain   Medical Staff Made Aware spoke to RN   Nurse Made Aware spoke to [facility]   Assessment   Prognosis Good   Problem List Decreased strength;Decreased endurance; Impaired balance;Decreased mobility; Decreased coordination; Impaired sensation;Obesity; Decreased skin integrity;Pain   Barriers to Discharge Inaccessible home environment;Decreased caregiver support   Goals   Patient Goals to go to rehab   STG Expiration Date 07/31/22   Short Term Goal #1 see eval note   PT Treatment Day 1   Plan   Treatment/Interventions ADL retraining;Functional transfer training;Elevations;LE strengthening/ROM; Therapeutic exercise; Endurance training;Patient/family training;Equipment eval/education; Bed mobility;Gait training;Spoke to nursing;OT;Spoke to case management   PT Frequency 2-3x/wk   Recommendation   PT Discharge Recommendation Post acute rehabilitation services   Equipment Recommended [facility] Recommended HD Bariatric wheeled walker   [address]   Turning in Bed Without Bedrails 2   Lying on Back to Sitting on Edge of Flat Bed 2   Moving Bed to Chair 2   Standing Up From Chair 2   Walk in Room 2   Climb 3-5 Stairs 1   Basic Mobility Inpatient Raw Score 11   Basic Mobility Standardized Score 30 25   Highest Level Of Mobility   -[facility] Goal 4: Move to chair/commode   -HL Achieved 6: Walk 10 steps or more   Additional Treatment Session   Start Time 1048   End Time 1100   Treatment Assessment Pt participated in additional short distance ambulation training and transfers training  He becomes fatigue very quickly  Equipment Use RW   End of Consult   Patient Position at End of Consult Supine         ASSESSMENT                                                                                                                     Natanael Monahan is a 48 y o  male admitted to [facility] on 7/20/2022 for Multiple open wounds of lower leg  Pt  has a past medical history of Bipolar affective disorder ([facility] ), Cellulitis, Crohn's disease of large intestine ([facility] ), Hyperlipidemia, Hypertension, and Scarlet fever    PT was consulted and pt was seen on 7/21/2022 for mobility assessment and d/c planning  Pt presents supine in bed alert and agreeable to therapy  Impairments limiting pt at this time include decreased ROM, impaired balance, decreased endurance, decreased coordination, increased fall risk, new onset of impairment of functional mobility, decreased ADLS, decreased IADLS, pain, decreased activity tolerance, decreased sensation, and decreased strength  Pt is currently functioning at a moderate assistance x1 level for bed mobility, moderate assistance x1 level for transfers, moderate assistance x1 level for ambulation with no assistive device  The patient's AM-PAC Basic Mobility Inpatient Short Form Raw Score is 11  A Raw score of less than or equal to 16 suggests the patient may benefit from discharge to post-acute rehabilitation services  Please also refer to the recommendation of the Physical Therapist for safe discharge planning      Goals                                                                                                                                    1) Bed mobility skills with modified independent assistance to facilitate safe return to previous living environment 2) Functional transfers with modified independent assistance to facilitate safe return to previous living environment  3) Ambulation with least restrictive AD modified independent assistance without LOB and stable vitals for safe ambulation home/ community distances  4) Stair training up/down flight 12 step/s with appropriate rail/s  and modified independent assistance for safe access to previous living environment  5) Improve balance grades to fair + to reduce risk of falls  6)Improve LE strength grades by 1 to increase independence w/ transfers and gait  7) PT for ongoing pt and family education; DME needs and D/C planning to promote highest level of function in least restrictive environment  Recommendations                                                                                                              Pt will benefit from continued skilled IP PT to address the above mentioned impairments in order to maximize recovery and increase functional independence when completing mobility and ADLs  See flow sheet for goals and POC       DME:  Bariatric RW    Discharge Disposition:  Post Acute Rehab Services      Cresencio Noel PT, DPT

## 2022-07-21 NOTE — PROGRESS NOTES
[facility]  Progress Note - Dannis Pack 1975, 48 y o  male MRN: [id number]  Unit/Bed#: 7T [facility] 714-01 Encounter: [id number]  Primary Care Provider: Wolfgang Ivey DO   Date and time admitted to hospital: 7/20/2022 12:14 AM    * Multiple open wounds of lower leg  Assessment & Plan  · Patient with longstanding history of bilateral lower extremity lymphedema and wounds  · With multiple admissions to [facility], [facility], [facility], [facility]  · Patient was admitted 7/14-7/18 secondary to wounds of the lower extremities and was receiving IV cefepime and restarted in the emergency room (he was discharged home on cefadroxil for 5 days)  · He was to follow up with Podiatry, Wound care and VNA  · Podiatry on both no intervention needed, due to venous statis      · Less likely to be cellulitis - hold off IV antibiotic  · Continue pain control  · Patient is medically clear    Weakness  Assessment & Plan  · Patient with increased weakness, was recently hospitalized for 4 days  · PT/OT recommended rehab  · CM for disposition planning      Cutaneous candidiasis  Assessment & Plan  · Patient diagnosed with diffuse cutaneous candidiasis and intertrigo  · Seen by wound care  · Recommended nystatin powder  · Diflucan 150 mg weekly for 4 weeks was started at [facility], next dose due 7/23    GERMANIA (acute kidney injury) ([facility] )  Assessment & Plan  · Baseline creatinine 0 8-1 1, improved  · Lasix was continued the hydrochlorothiazide was on hold at discharge for recent hospital stay [facility] on 07/18  · Had 1 L IV fluids at [facility] ED  · Monitor renal function    ALESSANDRA on CPAP  Assessment & Plan  · Patient declined cpap with nurse when admitting him    Gastroesophageal reflux disease without esophagitis  Assessment & Plan  · Continue PPI    Hypothyroidism  Assessment & Plan  · Continue levothyroxine    Morbid obesity ([facility] )  Assessment & Plan  · BMI greater than 50  · Healthy diet recommended    Bipolar disorder ([facility] )  Assessment & Plan  · Continue home BuSpar, Celexa, Depakote, Remeron, [place]vega    Crohn's disease ([facility] )  Assessment & Plan  · Patient with recent antibiotic use, had negative stool studies collected on   · Patient with episode of incontinent stool on     Primary hypertension  Assessment & Plan  · Continue metoprolol and Lasix  · Hydrochlorothiazide on hold due to recent GERMANIA  · Monitor blood pressure    VTE Pharmacologic Prophylaxis:   Pharmacologic: Heparin  Mechanical VTE Prophylaxis in Place: Yes    Patient Centered Rounds: I have performed bedside rounds with nursing staff today  Discussions with Specialists or Other Care Team Provider:  Discussed with Case Management, nurse    Education and Discussions with Family / Patient:  Discussed with patient    Time Spent for Care: 45 minutes  More than 50% of total time spent on counseling and coordination of care as described above  Current Length of Stay: 1 day(s)    Current Patient Status: Inpatient   Certification Statement: The patient will continue to require additional inpatient hospital stay due to Bilateral lower extremity wounds    Discharge Plan / Estimated Discharge Date:   patient is medically clear, awaiting placement      Code Status: Level 1 - Full Code      Subjective:   Patient was seen and examined at bedside  The patient has bilateral lower extremity pain which is around his baseline  He denies any fever, chills, chest pain, palpitation, shortness of breath, N/V, abd pain  Objective:     Vitals:   Temp (24hrs), Av 9 °F (36 1 °C), Min:95 9 °F (35 5 °C), Max:97 5 °F (36 4 °C)    Temp:  [95 9 °F (35 5 °C)-97 5 °F (36 4 °C)] 95 9 °F (35 5 °C)  HR:  [69-78] 74  Resp:  [18] 18  BP: (113-131)/(50-81) 113/50  SpO2:  [92 %-94 %] 94 %  Body mass index is 56 38 kg/m²  Input and Output Summary (last 24 hours):        Intake/Output Summary (Last 24 hours) at 7/21/2022 1303  Last data filed at 7/21/2022 1210  Gross per 24 hour   Intake 360 ml   Output 400 ml   Net -40 ml       Physical Exam:     Physical Exam  General: breathing well on room air, no acute distress  HEENT: NC/AT, PERRL, EOM - normal  Neck: Supple  Pulm/Chest: Normal chest wall expansion, clear breath sounds on both side, no wheezing/rhonchi or crackles appreciated  CVS: RRR, normal S1&S2, no murmur appreciated, capillary refill <2s  Abd: soft, non tender, non distended, bowel sounds +  MSK: move all 4 extremities spontaneously, significant bilateral lower extremity lymphedema with venous stasis ulcers  Skin: warm  CNS: no acute focal neuro deficit      Additional Data:     Labs:    Results from last 7 days   Lab Units 07/19/22  1906 07/16/22  1219 07/15/22  0455   WBC Thousand/uL 6 60   < > 4 92   HEMOGLOBIN g/dL 11 4*   < > 11 2*   HEMATOCRIT % 35 8*   < > 36 5   PLATELETS Thousands/uL 200   < > 255   NEUTROS PCT %  --   --  39*   LYMPHS PCT %  --   --  32   LYMPHO PCT % 24  --   --    MONOS PCT %  --   --  19*   MONO PCT % 14*  --   --    EOS PCT % 1  --  4    < > = values in this interval not displayed  Results from last 7 days   Lab Units 07/20/22  0445 07/19/22  1830   POTASSIUM mmol/L 3 7 5 4*   CHLORIDE mmol/L 106 102   CO2 mmol/L 26 24   BUN mg/dL 24 21   CREATININE mg/dL 1 38 1 69*   CALCIUM mg/dL 7 6* 8 0*   ALK PHOS U/L  --  71   ALT U/L  --  11*   AST U/L  --  48*     Results from last 7 days   Lab Units 07/19/22  1830   INR  1 03       * I Have Reviewed All Lab Data Listed Above  * Additional Pertinent Lab Tests Reviewed: [address] Admission Reviewed    Imaging:      I have reviewed pertinent imaging  Recent Cultures (last 7 days):     Results from last 7 days   Lab Units 07/19/22  1830 07/19/22  1805 07/16/22  0847 07/14/22  1948 07/14/22  1846   BLOOD CULTURE  No Growth at 24 hrs  No Growth at 24 hrs   --  No Growth After 5 Days  No Growth After 5 Days     C DIFF TOXIN B BY PCR   --   --  Negative  --   --        Last 24 Hours Medication List:   Current Facility-Administered Medications   Medication Dose Route Frequency Provider Last Rate    acetaminophen  650 mg Oral Q4H PRN Naoma Bottoms, PA-C      busPIRone  10 mg Oral TID Naoma Bottoms, PA-C      cholecalciferol  2,000 Units Oral Daily [place], [place]      citalopram  40 mg Oral Daily [place], [place]      clonazePAM  1 mg Oral TID Naoma Bottoms, PA-C      diphenhydrAMINE  25 mg Oral Q6H PRN Marisa Tee MD      divalproex sodium  1,000 mg Oral Daily Naoma Bottoms, PA-C      divalproex sodium  1,500 mg Oral HS Naoma Bottoms, PA-C      furosemide  40 mg Oral Daily [place], [place]      gabapentin  300 mg Oral TID Naoma Bottoms, PA-C      heparin (porcine)  7,500 Units Subcutaneous [place], [place]      HYDROmorphone  1 mg Intravenous Q4H PRN Naoma Bottoms, PA-C      levothyroxine  25 mcg Oral Early Morning [place], [place]      loperamide  4 mg Oral TID PRN Naoma Bottoms, PA-C      metoprolol tartrate  50 mg Oral BID Naoma Bottoms, PA-C      mirtazapine  7 5 mg Oral HS Naoma Bottoms, PA-C      nystatin   Topical BID [facility], PA-C      ondansetron  4 mg Intravenous Q6H PRN Naoma Bottoms, PA-C      oxyCODONE  10 mg Oral Q4H PRN Naoma Bottoms, PA-C      oxyCODONE  15 mg Oral Q4H PRN Naoma Bottoms, PA-C      paliperidone  6 mg Oral Daily [place], [place]      pantoprazole  40 mg Oral Early Morning [place], [place]      pravastatin  80 mg Oral Daily With [employer], [place]      silver sulfadiazine   Topical Daily Flores Sane, DPM      traZODone  50 mg Oral HS Naoma Bottoms, PA-C          Today, Patient Was Seen By: Marisa Tee, MD    ** Please Note: Dragon 360 Dictation voice to text software may have been used in the creation of this document   **

## 2022-07-21 NOTE — ASSESSMENT & PLAN NOTE
· Patient with increased weakness, was recently hospitalized for 4 days  · PT/OT recommended rehab  · CM for disposition planning

## 2022-07-21 NOTE — PLAN OF CARE
Problem: OCCUPATIONAL THERAPY ADULT  Goal: Performs self-care activities at highest level of function for planned discharge setting  See evaluation for individualized goals  Description: Treatment Interventions: ADL retraining, Functional transfer training, UE strengthening/ROM, Compensatory technique education, Energy conservation, Activityengagement, Endurance training, Equipment evaluation/education          See flowsheet documentation for full assessment, interventions and recommendations     Outcome: Progressing  Note: Limitation: Decreased ADL status, Decreased UE ROM, Decreased UE strength, Decreased Safe judgement during ADL, Decreased endurance, Decreased self-care trans, Decreased high-level ADLs  Prognosis: Good  Assessment: see note     OT Discharge Recommendation: Post acute rehabilitation services

## 2022-07-21 NOTE — PLAN OF CARE
Problem: PAIN - ADULT  Goal: Verbalizes/displays adequate comfort level or baseline comfort level  Description: Interventions:  - Encourage patient to monitor pain and request assistance  - Assess pain using appropriate pain scale  - Administer analgesics based on type and severity of pain and evaluate response  - Implement non-pharmacological measures as appropriate and evaluate response  - Consider cultural and social influences on pain and pain management  - Notify physician/advanced practitioner if interventions unsuccessful or patient reports new pain  Outcome: Progressing     Problem: INFECTION - ADULT  Goal: Absence or prevention of progression during hospitalization  Description: INTERVENTIONS:  - Assess and monitor for signs and symptoms of infection  - Monitor lab/diagnostic results  - Monitor all insertion sites, i e  indwelling lines, tubes, and drains  - Monitor endotracheal if appropriate and nasal secretions for changes in amount and color  - [place] appropriate cooling/warming therapies per order  - Administer medications as ordered  - Instruct and encourage patient and family to use good hand hygiene technique  - Identify and instruct in appropriate isolation precautions for identified infection/condition  Outcome: Progressing  Goal: Absence of fever/infection during neutropenic period  Description: INTERVENTIONS:  - Monitor WBC    Outcome: Progressing     Problem: SAFETY ADULT  Goal: Patient will remain free of falls  Description: INTERVENTIONS:  - Educate patient/family on patient safety including physical limitations  - Instruct patient to call for assistance with activity   - Consult OT/PT to assist with strengthening/mobility   - Keep Call bell within reach  - Keep bed low and locked with side rails adjusted as appropriate  - Keep care items and personal belongings within reach  - Initiate and maintain comfort rounds  - Make Fall Risk Sign visible to staff  - Apply yellow socks and bracelet for high fall risk patients  - Consider moving patient to room near nurses station  Outcome: Progressing  Goal: Maintain or return to baseline ADL function  Description: INTERVENTIONS:  -  Assess patient's ability to carry out ADLs; assess patient's baseline for ADL function and identify physical deficits which impact ability to perform ADLs (bathing, care of mouth/teeth, toileting, grooming, dressing, etc )  - Assess/evaluate cause of self-care deficits   - Assess range of motion  - Assess patient's mobility; develop plan if impaired  - Assess patient's need for assistive devices and provide as appropriate  - Encourage maximum independence but intervene and supervise when necessary  - Involve family in performance of ADLs  - Assess for home care needs following discharge   - Consider OT consult to assist with ADL evaluation and planning for discharge  - Provide patient education as appropriate  Outcome: Progressing  Goal: Maintains/Returns to pre admission functional level  Description: INTERVENTIONS:  - Perform BMAT or MOVE assessment daily    - Set and communicate daily mobility goal to care team and patient/family/caregiver     - Collaborate with rehabilitation services on mobility goals if consulted  - Out of bed for toileting  - Record patient progress and toleration of activity level   Outcome: Progressing     Problem: DISCHARGE PLANNING  Goal: Discharge to home or other facility with appropriate resources  Description: INTERVENTIONS:  - Identify barriers to discharge w/patient and caregiver  - Arrange for needed discharge resources and transportation as appropriate  - Identify discharge learning needs (meds, wound care, etc )  - Arrange for interpretive services to assist at discharge as needed  - Refer to Case Management Department for coordinating discharge planning if the patient needs post-hospital services based on physician/advanced practitioner order or complex needs related to functional status, cognitive ability, or social support system  Outcome: Progressing     Problem: Knowledge Deficit  Goal: Patient/family/caregiver demonstrates understanding of disease process, treatment plan, medications, and discharge instructions  Description: Complete learning assessment and assess knowledge base    Interventions:  - Provide teaching at level of understanding  - Provide teaching via preferred learning methods  Outcome: Progressing     Problem: GASTROINTESTINAL - ADULT  Goal: Minimal or absence of nausea and/or vomiting  Description: INTERVENTIONS:  - Administer IV fluids if ordered to ensure adequate hydration  - Maintain NPO status until nausea and vomiting are resolved  - Nasogastric tube if ordered  - Administer ordered antiemetic medications as needed  - Provide nonpharmacologic comfort measures as appropriate  - Advance diet as tolerated, if ordered  - Consider nutrition services referral to assist patient with adequate nutrition and appropriate food choices  Outcome: Progressing  Goal: Maintains or returns to baseline bowel function  Description: INTERVENTIONS:  - Assess bowel function  - Encourage oral fluids to ensure adequate hydration  - Administer IV fluids if ordered to ensure adequate hydration  - Administer ordered medications as needed  - Encourage mobilization and activity  - Consider nutritional services referral to assist patient with adequate nutrition and appropriate food choices  Outcome: Progressing  Goal: Maintains adequate nutritional intake  Description: INTERVENTIONS:  - Monitor percentage of each meal consumed  - Identify factors contributing to decreased intake, treat as appropriate  - Assist with meals as needed  - Monitor I&O, weight, and lab values if indicated  - Obtain nutrition services referral as needed  Outcome: Progressing  Goal: Establish and maintain optimal ostomy function  Description: INTERVENTIONS:  - Assess bowel function  - Encourage oral fluids to ensure adequate hydration  - Administer IV fluids if ordered to ensure adequate hydration   - Administer ordered medications as needed  - Encourage mobilization and activity  - Nutrition services referral to assist patient with appropriate food choices  - Assess stoma site  - Consider wound care consult   Outcome: Progressing  Goal: Oral mucous membranes remain intact  Description: INTERVENTIONS  - Assess oral mucosa and hygiene practices  - Implement preventative oral hygiene regimen  - Implement oral medicated treatments as ordered  - Initiate Nutrition services referral as needed  Outcome: Progressing     Problem: MUSCULOSKELETAL - ADULT  Goal: Maintain or return mobility to safest level of function  Description: INTERVENTIONS:  - Assess patient's ability to carry out ADLs; assess patient's baseline for ADL function and identify physical deficits which impact ability to perform ADLs (bathing, care of mouth/teeth, toileting, grooming, dressing, etc )  - Assess/evaluate cause of self-care deficits   - Assess range of motion  - Assess patient's mobility  - Assess patient's need for assistive devices and provide as appropriate  - Encourage maximum independence but intervene and supervise when necessary  - Involve family in performance of ADLs  - Assess for home care needs following discharge   - Consider OT consult to assist with ADL evaluation and planning for discharge  - Provide patient education as appropriate  Outcome: Progressing  Goal: Maintain proper alignment of affected body part  Description: INTERVENTIONS:  - Support, maintain and protect limb and body alignment  - Provide patient/ family with appropriate education  Outcome: Progressing     Problem: Nutrition/Hydration-ADULT  Goal: Nutrient/Hydration intake appropriate for improving, restoring or maintaining nutritional needs  Description: Monitor and assess patient's nutrition/hydration status for malnutrition   Collaborate with interdisciplinary team and initiate plan and interventions as ordered  Monitor patient's weight and dietary intake as ordered or per policy  Utilize nutrition screening tool and intervene as necessary  Determine patient's food preferences and provide high-protein, high-caloric foods as appropriate       INTERVENTIONS:  - Monitor oral intake, urinary output, labs, and treatment plans  - Assess nutrition and hydration status and recommend course of action  - Evaluate amount of meals eaten  - Assist patient with eating if necessary   - Allow adequate time for meals  - Recommend/ encourage appropriate diets, oral nutritional supplements, and vitamin/mineral supplements  - Order, calculate, and assess calorie counts as needed  - Recommend, monitor, and adjust tube feedings and TPN/PPN based on assessed needs  - Assess need for intravenous fluids  - Provide specific nutrition/hydration education as appropriate  - Include patient/family/caregiver in decisions related to nutrition  Outcome: Progressing     Problem: MOBILITY - ADULT  Goal: Maintain or return to baseline ADL function  Description: INTERVENTIONS:  -  Assess patient's ability to carry out ADLs; assess patient's baseline for ADL function and identify physical deficits which impact ability to perform ADLs (bathing, care of mouth/teeth, toileting, grooming, dressing, etc )  - Assess/evaluate cause of self-care deficits   - Assess range of motion  - Assess patient's mobility; develop plan if impaired  - Assess patient's need for assistive devices and provide as appropriate  - Encourage maximum independence but intervene and supervise when necessary  - Involve family in performance of ADLs  - Assess for home care needs following discharge   - Consider OT consult to assist with ADL evaluation and planning for discharge  - Provide patient education as appropriate  Outcome: Progressing  Goal: Maintains/Returns to pre admission functional level  Description: INTERVENTIONS:  - Perform BMAT or MOVE assessment daily    - Set and communicate daily mobility goal to care team and patient/family/caregiver     - Collaborate with rehabilitation services on mobility goals if consulted  - Out of bed for toileting  - Record patient progress and toleration of activity level   Outcome: Progressing     Problem: Prexisting or High Potential for Compromised Skin Integrity  Goal: Skin integrity is maintained or improved  Description: INTERVENTIONS:  - Identify patients at risk for skin breakdown  - Assess and monitor skin integrity  - Assess and monitor nutrition and hydration status  - Monitor labs   - Assess for incontinence   - Turn and reposition patient  - Assist with mobility/ambulation  - Relieve pressure over bony prominences  - Avoid friction and shearing  - Provide appropriate hygiene as needed including keeping skin clean and dry  - Evaluate need for skin moisturizer/barrier cream  - Collaborate with interdisciplinary team   - Patient/family teaching  - Consider wound care consult   Outcome: Progressing     Problem: Potential for Falls  Goal: Patient will remain free of falls  Description: INTERVENTIONS:  - Educate patient/family on patient safety including physical limitations  - Instruct patient to call for assistance with activity   - Consult OT/PT to assist with strengthening/mobility   - Keep Call bell within reach  - Keep bed low and locked with side rails adjusted as appropriate  - Keep care items and personal belongings within reach  - Initiate and maintain comfort rounds  - Make Fall Risk Sign visible to staff  - Apply yellow socks and bracelet for high fall risk patients  - Consider moving patient to room near nurses station  Outcome: Progressing

## 2022-07-21 NOTE — ASSESSMENT & PLAN NOTE
· Continue metoprolol and Lasix  · Hydrochlorothiazide on hold due to recent GERMANIA  · Monitor blood pressure

## 2022-07-21 NOTE — OCCUPATIONAL THERAPY NOTE
Occupational Therapy Evaluation     Patient Name: Lucero Hernandez  Today's Date: 7/21/2022  Problem List  Principal Problem:    Multiple open wounds of lower leg  Active Problems:    Primary hypertension    Crohn's disease ([facility] )    Bipolar disorder ([place] [address] )    Morbid obesity ([facility] )    Hypothyroidism    Gastroesophageal reflux disease without esophagitis    ALESSANDRA on CPAP    GERMANIA (acute kidney injury) ([facility] )    Cutaneous candidiasis    Weakness    Past Medical History  Past Medical History:   Diagnosis Date    Bipolar affective disorder ([facility] )     Cellulitis     Crohn's disease of large intestine ([facility] )     Hyperlipidemia     Hypertension     Scarlet fever      Past Surgical History  Past Surgical History:   Procedure Laterality Date    ABDOMINAL SURGERY      abdominal mass    FOOT SURGERY      SKIN SURGERY      mrsa abcess removed    TONSILLECTOMY            07/21/22 1030   OT Last Visit   OT Visit Date 07/21/22   Note Type   Note type Evaluation   Restrictions/Precautions   Weight Bearing Precautions Per Order No   Other Precautions Fall Risk;Pain   Pain Assessment   Pain Assessment Tool 0-10   Pain Score 6   Pain Location/Orientation Orientation: Bilateral;Location: Leg   Effect of Pain on Daily Activities limits activity tolerance and standing tolerance   Home Living   Type of Home Other (Comment)  (Boarding House)   Home Layout One level;Stairs to enter with rails; Performs ADLs on one level  (Communal bathroom, kitchen)   Additional [facility] areas among tennants   Prior Function   Level of [place] Independent with ADLs and functional mobility   Lives With Alone   ADL Assistance Independent   IADLs Independent   Falls in the last 6 months 0   Comments Pt reports he is independent with ADLs and IADls however, reports possible assistance from [address] [place] [address] - unclear of what assistance will be provided   Psychosocial   Psychosocial (WDL) WDL   Subjective   Subjective "I can usually walk better than this"   ADL   Eating Assistance 5  Supervision/Setup   Grooming Assistance 4  Minimal Assistance   UB Bathing Assistance 3  Moderate Assistance   LB Bathing Assistance 2  Maximal [address] 3  Moderate Assistance   LB Dressing Assistance 2  Maximal [address]  2  Maximal Assistance   Bed Mobility   Supine to Sit 3  Moderate assistance   Additional items Assist x 1;Bedrails; Increased time required;Verbal cues   Sit to Supine 3  Moderate assistance   Additional items Assist x 1;Bedrails; Increased time required;Verbal cues   Transfers   Sit to Stand 3  Moderate assistance   Additional items Assist x 1; Armrests; Increased time required;Verbal cues   Stand to Sit 3  Moderate assistance   Additional items Assist x 1; Armrests; Increased time required;Verbal cues   Additional Comments Hand held assist   Functional Mobility   Functional Mobility 3  Moderate assistance   Additional Comments short household distances ~20 ft   Additional items Hand hold assistance   Balance   Static Sitting Fair   Dynamic Sitting Fair   Static Standing Fair -   Dynamic Standing Poor +   Ambulatory Poor +   Activity Tolerance   Activity Tolerance Patient limited by fatigue   Medical Staff Made Aware Spoke to RN   Nurse Made Aware spoke to CM   RUE Assessment   RUE Assessment WFL   LUE Assessment   LUE Assessment WFL   Hand Function   Gross Motor Coordination Functional   Fine Motor Coordination Functional   Perception   Inattention/Neglect Appears intact   Cognition   Arousal/Participation Lethargic;Cooperative   Attention Attends with cues to redirect   Orientation Level Oriented X4   Memory Within functional limits   Following Commands Follows one step commands without difficulty   Comments Fatigue/lethargy impacting overall performance   Assessment   Limitation Decreased ADL status; Decreased UE ROM; Decreased UE strength;Decreased Safe judgement during ADL;Decreased endurance;Decreased self-care trans;Decreased high-level ADLs   Prognosis Good   Assessment Pt is a 48 y o  male seen for OT evaluation s/p admit to [facility] on 7/20/2022 w/ Multiple open wounds of lower leg  OT completed an extensive review of pt's medical and social history  Pt  has a past medical history of Bipolar affective disorder ([facility] ), Cellulitis, Crohn's disease of large intestine ([facility] ), Hyperlipidemia, Hypertension, and Scarlet fever  As per pt report, pta renting room at a boarding house with 3-5 TARA  Independent with ADLs/IADls and mobility with no AD  Upon evaluation, pt requires Mod A with transfers and mobility using Hand held assist   Pt currently  requires Mod A UB ADLs, Max A LB ADLs, and Max A toileting  2* the following deficits impacting occupational performance  Pt presents to OT below baseline due to the following performance deficits: weakness, decreased ROM, decreased strength, decreased balance, and decreased tolerance  Pt to benefit from continued skilled OT services while in the hospital to address deficits as defined above and maximize level of functional independence w ADL's and functional mobility  Occupational performance areas to address include: grooming, bathing/shower, toilet hygiene, dressing, functional mobility, community mobility, and clothing management  Based on OT evaluation, assessment(s), performance deficits listed, and current level of function, pt identified as a HIGH complexity evaluation  From OT standpoint, recommendation at time of d/c would be IP rehab to further progress independence with ADLs, mobility, and safety at home to reduce burden of care and reduce risk of falls/readmission  Goals   Patient Goals to go to rehab   Plan   Treatment Interventions ADL retraining;Functional transfer training;UE strengthening/ROM; Compensatory technique education; Energy conservation; Activityengagement; Endurance training;Equipment evaluation/education   Goal Expiration Date 08/04/22   OT Treatment Day 1   OT Frequency 3-5x/wk   Additional Treatment Session   Start Time 1050   End Time 1100   Treatment Assessment Pt seen for Occupational Therapy treatment following evaluation with skilled intervention targeting functional transfer/mobility training with focus on proper body mechanics and fall prevention to maximize pt safety when engaging in ADLs  Pt requires mod A with for bed mobility and function transfers, trailled hand held assist, requiring Mod A for steadying, fatigues quickly requiring seated rest break  Pt utilized urinal to void, spilled urinal when attempting to place on tray table  Pt limited by pain, ROM, weakness, fatigue, endurance, activity tolerance , standing tolerance and static/dynamic standing balance   Pt would benefit from continued OT sessions to further address above deficits to maximize independence  Additional Treatment Day 1   Recommendation   OT Discharge Recommendation Post acute rehabilitation services   AM-PAC Daily Activity Inpatient   Lower Body Dressing 2   Bathing 2   Toileting 2   Upper Body Dressing 2   Grooming 3   Eating 4   Daily Activity Raw Score 15   Daily Activity Standardized Score (Calc for Raw Score >=11) 34 69          Pt will complete the following goals in 14 days        1) Pt will complete LB dressing/self care MI using adaptive device and DME as needed     2) Pt will complete bathroom mobility MI  using AD as needed  3) Pt will complete toileting MI w/ G hygiene/thoroughness using DME as needed     4) Pt will improve functional transfers to MI  on/off all surfaces using DME as needed w/ G balance/safety      5) Pt will complete bed mobility Ind  with HOB flat/no BRs       6) Pt will demonstrate F+ dynamic standing balance and increase standing tolerance to 4-7 min for inc'd safety with standing purposeful tasks       Mohan Randolph MS, OTR/L

## 2022-07-21 NOTE — ASSESSMENT & PLAN NOTE
· Baseline creatinine 0 8-1 1, improved  · Lasix was continued the hydrochlorothiazide was on hold at discharge for recent hospital stay [address] [address] on 07/18  · Had 1 L IV fluids at [facility] ED  · Monitor renal function

## 2022-07-21 NOTE — ASSESSMENT & PLAN NOTE
· Patient with recent antibiotic use, had negative stool studies collected on 07/16  · Patient with episode of incontinent stool on 07/19

## 2022-07-22 PROCEDURE — 94660 CPAP INITIATION&MGMT: CPT

## 2022-07-22 PROCEDURE — 99232 SBSQ HOSP IP/OBS MODERATE 35: CPT | Performed by: INTERNAL MEDICINE

## 2022-07-22 PROCEDURE — 94760 N-INVAS EAR/PLS OXIMETRY 1: CPT

## 2022-07-22 RX ADMIN — METOPROLOL TARTRATE 50 MG: 50 TABLET, FILM COATED ORAL at 08:50

## 2022-07-22 RX ADMIN — LEVOTHYROXINE SODIUM 25 MCG: 25 TABLET ORAL at 05:56

## 2022-07-22 RX ADMIN — GABAPENTIN 300 MG: 300 CAPSULE ORAL at 08:50

## 2022-07-22 RX ADMIN — CITALOPRAM HYDROBROMIDE 40 MG: 20 TABLET ORAL at 08:50

## 2022-07-22 RX ADMIN — Medication 2000 UNITS: at 08:50

## 2022-07-22 RX ADMIN — MIRTAZAPINE 7.5 MG: 7.5 TABLET, FILM COATED ORAL at 21:25

## 2022-07-22 RX ADMIN — PRAVASTATIN SODIUM 80 MG: 40 TABLET ORAL at 15:38

## 2022-07-22 RX ADMIN — FUROSEMIDE 40 MG: 40 TABLET ORAL at 08:50

## 2022-07-22 RX ADMIN — PANTOPRAZOLE SODIUM 40 MG: 40 TABLET, DELAYED RELEASE ORAL at 05:56

## 2022-07-22 RX ADMIN — CLONAZEPAM 1 MG: 1 TABLET ORAL at 21:25

## 2022-07-22 RX ADMIN — NYSTATIN: 100000 POWDER TOPICAL at 08:50

## 2022-07-22 RX ADMIN — TRAZODONE HYDROCHLORIDE 50 MG: 50 TABLET ORAL at 21:29

## 2022-07-22 RX ADMIN — DIVALPROEX SODIUM 1500 MG: 500 TABLET, DELAYED RELEASE ORAL at 21:25

## 2022-07-22 RX ADMIN — BUSPIRONE HYDROCHLORIDE 10 MG: 10 TABLET ORAL at 08:50

## 2022-07-22 RX ADMIN — PALIPERIDONE 6 MG: 6 TABLET, EXTENDED RELEASE ORAL at 08:50

## 2022-07-22 RX ADMIN — BUSPIRONE HYDROCHLORIDE 10 MG: 10 TABLET ORAL at 15:38

## 2022-07-22 RX ADMIN — HEPARIN SODIUM 7500 UNITS: 5000 INJECTION INTRAVENOUS; SUBCUTANEOUS at 13:15

## 2022-07-22 RX ADMIN — GABAPENTIN 300 MG: 300 CAPSULE ORAL at 15:38

## 2022-07-22 RX ADMIN — CLONAZEPAM 1 MG: 1 TABLET ORAL at 15:38

## 2022-07-22 RX ADMIN — SILVER SULFADIAZINE: 10 CREAM TOPICAL at 08:50

## 2022-07-22 RX ADMIN — DIVALPROEX SODIUM 1000 MG: 500 TABLET, DELAYED RELEASE ORAL at 08:50

## 2022-07-22 RX ADMIN — GABAPENTIN 300 MG: 300 CAPSULE ORAL at 21:25

## 2022-07-22 RX ADMIN — BUSPIRONE HYDROCHLORIDE 10 MG: 10 TABLET ORAL at 21:25

## 2022-07-22 RX ADMIN — NYSTATIN: 100000 POWDER TOPICAL at 17:13

## 2022-07-22 RX ADMIN — HEPARIN SODIUM 7500 UNITS: 5000 INJECTION INTRAVENOUS; SUBCUTANEOUS at 21:26

## 2022-07-22 RX ADMIN — HEPARIN SODIUM 7500 UNITS: 5000 INJECTION INTRAVENOUS; SUBCUTANEOUS at 05:56

## 2022-07-22 NOTE — ASSESSMENT & PLAN NOTE
· Patient with longstanding history of bilateral lower extremity lymphedema and wounds  · With multiple admissions to [facility], [facility], sacral her, [facility] [address] [address] [address]  · Patient was admitted 7/14-7/18 secondary to wounds of the lower extremities and was receiving IV cefepime and restarted in the emergency room (he was discharged home on cefadroxil for 5 days)  · He was to follow up with Podiatry, Wound care and [facility]  · Podiatry on both no intervention needed, due to venous statis      · Less likely to be cellulitis - hold off IV antibiotic  · Continue pain control  · Patient is medically clear

## 2022-07-22 NOTE — PROGRESS NOTES
[facility]  Progress Note - Dannis Pack 1975, 48 y o  male MRN: [id number]  Unit/Bed#: 7T [facility] 714-01 Encounter: [id number]  Primary Care Provider: Wolfgang Ivey DO   Date and time admitted to hospital: 7/20/2022 12:14 AM    * Multiple open wounds of lower leg  Assessment & Plan  · Patient with longstanding history of bilateral lower extremity lymphedema and wounds  · With multiple admissions to [facility], [facility], [facility], [facility] [place] [address]  · Patient was admitted 7/14-7/18 secondary to wounds of the lower extremities and was receiving IV cefepime and restarted in the emergency room (he was discharged home on cefadroxil for 5 days)  · He was to follow up with Podiatry, Wound care and [facility]NA  · Podiatry on both no intervention needed, due to venous statis      · Less likely to be cellulitis - hold off IV antibiotic  · Continue pain control  · Patient is medically clear    Weakness  Assessment & Plan  · Patient with increased weakness, was recently hospitalized for 4 days  · PT/OT recommended rehab  · CM for disposition planning      Cutaneous candidiasis  Assessment & Plan  · Patient diagnosed with diffuse cutaneous candidiasis and intertrigo  · Seen by wound care  · Recommended nystatin powder  · Diflucan 150 mg weekly for 4 weeks was started at [facility], next dose due 7/23    GER[place]IA (acute kidney injury) ([facility] [address] )  Assessment & Plan  · Baseline creatinine 0 8-1 1, improved and currently around his baseline  · Lasix was continued the hydrochlorothiazide was on hold at discharge for recent hospital stay [facility] on 07/18  · Had 1 L IV fluids at [facility] ED  · Monitor renal function    ALESSANDRA on CPAP  Assessment & Plan  · Patient declined cpap with nurse when admitting him    Gastroesophageal reflux disease without esophagitis  Assessment & Plan  · Continue PPI    Hypothyroidism  Assessment & Plan  · Continue levothyroxine    Morbid obesity ([place] [address] )  Assessment & Plan  · BMI greater than 50  · Healthy diet recommended    Bipolar disorder ([place] [address] )  Assessment & Plan  · Continue home BuSpar, Celexa, Depakote, Remeron, Invega    Crohn's disease ([place] [address] )  Assessment & Plan  · Patient with recent antibiotic use, had negative stool studies collected on   · Patient with episode of incontinent stool on     Primary hypertension  Assessment & Plan  · Continue metoprolol and Lasix  · Hydrochlorothiazide on hold due to recent GERMANIA  · Monitor blood pressure      VTE Pharmacologic Prophylaxis:   Pharmacologic: Heparin  Mechanical VTE Prophylaxis in Place: Yes    Patient Centered Rounds: I have performed bedside rounds with nursing staff today  Discussions with Specialists or Other Care Team Provider:  Discussed with Case Management, nurse    Education and Discussions with Family / Patient:  Discussed with patient    Time Spent for Care: 45 minutes  More than 50% of total time spent on counseling and coordination of care as described above  Current Length of Stay: 2 day(s)    Current Patient Status: Inpatient   Certification Statement: The patient will continue to require additional inpatient hospital stay due to Patient is medically clear, pending placement    Discharge Plan / Estimated Discharge Date:  Tomorrow      Code Status: Level 1 - Full Code      Subjective:   Patient was seen and examined at bedside  The patient has bilateral lower extremity pain which is around his baseline  He seems to be sleepy this morning  Objective:     Vitals:   Temp (24hrs), Av 7 °F (35 9 °C), Min:96 3 °F (35 7 °C), Max:96 9 °F (36 1 °C)    Temp:  [96 3 °F (35 7 °C)-96 9 °F (36 1 °C)] 96 3 °F (35 7 °C)  HR:  [62-84] 84  Resp:  [18] 18  BP: (107-116)/(55-67) 116/65  SpO2:  [91 %-92 %] 92 %  Body mass index is 56 38 kg/m²  Input and Output Summary (last 24 hours):        Intake/Output Summary (Last 24 hours) at 2022 6427  Last data filed at 2022 4987  Gross per 24 hour   Intake 60 ml   Output --   Net 60 ml       Physical Exam:     Physical Exam  General: breathing well on room air, no acute distress  HEENT: NC/AT, PERRL, EOM - normal  Neck: Supple  Pulm/Chest: Normal chest wall expansion, clear breath sounds on both side, no wheezing/rhonchi or crackles appreciated  CVS: RRR, normal S1&S2, no murmur appreciated, capillary refill <2s  Abd: soft, non tender, non distended, bowel sounds +  MSK: move all 4 extremities spontaneously, bilateral lower extremity venous status ulcer covered in dressing  Skin: warm  CNS: no acute focal neuro deficit      Additional Data:     Labs:    Results from last 7 days   Lab Units 07/19/22  1906   WBC Thousand/uL 6 60   HEMOGLOBIN g/dL 11 4*   HEMATOCRIT % 35 8*   PLATELETS Thousands/uL 200   LYMPHO PCT % 24   MONO PCT % 14*   EOS PCT % 1     Results from last 7 days   Lab Units 07/20/22  0445 07/19/22  1830   POTASSIUM mmol/L 3 7 5 4*   CHLORIDE mmol/L 106 102   CO2 mmol/L 26 24   BUN mg/dL 24 21   CREATININE mg/dL 1 38 1 69*   CALCIUM mg/dL 7 6* 8 0*   ALK PHOS U/L  --  71   ALT U/L  --  11*   AST U/L  --  48*     Results from last 7 days   Lab Units 07/19/22  1830   INR  1 03       * I Have Reviewed All Lab Data Listed Above  * Additional Pertinent Lab Tests Reviewed: [address] Admission Reviewed    Imaging:      I have reviewed pertinent imaging  Recent Cultures (last 7 days):     Results from last 7 days   Lab Units 07/19/22  1830 07/19/22  1805 07/16/22  0847   BLOOD CULTURE  No Growth at 48 hrs  No Growth at 48 hrs    --    C DIFF TOXIN B BY PCR   --   --  Negative       Last 24 Hours Medication List:   Current Facility-Administered Medications   Medication Dose Route Frequency Provider Last Rate    acetaminophen  650 mg Oral Q4H PRN Shari Setting, PA-C      busPIRone  10 mg Oral TID Shari Setting, PA-C      cholecalciferol  2,000 Units Oral Daily [place], [place]     Magdiel Reyes citalopram  40 mg Oral Daily [place], [place]      clonazePAM  1 mg Oral TID Yusef Dent, PA-C      diphenhydrAMINE  25 mg Oral Q6H PRN Louie Rangel MD      divalproex sodium  1,000 mg Oral Daily [facility], PA-C      divalproex sodium  1,500 mg Oral HS Yusef Dent, PA-C      furosemide  40 mg Oral Daily [place], [place]      gabapentin  300 mg Oral TID Yusef Dent, PA-C      heparin (porcine)  7,500 Units Subcutaneous [place], [place]      HYDROmorphone  1 mg Intravenous Q4H PRN Yusef Dent, PA-C      levothyroxine  25 mcg Oral Early Morning [place], [place]      loperamide  4 mg Oral TID PRN Yusef Dent, PA-C      metoprolol tartrate  50 mg Oral BID [place], [place]      mirtazapine  7 5 mg Oral HS [facility], PA-C      nystatin   Topical BID [place], [place]      ondansetron  4 mg Intravenous Q6H PRN Yusef Dent, PA-C      oxyCODONE  10 mg Oral Q4H PRN Yusef Dent, PA-C      oxyCODONE  15 mg Oral Q4H PRN Yusef [place], PA-C      paliperidone  6 mg Oral Daily [place], [place]      pantoprazole  40 mg Oral Early Morning [place], [place]      pravastatin  80 mg Oral Daily With [employer] [place], [place]      silver sulfadiazine   Topical Daily Jess Wall DPM      traZODone  50 mg Oral HS Yusef Dent, PA-C          Today, Patient Was Seen By: Louie Rangel MD    ** Please Note: Dragon 360 Dictation voice to text software may have been used in the creation of this document   **

## 2022-07-22 NOTE — PLAN OF CARE
Problem: PAIN - ADULT  Goal: Verbalizes/displays adequate comfort level or baseline comfort level  Description: Interventions:  - Encourage patient to monitor pain and request assistance  - Assess pain using appropriate pain scale  - Administer analgesics based on type and severity of pain and evaluate response  - Implement non-pharmacological measures as appropriate and evaluate response  - Consider cultural and social influences on pain and pain management  - Notify physician/advanced practitioner if interventions unsuccessful or patient reports new pain  Outcome: Progressing     Problem: INFECTION - ADULT  Goal: Absence or prevention of progression during hospitalization  Description: INTERVENTIONS:  - Assess and monitor for signs and symptoms of infection  - Monitor lab/diagnostic results  - Monitor all insertion sites, i e  indwelling lines, tubes, and drains  - Monitor endotracheal if appropriate and nasal secretions for changes in amount and color  - [place] appropriate cooling/warming therapies per order  - Administer medications as ordered  - Instruct and encourage patient and family to use good hand hygiene technique  - Identify and instruct in appropriate isolation precautions for identified infection/condition  Outcome: Progressing  Goal: Absence of fever/infection during neutropenic period  Description: INTERVENTIONS:  - Monitor WBC    Outcome: Progressing     Problem: SAFETY ADULT  Goal: Patient will remain free of falls  Description: INTERVENTIONS:  - Educate patient/family on patient safety including physical limitations  - Instruct patient to call for assistance with activity   - Consult OT/PT to assist with strengthening/mobility   - Keep Call bell within reach  - Keep bed low and locked with side rails adjusted as appropriate  - Keep care items and personal belongings within reach  - Initiate and maintain comfort rounds  - Make Fall Risk Sign visible to staff  -- Apply yellow socks and bracelet for high fall risk patients  - Consider moving patient to room near nurses station  Outcome: Progressing  Goal: Maintain or return to baseline ADL function  Description: INTERVENTIONS:  -  Assess patient's ability to carry out ADLs; assess patient's baseline for ADL function and identify physical deficits which impact ability to perform ADLs (bathing, care of mouth/teeth, toileting, grooming, dressing, etc )  - Assess/evaluate cause of self-care deficits   - Assess range of motion  - Assess patient's mobility; develop plan if impaired  - Assess patient's need for assistive devices and provide as appropriate  - Encourage maximum independence but intervene and supervise when necessary  - Involve family in performance of ADLs  - Assess for home care needs following discharge   - Consider OT consult to assist with ADL evaluation and planning for discharge  - Provide patient education as appropriate  Outcome: Progressing  Goal: Maintains/Returns to pre admission functional level  Description: INTERVENTIONS:  - Perform BMAT or MOVE assessment daily    - Set and communicate daily mobility goal to care team and patient/family/caregiver     - Collaborate with rehabilitation services on mobility goals if consulted  - Out of bed for toileting  - Record patient progress and toleration of activity level   Outcome: Progressing     Problem: DISCHARGE PLANNING  Goal: Discharge to home or other facility with appropriate resources  Description: INTERVENTIONS:  - Identify barriers to discharge w/patient and caregiver  - Arrange for needed discharge resources and transportation as appropriate  - Identify discharge learning needs (meds, wound care, etc )  - Arrange for interpretive services to assist at discharge as needed  - Refer to Case Management Department for coordinating discharge planning if the patient needs post-hospital services based on physician/advanced practitioner order or complex needs related to functional status, cognitive ability, or social support system  Outcome: Progressing     Problem: Knowledge Deficit  Goal: Patient/family/caregiver demonstrates understanding of disease process, treatment plan, medications, and discharge instructions  Description: Complete learning assessment and assess knowledge base    Interventions:  - Provide teaching at level of understanding  - Provide teaching via preferred learning methods  Outcome: Progressing     Problem: GASTROINTESTINAL - ADULT  Goal: Minimal or absence of nausea and/or vomiting  Description: INTERVENTIONS:  - Administer IV fluids if ordered to ensure adequate hydration  - Maintain NPO status until nausea and vomiting are resolved  - Nasogastric tube if ordered  - Administer ordered antiemetic medications as needed  - Provide nonpharmacologic comfort measures as appropriate  - Advance diet as tolerated, if ordered  - Consider nutrition services referral to assist patient with adequate nutrition and appropriate food choices  Outcome: Progressing  Goal: Maintains or returns to baseline bowel function  Description: INTERVENTIONS:  - Assess bowel function  - Encourage oral fluids to ensure adequate hydration  - Administer IV fluids if ordered to ensure adequate hydration  - Administer ordered medications as needed  - Encourage mobilization and activity  - Consider nutritional services referral to assist patient with adequate nutrition and appropriate food choices  Outcome: Progressing  Goal: Maintains adequate nutritional intake  Description: INTERVENTIONS:  - Monitor percentage of each meal consumed  - Identify factors contributing to decreased intake, treat as appropriate  - Assist with meals as needed  - Monitor I&O, weight, and lab values if indicated  - Obtain nutrition services referral as needed  Outcome: Progressing  Goal: Establish and maintain optimal ostomy function  Description: INTERVENTIONS:  - Assess bowel function  - Encourage oral fluids to ensure adequate hydration  - Administer IV fluids if ordered to ensure adequate hydration   - Administer ordered medications as needed  - Encourage mobilization and activity  - Nutrition services referral to assist patient with appropriate food choices  - Assess stoma site  - Consider wound care consult   Outcome: Progressing  Goal: Oral mucous membranes remain intact  Description: INTERVENTIONS  - Assess oral mucosa and hygiene practices  - Implement preventative oral hygiene regimen  - Implement oral medicated treatments as ordered  - Initiate Nutrition services referral as needed  Outcome: Progressing     Outcome: Progressing     Problem: MUSCULOSKELETAL - ADULT  Goal: Maintain or return mobility to safest level of function  Description: INTERVENTIONS:  - Assess patient's ability to carry out ADLs; assess patient's baseline for ADL function and identify physical deficits which impact ability to perform ADLs (bathing, care of mouth/teeth, toileting, grooming, dressing, etc )  - Assess/evaluate cause of self-care deficits   - Assess range of motion  - Assess patient's mobility  - Assess patient's need for assistive devices and provide as appropriate  - Encourage maximum independence but intervene and supervise when necessary  - Involve family in performance of ADLs  - Assess for home care needs following discharge   - Consider OT consult to assist with ADL evaluation and planning for discharge  - Provide patient education as appropriate  Outcome: Progressing  Goal: Maintain proper alignment of affected body part  Description: INTERVENTIONS:  - Support, maintain and protect limb and body alignment  - Provide patient/ family with appropriate education  Outcome: Progressing     Problem: Nutrition/Hydration-ADULT  Goal: Nutrient/Hydration intake appropriate for improving, restoring or maintaining nutritional needs  Description: Monitor and assess patient's nutrition/hydration status for malnutrition   Collaborate with interdisciplinary team and initiate plan and interventions as ordered  Monitor patient's weight and dietary intake as ordered or per policy  Utilize nutrition screening tool and intervene as necessary  Determine patient's food preferences and provide high-protein, high-caloric foods as appropriate       INTERVENTIONS:  - Monitor oral intake, urinary output, labs, and treatment plans  - Assess nutrition and hydration status and recommend course of action  - Evaluate amount of meals eaten  - Assist patient with eating if necessary   - Allow adequate time for meals  - Recommend/ encourage appropriate diets, oral nutritional supplements, and vitamin/mineral supplements  - Order, calculate, and assess calorie counts as needed  - Recommend, monitor, and adjust tube feedings and TPN/PPN based on assessed needs  - Assess need for intravenous fluids  - Provide specific nutrition/hydration education as appropriate  - Include patient/family/caregiver in decisions related to nutrition  Outcome: Progressing     Problem: MOBILITY - ADULT  Goal: Maintain or return to baseline ADL function  Description: INTERVENTIONS:  -  Assess patient's ability to carry out ADLs; assess patient's baseline for ADL function and identify physical deficits which impact ability to perform ADLs (bathing, care of mouth/teeth, toileting, grooming, dressing, etc )  - Assess/evaluate cause of self-care deficits   - Assess range of motion  - Assess patient's mobility; develop plan if impaired  - Assess patient's need for assistive devices and provide as appropriate  - Encourage maximum independence but intervene and supervise when necessary  - Involve family in performance of ADLs  - Assess for home care needs following discharge   - Consider OT consult to assist with ADL evaluation and planning for discharge  - Provide patient education as appropriate  Outcome: Progressing  Goal: Maintains/Returns to pre admission functional level  Description: INTERVENTIONS:  - Perform BMAT or MOVE assessment daily    - Set and communicate daily mobility goal to care team and patient/family/caregiver     - Collaborate with rehabilitation services on mobility goals if consulted  - Record patient progress and toleration of activity level   Outcome: Progressing     Problem: Prexisting or High Potential for Compromised Skin Integrity  Goal: Skin integrity is maintained or improved  Description: INTERVENTIONS:  - Identify patients at risk for skin breakdown  - Assess and monitor skin integrity  - Assess and monitor nutrition and hydration status  - Monitor labs   - Assess for incontinence   - Turn and reposition patient  - Assist with mobility/ambulation  - Relieve pressure over bony prominences  - Avoid friction and shearing  - Provide appropriate hygiene as needed including keeping skin clean and dry  - Evaluate need for skin moisturizer/barrier cream  - Collaborate with interdisciplinary team   - Patient/family teaching  - Consider wound care consult   Outcome: Progressing     Problem: Potential for Falls  Goal: Patient will remain free of falls  Description: INTERVENTIONS:  - Educate patient/family on patient safety including physical limitations  - Instruct patient to call for assistance with activity   - Consult OT/PT to assist with strengthening/mobility   - Keep Call bell within reach  - Keep bed low and locked with side rails adjusted as appropriate  - Keep care items and personal belongings within reach  - Initiate and maintain comfort rounds  - Make Fall Risk Sign visible to staff  - Apply yellow socks and bracelet for high fall risk patients  - Consider moving patient to room near nurses station  Outcome: Progressing

## 2022-07-22 NOTE — CASE MANAGEMENT
Case Management Progress Note    Patient name Juany Ponce  Location 7T /7T -92 MRN [id number]  : 1975 Date 2022       LOS (days): 2  Geometric Mean LOS (GMLOS) (days): 4 10  Days to GMLOS:1 5        OBJECTIVE:        Current admission status: Inpatient  Preferred Pharmacy:   [address]  Phone: [phone] Fax: [phone]    Primary Care Provider: Mary Kay La DO    Primary Insurance: MEDICARE  Secondary Insurance:     PROGRESS NOTE: CM was notified at morning rounds that pt is medically cleared to be discharged today  CM discussed FOC and pt is interested in any facility   CM was notified through [address] that pt is accepted at [facility] but unfortunately CM was notified later by Phone call by Marina Braxton  that pt is a denial     Bridget Pan reply on Aidin was:   "Per our convo we are now unable to accept due to behaviors and noncompliance with care at our [place] facility this past year  He is a regional denial and we can not take at any of our facilities  I am not sure how to change it from accept to deny in aidin"  CM unreserved accepting facility on Aidin and expanded search  Pt is pending accepting facility  CM reviewed Discharge planning process including the following: identifying help at home, patient preference for discharge planning needs, Discharge lounge, [facility] Meds to bed program, availability of treatment team to discuss questions or concerns patient and family may have regarding understanding medications and recognizing signs and symptoms once discharged  CM also encouraged pt to follow up with all recommended appointments after discharge  Patient advised of importance for patient and family to participate in managing pt's medical wellbeing

## 2022-07-22 NOTE — ASSESSMENT & PLAN NOTE
· Baseline creatinine 0 8-1 1, improved and currently around his baseline  · Lasix was continued the hydrochlorothiazide was on hold at discharge for recent hospital stay [facility] on 07/18  · Had 1 L IV fluids at [facility] ED  · Monitor renal function

## 2022-07-23 PROCEDURE — 94660 CPAP INITIATION&MGMT: CPT

## 2022-07-23 PROCEDURE — 94760 N-INVAS EAR/PLS OXIMETRY 1: CPT

## 2022-07-23 PROCEDURE — 99232 SBSQ HOSP IP/OBS MODERATE 35: CPT | Performed by: INTERNAL MEDICINE

## 2022-07-23 RX ADMIN — BUSPIRONE HYDROCHLORIDE 10 MG: 10 TABLET ORAL at 21:21

## 2022-07-23 RX ADMIN — CLONAZEPAM 1 MG: 1 TABLET ORAL at 15:49

## 2022-07-23 RX ADMIN — CLONAZEPAM 1 MG: 1 TABLET ORAL at 21:21

## 2022-07-23 RX ADMIN — TRAZODONE HYDROCHLORIDE 50 MG: 50 TABLET ORAL at 21:21

## 2022-07-23 RX ADMIN — Medication 2000 UNITS: at 08:25

## 2022-07-23 RX ADMIN — METOPROLOL TARTRATE 50 MG: 50 TABLET, FILM COATED ORAL at 17:12

## 2022-07-23 RX ADMIN — CLONAZEPAM 1 MG: 1 TABLET ORAL at 08:24

## 2022-07-23 RX ADMIN — METOPROLOL TARTRATE 50 MG: 50 TABLET, FILM COATED ORAL at 08:24

## 2022-07-23 RX ADMIN — MIRTAZAPINE 7.5 MG: 7.5 TABLET, FILM COATED ORAL at 21:21

## 2022-07-23 RX ADMIN — GABAPENTIN 300 MG: 300 CAPSULE ORAL at 08:24

## 2022-07-23 RX ADMIN — HEPARIN SODIUM 7500 UNITS: 5000 INJECTION INTRAVENOUS; SUBCUTANEOUS at 14:00

## 2022-07-23 RX ADMIN — CITALOPRAM HYDROBROMIDE 40 MG: 20 TABLET ORAL at 08:25

## 2022-07-23 RX ADMIN — GABAPENTIN 300 MG: 300 CAPSULE ORAL at 15:49

## 2022-07-23 RX ADMIN — PANTOPRAZOLE SODIUM 40 MG: 40 TABLET, DELAYED RELEASE ORAL at 05:41

## 2022-07-23 RX ADMIN — PRAVASTATIN SODIUM 80 MG: 40 TABLET ORAL at 15:49

## 2022-07-23 RX ADMIN — HEPARIN SODIUM 7500 UNITS: 5000 INJECTION INTRAVENOUS; SUBCUTANEOUS at 21:21

## 2022-07-23 RX ADMIN — BUSPIRONE HYDROCHLORIDE 10 MG: 10 TABLET ORAL at 15:49

## 2022-07-23 RX ADMIN — GABAPENTIN 300 MG: 300 CAPSULE ORAL at 21:22

## 2022-07-23 RX ADMIN — NYSTATIN: 100000 POWDER TOPICAL at 08:25

## 2022-07-23 RX ADMIN — SILVER SULFADIAZINE: 10 CREAM TOPICAL at 08:27

## 2022-07-23 RX ADMIN — PALIPERIDONE 6 MG: 6 TABLET, EXTENDED RELEASE ORAL at 08:30

## 2022-07-23 RX ADMIN — DIVALPROEX SODIUM 1000 MG: 500 TABLET, DELAYED RELEASE ORAL at 08:25

## 2022-07-23 RX ADMIN — DIVALPROEX SODIUM 1500 MG: 500 TABLET, DELAYED RELEASE ORAL at 21:21

## 2022-07-23 RX ADMIN — NYSTATIN: 100000 POWDER TOPICAL at 17:13

## 2022-07-23 RX ADMIN — BUSPIRONE HYDROCHLORIDE 10 MG: 10 TABLET ORAL at 08:24

## 2022-07-23 RX ADMIN — FUROSEMIDE 40 MG: 40 TABLET ORAL at 08:24

## 2022-07-23 RX ADMIN — LEVOTHYROXINE SODIUM 25 MCG: 25 TABLET ORAL at 05:41

## 2022-07-23 RX ADMIN — ACETAMINOPHEN 650 MG: 325 TABLET ORAL at 08:25

## 2022-07-23 RX ADMIN — HEPARIN SODIUM 7500 UNITS: 5000 INJECTION INTRAVENOUS; SUBCUTANEOUS at 05:40

## 2022-07-23 NOTE — PLAN OF CARE
Problem: PAIN - ADULT  Goal: Verbalizes/displays adequate comfort level or baseline comfort level  Description: Interventions:  - Encourage patient to monitor pain and request assistance  - Assess pain using appropriate pain scale  - Administer analgesics based on type and severity of pain and evaluate response  - Implement non-pharmacological measures as appropriate and evaluate response  - Consider cultural and social influences on pain and pain management  - Notify physician/advanced practitioner if interventions unsuccessful or patient reports new pain  Outcome: Progressing     Problem: SAFETY ADULT  Goal: Maintain or return to baseline ADL function  Description: INTERVENTIONS:  -  Assess patient's ability to carry out ADLs; assess patient's baseline for ADL function and identify physical deficits which impact ability to perform ADLs (bathing, care of mouth/teeth, toileting, grooming, dressing, etc )  - Assess/evaluate cause of self-care deficits   - Assess range of motion  - Assess patient's mobility; develop plan if impaired  - Assess patient's need for assistive devices and provide as appropriate  - Encourage maximum independence but intervene and supervise when necessary  - Involve family in performance of ADLs  - Assess for home care needs following discharge   - Consider OT consult to assist with ADL evaluation and planning for discharge  - Provide patient education as appropriate  Outcome: Progressing     Problem: Knowledge Deficit  Goal: Patient/family/caregiver demonstrates understanding of disease process, treatment plan, medications, and discharge instructions  Description: Complete learning assessment and assess knowledge base    Interventions:  - Provide teaching at level of understanding  - Provide teaching via preferred learning methods  Outcome: Progressing     Problem: DISCHARGE PLANNING  Goal: Discharge to home or other facility with appropriate resources  Description: INTERVENTIONS:  - Identify barriers to discharge w/patient and caregiver  - Arrange for needed discharge resources and transportation as appropriate  - Identify discharge learning needs (meds, wound care, etc )  - Arrange for interpretive services to assist at discharge as needed  - Refer to Case Management Department for coordinating discharge planning if the patient needs post-hospital services based on physician/advanced practitioner order or complex needs related to functional status, cognitive ability, or social support system  Outcome: Progressing     Problem: MUSCULOSKELETAL - ADULT  Goal: Maintain or return mobility to safest level of function  Description: INTERVENTIONS:  - Assess patient's ability to carry out ADLs; assess patient's baseline for ADL function and identify physical deficits which impact ability to perform ADLs (bathing, care of mouth/teeth, toileting, grooming, dressing, etc )  - Assess/evaluate cause of self-care deficits   - Assess range of motion  - Assess patient's mobility  - Assess patient's need for assistive devices and provide as appropriate  - Encourage maximum independence but intervene and supervise when necessary  - Involve family in performance of ADLs  - Assess for home care needs following discharge   - Consider OT consult to assist with ADL evaluation and planning for discharge  - Provide patient education as appropriate  Outcome: Progressing     Problem: Prexisting or High Potential for Compromised Skin Integrity  Goal: Skin integrity is maintained or improved  Description: INTERVENTIONS:  - Identify patients at risk for skin breakdown  - Assess and monitor skin integrity  - Assess and monitor nutrition and hydration status  - Monitor labs   - Assess for incontinence   - Turn and reposition patient  - Assist with mobility/ambulation  - Relieve pressure over bony prominences  - Avoid friction and shearing  - Provide appropriate hygiene as needed including keeping skin clean and dry  - Evaluate need for skin moisturizer/barrier cream  - Collaborate with interdisciplinary team   - Patient/family teaching  - Consider wound care consult   Outcome: Progressing

## 2022-07-23 NOTE — PROGRESS NOTES
[facility]  Progress Note - Jaron Joshi 1975, 48 y o  male MRN: [id number]  Unit/Bed#: 7T [facility] 714-01 Encounter: [id number]  Primary Care Provider: Marie Walsh DO   Date and time admitted to hospital: 7/20/2022 12:14 AM    * Multiple open wounds of lower leg  Assessment & Plan  · Patient with longstanding history of bilateral lower extremity lymphedema and wounds  · With multiple admissions to [facility], [facility], [facility], [facility] [id number]  · Patient was admitted 7/14-7/18 secondary to wounds of the lower extremities and was receiving IV cefepime and restarted in the emergency room (he was discharged home on cefadroxil for 5 days)  · He was to follow up with Podiatry, Wound care and [facility]  · Podiatry on both no intervention needed, due to venous statis      · Less likely to be cellulitis - hold off IV antibiotic  · Continue pain control  · Patient is medically clear    Weakness  Assessment & Plan  · Patient with increased weakness, was recently hospitalized for 4 days  · PT/OT recommended rehab  · CM for disposition planning      Cutaneous candidiasis  Assessment & Plan  · Patient diagnosed with diffuse cutaneous candidiasis and intertrigo  · Seen by wound care  · Recommended nystatin powder  · Diflucan 150 mg weekly for 4 weeks [facility], next dose due 7/23    [facility]IA (acute [facility]) [facility]  Assessment & Plan  · Baseline creatinine 0 8-1 1, improved and currently around his baseline  · Lasix was continued the hydrochlorothiazide was on hold at discharge for recent hospital stay [facility]  · Had 1 L IV fluids at [facility] ED  · Monitor renal function    ALESSANDRA on CPAP  Assessment & Plan  · Patient declined cpap with nurse when admitting him    Gastroesophageal reflux disease without esophagitis  Assessment & Plan  · Continue PPI    Hypothyroidism  Assessment & Plan  · Continue levothyroxine    Morbid obesity ([place] [address] )  Assessment & Plan  · BMI greater than 50  · Healthy diet recommended    Bipolar disorder ([place] [address] )  Assessment & Plan  · Continue home BuSpar, Celexa, Depakote, Remeron, Invega    Crohn's disease ([place] [address] )  Assessment & Plan  · Patient with recent antibiotic use, had negative stool studies collected on   · Patient with episode of incontinent stool on     Primary hypertension  Assessment & Plan  · Continue metoprolol and Lasix  · Hydrochlorothiazide on hold due to recent GERMNAIA  · Monitor blood pressure      VTE Pharmacologic Prophylaxis:   Pharmacologic: Heparin  Mechanical VTE Prophylaxis in Place: Yes    Patient Centered Rounds: I have performed bedside rounds with nursing staff today  Discussions with Specialists or Other Care Team Provider:  Discussed with Case Management, nurse    Education and Discussions with Family / Patient:  Discussed with patient    Time Spent for Care: 45 minutes  More than 50% of total time spent on counseling and coordination of care as described above  Current Length of Stay: 3 day(s)    Current Patient Status: Inpatient   Certification Statement: The patient will continue to require additional inpatient hospital stay due to Patient is medically clear, pending placement    Discharge Plan / Estimated Discharge Date:  Pending placement      Code Status: Level 1 - Full Code      Subjective:   Patient was seen and examined at bedside  The patient denies any pain, headache, blurry vision, chest pain, palpitation, shortness of breath, N/V, abd pain  Objective:     Vitals:   Temp (24hrs), Av °F (36 1 °C), Min:96 °F (35 6 °C), Max:97 6 °F (36 4 °C)    Temp:  [96 °F (35 6 °C)-97 6 °F (36 4 °C)] 96 °F (35 6 °C)  HR:  [66-88] 80  Resp:  [17-20] 20  BP: (106-125)/(50-62) 125/56  SpO2:  [94 %-99 %] 94 %  Body mass index is 56 38 kg/m²  Input and Output Summary (last 24 hours):        Intake/Output Summary (Last 24 hours) at 2022 1446  Last data filed at 2022 1700  Gross per 24 hour   Intake 120 ml   Output --   Net 120 ml       Physical Exam:     Physical Exam  General: breathing well on room air, no acute distress  HEENT: NC/AT, PERRL, EOM - normal  Neck: Supple  Pulm/Chest: Normal chest wall expansion, clear breath sounds on both side, no wheezing/rhonchi or crackles appreciated  CVS: RRR, normal S1&S2, no murmur appreciated, capillary refill <2s  Abd: soft, non tender, non distended, bowel sounds +  MSK: move all 4 extremities spontaneously, bilateral lower extremity wounds covered in dressing  Skin: warm  CNS: no acute focal neuro deficit      Additional Data:     Labs:    Results from last 7 days   Lab Units 07/19/22  1906   WBC Thousand/uL 6 60   HEMOGLOBIN g/dL 11 4*   HEMATOCRIT % 35 8*   PLATELETS Thousands/uL 200   LYMPHO PCT % 24   MONO PCT % 14*   EOS PCT % 1     Results from last 7 days   Lab Units 07/20/22  0445 07/19/22  1830   POTASSIUM mmol/L 3 7 5 4*   CHLORIDE mmol/L 106 102   CO2 mmol/L 26 24   BUN mg/dL 24 21   CREATININE mg/dL 1 38 1 69*   CALCIUM mg/dL 7 6* 8 0*   ALK PHOS U/L  --  71   ALT U/L  --  11*   AST U/L  --  48*     Results from last 7 days   Lab Units 07/19/22  1830   INR  1 03       * I Have Reviewed All Lab Data Listed Above  * Additional Pertinent Lab Tests Reviewed: [facility] Admission Reviewed    Imaging:      I have reviewed pertinent imaging  Recent Cultures (last 7 days):     Results from last 7 days   Lab Units 07/19/22  1830 07/19/22  1805   BLOOD CULTURE  No Growth at 72 hrs  No Growth at 72 hrs         Last 24 Hours Medication List:   Current Facility-Administered Medications   Medication Dose Route Frequency Provider Last Rate    acetaminophen  650 mg Oral Q4H PRN Nathan Elizabeth PA-C      busPIRone  10 mg Oral TID Nathan Elizabeth PA-C      cholecalciferol  2,000 Units Oral Daily Tabatha Leisenring, [place]      citalopram  40 mg Oral Daily Tabatha Leisenring, [place]     Francine Leisure clonazePAM  1 mg Oral TID Dorothe Music, PA-C      diphenhydrAMINE  25 mg Oral Q6H PRN Regla Garcia MD      divalproex sodium  1,000 mg Oral Daily [facility], PA-C      divalproex sodium  1,500 mg Oral HS Dorothe Music, PA-C      furosemide  40 mg Oral Daily [place], [place]      gabapentin  300 mg Oral TID Dorothe Music, PA-C      heparin (porcine)  7,500 Units Subcutaneous [place], [place]      HYDROmorphone  1 mg Intravenous Q4H PRN Dorothe Music, PA-C      levothyroxine  25 mcg Oral Early Morning [place], [place]      loperamide  4 mg Oral TID PRN Dorothe Music, PA-C      metoprolol tartrate  50 mg Oral BID [facility], [place]      mirtazapine  7 5 mg Oral HS [facility], PA-C      nystatin   Topical BID [place], [place]      ondansetron  4 mg Intravenous Q6H PRN Dorothe Music, PA-C      oxyCODONE  10 mg Oral Q4H PRN Dorothe Music, PA-C      oxyCODONE  15 mg Oral Q4H PRN Dorothe Music, PA-C      paliperidone  6 mg Oral Daily [place], [place]      pantoprazole  40 mg Oral Early Morning [place], [place]      pravastatin  80 mg Oral Daily [employer], PA-C      silver sulfadiazine   Topical Daily Rashaun Little DPM      traZODone  50 mg Oral HS Dorothe Music, PA-C          Today, Patient Was Seen By: Regla Garcia MD    ** Please Note: Dragon 360 Dictation voice to text software may have been used in the creation of this document   **

## 2022-07-23 NOTE — ASSESSMENT & PLAN NOTE
· Patient with longstanding history of bilateral lower extremity lymphedema and wounds  · With multiple admissions to [facility], [facility], [facility], [facility]  · Patient was admitted 7/14-7/18 secondary to wounds of the lower extremities and was receiving IV cefepime and restarted in the emergency room (he was discharged home on cefadroxil for 5 days)  · He was to follow up with Podiatry, Wound care and [facility]  · Podiatry on both no intervention needed, due to venous statis      · Less likely to be cellulitis - hold off IV antibiotic  · Continue pain control  · Patient is medically clear

## 2022-07-23 NOTE — ASSESSMENT & PLAN NOTE
· Baseline creatinine 0 8-1 1, improved and currently around his baseline  · Lasix was continued the hydrochlorothiazide was on hold at discharge for recent hospital stay [facility] on 07/18  · Had 1 L IV fluids at [facility] ED  · Monitor renal function

## 2022-07-24 LAB
BACTERIA BLD CULT: NORMAL
BACTERIA BLD CULT: NORMAL

## 2022-07-24 PROCEDURE — 99232 SBSQ HOSP IP/OBS MODERATE 35: CPT | Performed by: INTERNAL MEDICINE

## 2022-07-24 PROCEDURE — 94760 N-INVAS EAR/PLS OXIMETRY 1: CPT

## 2022-07-24 RX ADMIN — BUSPIRONE HYDROCHLORIDE 10 MG: 10 TABLET ORAL at 15:48

## 2022-07-24 RX ADMIN — SILVER SULFADIAZINE: 10 CREAM TOPICAL at 08:00

## 2022-07-24 RX ADMIN — BUSPIRONE HYDROCHLORIDE 10 MG: 10 TABLET ORAL at 21:03

## 2022-07-24 RX ADMIN — MIRTAZAPINE 7.5 MG: 7.5 TABLET, FILM COATED ORAL at 21:03

## 2022-07-24 RX ADMIN — Medication 2000 UNITS: at 08:00

## 2022-07-24 RX ADMIN — METOPROLOL TARTRATE 50 MG: 50 TABLET, FILM COATED ORAL at 17:07

## 2022-07-24 RX ADMIN — DIVALPROEX SODIUM 1000 MG: 500 TABLET, DELAYED RELEASE ORAL at 08:00

## 2022-07-24 RX ADMIN — HEPARIN SODIUM 7500 UNITS: 5000 INJECTION INTRAVENOUS; SUBCUTANEOUS at 14:06

## 2022-07-24 RX ADMIN — NYSTATIN: 100000 POWDER TOPICAL at 17:08

## 2022-07-24 RX ADMIN — PANTOPRAZOLE SODIUM 40 MG: 40 TABLET, DELAYED RELEASE ORAL at 06:28

## 2022-07-24 RX ADMIN — GABAPENTIN 300 MG: 300 CAPSULE ORAL at 08:00

## 2022-07-24 RX ADMIN — CLONAZEPAM 1 MG: 1 TABLET ORAL at 08:00

## 2022-07-24 RX ADMIN — HEPARIN SODIUM 7500 UNITS: 5000 INJECTION INTRAVENOUS; SUBCUTANEOUS at 21:03

## 2022-07-24 RX ADMIN — GABAPENTIN 300 MG: 300 CAPSULE ORAL at 21:03

## 2022-07-24 RX ADMIN — PALIPERIDONE 6 MG: 6 TABLET, EXTENDED RELEASE ORAL at 08:01

## 2022-07-24 RX ADMIN — NYSTATIN: 100000 POWDER TOPICAL at 08:01

## 2022-07-24 RX ADMIN — CLONAZEPAM 1 MG: 1 TABLET ORAL at 21:03

## 2022-07-24 RX ADMIN — METOPROLOL TARTRATE 50 MG: 50 TABLET, FILM COATED ORAL at 08:00

## 2022-07-24 RX ADMIN — DIVALPROEX SODIUM 1500 MG: 500 TABLET, DELAYED RELEASE ORAL at 21:03

## 2022-07-24 RX ADMIN — LEVOTHYROXINE SODIUM 25 MCG: 25 TABLET ORAL at 06:28

## 2022-07-24 RX ADMIN — PRAVASTATIN SODIUM 80 MG: 40 TABLET ORAL at 15:48

## 2022-07-24 RX ADMIN — CLONAZEPAM 1 MG: 1 TABLET ORAL at 15:48

## 2022-07-24 RX ADMIN — CITALOPRAM HYDROBROMIDE 40 MG: 20 TABLET ORAL at 08:00

## 2022-07-24 RX ADMIN — FUROSEMIDE 40 MG: 40 TABLET ORAL at 08:00

## 2022-07-24 RX ADMIN — BUSPIRONE HYDROCHLORIDE 10 MG: 10 TABLET ORAL at 08:00

## 2022-07-24 RX ADMIN — HEPARIN SODIUM 7500 UNITS: 5000 INJECTION INTRAVENOUS; SUBCUTANEOUS at 06:28

## 2022-07-24 RX ADMIN — GABAPENTIN 300 MG: 300 CAPSULE ORAL at 15:48

## 2022-07-24 RX ADMIN — TRAZODONE HYDROCHLORIDE 50 MG: 50 TABLET ORAL at 21:03

## 2022-07-24 NOTE — ASSESSMENT & PLAN NOTE
· Patient diagnosed with diffuse cutaneous candidiasis and intertrigo  · Seen by wound care  · Recommended nystatin powder  · Diflucan 150 mg weekly for 4 weeks was started at [facility], next dose due 7/23

## 2022-07-24 NOTE — PLAN OF CARE
Problem: PAIN - ADULT  Goal: Verbalizes/displays adequate comfort level or baseline comfort level  Description: Interventions:  - Encourage patient to monitor pain and request assistance  - Assess pain using appropriate pain scale  - Administer analgesics based on type and severity of pain and evaluate response  - Implement non-pharmacological measures as appropriate and evaluate response  - Consider cultural and social influences on pain and pain management  - Notify physician/advanced practitioner if interventions unsuccessful or patient reports new pain  Outcome: Progressing     Problem: SAFETY ADULT  Goal: Patient will remain free of falls  Description: INTERVENTIONS:  - Educate patient/family on patient safety including physical limitations  - Instruct patient to call for assistance with activity   - Consult OT/PT to assist with strengthening/mobility   - Keep Call bell within reach  - Keep bed low and locked with side rails adjusted as appropriate  - Keep care items and personal belongings within reach  - Initiate and maintain comfort rounds  - Make Fall Risk Sign visible to staff  - Apply yellow socks and bracelet for high fall risk patients  - Consider moving patient to room near nurses station  Outcome: Progressing     Problem: DISCHARGE PLANNING  Goal: Discharge to home or other facility with appropriate resources  Description: INTERVENTIONS:  - Identify barriers to discharge w/patient and caregiver  - Arrange for needed discharge resources and transportation as appropriate  - Identify discharge learning needs (meds, wound care, etc )  - Arrange for interpretive services to assist at discharge as needed  - Refer to Case Management Department for coordinating discharge planning if the patient needs post-hospital services based on physician/advanced practitioner order or complex needs related to functional status, cognitive ability, or social support system  Outcome: Progressing     Problem: Knowledge Deficit  Goal: Patient/family/caregiver demonstrates understanding of disease process, treatment plan, medications, and discharge instructions  Description: Complete learning assessment and assess knowledge base    Interventions:  - Provide teaching at level of understanding  - Provide teaching via preferred learning methods  Outcome: Progressing     Problem: SKIN/TISSUE INTEGRITY - ADULT  Goal: Skin Integrity remains intact(Skin Breakdown Prevention)  Description: Assess:  -Perform Javier assessment every shift  -Clean and moisturize skin every shift and PRN  -Inspect skin when repositioning, toileting, and assisting with ADLS  -Assess under medical devices such as  every   -Assess extremities for adequate circulation and sensation     Bed Management:  -Have minimal linens on bed & keep smooth, unwrinkled  -Change linens as needed when moist or perspiring  -Avoid sitting or lying in one position for more than 2 hours while in bed  -Keep HOB at 30degrees     Toileting:  -Offer bedside commode  -Assess for incontinence every   -Use incontinent care products after each incontinent episode such as     Activity:  -Mobilize patient 3 times a day  -Encourage activity and walks on unit  -Encourage or provide ROM exercises   -Turn and reposition patient every 2 Hours  -Use appropriate equipment to lift or move patient in bed  -Instruct/ Assist with weight shifting every 2 when out of bed in chair  -Consider limitation of chair time 2 hour intervals    Skin Care:  -Avoid use of baby powder, tape, friction and shearing, hot water or constrictive clothing  -Relieve pressure over bony prominences using   -Do not massage red bony areas    Next Steps:  -Teach patient strategies to minimize risks such as    -Consider consults to  interdisciplinary teams such as   Outcome: Progressing     Problem: MUSCULOSKELETAL - ADULT  Goal: Maintain or return mobility to safest level of function  Description: INTERVENTIONS:  - Assess patient's ability to carry out ADLs; assess patient's baseline for ADL function and identify physical deficits which impact ability to perform ADLs (bathing, care of mouth/teeth, toileting, grooming, dressing, etc )  - Assess/evaluate cause of self-care deficits   - Assess range of motion  - Assess patient's mobility  - Assess patient's need for assistive devices and provide as appropriate  - Encourage maximum independence but intervene and supervise when necessary  - Involve family in performance of ADLs  - Assess for home care needs following discharge   - Consider OT consult to assist with ADL evaluation and planning for discharge  - Provide patient education as appropriate  Outcome: Progressing

## 2022-07-24 NOTE — PROGRESS NOTES
[facility]  Progress Note - Ronan Tsai 1975, 48 y o  male MRN: [id number]  Unit/Bed#: 7T [facility] 714-01 Encounter: [id number]  Primary Care Provider: Freddy Lake DO   Date and time admitted to hospital: 7/20/2022 12:14 AM    * Multiple open wounds of lower leg  Assessment & Plan  · Patient with longstanding history of bilateral lower extremity lymphedema and wounds  · With multiple admissions to [facility], [facility], [facility], [address] [place] [address]  · Patient was admitted 7/14-7/18 secondary to wounds of the lower extremities and was receiving IV cefepime and restarted in the emergency room (he was discharged home on cefadroxil for 5 days)  · He was to follow up with Podiatry, Wound care and VNA  · Podiatry on both no intervention needed, due to venous statis      · Less likely to be cellulitis - hold off IV antibiotic  · Continue pain control  · Patient is medically clear    Weakness  Assessment & Plan  · Patient with increased weakness, was recently hospitalized for 4 days  · PT/OT recommended rehab  · CM for disposition planning      Cutaneous candidiasis  Assessment & Plan  · Patient diagnosed with diffuse cutaneous candidiasis and intertrigo  · Seen by wound care  · Recommended nystatin powder  · Diflucan 150 mg weekly for 4 weeks was [facility] at [facility], next dose due 7/23    GERMANIA (acute kidney injury) ([facility] [address] )  Assessment & Plan  · GERMANIA on POA, Baseline creatinine 0 8-1 1, improved and currently around his baseline  · Lasix was continued the hydrochlorothiazide was on hold at discharge for recent hospital stay [facility]  · Had 1 L IV fluids at [facility] ED  · Monitor renal function    ALESSANDRA on CPAP  Assessment & Plan  · Patient declined cpap with nurse when admitting him    Gastroesophageal reflux disease without esophagitis  Assessment & Plan  · Continue PPI    Hypothyroidism  Assessment & Plan  · Continue levothyroxine    Morbid obesity (HCC)  Assessment & Plan  · BMI greater than 50  · Healthy diet recommended    B[facility] disorder ([facility] )  Assessment & Plan  · Continue home BuSpar, Celexa, Depakote, Remeron, Invega    Crohn's disease ([email or web address] )  Assessment & Plan  · Patient with recent antibiotic use, had negative stool studies collected on   · Patient with episode of incontinent stool on     Primary hypertension  Assessment & Plan  · Continue metoprolol and Lasix  · Hydrochlorothiazide on hold due to recent GERMANIA  · Monitor blood pressure      VTE Pharmacologic Prophylaxis:   Pharmacologic: Heparin  Mechanical VTE Prophylaxis in Place: Yes    Patient Centered Rounds: I have performed bedside rounds with nursing staff today  Discussions with Specialists or Other Care Team Provider:  Discussed with nurse    Education and Discussions with Family / Patient:  Discussed with patient    Time Spent for Care: 45 minutes  More than 50% of total time spent on counseling and coordination of care as described above  Current Length of Stay: 4 day(s)    Current Patient Status: Inpatient   Certification Statement: The patient will continue to require additional inpatient hospital stay due to Medically clear, pending placement    Discharge Plan / Estimated Discharge Date:  Pending placement      Code Status: Level 1 - Full Code      Subjective:   Patient was seen and examined at bedside  The patient denies any pain, headache, blurry vision, chest pain, palpitation, shortness of breath, N/V, abd pain  Objective:     Vitals:   Temp (24hrs), Av 5 °F (36 4 °C), Min:96 7 °F (35 9 °C), Max:98 2 °F (36 8 °C)    Temp:  [96 7 °F (35 9 °C)-98 2 °F (36 8 °C)] 98 2 °F (36 8 °C)  HR:  [64-74] 64  Resp:  [20] 20  BP: (121-140)/(52-61) 121/61  SpO2:  [96 %-97 %] 97 %  Body mass index is 56 38 kg/m²  Input and Output Summary (last 24 hours):        Intake/Output Summary (Last 24 hours) at 2022 1052  Last data filed at 2022 1700  Gross per 24 hour   Intake 240 ml   Output --   Net 240 ml       Physical Exam:     Physical Exam  General: breathing well on room air, no acute distress  HEENT: NC/AT, PERRL, EOM - normal  Neck: Supple  Pulm/Chest: Normal chest wall expansion, clear breath sounds on both side, no wheezing/rhonchi or crackles appreciated  CVS: RRR, normal S1&S2, no murmur appreciated, capillary refill <2s  Abd: soft, non tender, non distended, bowel sounds +  MSK: move all 4 extremities spontaneously, bilateral lower extremity wound  Skin: warm  CNS: no acute focal neuro deficit      Additional Data:     Labs:    Results from last 7 days   Lab Units 07/19/22  1906   WBC Thousand/uL 6 60   HEMOGLOBIN g/dL 11 4*   HEMATOCRIT % 35 8*   PLATELETS Thousands/uL 200   LYMPHO PCT % 24   MONO PCT % 14*   EOS PCT % 1     Results from last 7 days   Lab Units 07/20/22  0445 07/19/22  1830   POTASSIUM mmol/L 3 7 5 4*   CHLORIDE mmol/L 106 102   CO2 mmol/L 26 24   BUN mg/dL 24 21   CREATININE mg/dL 1 38 1 69*   CALCIUM mg/dL 7 6* 8 0*   ALK PHOS U/L  --  71   ALT U/L  --  11*   AST U/L  --  48*     Results from last 7 days   Lab Units 07/19/22  1830   INR  1 03       * I Have Reviewed All Lab Data Listed Above  * Additional Pertinent Lab Tests Reviewed: [address] Admission Reviewed    Imaging:      I have reviewed pertinent imaging  Recent Cultures (last 7 days):     Results from last 7 days   Lab Units 07/19/22  1830 07/19/22  1805   BLOOD CULTURE  No Growth After 4 Days  No Growth After 4 Days         Last 24 Hours Medication List:   Current Facility-Administered Medications   Medication Dose Route Frequency Provider Last Rate    acetaminophen  650 mg Oral Q4H PRN Dorothe Music, PA-C      busPIRone  10 mg Oral TID Dorothe Music, PA-C      cholecalciferol  2,000 Units Oral Daily [place], [place]      citalopram  40 mg Oral Daily [place], [place]      clonazePAM  1 mg Oral TID Enma Caraballo eJssica Mccracken PA-C      diphenhydrAMINE  25 mg Oral Q6H PRN Janice Miller MD      divalproex sodium  1,000 mg Oral Daily Stevenson Gilford [place], [place]      divalproex sodium  1,500 mg Oral HS Shari Setting, NEISHA      furosemide  40 mg Oral Daily Stevenson Gilford [place], [place]      gabapentin  300 mg Oral TID Shari Setting, NEISHA      heparin (porcine)  7,500 Units Subcutaneous [facility] Stevenson Gilford [place], [place]      HYDROmorphone  1 mg Intravenous Q4H PRN Shari Setting, [place]      levothyroxine  25 mcg Oral Early Morning Stevenson Gilford [place], [place]      loperamide  4 mg Oral TID PRN Shari Setting, NEISHA      metoprolol tartrate  50 mg Oral BID Stevenson Gilford [place], [place]      mirtazapine  7 5 mg Oral HS Stevenson Gilford [place], [place]      nystatin   Topical BID Stevenson Gilford [place], [place]      ondansetron  4 mg Intravenous Q6H PRN Shari [facility], [place]      oxyCODONE  10 mg Oral Q4H PRN Shari Setting, [place]      oxyCODONE  15 mg Oral Q4H PRN Shari Setting, [place]      paliperidone  6 mg Oral Daily Stevenson Gilford [place], [place]      pantoprazole  40 mg Oral Early Morning Stevenson Gilford [place], [place]      pravastatin  80 mg Oral Daily With [employer], [place]      silver sulfadiazine   Topical Daily Jearl Folds, DPM      traZODone  50 mg Oral HS Shari Setting, NEISHA          Today, Patient Was Seen By: Janice Miller MD    ** Please Note: Dragon 360 Dictation voice to text software may have been used in the creation of this document   **

## 2022-07-24 NOTE — ASSESSMENT & PLAN NOTE
· Patient with longstanding history of bilateral lower extremity lymphedema and wounds  · With multiple admissions to [facility], [facility], [facility], [address]  · Patient was admitted 7/14-7/18 secondary to wounds of the lower extremities and was receiving IV cefepime and restarted in the emergency room (he was discharged home on cefadroxil for 5 days)  · He was to follow up with Podiatry, Wound care and VNA  · Podiatry on both no intervention needed, due to venous statis      · Less likely to be cellulitis - hold off IV antibiotic  · Continue pain control  · Patient is medically clear

## 2022-07-24 NOTE — ASSESSMENT & PLAN NOTE
· GERMANIA on POA, Baseline creatinine 0 8-1 1, improved and currently around his baseline  · Lasix was continued the hydrochlorothiazide was on hold at discharge for recent hospital stay [facility] on 07/18  · Had 1 L IV fluids at [facility] ED  · Monitor renal function

## 2022-07-25 PROCEDURE — 94660 CPAP INITIATION&MGMT: CPT

## 2022-07-25 PROCEDURE — 94760 N-INVAS EAR/PLS OXIMETRY 1: CPT

## 2022-07-25 PROCEDURE — 99232 SBSQ HOSP IP/OBS MODERATE 35: CPT | Performed by: INTERNAL MEDICINE

## 2022-07-25 RX ORDER — MICONAZOLE NITRATE 20 MG/G
1 CREAM TOPICAL 2 TIMES DAILY
Status: DISCONTINUED | OUTPATIENT
Start: 2022-07-25 | End: 2022-07-27 | Stop reason: HOSPADM

## 2022-07-25 RX ORDER — FLUCONAZOLE 100 MG/1
200 TABLET ORAL DAILY
Status: DISCONTINUED | OUTPATIENT
Start: 2022-07-25 | End: 2022-07-26

## 2022-07-25 RX ADMIN — LEVOTHYROXINE SODIUM 25 MCG: 25 TABLET ORAL at 06:20

## 2022-07-25 RX ADMIN — HEPARIN SODIUM 7500 UNITS: 5000 INJECTION INTRAVENOUS; SUBCUTANEOUS at 21:24

## 2022-07-25 RX ADMIN — DIVALPROEX SODIUM 1000 MG: 500 TABLET, DELAYED RELEASE ORAL at 08:27

## 2022-07-25 RX ADMIN — CITALOPRAM HYDROBROMIDE 40 MG: 20 TABLET ORAL at 08:27

## 2022-07-25 RX ADMIN — DIVALPROEX SODIUM 1500 MG: 500 TABLET, DELAYED RELEASE ORAL at 21:20

## 2022-07-25 RX ADMIN — GABAPENTIN 300 MG: 300 CAPSULE ORAL at 16:51

## 2022-07-25 RX ADMIN — GABAPENTIN 300 MG: 300 CAPSULE ORAL at 21:21

## 2022-07-25 RX ADMIN — FLUCONAZOLE 200 MG: 100 TABLET ORAL at 10:50

## 2022-07-25 RX ADMIN — MICONAZOLE NITRATE 1 APPLICATION: 20 CREAM TOPICAL at 14:10

## 2022-07-25 RX ADMIN — SILVER SULFADIAZINE 1 APPLICATION: 10 CREAM TOPICAL at 10:50

## 2022-07-25 RX ADMIN — GABAPENTIN 300 MG: 300 CAPSULE ORAL at 08:27

## 2022-07-25 RX ADMIN — CLONAZEPAM 1 MG: 1 TABLET ORAL at 16:51

## 2022-07-25 RX ADMIN — OXYCODONE HYDROCHLORIDE 10 MG: 10 TABLET ORAL at 17:54

## 2022-07-25 RX ADMIN — CLONAZEPAM 1 MG: 1 TABLET ORAL at 08:27

## 2022-07-25 RX ADMIN — CLONAZEPAM 1 MG: 1 TABLET ORAL at 21:22

## 2022-07-25 RX ADMIN — TRAZODONE HYDROCHLORIDE 50 MG: 50 TABLET ORAL at 21:21

## 2022-07-25 RX ADMIN — PANTOPRAZOLE SODIUM 40 MG: 40 TABLET, DELAYED RELEASE ORAL at 06:20

## 2022-07-25 RX ADMIN — NYSTATIN: 100000 POWDER TOPICAL at 17:54

## 2022-07-25 RX ADMIN — HEPARIN SODIUM 7500 UNITS: 5000 INJECTION INTRAVENOUS; SUBCUTANEOUS at 06:20

## 2022-07-25 RX ADMIN — PRAVASTATIN SODIUM 80 MG: 40 TABLET ORAL at 16:51

## 2022-07-25 RX ADMIN — MIRTAZAPINE 7.5 MG: 7.5 TABLET, FILM COATED ORAL at 21:20

## 2022-07-25 RX ADMIN — MICONAZOLE NITRATE 1 APPLICATION: 20 CREAM TOPICAL at 17:53

## 2022-07-25 RX ADMIN — BUSPIRONE HYDROCHLORIDE 10 MG: 10 TABLET ORAL at 08:28

## 2022-07-25 RX ADMIN — HEPARIN SODIUM 7500 UNITS: 5000 INJECTION INTRAVENOUS; SUBCUTANEOUS at 14:11

## 2022-07-25 RX ADMIN — NYSTATIN 1 APPLICATION: 100000 POWDER TOPICAL at 08:28

## 2022-07-25 RX ADMIN — BUSPIRONE HYDROCHLORIDE 10 MG: 10 TABLET ORAL at 16:51

## 2022-07-25 RX ADMIN — OXYCODONE HYDROCHLORIDE 15 MG: 10 TABLET ORAL at 11:16

## 2022-07-25 RX ADMIN — Medication 2000 UNITS: at 08:27

## 2022-07-25 RX ADMIN — PALIPERIDONE 6 MG: 6 TABLET, EXTENDED RELEASE ORAL at 08:28

## 2022-07-25 RX ADMIN — BUSPIRONE HYDROCHLORIDE 10 MG: 10 TABLET ORAL at 21:21

## 2022-07-25 NOTE — ASSESSMENT & PLAN NOTE
· Patient with increased weakness, was recently hospitalized for 4 days  · PT/OT recommended rehab  · Patient is medically stable for discharge to short-term rehab  · CM for disposition planning

## 2022-07-25 NOTE — ASSESSMENT & PLAN NOTE
· Patient diagnosed with diffuse cutaneous candidiasis and intertrigo  · Seen by wound care  · Recommended nystatin powder  · Diflucan 150 mg weekly for 4 weeks was started at [facility], next dose due 7/23

## 2022-07-25 NOTE — ASSESSMENT & PLAN NOTE
· GERMANIA on POA, Baseline creatinine 0 8-1 1, improved and currently around his baseline  · Lasix was continued the hydrochlorothiazide was on hold at discharge for recent hospital stay [facility]  · Had 1 L IV fluids at [facility] ED  · Monitor renal function

## 2022-07-25 NOTE — PLAN OF CARE
Problem: SAFETY ADULT  Goal: Patient will remain free of falls  Description: INTERVENTIONS:  - Educate patient/family on patient safety including physical limitations  - Instruct patient to call for assistance with activity   - Consult OT/PT to assist with strengthening/mobility   - Keep Call bell within reach  - Keep bed low and locked with side rails adjusted as appropriate  - Keep care items and personal belongings within reach  - Initiate and maintain comfort rounds  - Make Fall Risk Sign visible to staff  - Apply yellow socks and bracelet for high fall risk patients  - Consider moving patient to room near nurses station  Outcome: Progressing     Problem: DISCHARGE PLANNING  Goal: Discharge to home or other facility with appropriate resources  Description: INTERVENTIONS:  - Identify barriers to discharge w/patient and caregiver  - Arrange for needed discharge resources and transportation as appropriate  - Identify discharge learning needs (meds, wound care, etc )  - Arrange for interpretive services to assist at discharge as needed  - Refer to Case Management Department for coordinating discharge planning if the patient needs post-hospital services based on physician/advanced practitioner order or complex needs related to functional status, cognitive ability, or social support system  Outcome: Progressing     Problem: Knowledge Deficit  Goal: Patient/family/caregiver demonstrates understanding of disease process, treatment plan, medications, and discharge instructions  Description: Complete learning assessment and assess knowledge base    Interventions:  - Provide teaching at level of understanding  - Provide teaching via preferred learning methods  Outcome: Progressing     Problem: MUSCULOSKELETAL - ADULT  Goal: Maintain or return mobility to safest level of function  Description: INTERVENTIONS:  - Assess patient's ability to carry out ADLs; assess patient's baseline for ADL function and identify physical deficits which impact ability to perform ADLs (bathing, care of mouth/teeth, toileting, grooming, dressing, etc )  - Assess/evaluate cause of self-care deficits   - Assess range of motion  - Assess patient's mobility  - Assess patient's need for assistive devices and provide as appropriate  - Encourage maximum independence but intervene and supervise when necessary  - Involve family in performance of ADLs  - Assess for home care needs following discharge   - Consider OT consult to assist with ADL evaluation and planning for discharge  - Provide patient education as appropriate  Outcome: Progressing

## 2022-07-25 NOTE — WOUND OSTOMY CARE
Consult Note - Wound   Elvia Goodman 48 y o  male MRN: [id number]  Unit/Bed#: 7T [facility] 714-01 Encounter: [id number]        History and Present Illness: Patient is seen for wound care consult today   The patient is accepting for the skin assessment   He is a 48year old male that is admitted with multiple open wounds of the lower legs   He is morbidly obese and has a a significant history for lymphedema   Assessment Findings:   1  Bilateral lower legs with scattered diffuse candidiasis  rash with scattered open areas related to venous status and edema   Ordered maxorb and ABD daily changes   2  Noted diffuse candidiasis / intertriginous  dermatitis to the abdominal area , buttocks ,  groin, umbilical area , breast and neck area - Will order yolis antifungal cream to the be applied - Discussed with the RN and the MD           Skin care plans:  1-Hydraguard to bilateral  heels BID and PRN  2-Elevate heels to offload pressure  3-Ehob bariatic  cushion in chair when out of bed  4-Moisturize skin daily with skin nourishing cream   5-Turn/reposition q2h or when medically stable for pressure re-distribution on skin  6  Consider bariatric bed low air loss   7  Cleanse with soap and water then pat dry abdominal fold , groin , buttocks , breast area Apply yolis antifungal cream bid and prn   8  Cleanse with soap and water then apply nystatin powder to the neck area   9   Right and left lower legs - cleanse with soap and water then pat dry dust with nystatin powder then apply maxorb to open areas then top with a ABD and oksana change daily         Wounds:  Wound 06/16/22 Pretibial Left (Active)   Wound Image   07/25/22 1015   Wound Description Fragile;Pink 07/25/22 1031   Neela-wound Assessment Rash 07/25/22 1031   Wound Length (cm) 56 cm 07/25/22 1031   Wound Width (cm) 42 cm 07/25/22 1031   Wound Depth (cm) 1 cm 07/25/22 1031   Wound Surface Area (cm^2) 2352 cm^2 07/25/22 1031   Wound Volume (cm^3) 2352 cm^3 07/25/22 1031   Calculated Wound Volume (cm^3) 7346 cm^3 07/25/22 1031   Change in Wound Size % -6361 54 07/25/22 1031   Drainage Amount Moderate 07/25/22 1031   Drainage Description Serous; Heard 07/25/22 1031   Non-staged Wound Description Partial thickness 07/25/22 1031   Treatments Cleansed;Elevated 07/25/22 1031   Dressing Calcium Alginate;ABD 07/25/22 1031   Dressing Changed Changed 07/25/22 1031   Patient Tolerance Tolerated well 07/25/22 1031   Dressing Status Clean;Dry; Intact 07/24/22 2022       Wound 06/16/22 Pretibial Right (Active)   Wound Image   07/25/22 1014   Wound Description Fragile;Pink 07/25/22 1014   Neela-wound Assessment Edema;Fragile;Rash 07/25/22 1014   Wound Length (cm) 56 cm 07/25/22 1014   Wound Width (cm) 42 cm 07/25/22 1014   Wound Depth (cm) 0 1 cm 07/25/22 1014   Wound Surface Area (cm^2) 2352 cm^2 07/25/22 1014   Wound Volume (cm^3) 235 2 cm^3 07/25/22 1014   Calculated Wound Volume (cm^3) 235 2 cm^3 07/25/22 1014   Change in Wound Size % -74 22 07/25/22 1014   Drainage Amount Moderate 07/25/22 1014   Drainage Description Serous; Heard 07/25/22 1014   Non-staged Wound Description Partial thickness 07/25/22 1014   Treatments Cleansed;Site care 07/25/22 1014   Dressing Calcium Alginate;ABD 07/25/22 1014   Dressing Changed Changed 07/25/22 1014   Patient Tolerance Tolerated well 07/25/22 1014   Dressing Status Clean;Dry; Intact 07/24/22 2022       Wound 07/20/22 Groin (Active)   Wound Image   07/25/22 1015   Wound Description Dry; Intact 07/24/22 2022   Wound Length (cm) 14 cm 07/20/22 0928   Wound Width (cm) 28 cm 07/20/22 0928   Wound Surface Area (cm^2) 392 cm^2 07/20/22 0928   Drainage Amount Moderate 07/24/22 1259   Drainage Description Serous 07/24/22 1259   Treatments Cleansed;Site care 07/24/22 1259   Dressing Other (Comment);ABD 07/24/22 1259   Dressing Changed New 07/23/22 0732   Patient Tolerance Tolerated well 07/24/22 1259   Dressing Status Leaking (Comment) 07/24/22 1259       Wound 07/20/22 Abdomen Anterior (Active)   Wound Image   07/25/22 1016   Wound Description Drainage 07/24/22 1259   Wound Length (cm) 18 cm 07/20/22 0929   Wound Width (cm) 25 cm 07/20/22 0929   Wound Surface Area (cm^2) 450 cm^2 07/20/22 0929   Drainage Amount Moderate 07/24/22 1259   Treatments Cleansed;Site care 07/24/22 1259   Dressing Status Leaking (Comment) 07/24/22 1259       Wound 07/20/22 Coccyx (Active)   Wound Image   07/25/22 1031   Wound Description Fragile;Pink 07/25/22 1031   Neela-wound Assessment Fragile;Rash 07/25/22 1031   Wound Length (cm) 2 cm 07/25/22 1031   Wound Width (cm) 2 cm 07/25/22 1031   Wound Depth (cm) 0 1 cm 07/25/22 1031   Wound Surface Area (cm^2) 4 cm^2 07/25/22 1031   Wound Volume (cm^3) 0 4 cm^3 07/25/22 1031   Calculated Wound Volume (cm^3) 0 4 cm^3 07/25/22 1031   Drainage Amount Scant 07/25/22 1031   Drainage Description Serous 07/25/22 1031   Non-staged Wound Description Partial thickness 07/25/22 1031   Treatments Cleansed;Site care 07/24/22 1259   Dressing Other (Comment) 07/25/22 1031   Patient Tolerance Tolerated well 07/25/22 1031      Wound care will follow weekly call or tiger text with questions     Víctor Weldon RN  BSN CWOCN

## 2022-07-25 NOTE — CASE MANAGEMENT
Case Management Discharge Planning Note    Patient name Natanael Monahan  Location [address] [facility] 542-89 MRN [id number]  : 1975 Date 2022       Current Admission Date: 2022  Current Admission Diagnosis:Multiple open wounds of lower leg   Patient Active Problem List    Diagnosis Date Noted    Weakness 2022    Cutaneous candidiasis 2022    Rash of body 07/15/2022    GERMANIA (acute kidney injury) ([facility] ) 2022    CORKY (generalized anxiety disorder) 2022    Pain and swelling of lower extremity 2022    Primary hypertension 2022    Dyslipidemia 2022    Crohn's disease ([place] [address] ) 2022    Bipolar disorder ([place] [address] ) 2022    Morbid obesity ([place] [address] ) 2022    Hypothyroidism 2022    Multiple open wounds of lower leg 2022    Peripheral edema 2022    Anxiety 2022    Dietary noncompliance 2021    Ambulatory dysfunction 2021    Venous stasis dermatitis 2021    Venous insufficiency of lower extremity 2021    Financial difficulties 10/26/2020    Bilateral leg edema 2020    Binge eating 5397    Folic acid deficiency     ALESSANDRA on CPAP 2019    Fatty liver 2019    History of MRSA infection 2019    Pre-diabetes 2019    Peripheral polyneuropathy 2017    Vitamin D deficiency 2015    Gastroesophageal reflux disease without esophagitis 2015    Crohn's disease of large intestine without complication ([place] [address] )     Cocaine dependence in remission ([place] [address] ) 2011      LOS (days): 5  Geometric Mean LOS (GMLOS) (days): 4 10  Days to GMLOS:-1 4     OBJECTIVE:  Risk of Unplanned Readmission Score: 32 72         Current admission status: Inpatient   Preferred Pharmacy:   [facility], [address]  Phone: [phone] Fax: [phone]    Primary Care Provider: Coretta Avery DO    Primary Insurance: MEDICARE  Secondary Insurance:     DISCHARGE DETAILS: CM was notified at morning rounds that pt is medically cleared to be discharged today  CM sent mulitple referrals  Pt is waiting for accepting facility  Pt stated his first choice is [address] [facility]  CM sent referral and pt is under review with [address] [facility], Pending bed acceptance  Pt stated after rehab he will return back to his previous environment     CM department will continue to follow through pt's D/C

## 2022-07-25 NOTE — PROGRESS NOTES
[facility]  Progress Note - Elvia Goodman 1975, 48 y o  male MRN: [id number]  Unit/Bed#: 7T [facility] 714-01 Encounter: [id number]  Primary Care Provider: Majo Devi DO   Date and time admitted to hospital: 7/20/2022 12:14 AM    * Multiple open wounds of lower leg  Assessment & Plan  · Patient with longstanding history of bilateral lower extremity lymphedema and wounds  · With multiple admissions to [facility], [facility], [facility], [facility] [address]  · Patient was admitted 7/14-7/18 secondary to wounds of the lower extremities and was receiving IV cefepime and restarted in the emergency room (he was discharged home on cefadroxil for 5 days)  · He was to follow up with Podiatry, Wound care and [facility]NA  · Podiatry on both no intervention needed, due to venous statis      · Less likely to be cellulitis - hold off IV antibiotic  · Continue pain control  · Patient is medically clear    Weakness  Assessment & Plan  · Patient with increased weakness, was recently hospitalized for 4 days  · PT/OT recommended rehab  · Patient is medically stable for discharge to short-term rehab  · CM for disposition planning      Cutaneous candidiasis  Assessment & Plan  · Patient diagnosed with diffuse cutaneous candidiasis and intertrigo  · Seen by wound care  · Recommended nystatin powder  · Diflucan 150 mg weekly for 4 weeks was started at [facility], next dose due 7/23    [place]IA (acute kidney injury) ([facility] [address] )  Assessment & Plan  · GERMANIA on POA, Baseline creatinine 0 8-1 1, improved and currently around his baseline  · Lasix was continued the hydrochlorothiazide was on hold at discharge for recent hospital stay [address] [address] on 07/18  · Had 1 L IV fluids at [facility] ED  · Monitor renal function    ALESSANDRA on CPAP  Assessment & Plan  · Patient declined cpap with nurse when admitting him    Gastroesophageal reflux disease without esophagitis  Assessment & Plan  · Continue PPI    Hypothyroidism  Assessment & Plan  · Continue levothyroxine    Morbid obesity (HCC)  Assessment & Plan  · BMI greater than 50  · Healthy diet recommended    Bipolar disorder (HCC)  Assessment & Plan  · Continue home BuSpar, Celexa, Depakote, Remeron, Invega    Crohn's disease ([facility] [address] )  Assessment & Plan  · Patient with recent antibiotic use, had negative stool studies collected on   · Patient with episode of incontinent stool on     Primary hypertension  Assessment & Plan  · Continue metoprolol and Lasix  · Hydrochlorothiazide on hold due to recent GERMANIA  · Monitor blood pressure      VTE Pharmacologic Prophylaxis:  Heparin    Patient Centered Rounds:  Patient care rounds were performed with nursing    Discussions with Specialists or Other Care Team Provider:  Case Management, nursing, PT/OT    Education and Discussions with Family / Patient:     Time Spent for Care: 30  More than 50% of total time spent on counseling and coordination of care as described above  Current Length of Stay: 5 day(s)    Current Patient Status: Inpatient     Certification Statement: The patient will continue to require additional inpatient hospital stay due to placement to short-term rehab    Discharge Plan:  Medically stable for discharge to short-term rehab    Code Status: Level 1 - Full Code      Subjective:   Patient seen examined at bedside  No acute events overnight  Patient agreeable to short-term rehab at [facility]  Objective:     Vitals:   Temp (24hrs), Av 5 °F (36 4 °C), Min:96 9 °F (36 1 °C), Max:98 2 °F (36 8 °C)    Temp:  [96 9 °F (36 1 °C)-98 2 °F (36 8 °C)] 97 5 °F (36 4 °C)  HR:  [68-85] 85  Resp:  [18-20] 18  BP: (103-124)/(60-65) 103/65  SpO2:  [93 %-95 %] 95 %  Body mass index is 56 38 kg/m²  Input and Output Summary (last 24 hours):        Intake/Output Summary (Last 24 hours) at 2022 0801  Last data filed at 2022 0601  Gross per 24 hour   Intake 1620 ml   Output 950 ml   Net 670 ml Physical Exam:     Physical Exam  Vitals and nursing note reviewed  Constitutional:       Appearance: He is well-developed  HENT:      Head: Normocephalic and atraumatic  Eyes:      Conjunctiva/sclera: Conjunctivae normal    Cardiovascular:      Rate and Rhythm: Normal rate and regular rhythm  Heart sounds: No murmur heard  Pulmonary:      Effort: Pulmonary effort is normal  No respiratory distress  Breath sounds: Normal breath sounds  Abdominal:      Palpations: Abdomen is soft  Tenderness: There is no abdominal tenderness  Musculoskeletal:      Cervical back: Neck supple  Right lower leg: Edema present  Left lower leg: Edema present  Skin:     General: Skin is warm and dry  Neurological:      Mental Status: He is alert  Additional Data:     Labs:  I have reviewed pertinent results     Results from last 7 days   Lab Units 07/19/22  1906   WBC Thousand/uL 6 60   HEMOGLOBIN g/dL 11 4*   HEMATOCRIT % 35 8*   PLATELETS Thousands/uL 200   BANDS PCT % 3   LYMPHO PCT % 24   MONO PCT % 14*   EOS PCT % 1     Results from last 7 days   Lab Units 07/20/22  0445 07/19/22  1830   SODIUM mmol/L 136 135   POTASSIUM mmol/L 3 7 5 4*   CHLORIDE mmol/L 106 102   CO2 mmol/L 26 24   BUN mg/dL 24 21   CREATININE mg/dL 1 38 1 69*   ANION GAP mmol/L 4* 9   CALCIUM mg/dL 7 6* 8 0*   ALBUMIN g/dL  --  1 7*   TOTAL BILIRUBIN mg/dL  --  0 82   ALK PHOS U/L  --  71   ALT U/L  --  11*   AST U/L  --  48*   GLUCOSE RANDOM mg/dL 108* 108     Results from last 7 days   Lab Units 07/19/22  1830   INR  1 03     Results from last 7 days   Lab Units 07/20/22  0535   POC GLUCOSE mg/dl 113         Results from last 7 days   Lab Units 07/19/22  1847 07/19/22  1830   LACTIC ACID mmol/L 1 7  --    PROCALCITONIN ng/ml  --  0 11         Imaging: I have reviewed pertinent imaging       Recent Cultures (last 7 days):     Results from last 7 days   Lab Units 07/19/22  1830 07/19/22  1805   BLOOD CULTURE  No Growth After 5 Days  No Growth After 5 Days         Last 24 Hours Medication List:   Current Facility-Administered Medications   Medication Dose Route Frequency Provider Last Rate    acetaminophen  650 mg Oral Q4H PRN Irene Maguire PA-C      busPIRone  10 mg Oral TID Irene Maguire PA-C      cholecalciferol  2,000 Units Oral Daily [place], [place]      citalopram  40 mg Oral Daily [place], [place]      clonazePAM  1 mg Oral TID Irene Maguire PA-C      diphenhydrAMINE  25 mg Oral Q6H PRN Nichole Aden MD      divalproex sodium  1,000 mg Oral Daily Irene Maguire PA-C      divalproex sodium  1,500 mg Oral HS Irene Maguire PA-C      furosemide  40 mg Oral Daily [place], [place]      gabapentin  300 mg Oral TID Irene Maguire PA-C      heparin (porcine)  7,500 Units Subcutaneous [place], [place]      HYDROmorphone  1 mg Intravenous Q4H PRN Irene Maguire PA-C      levothyroxine  25 mcg Oral Early Morning [place], [place]      loperamide  4 mg Oral TID PRN Irene Maguire PA-C      metoprolol tartrate  50 mg Oral BID Irene Maguire PA-C      mirtazapine  7 5 mg Oral HS Irene Maguire PA-C      n[facility]atin   [facility]      ondansetron  4 mg Intravenous Q6H PRN Irene Maguire PA-C      oxyCODONE  10 mg Oral Q4H PRN Irene Maguire PA-C      oxyCODONE  15 mg Oral Q4H PRN Irene Maguire PA-C      paliperidone  6 mg Oral Daily [place], [place]      pantoprazole  40 mg Oral Early Morning [place], [place]      pravastatin  80 mg Oral Daily With [employer], [place]      silver sulfadiazine   Topical Daily Desean Moralez DPM      traZODone  50 mg Oral HS Irene Maguire PA-C          Today, Patient Was Seen By: Loren Paz, DO    ** Please Note: Dictation voice to text software may have been used in the creation of this document   **

## 2022-07-25 NOTE — ASSESSMENT & PLAN NOTE
· Patient with longstanding history of bilateral lower extremity lymphedema and wounds  · With multiple admissions to [facility], [facility], [facility], [address]  · Patient was admitted 7/14-7/18 secondary to wounds of the lower extremities and was receiving IV cefepime and restarted in the emergency room (he was discharged home on cefadroxil for 5 days)  · He was to follow up with Podiatry, Wound care and [facility]NA  · Podiatry on both no intervention needed, due to venous statis      · Less likely to be cellulitis - hold off IV antibiotic  · Continue pain control  · Patient is medically clear

## 2022-07-26 LAB
FLUAV RNA RESP QL NAA+PROBE: NEGATIVE
FLUBV RNA RESP QL NAA+PROBE: NEGATIVE
RSV RNA RESP QL NAA+PROBE: NEGATIVE
SARS-COV-2 RNA RESP QL NAA+PROBE: NEGATIVE

## 2022-07-26 PROCEDURE — 99239 HOSP IP/OBS DSCHRG MGMT >30: CPT | Performed by: INTERNAL MEDICINE

## 2022-07-26 PROCEDURE — 97112 NEUROMUSCULAR REEDUCATION: CPT

## 2022-07-26 PROCEDURE — 97530 THERAPEUTIC ACTIVITIES: CPT

## 2022-07-26 PROCEDURE — 0241U HB NFCT DS VIR RESP RNA 4 TRGT: CPT | Performed by: INTERNAL MEDICINE

## 2022-07-26 RX ADMIN — GABAPENTIN 300 MG: 300 CAPSULE ORAL at 21:54

## 2022-07-26 RX ADMIN — HEPARIN SODIUM 7500 UNITS: 5000 INJECTION INTRAVENOUS; SUBCUTANEOUS at 05:44

## 2022-07-26 RX ADMIN — GABAPENTIN 300 MG: 300 CAPSULE ORAL at 08:01

## 2022-07-26 RX ADMIN — CLONAZEPAM 1 MG: 1 TABLET ORAL at 21:54

## 2022-07-26 RX ADMIN — PALIPERIDONE 6 MG: 6 TABLET, EXTENDED RELEASE ORAL at 08:01

## 2022-07-26 RX ADMIN — HEPARIN SODIUM 7500 UNITS: 5000 INJECTION INTRAVENOUS; SUBCUTANEOUS at 13:20

## 2022-07-26 RX ADMIN — BUSPIRONE HYDROCHLORIDE 10 MG: 10 TABLET ORAL at 21:54

## 2022-07-26 RX ADMIN — MIRTAZAPINE 7.5 MG: 7.5 TABLET, FILM COATED ORAL at 21:54

## 2022-07-26 RX ADMIN — SILVER SULFADIAZINE: 10 CREAM TOPICAL at 08:00

## 2022-07-26 RX ADMIN — DIVALPROEX SODIUM 1500 MG: 500 TABLET, DELAYED RELEASE ORAL at 21:54

## 2022-07-26 RX ADMIN — OXYCODONE HYDROCHLORIDE 10 MG: 10 TABLET ORAL at 06:00

## 2022-07-26 RX ADMIN — BUSPIRONE HYDROCHLORIDE 10 MG: 10 TABLET ORAL at 08:02

## 2022-07-26 RX ADMIN — NYSTATIN: 100000 POWDER TOPICAL at 08:02

## 2022-07-26 RX ADMIN — FUROSEMIDE 40 MG: 40 TABLET ORAL at 08:02

## 2022-07-26 RX ADMIN — METOPROLOL TARTRATE 50 MG: 50 TABLET, FILM COATED ORAL at 08:01

## 2022-07-26 RX ADMIN — PRAVASTATIN SODIUM 80 MG: 40 TABLET ORAL at 16:59

## 2022-07-26 RX ADMIN — DIVALPROEX SODIUM 1000 MG: 500 TABLET, DELAYED RELEASE ORAL at 08:01

## 2022-07-26 RX ADMIN — Medication 2000 UNITS: at 08:01

## 2022-07-26 RX ADMIN — BUSPIRONE HYDROCHLORIDE 10 MG: 10 TABLET ORAL at 16:59

## 2022-07-26 RX ADMIN — OXYCODONE HYDROCHLORIDE 10 MG: 10 TABLET ORAL at 17:03

## 2022-07-26 RX ADMIN — CLONAZEPAM 1 MG: 1 TABLET ORAL at 16:59

## 2022-07-26 RX ADMIN — GABAPENTIN 300 MG: 300 CAPSULE ORAL at 16:59

## 2022-07-26 RX ADMIN — MICONAZOLE NITRATE 1 APPLICATION: 20 CREAM TOPICAL at 17:19

## 2022-07-26 RX ADMIN — CITALOPRAM HYDROBROMIDE 40 MG: 20 TABLET ORAL at 08:01

## 2022-07-26 RX ADMIN — CLONAZEPAM 1 MG: 1 TABLET ORAL at 08:01

## 2022-07-26 RX ADMIN — PANTOPRAZOLE SODIUM 40 MG: 40 TABLET, DELAYED RELEASE ORAL at 05:44

## 2022-07-26 RX ADMIN — HEPARIN SODIUM 7500 UNITS: 5000 INJECTION INTRAVENOUS; SUBCUTANEOUS at 21:54

## 2022-07-26 RX ADMIN — TRAZODONE HYDROCHLORIDE 50 MG: 50 TABLET ORAL at 21:54

## 2022-07-26 RX ADMIN — LEVOTHYROXINE SODIUM 25 MCG: 25 TABLET ORAL at 05:44

## 2022-07-26 RX ADMIN — NYSTATIN: 100000 POWDER TOPICAL at 17:19

## 2022-07-26 RX ADMIN — MICONAZOLE NITRATE 1 APPLICATION: 20 CREAM TOPICAL at 08:29

## 2022-07-26 NOTE — PROGRESS NOTES
[facility]  Progress Note - Blanco Taylor 1975, 48 y o  male MRN: [id number]  Unit/Bed#: 7T [facility] 714-01 Encounter: [id number]  Primary Care Provider: Jr Heller DO   Date and time admitted to hospital: 7/20/2022 12:14 AM    * Multiple open wounds of lower leg  Assessment & Plan  · Patient with longstanding history of bilateral lower extremity lymphedema and wounds  · With multiple admissions to [facility], [facility], [facility], [address] [address]  · Patient was admitted 7/14-7/18 secondary to wounds of the lower extremities and was receiving IV cefepime and restarted in the emergency room (he was discharged home on cefadroxil for 5 days)  · He was to follow up with Podiatry, Wound care and VNA  · Podiatry on both no intervention needed, due to venous stasis  · Less likely to be cellulitis - hold off IV antibiotic  · Continue pain control  · Wound care following  · Patient is medically clear for discharge to short-term rehab    GER[place]IA (acute kidney injury) ([facility] [address] )  Assessment & Plan  · Resolved  · POA  · Baseline creatinine 0 8-1 1  · Monitor renal function  · Trend BMP  · Avoid nephrotoxic agents and hypotension    Cutaneous candidiasis  Assessment & Plan  · Patient diagnosed with diffuse cutaneous candidiasis and intertrigo  · Seen by wound care  · Recommended nystatin powder  · Diflucan 150 mg weekly for 4 weeks was started at [address], next dose due 7/30    Weakness  Assessment & Plan  · Patient with increased weakness, was recently hospitalized for 4 days  · PT/OT recommended rehab  · Patient is medically stable for discharge to short-term rehab  · CM for disposition planning  · Patient agreeable to [facility]      ALESSANDRA on CPAP  Assessment & Plan  · CPAP qHS    Gastroesophageal reflux disease without esophagitis  Assessment & Plan  · Continue PPI    Hypothyroidism  Assessment & Plan  · Continue levothyroxine    Morbid obesity ([facility] [address] )  Assessment & Plan  · BMI greater than 50  · Healthy diet recommended    Bipolar disorder ([facility] [address] )  Assessment & Plan  · Continue home BuSpar, Celexa, Depakote, Remeron, Invega    [facility]  Assessment & Plan  · Patient with recent antibiotic use, had negative stool studies collected on   · Patient with episode of incontinent stool on     Primary hypertension  Assessment & Plan  · Continue metoprolol and Lasix  · Hydrochlorothiazide on hold due to recent GERMANIA  · Monitor blood pressure    VTE Pharmacologic Prophylaxis:  Heparin    Patient Centered Rounds:  Patient care rounds were performed with nursing    Discussions with Specialists or Other Care Team Provider:  Case Management, nursing, PT/OT    Education and Discussions with Family / Patient:     Time Spent for Care: 30  More than 50% of total time spent on counseling and coordination of care as described above  Current Length of Stay: 6 day(s)    Current Patient Status: Inpatient     Certification Statement: The patient will continue to require additional inpatient hospital stay due to placement to short-term rehab    Discharge Plan:  Medically stable for discharge to short-term rehab    Code Status: Level 1 - Full Code      Subjective:   Patient seen examined at bedside  No acute events overnight  Patient agreeable to short-term rehab at [facility]  Objective:     Vitals:   Temp (24hrs), Av 5 °F (36 4 °C), Min:97 4 °F (36 3 °C), Max:97 5 °F (36 4 °C)    Temp:  [97 4 °F (36 3 °C)-97 5 °F (36 4 °C)] 97 4 °F (36 3 °C)  HR:  [80-85] 85  Resp:  [18] 18  BP: (101-112)/(65-72) 112/65  SpO2:  [93 %-97 %] 93 %  Body mass index is 56 38 kg/m²  Input and Output Summary (last 24 hours): Intake/Output Summary (Last 24 hours) at 2022 0711  Last data filed at 2022 1300  Gross per 24 hour   Intake 600 ml   Output 200 ml   Net 400 ml       Physical Exam:     Physical Exam  Vitals and nursing note reviewed     Constitutional:       Appearance: He is well-developed  HENT:      Head: Normocephalic and atraumatic  Eyes:      Conjunctiva/sclera: Conjunctivae normal    Cardiovascular:      Rate and Rhythm: Normal rate and regular rhythm  Heart sounds: No murmur heard  Pulmonary:      Effort: Pulmonary effort is normal  No respiratory distress  Breath sounds: Normal breath sounds  Abdominal:      Palpations: Abdomen is soft  Tenderness: There is no abdominal tenderness  Musculoskeletal:      Cervical back: Neck supple  Right lower leg: Edema present  Left lower leg: Edema present  Skin:     General: Skin is warm and dry  Neurological:      Mental Status: He is alert  Additional Data:     Labs: I have reviewed pertinent results     Results from last 7 days   Lab Units 07/19/22  1906   WBC Thousand/uL 6 60   HEMOGLOBIN g/dL 11 4*   HEMATOCRIT % 35 8*   PLATELETS Thousands/uL 200   BANDS PCT % 3   LYMPHO PCT % 24   MONO PCT % 14*   EOS PCT % 1     Results from last 7 days   Lab Units 07/20/22  0445 07/19/22  1830   SODIUM mmol/L 136 135   POTASSIUM mmol/L 3 7 5 4*   CHLORIDE mmol/L 106 102   CO2 mmol/L 26 24   BUN mg/dL 24 21   CREATININE mg/dL 1 38 1 69*   ANION GAP mmol/L 4* 9   CALCIUM mg/dL 7 6* 8 0*   ALBUMIN g/dL  --  1 7*   TOTAL BILIRUBIN mg/dL  --  0 82   ALK PHOS U/L  --  71   ALT U/L  --  11*   AST U/L  --  48*   GLUCOSE RANDOM mg/dL 108* 108     Results from last 7 days   Lab Units 07/19/22  1830   INR  1 03     Results from last 7 days   Lab Units 07/20/22  0535   POC GLUCOSE mg/dl 113         Results from last 7 days   Lab Units 07/19/22  1847 07/19/22  1830   LACTIC ACID mmol/L 1 7  --    PROCALCITONIN ng/ml  --  0 11         Imaging: I have reviewed pertinent imaging       Recent Cultures (last 7 days):     Results from last 7 days   Lab Units 07/19/22  1830 07/19/22  1805   BLOOD CULTURE  No Growth After 5 Days  No Growth After 5 Days         Last 24 Hours Medication List:   Current Facility-Administered Medications   Medication Dose Route Frequency Provider Last Rate    acetaminophen  650 mg Oral Q4H PRN Gideon Baron PA-C      busPIRone  10 mg Oral TID Gideon Baron PA-C      cholecalciferol  2,000 Units Oral Daily [place], [place]      citalopram  40 mg Oral Daily [place], [place]      clonazePAM  1 mg Oral TID Gideon Baron PA-C      diphenhydrAMINE  25 mg Oral Q6H PRN Geovany Ortiz MD      divalproex sodium  1,000 mg Oral Daily Gideon Baron PA-C      divalproex sodium  1,500 mg Oral HS Gideon Baron PA-C      furosemide  40 mg Oral Daily [place], [place]      gabapentin  300 mg Oral TID Gideon Baron PA-C      heparin (porcine)  7,500 Units Subcutaneous [place], [place]      HYDROmorphone  1 mg Intravenous Q4H PRN Gideon Baron PA-C      levothyroxine  25 mcg Oral Early Morning [place], [place]      loperamide  4 mg Oral TID PRN Gideon Baron PA-C      metoprolol tartrate  50 mg Oral BID Gideon Baron PA-C      mirtazapine  7 5 mg Oral HS [facility]      HANNAH ANTIFUNGAL  1 application Topical BID Vanessa Bridges DO      nystatin   Topical BID [facility]      ondansetron  4 mg Intravenous Q6H PRN Gideon Baron PA-C      oxyCODONE  10 mg Oral Q4H PRN Gideon Baron PA-C      oxyCODONE  15 mg Oral Q4H PRN Gideon Baron PA-C      paliperidone  6 mg Oral Daily [place], [place]      pantoprazole  40 mg Oral Early Morning [place], [place]      pravastatin  80 mg Oral Daily With [employer], [place]      silver sulfadiazine   Topical Daily Svetlana Newsome DPM      traZODone  50 mg Oral HS Gideon Baron PA-C          Today, Patient Was Seen By: Vanessa Bridges DO    ** Please Note: Dictation voice to text software may have been used in the creation of this document   **

## 2022-07-26 NOTE — ASSESSMENT & PLAN NOTE
· Patient diagnosed with diffuse cutaneous candidiasis and intertrigo  · Seen by wound care  · Recommended nystatin powder  · Diflucan 150 mg weekly for 4 weeks was started at [facility], next dose due 7/30

## 2022-07-26 NOTE — DISCHARGE SUMMARY
[facility]  Discharge- Brant Smith 1975, 48 y o  male MRN: [id number]  Unit/Bed#: 7T [facility] 714-01 Encounter: [id number]  Primary Care Provider: Carly Pop DO   Date and time admitted to hospital: 7/20/2022 12:14 AM    * Multiple open wounds of lower leg  Assessment & Plan  · Patient with longstanding history of bilateral lower extremity lymphedema and wounds  · With [facility], [address] [place] [address], [facility]  · Patient was admitted 7/14-7/18 secondary to wounds of the lower extremities and was receiving IV cefepime and restarted in the emergency room (he was discharged home on cefadroxil for 5 days)  · He was to follow up with Podiatry, Wound care and VNA  · Podiatry on both no intervention needed, due to venous stasis  · Less likely to be cellulitis - hold off IV antibiotic  · Continue pain control  · Wound care following  · Patient is medically clear for discharge to short-term rehab    GERMANIA (acute kidney injury) ([facility] )  Assessment & Plan  · Resolved  · POA  · Baseline creatinine 0 8-1 1  · Monitor renal function  · Trend BMP  · Avoid nephrotoxic agents and hypotension    Cutaneous candidiasis  Assessment & Plan  · Patient diagnosed with diffuse cutaneous candidiasis and intertrigo  · Seen by wound care  · Recommended nystatin powder  · Diflucan 150 mg weekly for 4 weeks was started at [facility], next dose due 7/30    Weakness  Assessment & Plan  · Patient with increased weakness, was recently hospitalized for 4 days  · PT/OT recommended rehab  · Patient is medically stable for discharge to short-term rehab  · CM for disposition planning      ALESSANDRA on CPAP  Assessment & Plan  · CPAP qHS    Gastroesophageal reflux disease without esophagitis  Assessment & Plan  · Continue PPI    Hypothyroidism  Assessment & Plan  · Continue levothyroxine    Morbid obesity ([facility] )  Assessment & Plan  · BMI greater than 50  · Healthy diet recommended    Bipolar disorder ([facility] )  Assessment & Plan  · Continue home BuSpar, Celexa, Depakote, Remeron, Invega    Crohn's disease ([facility] )  Assessment & Plan  · Patient with recent antibiotic use, had negative stool studies collected on 07/16  · Patient with episode of incontinent stool on 07/19    Primary hypertension  Assessment & Plan  · Continue metoprolol and Lasix  · Hydrochlorothiazide on hold due to recent GERMANIA  · Monitor blood pressure    Discharging Physician / Practitioner: Loren Paz DO  PCP: Linette Lenz DO  Admission Date:   Admission Orders (From admission, onward)     Ordered        07/20/22 0019  Inpatient Admission  Once                      Discharge Date: 07/27/22    Medical Problems             Resolved Problems  Date Reviewed: 7/26/2022   None                 Consultations During Hospital Stay:  PT/OT    Procedures Performed:  Not applicable    Significant Findings / Test Results:     No results found  Incidental Findings:  Not applicable    Test Results Pending at Discharge (will require follow up): Not applicable     Outpatient Tests Requested: Follow-up CBC/BMP    Reason for Admission:  Worsening leg wounds and generalized weakness    Hospital Course:     Nataliia Hauser is a 48 y o  male with a PMH of bipolar disorder, hypertension, morbid obesity, hypothyroidism, lymphedema with chronic lower extremity wounds who presents with worsening leg wounds, weakness and incontinent stool  Patient was hospitalized from 7/14-7/18 at [facility] he was on IV cefepime and transition to oral antibiotics for additional 5 more days at discharge he also had Diflucan added for 4 week course    Patient reports today visiting nurse was there and he had episode of incontinent stool and also significant bilateral lower extremity leg pain with worsening leg wounds and weakness      Patient reports that he had increased redness on his leg from when he was discharged, notes burning pain on his skin and a stabbing sensation in his ankle  He reports needs pain meds  He also reports bout of incontinent stool while his nurse was there today to do wound care, he normally takes imodium regularly and did not receive when he was in the hospital   He had f/u appointment with wound care for Tuesday 7/19 and then rescheduled to Wednesday 7/20  He also reports he is out of supplies when the nurses come and he needs them to be ordered when he is discharged  He is frustrated that he has been dealing with this for 2 years  He notes that he is in and out of hospitals recently and has not been able to get to his follow ups because he is back in the hospital       ED treatment: 1 L IVF, Cefepime 2000mg, vanco 2000mg, Dilaudid 1mg  The patient remained hemodynamically stable and saturating well on room air throughout his hospital course  PT/OT recommended short-term rehab  Podiatry recommended local wound care however no intervention  Case management was consulted for disposition to short-term rehab  Diflucan 150 mg every week x4 weeks was continued with his next dosing scheduled for 07/30/2022  Case management arranged for short-term rehab  The patient was transported in stable condition  He was strongly encouraged to resume all preadmission medications at all preadmission dosages  He was also encouraged to follow-up with his PCP within 7-14 days  Condition at Discharge: stable     Discharge Day Visit / Exam:     Subjective: Patient seen and examined at bedside  No acute events overnight  Denies chest pain, SOB, diaphoresis, nausea/vomiting/diarrhea, fevers/chills       Vitals: Blood Pressure: 103/65 (07/26/22 2335)  Pulse: 85 (07/26/22 2335)  Temperature: (!) 97 4 °F (36 3 °C) (07/26/22 2335)  Temp Source: Temporal (07/26/22 2335)  Respirations: 18 (07/26/22 2335)  Height: 6' 4" (193 cm) (07/20/22 0031)  Weight - Scale: (!) 210 kg (463 lb 3 oz) (07/20/22 0031)  SpO2: 95 % (07/26/22 2335)     Exam: Physical Exam  Vitals and nursing note reviewed  Constitutional:       Appearance: He is well-developed  He is obese  HENT:      Head: Normocephalic and atraumatic  Eyes:      Conjunctiva/sclera: Conjunctivae normal    Cardiovascular:      Rate and Rhythm: Normal rate and regular rhythm  Heart sounds: No murmur heard  Pulmonary:      Effort: Pulmonary effort is normal  No respiratory distress  Breath sounds: Normal breath sounds  Abdominal:      Palpations: Abdomen is soft  Tenderness: There is no abdominal tenderness  Musculoskeletal:      Cervical back: Neck supple  Right lower leg: Edema present  Left lower leg: Edema present  Comments: Generalized rash on lower extremities   Skin:     General: Skin is warm and dry  Neurological:      Mental Status: He is alert  Discharge instructions/Information to patient and family:   See after visit summary for information provided to patient and family  Provisions for Follow-Up Care:  See after visit summary for information related to follow-up care and any pertinent home health orders  Disposition:     Short-term rehab  Diflucan 150 mg every week x4 weeks  Outpatient follow-up with PCP  Resume pre-admission medications     Discharge Statement:  I spent 60 minutes discharging the patient  This time was spent on the day of discharge  I had direct contact with the patient on the day of discharge  Greater than 50% of the total time was spent examining patient, answering all patient questions, arranging and discussing plan of care with patient as well as directly providing post-discharge instructions  Additional time then spent on discharge activities  Discharge Medications:  See after visit summary for reconciled discharge medications provided to patient and family        ** Please Note: This note has been constructed using a voice recognition system **

## 2022-07-26 NOTE — PHYSICAL THERAPY NOTE
PT Treatment       07/26/22 1046   PT Last Visit   PT Visit Date 07/26/22   Note Type   Note Type Treatment   Pain Assessment   Pain Assessment Tool FLACC   Pain Location/Orientation Location: Leg;Orientation: Bilateral   Effect of Pain on Daily Activities limits ambulation   Patient's Stated Pain Goal No pain   Hospital Pain Intervention(s) Ambulation/increased activity;Repositioned   Pain Rating: FLACC (Rest) - Face 0   Pain Rating: FLACC (Rest) - Legs 0   Pain Rating: FLACC (Rest) - Activity 0   Pain Rating: FLACC (Rest) - Cry 0   Pain Rating: FLACC (Rest) - Consolability 0   Score: FLACC (Rest) 0   Pain Rating: FLACC (Activity) - Face 1   Pain Rating: FLACC (Activity) - Legs 1   Pain Rating: FLACC (Activity) - Activity 0   Pain Rating: FLACC (Activity) - Cry 1   Pain Rating: FLACC (Activity) - Consolability 1   Score: FLACC (Activity) 4   Restrictions/Precautions   Other Precautions Fall Risk;Pain  (obesity)   General   Chart Reviewed Yes   Response to Previous Treatment Patient with no complaints from previous session  Family/Caregiver Present No   Cognition   Arousal/Participation Lethargic   Attention Attends with cues to redirect   Following Commands Follows multistep commands with increased time or repetition   Comments patient observed to have very small pupils, significantly lethargic whic he reports is from receiving pain medication approx 2 hours ago  Subjective   Subjective "I am so tired"   Bed Mobility   Supine to Sit 4  Minimal assistance   Additional items Assist x 1; Increased time required;HOB elevated;LE management   Sit to Supine 4  Minimal assistance   Additional items Assist x 1; Increased time required;LE management   Additional Comments patient received sleeping in bed  Patient lethargic requiring increased time to arouse and participate in PT services  during this time, education was provided regarding PT services this admission and upon d/c (discussed rehab versus home PT)   Once patient more alert, he required min-AX1 to manage L LE off side of bed during supine-->sit transfer w/ HOB elevated  Patient maintained static sitting EOB x 10 minutes to acclimate to sitting position in setting of increased fatigue  Room arranged and linens retrieved for safe bed to chair transfer  Post transfer to chair, patient sat on side of bed for additional 10 minutes performing dynamic tasks with supervision (donning new gown, drinking beverage)  He required min-AX1 to sit-->supine transfer to manage R LE into bed and required extended time for appropriate repositioning (elevated b/l LE, change of linens due to drainage)   Transfers   Sit to Stand 4  Minimal assistance   Additional items Assist x 1; Increased time required;Verbal cues   Stand to Sit 4  Minimal assistance   Additional items Assist x 1; Increased time required;Verbal cues   Stand pivot 4  Minimal assistance   Additional items Assist x 1; Increased time required;Verbal cues   Additional Comments patient performed 2 tranfsers (bed<-->chair) with hand held assistance  He refused to sit in chair > 5 minutes, requesting to sleep in bed   Ambulation/Elevation   Distance deferred in setting of significant lethargy, patient reporting "I will do it tomorrow"   Balance   Static Sitting Good   Dynamic Sitting Fair   Static Standing Fair -   Ambulatory Poor +   Endurance Deficit   Endurance Deficit Yes   Endurance Deficit Description gross lethargy, baseline body habitus, pain in b/l LE   Activity Tolerance   Activity Tolerance Patient limited by pain; Patient limited by fatigue   Medical Staff Made Aware spoke with case management   Nurse Made Aware spoke with RN pre and post   Exercises   Neuro re-ed transfer training as described above   Assessment   Prognosis Fair   Problem List Decreased strength;Decreased endurance; Impaired balance;Decreased mobility;Pain;Decreased skin integrity;Obesity   Assessment PT initiated treatment session in order to assist patient in achieving goals to improve transfers, gait training, and overall activity tolerance  Patient very lethargic today with reduced participation in functional mobility  He required extended time and min-AX1 to participate in bed level tasks and stand pivot transfers (bed to chair)  Patient had difficulty standing from recliner chair and required 3 attempts with mild frustration/agitiation to attain stance  Patient reports that at his baseline he mainly stays in his bed with use of a urinal but will ambulate approx 20 feet to access bathroom at times  He also reports he uses public transportation to go to grocery store 1x/2 weeks  He desire to become more mobile and remains appropriate for PT d/c recommendation to rehab  Patient will continue to benefit from continued skilled PT this admission to achieve maximal function and safety  Goals   Patient Goals to sleep   STG Expiration Date 07/31/22   PT Treatment Day 2   Plan   Treatment/Interventions Functional transfer training; Therapeutic exercise; Endurance training;Patient/family training;Equipment eval/education; Bed mobility;Gait training;Spoke to nursing   Progress Slow progress, decreased activity tolerance   PT Frequency 2-3x/wk   Recommendation   PT Discharge Recommendation Post acute rehabilitation services   Equipment Recommended   (will cont to assess)   AM-PAC Basic Mobility Inpatient   Turning in Bed Without Bedrails 3   Lying on Back to Sitting on Edge of Flat Bed 3   Moving Bed to Chair 3   Standing Up From Chair 3   Walk in Room 2   Climb 3-5 Stairs 1   Basic Mobility Inpatient Raw Score 15   Basic Mobility Standardized Score 36 97   Highest Level Of Mobility   -[facility] Goal 4: Move to chair/commode   JH-HLM Achieved 5: Stand (1 or more minutes)     The patient's AM-PAC Basic Mobility Inpatient Standardized Score is less than 42 9, suggesting this patient may benefit from discharge to post-acute rehabilitation services   Please also refer to the recommendation of the Physical Therapist for safe discharge planning      LUC WARD PT, DPT

## 2022-07-26 NOTE — CASE MANAGEMENT
Case Management Discharge Planning Note    Patient name Forrest Alcantar  Location 7T [facility] 736/4R [facility] 364-31 MRN [id number]  : 1975 Date 2022       Current Admission Date: 2022  Current Admission Diagnosis:Multiple open wounds of lower leg   Patient Active Problem List    Diagnosis Date Noted    Weakness 2022    Cutaneous candidiasis 2022    Rash of body 07/15/2022    GERMANIA (acute kidney injury) ([facility] ) 2022    CORKY (generalized anxiety disorder) 2022    Pain and swelling of lower extremity 2022    Primary hypertension 2022    Dyslipidemia 2022    Crohn's disease ([place] [address] ) 2022    Bipolar disorder ([place] [address] ) 2022    Morbid obesity ([place] [address] ) 2022    Hypothyroidism 2022    Multiple open wounds of lower leg 2022    Peripheral edema 2022    Anxiety 2022    Dietary noncompliance 2021    Ambulatory dysfunction 2021    Venous stasis dermatitis 2021    Venous insufficiency of lower extremity 2021    Financial difficulties 10/26/2020    Bilateral leg edema 2020    Binge eating     Folic acid deficiency     ALESSANDRA on CPAP 2019    Fatty liver 2019    History of MRSA infection 2019    Pre-diabetes 2019    Peripheral polyneuropathy 2017    Vitamin D deficiency 2015    Gastroesophageal reflux disease without esophagitis 2015    Crohn's disease of large intestine without complication ([place] [address] )     Cocaine dependence in remission ([place] [address] ) 2011      LOS (days): 6  Geometric Mean LOS (GMLOS) (days): 4 10  Days to GMLOS:-2 4     OBJECTIVE:  Risk of Unplanned Readmission Score: 33 38         Current admission status: Inpatient   Preferred Pharmacy:   [facility], [address] [facility] [address]  Phone: [phone] Fax: [phone]    Primary Care Provider: Nicolas Leon DO    Primary Insurance: MEDICARE  Secondary Insurance:     DISCHARGE DETAILS:  CM was notified at morning rounds that pt is medical cleared to be discharged today  CM met with pt at bedside  CM Discussed Freedom of choice  CM updated pt's Passr for discharge on Aidin  Accepting Facility Name, [facility] [place] [address]: Address: [address] [place] [address]  Receiving Facility/Agency Phone Number: Nurse to report- main line [phone]  fax [phone]  Facility/Agency Fax Number: Nurse Fax AVS: [phone]     Transportation: BLS; CM completed MN from and gave it to the nurse     CM reviewed Discharge planning process including the following: identifying help at home, patient preference for discharge planning needs, Discharge lounge, [facility] Med[facility] to bed program, availability of treatment team to discuss questions or concerns patient and family may have regarding understanding medications and recognizing signs and symptoms once discharged  CM also encouraged pt to follow up with all recommended appointments after discharge  Patient advised of importance for patient and family to participate in managing pt's medical wellbeing  CM notified pt, dr and staff about pt's discharge for today      CN department will continue to follow through pt's D/C

## 2022-07-26 NOTE — PLAN OF CARE
Problem: PHYSICAL THERAPY ADULT  Goal: Performs mobility at highest level of function for planned discharge setting  See evaluation for individualized goals  Description: Treatment/Interventions: Functional transfer training, Therapeutic exercise, Endurance training, Patient/family training, Equipment eval/education, Bed mobility, Gait training, Spoke to nursing  Equipment Recommended:  (will cont to assess)       See flowsheet documentation for full assessment, interventions and recommendations  Note: Prognosis: Fair  Problem List: Decreased strength, Decreased endurance, Impaired balance, Decreased mobility, Pain, Decreased skin integrity, Obesity  Assessment: PT initiated treatment session in order to assist patient in achieving goals to improve transfers, gait training, and overall activity tolerance  Patient very lethargic today with reduced participation in functional mobility  He required extended time and min-AX1 to participate in bed level tasks and stand pivot transfers (bed to chair)  Patient had difficulty standing from recliner chair and required 3 attempts with mild frustration/agitiation to attain stance  Patient reports that at his baseline he mainly stays in his bed with use of a urinal but will ambulate approx 20 feet to access bathroom at times  He also reports he uses public transportation to go to grocery store 1x/2 weeks  He desire to become more mobile and remains appropriate for PT d/c recommendation to rehab  Patient will continue to benefit from continued skilled PT this admission to achieve maximal function and safety  Barriers to Discharge: Inaccessible home environment, Decreased caregiver support     PT Discharge Recommendation: Post acute rehabilitation services    See flowsheet documentation for full assessment

## 2022-07-26 NOTE — ASSESSMENT & PLAN NOTE
· Resolved  · POA  · Baseline creatinine 0 8-1 1  · Monitor renal function  · Trend BMP  · Avoid nephrotoxic agents and hypotension

## 2022-07-26 NOTE — ASSESSMENT & PLAN NOTE
· Patient with longstanding history of bilateral lower extremity lymphedema and wounds  · With multiple admissions to [address] [place] [address], [facility], [facility], [facility]  · Patient was admitted 7/14-7/18 secondary to wounds of the lower extremities and was receiving IV cefepime and restarted in the emergency room (he was discharged home on cefadroxil for 5 days)  · He was to follow up with Podiatry, Wound care and VNA  · Podiatry on both no intervention needed, due to venous stasis  · Less likely to be cellulitis - hold off IV antibiotic  · Continue pain control  · Wound care following  · Patient is medically clear for discharge to short-term rehab

## 2022-07-26 NOTE — ASSESSMENT & PLAN NOTE
· Patient with increased weakness, was recently hospitalized for 4 days  · PT/OT recommended rehab  · Patient is medically stable for discharge to short-term rehab  · CM for disposition planning  · Patient agreeable to TCF

## 2022-07-27 VITALS
WEIGHT: 315 LBS | BODY MASS INDEX: 38.36 KG/M2 | HEART RATE: 103 BPM | DIASTOLIC BLOOD PRESSURE: 55 MMHG | TEMPERATURE: 97.5 F | OXYGEN SATURATION: 93 % | HEIGHT: 76 IN | RESPIRATION RATE: 18 BRPM | SYSTOLIC BLOOD PRESSURE: 107 MMHG

## 2022-07-27 PROCEDURE — 94660 CPAP INITIATION&MGMT: CPT

## 2022-07-27 PROCEDURE — 99239 HOSP IP/OBS DSCHRG MGMT >30: CPT | Performed by: INTERNAL MEDICINE

## 2022-07-27 RX ADMIN — HEPARIN SODIUM 7500 UNITS: 5000 INJECTION INTRAVENOUS; SUBCUTANEOUS at 06:16

## 2022-07-27 RX ADMIN — DIVALPROEX SODIUM 1000 MG: 500 TABLET, DELAYED RELEASE ORAL at 09:09

## 2022-07-27 RX ADMIN — Medication 2000 UNITS: at 09:08

## 2022-07-27 RX ADMIN — NYSTATIN: 100000 POWDER TOPICAL at 09:12

## 2022-07-27 RX ADMIN — PANTOPRAZOLE SODIUM 40 MG: 40 TABLET, DELAYED RELEASE ORAL at 06:16

## 2022-07-27 RX ADMIN — LEVOTHYROXINE SODIUM 25 MCG: 25 TABLET ORAL at 06:16

## 2022-07-27 RX ADMIN — MICONAZOLE NITRATE 1 APPLICATION: 20 CREAM TOPICAL at 09:09

## 2022-07-27 RX ADMIN — CLONAZEPAM 1 MG: 1 TABLET ORAL at 09:08

## 2022-07-27 RX ADMIN — GABAPENTIN 300 MG: 300 CAPSULE ORAL at 09:09

## 2022-07-27 RX ADMIN — SILVER SULFADIAZINE: 10 CREAM TOPICAL at 09:11

## 2022-07-27 RX ADMIN — BUSPIRONE HYDROCHLORIDE 10 MG: 10 TABLET ORAL at 09:08

## 2022-07-27 RX ADMIN — PALIPERIDONE 6 MG: 6 TABLET, EXTENDED RELEASE ORAL at 09:09

## 2022-07-27 RX ADMIN — CITALOPRAM HYDROBROMIDE 40 MG: 20 TABLET ORAL at 09:08

## 2022-07-27 NOTE — PLAN OF CARE
Problem: PAIN - ADULT  Goal: Verbalizes/displays adequate comfort level or baseline comfort level  Description: Interventions:  - Encourage patient to monitor pain and request assistance  - Assess pain using appropriate pain scale  - Administer analgesics based on type and severity of pain and evaluate response  - Implement non-pharmacological measures as appropriate and evaluate response  - Consider cultural and social influences on pain and pain management  - Notify physician/advanced practitioner if interventions unsuccessful or patient reports new pain  Outcome: Progressing     Problem: INFECTION - ADULT  Goal: Absence or prevention of progression during hospitalization  Description: INTERVENTIONS:  - Assess and monitor for signs and symptoms of infection  - Monitor lab/diagnostic results  - Monitor all insertion sites, i e  indwelling lines, tubes, and drains  - Monitor endotracheal if appropriate and nasal secretions for changes in amount and color  - [place] appropriate cooling/warming therapies per order  - Administer medications as ordered  - Instruct and encourage patient and family to use good hand hygiene technique  - Identify and instruct in appropriate isolation precautions for identified infection/condition  Outcome: Progressing  Goal: Absence of fever/infection during neutropenic period  Description: INTERVENTIONS:  - Monitor WBC    Outcome: Progressing     Problem: SAFETY ADULT  Goal: Patient will remain free of falls  Description: INTERVENTIONS:  - Educate patient/family on patient safety including physical limitations  - Instruct patient to call for assistance with activity   - Consult OT/PT to assist with strengthening/mobility   - Keep Call bell within reach  - Keep bed low and locked with side rails adjusted as appropriate  - Keep care items and personal belongings within reach  - Initiate and maintain comfort rounds  - Make Fall Risk Sign visible to staff  - Apply yellow socks and bracelet for high fall risk patients  - Consider moving patient to room near nurses station  Outcome: Progressing  Goal: Maintain or return to baseline ADL function  Description: INTERVENTIONS:  -  Assess patient's ability to carry out ADLs; assess patient's baseline for ADL function and identify physical deficits which impact ability to perform ADLs (bathing, care of mouth/teeth, toileting, grooming, dressing, etc )  - Assess/evaluate cause of self-care deficits   - Assess range of motion  - Assess patient's mobility; develop plan if impaired  - Assess patient's need for assistive devices and provide as appropriate  - Encourage maximum independence but intervene and supervise when necessary  - Involve family in performance of ADLs  - Assess for home care needs following discharge   - Consider OT consult to assist with ADL evaluation and planning for discharge  - Provide patient education as appropriate  Outcome: Progressing  Goal: Maintains/Returns to pre admission functional level  Description: INTERVENTIONS:  - Perform BMAT or MOVE assessment daily    - Set and communicate daily mobility goal to care team and patient/family/caregiver     - Collaborate with rehabilitation services on mobility goals if consulted  - Out of bed for toileting  - Record patient progress and toleration of activity level   Outcome: Progressing     Problem: DISCHARGE PLANNING  Goal: Discharge to home or other facility with appropriate resources  Description: INTERVENTIONS:  - Identify barriers to discharge w/patient and caregiver  - Arrange for needed discharge resources and transportation as appropriate  - Identify discharge learning needs (meds, wound care, etc )  - Arrange for interpretive services to assist at discharge as needed  - Refer to Case Management Department for coordinating discharge planning if the patient needs post-hospital services based on physician/advanced practitioner order or complex needs related to functional status, cognitive ability, or social support system  Outcome: Progressing     Problem: Knowledge Deficit  Goal: Patient/family/caregiver demonstrates understanding of disease process, treatment plan, medications, and discharge instructions  Description: Complete learning assessment and assess knowledge base    Interventions:  - Provide teaching at level of understanding  - Provide teaching via preferred learning methods  Outcome: Progressing     Problem: GASTROINTESTINAL - ADULT  Goal: Minimal or absence of nausea and/or vomiting  Description: INTERVENTIONS:  - Administer IV fluids if ordered to ensure adequate hydration  - Maintain NPO status until nausea and vomiting are resolved  - Nasogastric tube if ordered  - Administer ordered antiemetic medications as needed  - Provide nonpharmacologic comfort measures as appropriate  - Advance diet as tolerated, if ordered  - Consider nutrition services referral to assist patient with adequate nutrition and appropriate food choices  Outcome: Progressing  Goal: Maintains or returns to baseline bowel function  Description: INTERVENTIONS:  - Assess bowel function  - Encourage oral fluids to ensure adequate hydration  - Administer IV fluids if ordered to ensure adequate hydration  - Administer ordered medications as needed  - Encourage mobilization and activity  - Consider nutritional services referral to assist patient with adequate nutrition and appropriate food choices  Outcome: Progressing  Goal: Maintains adequate nutritional intake  Description: INTERVENTIONS:  - Monitor percentage of each meal consumed  - Identify factors contributing to decreased intake, treat as appropriate  - Assist with meals as needed  - Monitor I&O, weight, and lab values if indicated  - Obtain nutrition services referral as needed  Outcome: Progressing  Goal: Oral mucous membranes remain intact  Description: INTERVENTIONS  - Assess oral mucosa and hygiene practices  - Implement preventative oral hygiene regimen  - Implement oral medicated treatments as ordered  - Initiate Nutrition services referral as needed  Outcome: Progressing     Problem: SKIN/TISSUE INTEGRITY - ADULT  Goal: Skin Integrity remains intact(Skin Breakdown Prevention)  Description: Assess:  -Inspect skin when repositioning, toileting, and assisting with ADLS  -Assess extremities for adequate circulation and sensation     Bed Management:  -Have minimal linens on bed & keep smooth, unwrinkled  -Change linens as needed when moist or perspiring    Toileting:  -Offer bedside commode    Activity:  -Encourage activity and walks on unit  -Encourage or provide ROM exercises   -Use appropriate equipment to lift or move patient in bed    Skin Care:  -Avoid use of baby powder, tape, friction and shearing, hot water or constrictive clothing  -Do not massage red bony areas  Outcome: Progressing  Goal: Incision(s), wounds(s) or drain site(s) healing without S/S of infection  Description: INTERVENTIONS  - Assess and document dressing, incision, wound bed, drain sites and surrounding tissue  - Provide patient and family education  Outcome: Progressing  Goal: Pressure injury heals and does not worsen  Description: Interventions:  - Implement low air loss mattress or specialty surface (Criteria met)  - Apply silicone foam dressing  - Apply fecal or urinary incontinence containment device   - Utilize friction reducing device or surface for transfers   - Consider nutrition services referral as needed  Outcome: Progressing     Problem: MUSCULOSKELETAL - ADULT  Goal: Maintain or return mobility to safest level of function  Description: INTERVENTIONS:  - Assess patient's ability to carry out ADLs; assess patient's baseline for ADL function and identify physical deficits which impact ability to perform ADLs (bathing, care of mouth/teeth, toileting, grooming, dressing, etc )  - Assess/evaluate cause of self-care deficits   - Assess range of motion  - Assess patient's mobility  - Assess patient's need for assistive devices and provide as appropriate  - Encourage maximum independence but intervene and supervise when necessary  - Involve family in performance of ADLs  - Assess for home care needs following discharge   - Consider OT consult to assist with ADL evaluation and planning for discharge  - Provide patient education as appropriate  Outcome: Progressing  Goal: Maintain proper alignment of affected body part  Description: INTERVENTIONS:  - Support, maintain and protect limb and body alignment  - Provide patient/ family with appropriate education  Outcome: Progressing     Problem: Nutrition/Hydration-ADULT  Goal: Nutrient/Hydration intake appropriate for improving, restoring or maintaining nutritional needs  Description: Monitor and assess patient's nutrition/hydration status for malnutrition  Collaborate with interdisciplinary team and initiate plan and interventions as ordered  Monitor patient's weight and dietary intake as ordered or per policy  Utilize nutrition screening tool and intervene as necessary  Determine patient's food preferences and provide high-protein, high-caloric foods as appropriate       INTERVENTIONS:  - Monitor oral intake, urinary output, labs, and treatment plans  - Assess nutrition and hydration status and recommend course of action  - Evaluate amount of meals eaten  - Assist patient with eating if necessary   - Allow adequate time for meals  - Recommend/ encourage appropriate diets, oral nutritional supplements, and vitamin/mineral supplements  - Order, calculate, and assess calorie counts as needed  - Recommend, monitor, and adjust tube feedings and TPN/PPN based on assessed needs  - Assess need for intravenous fluids  - Provide specific nutrition/hydration education as appropriate  - Include patient/family/caregiver in decisions related to nutrition  Outcome: Progressing     Problem: MOBILITY - ADULT  Goal: Maintain or return to baseline ADL function  Description: INTERVENTIONS:  -  Assess patient's ability to carry out ADLs; assess patient's baseline for ADL function and identify physical deficits which impact ability to perform ADLs (bathing, care of mouth/teeth, toileting, grooming, dressing, etc )  - Assess/evaluate cause of self-care deficits   - Assess range of motion  - Assess patient's mobility; develop plan if impaired  - Assess patient's need for assistive devices and provide as appropriate  - Encourage maximum independence but intervene and supervise when necessary  - Involve family in performance of ADLs  - Assess for home care needs following discharge   - Consider OT consult to assist with ADL evaluation and planning for discharge  - Provide patient education as appropriate  Outcome: Progressing  Goal: Maintains/Returns to pre admission functional level  Description: INTERVENTIONS:  - Perform BMAT or MOVE assessment daily    - Set and communicate daily mobility goal to care team and patient/family/caregiver     - Collaborate with rehabilitation services on mobility goals if consulted  - Out of bed for toileting  - Record patient progress and toleration of activity level   Outcome: Progressing     Problem: Prexisting or High Potential for Compromised Skin Integrity  Goal: Skin integrity is maintained or improved  Description: INTERVENTIONS:  - Identify patients at risk for skin breakdown  - Assess and monitor skin integrity  - Assess and monitor nutrition and hydration status  - Monitor labs   - Assess for incontinence   - Turn and reposition patient  - Assist with mobility/ambulation  - Relieve pressure over bony prominences  - Avoid friction and shearing  - Provide appropriate hygiene as needed including keeping skin clean and dry  - Evaluate need for skin moisturizer/barrier cream  - Collaborate with interdisciplinary team   - Patient/family teaching  - Consider wound care consult   Outcome: Progressing     Problem: Potential for Falls  Goal: Patient will remain free of falls  Description: INTERVENTIONS:  - Educate patient/family on patient safety including physical limitations  - Instruct patient to call for assistance with activity   - Consult OT/PT to assist with strengthening/mobility   - Keep Call bell within reach  - Keep bed low and locked with side rails adjusted as appropriate  - Keep care items and personal belongings within reach  - Initiate and maintain comfort rounds  - Make Fall Risk Sign visible to staff  - Apply yellow socks and bracelet for high fall risk patients  - Consider moving patient to room near nurses station  Outcome: Progressing

## 2022-07-27 NOTE — ASSESSMENT & PLAN NOTE
· Patient with longstanding history of bilateral lower extremity lymphedema and wounds  · With multiple admissions to [address] [place] [address], [facility]  · Patient was admitted 7/14-7/18 secondary to wounds of the lower extremities and was receiving IV cefepime and restarted in the emergency room (he was discharged home on cefadroxil for 5 days)  · He was to follow up with Podiatry, Wound care and [facility]  · Podiatry on both no intervention needed, due to venous stasis  · Less likely to be cellulitis - hold off IV antibiotic  · Continue pain control  · Wound care following  · Patient is medically clear for discharge to short-term rehab

## 2022-07-27 NOTE — PLAN OF CARE
Problem: PAIN - ADULT  Goal: Verbalizes/displays adequate comfort level or baseline comfort level  Description: Interventions:  - Encourage patient to monitor pain and request assistance  - Assess pain using appropriate pain scale  - Administer analgesics based on type and severity of pain and evaluate response  - Implement non-pharmacological measures as appropriate and evaluate response  - Consider cultural and social influences on pain and pain management  - Notify physician/advanced practitioner if interventions unsuccessful or patient reports new pain  Outcome: Progressing     Problem: INFECTION - ADULT  Goal: Absence or prevention of progression during hospitalization  Description: INTERVENTIONS:  - Assess and monitor for signs and symptoms of infection  - Monitor lab/diagnostic results  - Monitor all insertion sites, i e  indwelling lines, tubes, and drains  - Monitor endotracheal if appropriate and nasal secretions for changes in amount and color  - [place] appropriate cooling/warming therapies per order  - Administer medications as ordered  - Instruct and encourage patient and family to use good hand hygiene technique  - Identify and instruct in appropriate isolation precautions for identified infection/condition  Outcome: Progressing  Goal: Absence of fever/infection during neutropenic period  Description: INTERVENTIONS:  - Monitor WBC    Outcome: Progressing     Problem: SAFETY ADULT  Goal: Patient will remain free of falls  Description: INTERVENTIONS:  - Educate patient/family on patient safety including physical limitations  - Instruct patient to call for assistance with activity   - Consult OT/PT to assist with strengthening/mobility   - Keep Call bell within reach  - Keep bed low and locked with side rails adjusted as appropriate  - Keep care items and personal belongings within reach  - Initiate and maintain comfort rounds  - Make Fall Risk Sign visible to staff  - Apply yellow socks and bracelet for high fall risk patients  - Consider moving patient to room near nurses station  Outcome: Progressing  Goal: Maintain or return to baseline ADL function  Description: INTERVENTIONS:  -  Assess patient's ability to carry out ADLs; assess patient's baseline for ADL function and identify physical deficits which impact ability to perform ADLs (bathing, care of mouth/teeth, toileting, grooming, dressing, etc )  - Assess/evaluate cause of self-care deficits   - Assess range of motion  - Assess patient's mobility; develop plan if impaired  - Assess patient's need for assistive devices and provide as appropriate  - Encourage maximum independence but intervene and supervise when necessary  - Involve family in performance of ADLs  - Assess for home care needs following discharge   - Consider OT consult to assist with ADL evaluation and planning for discharge  - Provide patient education as appropriate  Outcome: Progressing  Goal: Maintains/Returns to pre admission functional level  Description: INTERVENTIONS:  - Perform BMAT or MOVE assessment daily    - Set and communicate daily mobility goal to care team and patient/family/caregiver     - Collaborate with rehabilitation services on mobility goals if consulted  - Out of bed for toileting  - Record patient progress and toleration of activity level   Outcome: Progressing     Problem: DISCHARGE PLANNING  Goal: Discharge to home or other facility with appropriate resources  Description: INTERVENTIONS:  - Identify barriers to discharge w/patient and caregiver  - Arrange for needed discharge resources and transportation as appropriate  - Identify discharge learning needs (meds, wound care, etc )  - Arrange for interpretive services to assist at discharge as needed  - Refer to Case Management Department for coordinating discharge planning if the patient needs post-hospital services based on physician/advanced practitioner order or complex needs related to functional status, cognitive ability, or social support system  Outcome: Progressing     Problem: Knowledge Deficit  Goal: Patient/family/caregiver demonstrates understanding of disease process, treatment plan, medications, and discharge instructions  Description: Complete learning assessment and assess knowledge base    Interventions:  - Provide teaching at level of understanding  - Provide teaching via preferred learning methods  Outcome: Progressing     Problem: GASTROINTESTINAL - ADULT  Goal: Minimal or absence of nausea and/or vomiting  Description: INTERVENTIONS:  - Administer IV fluids if ordered to ensure adequate hydration  - Maintain NPO status until nausea and vomiting are resolved  - Nasogastric tube if ordered  - Administer ordered antiemetic medications as needed  - Provide nonpharmacologic comfort measures as appropriate  - Advance diet as tolerated, if ordered  - Consider nutrition services referral to assist patient with adequate nutrition and appropriate food choices  Outcome: Progressing  Goal: Maintains or returns to baseline bowel function  Description: INTERVENTIONS:  - Assess bowel function  - Encourage oral fluids to ensure adequate hydration  - Administer IV fluids if ordered to ensure adequate hydration  - Administer ordered medications as needed  - Encourage mobilization and activity  - Consider nutritional services referral to assist patient with adequate nutrition and appropriate food choices  Outcome: Progressing  Goal: Maintains adequate nutritional intake  Description: INTERVENTIONS:  - Monitor percentage of each meal consumed  - Identify factors contributing to decreased intake, treat as appropriate  - Assist with meals as needed  - Monitor I&O, weight, and lab values if indicated  - Obtain nutrition services referral as needed  Outcome: Progressing  Goal: Oral mucous membranes remain intact  Description: INTERVENTIONS  - Assess oral mucosa and hygiene practices  - Implement preventative oral hygiene regimen  - Implement oral medicated treatments as ordered  - Initiate Nutrition services referral as needed  Outcome: Progressing

## 2022-07-27 NOTE — NURSING NOTE
All belongings returned to PT  No IV present  Report was called to receiving facility  Pt in no distress and has no complaints at this time  Transport team escorted pt down in stretcher

## 2022-07-27 NOTE — ASSESSMENT & PLAN NOTE
· Patient diagnosed with diffuse cutaneous candidiasis and intertrigo  · Seen by wound care  · Recommended nystatin powder  · Diflucan 150 mg weekly for 4 weeks was started at [facility], next dose due 7/30

## 2022-08-05 ENCOUNTER — HOSPITAL ENCOUNTER (EMERGENCY)
Facility: HOSPITAL | Age: 47
Discharge: HOME | End: 2022-08-06
Attending: STUDENT IN AN ORGANIZED HEALTH CARE EDUCATION/TRAINING PROGRAM
Payer: MEDICARE

## 2022-08-05 ENCOUNTER — APPOINTMENT (EMERGENCY)
Dept: RADIOLOGY | Facility: HOSPITAL | Age: 47
End: 2022-08-05
Payer: MEDICARE

## 2022-08-05 DIAGNOSIS — N17.9 ACUTE KIDNEY INJURY (HCC): ICD-10-CM

## 2022-08-05 DIAGNOSIS — S91.115A LACERATION OF SECOND TOE OF LEFT FOOT, INITIAL ENCOUNTER: Primary | ICD-10-CM

## 2022-08-05 LAB
ANION GAP SERPL CALCULATED.3IONS-SCNC: 7 MMOL/L (ref 4–13)
BASOPHILS # BLD MANUAL: 0 THOUSAND/UL (ref 0–0.1)
BASOPHILS NFR MAR MANUAL: 0 % (ref 0–1)
BUN SERPL-MCNC: 26 MG/DL (ref 5–25)
CALCIUM SERPL-MCNC: 7.7 MG/DL (ref 8.3–10.1)
CHLORIDE SERPL-SCNC: 99 MMOL/L (ref 96–108)
CO2 SERPL-SCNC: 31 MMOL/L (ref 21–32)
CREAT SERPL-MCNC: 1.99 MG/DL (ref 0.6–1.3)
EOSINOPHIL # BLD MANUAL: 0.26 THOUSAND/UL (ref 0–0.4)
EOSINOPHIL NFR BLD MANUAL: 3 % (ref 0–6)
ERYTHROCYTE [DISTWIDTH] IN BLOOD BY AUTOMATED COUNT: 14.7 % (ref 11.6–15.1)
GFR SERPL CREATININE-BSD FRML MDRD: 38 ML/MIN/1.73SQ M
GLUCOSE SERPL-MCNC: 150 MG/DL (ref 65–140)
HCT VFR BLD AUTO: 35.1 % (ref 36.5–49.3)
HGB BLD-MCNC: 11.2 G/DL (ref 12–17)
INR PPP: 1.05 (ref 0.84–1.19)
LYMPHOCYTES # BLD AUTO: 1.37 THOUSAND/UL (ref 0.6–4.47)
LYMPHOCYTES # BLD AUTO: 16 % (ref 14–44)
MCH RBC QN AUTO: 30 PG (ref 26.8–34.3)
MCHC RBC AUTO-ENTMCNC: 31.9 G/DL (ref 31.4–37.4)
MCV RBC AUTO: 94 FL (ref 82–98)
METAMYELOCYTES NFR BLD MANUAL: 2 % (ref 0–1)
MICROCYTES BLD QL AUTO: PRESENT
MONOCYTES # BLD AUTO: 1.71 THOUSAND/UL (ref 0–1.22)
MONOCYTES NFR BLD: 20 % (ref 4–12)
MYELOCYTES NFR BLD MANUAL: 1 % (ref 0–1)
NEUTROPHILS # BLD MANUAL: 4.96 THOUSAND/UL (ref 1.85–7.62)
NEUTS BAND NFR BLD MANUAL: 1 % (ref 0–8)
NEUTS SEG NFR BLD AUTO: 57 % (ref 43–75)
PLATELET # BLD AUTO: 299 THOUSANDS/UL (ref 149–390)
PLATELET BLD QL SMEAR: ADEQUATE
PMV BLD AUTO: 9.8 FL (ref 8.9–12.7)
POLYCHROMASIA BLD QL SMEAR: PRESENT
POTASSIUM SERPL-SCNC: 4.4 MMOL/L (ref 3.5–5.3)
PROTHROMBIN TIME: 13.8 SECONDS (ref 11.6–14.5)
RBC # BLD AUTO: 3.73 MILLION/UL (ref 3.88–5.62)
RBC MORPH BLD: PRESENT
SODIUM SERPL-SCNC: 137 MMOL/L (ref 135–147)
WBC # BLD AUTO: 8.56 THOUSAND/UL (ref 4.31–10.16)

## 2022-08-05 PROCEDURE — 12002 RPR S/N/AX/GEN/TRNK2.6-7.5CM: CPT | Performed by: STUDENT IN AN ORGANIZED HEALTH CARE EDUCATION/TRAINING PROGRAM

## 2022-08-05 PROCEDURE — 85007 BL SMEAR W/DIFF WBC COUNT: CPT | Performed by: STUDENT IN AN ORGANIZED HEALTH CARE EDUCATION/TRAINING PROGRAM

## 2022-08-05 PROCEDURE — 85025 COMPLETE CBC W/AUTO DIFF WBC: CPT | Performed by: STUDENT IN AN ORGANIZED HEALTH CARE EDUCATION/TRAINING PROGRAM

## 2022-08-05 PROCEDURE — 96374 THER/PROPH/DIAG INJ IV PUSH: CPT

## 2022-08-05 PROCEDURE — 73660 X-RAY EXAM OF TOE(S): CPT

## 2022-08-05 PROCEDURE — 80048 BASIC METABOLIC PNL TOTAL CA: CPT | Performed by: STUDENT IN AN ORGANIZED HEALTH CARE EDUCATION/TRAINING PROGRAM

## 2022-08-05 PROCEDURE — 99284 EMERGENCY DEPT VISIT MOD MDM: CPT

## 2022-08-05 PROCEDURE — 85610 PROTHROMBIN TIME: CPT | Performed by: STUDENT IN AN ORGANIZED HEALTH CARE EDUCATION/TRAINING PROGRAM

## 2022-08-05 PROCEDURE — 99285 EMERGENCY DEPT VISIT HI MDM: CPT | Performed by: STUDENT IN AN ORGANIZED HEALTH CARE EDUCATION/TRAINING PROGRAM

## 2022-08-05 PROCEDURE — 36415 COLL VENOUS BLD VENIPUNCTURE: CPT | Performed by: STUDENT IN AN ORGANIZED HEALTH CARE EDUCATION/TRAINING PROGRAM

## 2022-08-05 PROCEDURE — 85027 COMPLETE CBC AUTOMATED: CPT | Performed by: STUDENT IN AN ORGANIZED HEALTH CARE EDUCATION/TRAINING PROGRAM

## 2022-08-05 RX ORDER — CEPHALEXIN 250 MG/1
500 CAPSULE ORAL ONCE
Status: COMPLETED | OUTPATIENT
Start: 2022-08-05 | End: 2022-08-05

## 2022-08-05 RX ORDER — MORPHINE SULFATE 10 MG/ML
6 INJECTION, SOLUTION INTRAMUSCULAR; INTRAVENOUS ONCE
Status: COMPLETED | OUTPATIENT
Start: 2022-08-05 | End: 2022-08-05

## 2022-08-05 RX ORDER — CEPHALEXIN 500 MG/1
500 CAPSULE ORAL EVERY 6 HOURS SCHEDULED
Qty: 27 CAPSULE | Refills: 0 | Status: SHIPPED | OUTPATIENT
Start: 2022-08-05 | End: 2022-08-12

## 2022-08-05 RX ADMIN — MORPHINE SULFATE 6 MG: 10 INJECTION INTRAVENOUS at 20:36

## 2022-08-05 RX ADMIN — CEPHALEXIN 500 MG: 250 CAPSULE ORAL at 21:28

## 2022-08-05 NOTE — ED PROVIDER NOTES
History  Chief Complaint   Patient presents with    Toe Injury     Pt presented from [facility] via EMS to this ED c/o toe injury after standing up and turning to go to bathroom this afternoon  Hx cellulitis, yeast infection-currently being tx  History provided by:  Patient and medical records  Toe Pain  Location:  Left 2nd toe   Quality:  Throbbing  Severity:  Severe  Onset quality:  Sudden  Duration:  1 hour  Timing:  Constant  Progression:  Worsening  Chronicity:  New  Context:  Left toe injury   Relieved by:  Nothing  Worsened by: Movement  Associated symptoms: no fever and no rash       48year old M  history of acute candidiasis on Diflucan x4 weeks, multiple open wounds of lower leg  Presents to the emergency department with a left 2nd toe injury  He states that he sustained a toe injury when attempting to stand and pivot while at the skilled nursing facility  The left toe was wrapped and the patient was transported to the ED by EMS  The patient expresses significant pain in his left foot/2nd toe  Does not take anticoagulation/antiplatelet medications  Prior to Admission Medications   Prescriptions Last Dose Informant Patient Reported? Taking?    Cholecalciferol (Vitamin D3) 50 MCG (2000 UT) capsule   Yes No   Sig: Take 2,000 Units by mouth daily   busPIRone (BUSPAR) 10 mg tablet   No No   Sig: Take 1 tablet (10 mg total) by mouth 3 (three) times a day   citalopram (CeleXA) 40 mg tablet   Yes No   clonazePAM (KlonoPIN) 1 mg tablet   Yes No   Sig: Take 1 mg by mouth 3 (three) times a day   divalproex sodium (DEPAKOTE) 500 mg EC tablet   No No   Sig: Take 1 tablet (500 mg total) by mouth daily at bedtime   divalproex sodium (DEPAKOTE) 500 mg EC tablet   No No   Sig: Take 2 tablets (1,000 mg total) by mouth 2 (two) times a day   furosemide (LASIX) 40 mg tablet   Yes No   Sig: Take 40 mg by mouth daily   gabapentin (NEURONTIN) 100 mg capsule   No No   Sig: Take 1 capsule (100 mg total) by mouth 3 (three) times a day   levothyroxine 25 mcg tablet   Yes No   loperamide (IMODIUM) 2 mg capsule   Yes No   Sig: TAKE 2 CAPSULES BY MOUTH TWO TIMES DAILY AS NEEDED FOR DIARRHEA    metoprolol tartrate (LOPRESSOR) 50 mg tablet   Yes No   Sig: Take 50 mg by mouth 2 (two) times a day   mirtazapine (REMERON) 7 5 MG tablet   Yes No   nystatin (MYCOSTATIN) powder   No No   Sig: Apply topically 2 (two) times a day   paliperidone (INVEGA) 6 MG 24 hr tablet   Yes No   pantoprazole (PROTONIX) 40 mg tablet   Yes No   rosuvastatin (CRESTOR) 10 MG tablet   Yes No   silver sulfadiazine (SILVADENE,SSD) 1 % cream   No No   Sig: Apply topically daily   traZODone (DESYREL) 50 mg tablet   Yes No      Facility-Administered Medications: None     Past Medical History:   Diagnosis Date    Bipolar affective disorder ([facility] [address] )     Cellulitis     Crohn's disease of large intestine (HCC)     Hyperlipidemia     Hypertension     Scarlet fever        Past Surgical History:   Procedure Laterality Date    ABDOMINAL SURGERY      abdominal mass    FOOT SURGERY      SKIN SURGERY      mrsa abcess removed    TONSILLECTOMY         Family History   Problem Relation Age of Onset    Heart disease Mother     Failure to thrive Father      I have reviewed and agree with the history as documented  E-Cigarette/Vaping    E-Cigarette Use Never User      E-Cigarette/Vaping Substances     Social History     Tobacco Use    Smoking status: Former Smoker    Smokeless tobacco: Never Used   Vaping Use    Vaping Use: Never used   Substance Use Topics    Alcohol use: Never    Drug use: Never     Review of Systems   Constitutional: Negative for chills and fever  Musculoskeletal: Positive for arthralgias, gait problem and joint swelling  Skin: Positive for color change and wound  Negative for pallor and rash  Hematological: Does not bruise/bleed easily  All other systems reviewed and are negative      Physical Exam  Physical Exam  Vitals and nursing note reviewed  Constitutional:       General: He is not in acute distress  Appearance: He is not toxic-appearing  Comments: Chronically ill-appearing, elevated BMI   HENT:      Head: Normocephalic and atraumatic  Right Ear: External ear normal       Left Ear: External ear normal       Nose: No congestion or rhinorrhea  Eyes:      General:         Right eye: No discharge  Left eye: No discharge  Extraocular Movements: Extraocular movements intact  Conjunctiva/sclera: Conjunctivae normal       Pupils: Pupils are equal, round, and reactive to light  Cardiovascular:      Rate and Rhythm: Normal rate and regular rhythm  Pulses: Normal pulses  Heart sounds: No murmur heard  Pulmonary:      Effort: Pulmonary effort is normal  No respiratory distress  Breath sounds: Normal breath sounds  No stridor  No wheezing, rhonchi or rales  Chest:      Chest wall: No tenderness  Abdominal:      General: Abdomen is flat  Bowel sounds are normal       Palpations: Abdomen is soft  Tenderness: There is no abdominal tenderness  There is no right CVA tenderness, left CVA tenderness, guarding or rebound  Musculoskeletal:         General: Swelling, tenderness and signs of injury present  Cervical back: Neck supple  No tenderness  Feet:       Comments: 4 cm laceration that extends from the dorsal aspect of the 2nd digit its volar aspect in a counter clockwise direction  With manipulation of the toe, there is joint exposure  Bleeding controlled  Sensation is intact  The patient is able to twitch his toes to command  The 2nd toe is pink in color and warm  Skin:     General: Skin is warm and dry  Capillary Refill: Capillary refill takes less than 2 seconds  Coloration: Skin is not jaundiced or pale  Findings: Lesion and rash present  No bruising or erythema  Comments: Bilateral lower extremities are wrapped with Kerlix  Neurological:      General: No focal deficit present  Mental Status: He is alert and oriented to person, place, and time  Mental status is at baseline  Cranial Nerves: No cranial nerve deficit  Sensory: No sensory deficit  Motor: No weakness  Psychiatric:         Mood and Affect: Mood normal          Behavior: Behavior normal          Thought Content:  Thought content normal          Judgment: Judgment normal        Vital Signs  ED Triage Vitals [08/05/22 1820]   Temperature Pulse Respirations Blood Pressure SpO2   98 4 °F (36 9 °C) (!) 107 18 140/75 98 %      Temp Source Heart Rate Source Patient Position - Orthostatic VS BP Location FiO2 (%)   Oral Monitor Lying Left arm --      Pain Score       No Pain         Vitals:    08/05/22 1930 08/05/22 1945 08/05/22 2215 08/06/22 0100   BP: 153/97 153/81 (!) 89/48 (!) 91/46   Pulse: 97 89 91 79   Patient Position - Orthostatic VS:  Lying       ED Medications  Medications   morphine injection 6 mg (6 mg Intravenous Given 8/5/22 2036)   cephalexin (KEFLEX) capsule 500 mg (500 mg Oral Given 8/5/22 2128)     Diagnostic Studies  Results Reviewed     Procedure Component Value Units Date/Time    Manual Differential(PHLEBS Do Not Order) [[id number]]  (Abnormal) Collected: 08/05/22 1935    Lab Status: Final result Specimen: Blood from Arm, Left Updated: 08/05/22 2222     Segmented % 57 %      Bands % 1 %      Lymphocytes % 16 %      Monocytes % 20 %      Eosinophils, % 3 %      Basophils % 0 %      Metamyelocytes% 2 %      Myelocytes % 1 %      Absolute Neutrophils 4 96 Thousand/uL      Lymphocytes Absolute 1 37 Thousand/uL      Monocytes Absolute 1 71 Thousand/uL      Eosinophils Absolute 0 26 Thousand/uL      Basophils Absolute 0 00 Thousand/uL      Total Counted --     RBC Morphology Present     Microcytes Present     Polychromasia Present     Platelet Estimate Adequate    Basic metabolic panel [[id number]]  (Abnormal) Collected: 08/05/22 1935    Lab Status: Final result Specimen: Blood from Arm, Left Updated: 08/05/22 2015     Sodium 137 mmol/L      Potassium 4 4 mmol/L      Chloride 99 mmol/L      CO2 31 mmol/L      ANION GAP 7 mmol/L      BUN 26 mg/dL      Creatinine 1 99 mg/dL      Glucose 150 mg/dL      Calcium 7 7 mg/dL      eGFR 38 ml/min/1 73sq m     Narrative:      [place] guidelines for Chronic Kidney Disease (CKD):     Stage 1 with normal or high GFR (GFR > 90 mL/min/1 73 square meters)    Stage 2 Mild CKD (GFR = 60-89 mL/min/1 73 square meters)    Stage 3A Moderate CKD (GFR = 45-59 mL/min/1 73 square meters)    Stage 3B Moderate CKD (GFR = 30-44 mL/min/1 73 square meters)    Stage 4 Severe CKD (GFR = 15-29 mL/min/1 73 square meters)    Stage 5 End Stage CKD (GFR <15 mL/min/1 73 square meters)  Note: GFR calculation is accurate only with a steady state creatinine    CBC and differential [[id number]]  (Abnormal) Collected: 08/05/22 1935    Lab Status: Final result Specimen: Blood from Arm, Left Updated: 08/05/22 2003     WBC 8 56 Thousand/uL      RBC 3 73 Million/uL      Hemoglobin 11 2 g/dL      Hematocrit 35 1 %      MCV 94 fL      MCH 30 0 pg      MCHC 31 9 g/dL      RDW 14 7 %      MPV 9 8 fL      Platelets 320 Thousands/uL     Narrative: This is an appended report  These results have been appended to a previously verified report  Gris Peraltaer [[id number]]  (Normal) Collected: 08/05/22 1935    Lab Status: Final result Specimen: Blood from Arm, Left Updated: 08/05/22 1954     Protime 13 8 seconds      INR 1 05             XR toe second min 2 views LEFT   ED Interpretation by Justin Rodríguez DO (08/05 2112)   No obvious signs of fracture/dislocation noted on XR      Final Result by Gage Pop DO (08/06 9252)      No acute osseous abnormality  Diffuse soft tissue swelling        Workstation performed: [id number]                Procedures  Laceration repair    Date/Time: 8/5/2022 8:55 PM  Performed by: Katharine Garcia DO  Authorized by: Katharine Garcia DO   Consent: Verbal consent obtained  Consent given by: patient  Patient understanding: patient states understanding of the procedure being performed  Site marked: the operative site was marked  Patient identity confirmed: verbally with patient  Body area: lower extremity  Location details: left second toe  Laceration length: 4 5 cm  Tendon involvement: none  Nerve involvement: none  Anesthesia: local infiltration    Anesthesia:  Local Anesthetic: bupivacaine 0 25% without epinephrine    Sedation:  Patient sedated: no        Procedure Details:  Preparation: Patient was prepped and draped in the usual sterile fashion  Irrigation solution: saline  Irrigation method: syringe  Amount of cleaning: standard  Skin closure: Ethilon  Number of sutures: 7  Technique: simple  Approximation: close  Approximation difficulty: complex  Dressing: xeroform gauze  Patient tolerance: patient tolerated the procedure well with no immediate complications        ED Course  ED Course as of 08/06/22 1200   Fri Aug 05, 2022   1949 No leukocytosis  Hemoglobin is at baseline  Reaching up to Podiatry, Dr Marrian Severe  2029 The case was discussed with Dr Marrian Severe  Requesting that the wound be irrigated/repaired as well as treat the patient with oral abx x 7 days  Will follow up with the patient next week  2117 The laceration was repaired with sutures  See procedural note above for further details  The distal toe continued to have normal capillary refill  The patient was able to move his toes; sensation intact  The repaired laceration was then dressed with xeroform gauze  First dose of abx was administered  2119 Cr 1 99 from 1 38; the patient was able to tolerate PO in the ED  Denies flank pain  Repeat blood work recommended--PCP follow up encouraged  Dr Marrian Severe advised that she will follow up with the patient next week  The patient was stable for discharge        SBIRT 20yo+ Flowsheet Row Most Recent Value   SBIRT (23 yo +)    In order to provide better care to our patients, we are screening all of our patients for alcohol and drug use  Would it be okay to ask you these screening questions? No Filed at: 08/05/2022 1841        MDM    Disposition  Final diagnoses:   Laceration of second toe of left foot, initial encounter   Acute kidney injury [facility])     Time reflects when diagnosis was documented in both MDM as applicable and the Disposition within this note     Time User Action Codes Description Comment    8/5/2022  9:21 PM Marina Rout Add [P69 905B] Laceration of second toe of left foot, initial encounter     8/5/2022  9:23 PM Marina Rout Add [N17 9] Acute kidney injury [facility])       ED Disposition     ED Disposition   Discharge    Condition   Stable    Date/Time   Fri Aug 5, 2022  9:20 PM    Comment   Nataliia Hauser discharge to home/self care                 Follow-up Information     Follow up With Specialties Details Why [address] [facility]   [address]  [phone]            Discharge Medication List as of 8/5/2022  9:24 PM      START taking these medications    Details   cephalexin (KEFLEX) 500 mg capsule Take 1 capsule (500 mg total) by mouth every 6 (six) hours for 7 days, Starting Fri 8/5/2022, Until Fri 8/12/2022, Print         CONTINUE these medications which have NOT CHANGED    Details   busPIRone (BUSPAR) 10 mg tablet Take 1 tablet (10 mg total) by mouth 3 (three) times a day, Starting Mon 6/20/2022, Until Wed 7/20/2022, Normal      Cholecalciferol (Vitamin D3) 50 MCG (2000 UT) capsule Take 2,000 Units by mouth daily, Starting Wed 3/30/2022, Historical Med      citalopram (CeleXA) 40 mg tablet Starting Mon 5/9/2022, Historical Med      clonazePAM (KlonoPIN) 1 mg tablet Take 1 mg by mouth 3 (three) times a day, Starting Wed 3/30/2022, Historical Med      divalproex sodium (DEPAKOTE) 500 mg EC tablet Take 1 tablet (500 mg total) by mouth daily at bedtime, Starting Mon 6/20/2022, Until Wed 7/20/2022, Normal      divalproex sodium (DEPAKOTE) 500 mg EC tablet Take 2 tablets (1,000 mg total) by mouth 2 (two) times a day, Starting Mon 6/20/2022, Until Wed 7/20/2022, Normal      furosemide (LASIX) 40 mg tablet Take 40 mg by mouth daily, Starting Wed 3/30/2022, Historical Med      gabapentin (NEURONTIN) 100 mg capsule Take 1 capsule (100 mg total) by mouth 3 (three) times a day, Starting Mon 6/20/2022, Until Wed 7/20/2022, Normal      levothyroxine 25 mcg tablet Starting Mon 5/9/2022, Historical Med      loperamide (IMODIUM) 2 mg capsule TAKE 2 CAPSULES BY MOUTH TWO TIMES DAILY AS NEEDED FOR DIARRHEA , Historical Med      metoprolol tartrate (LOPRESSOR) 50 mg tablet Take 50 mg by mouth 2 (two) times a day, Starting Mon 5/9/2022, Historical Med      mirtazapine (REMERON) 7 5 MG tablet Starting Sat 2/12/2022, Historical Med      nystatin (MYCOSTATIN) powder Apply topically 2 (two) times a day, Starting Mon 6/20/2022, Normal      paliperidone (INVEGA) 6 MG 24 hr tablet Starting Mon 5/9/2022, Historical Med      pantoprazole (PROTONIX) 40 mg tablet Starting Mon 5/9/2022, Historical Med      rosuvastatin (CRESTOR) 10 MG tablet Starting Mon 2/28/2022, Historical Med      silver sulfadiazine (SILVADENE,SSD) 1 % cream Apply topically daily, Starting Wed 7/27/2022, No Print      traZODone (DESYREL) 50 mg tablet Starting Mon 5/9/2022, Historical Med                 PDMP Review       Value Time User    PDMP Reviewed  Yes 7/14/2022  8:46 PM Yanna Harvey PA-C          ED Provider  Electronically Signed by           Marco Antonio Honeycutt DO  08/06/22 1200

## 2022-08-06 VITALS
RESPIRATION RATE: 16 BRPM | WEIGHT: 315 LBS | OXYGEN SATURATION: 90 % | BODY MASS INDEX: 38.36 KG/M2 | TEMPERATURE: 98.4 F | DIASTOLIC BLOOD PRESSURE: 46 MMHG | SYSTOLIC BLOOD PRESSURE: 91 MMHG | HEART RATE: 79 BPM | HEIGHT: 76 IN

## 2022-08-06 NOTE — DISCHARGE INSTRUCTIONS
Wolfgang Arzate is being prescribed antibiotics  A total of 7 sutures were used to repair the laceration  A referral to Podiatry was ordered  He is to follow up in [address] next week  He can continue all other prescribed medications  Replace the xeroform dressing daily  Have him follow-up with his PCP in 2-3 days for repeat BMP to further evaluate renal function  Encourage oral hydration  Do not hesitate to have him re-evaluated in the ED for any concerning signs or symptoms

## 2022-09-07 ENCOUNTER — APPOINTMENT (EMERGENCY)
Dept: CT IMAGING | Facility: HOSPITAL | Age: 47
DRG: 683 | End: 2022-09-07
Payer: MEDICARE

## 2022-09-07 ENCOUNTER — HOSPITAL ENCOUNTER (INPATIENT)
Facility: HOSPITAL | Age: 47
LOS: 8 days | Discharge: NURSING, REHAB OR LONG-TERM CARE | DRG: 683 | End: 2022-09-15
Attending: EMERGENCY MEDICINE | Admitting: STUDENT IN AN ORGANIZED HEALTH CARE EDUCATION/TRAINING PROGRAM
Payer: MEDICARE

## 2022-09-07 ENCOUNTER — APPOINTMENT (OUTPATIENT)
Dept: RADIOLOGY | Facility: HOSPITAL | Age: 47
DRG: 683 | End: 2022-09-07
Payer: MEDICARE

## 2022-09-07 DIAGNOSIS — R09.02 HYPOXIA: ICD-10-CM

## 2022-09-07 DIAGNOSIS — F41.1 GAD (GENERALIZED ANXIETY DISORDER): ICD-10-CM

## 2022-09-07 DIAGNOSIS — L03.115 CELLULITIS OF RIGHT LOWER EXTREMITY: ICD-10-CM

## 2022-09-07 DIAGNOSIS — A41.9 SEVERE SEPSIS (HCC): Primary | ICD-10-CM

## 2022-09-07 DIAGNOSIS — B37.2 CUTANEOUS CANDIDIASIS: ICD-10-CM

## 2022-09-07 DIAGNOSIS — R65.20 SEVERE SEPSIS (HCC): Primary | ICD-10-CM

## 2022-09-07 DIAGNOSIS — R21 RASH OF BODY: ICD-10-CM

## 2022-09-07 DIAGNOSIS — L03.116 CELLULITIS OF LEFT LOWER EXTREMITY: ICD-10-CM

## 2022-09-07 DIAGNOSIS — S81.809D MULTIPLE OPEN WOUNDS OF LOWER LEG, UNSPECIFIED LATERALITY, SUBSEQUENT ENCOUNTER: ICD-10-CM

## 2022-09-07 DIAGNOSIS — B35.4 TINEA CORPORIS: ICD-10-CM

## 2022-09-07 PROBLEM — J96.01 ACUTE RESPIRATORY FAILURE WITH HYPOXIA (HCC): Status: ACTIVE | Noted: 2022-09-07

## 2022-09-07 PROBLEM — R79.89 ELEVATED SERUM CREATININE: Status: ACTIVE | Noted: 2022-09-07

## 2022-09-07 LAB
2HR DELTA HS TROPONIN: 4 NG/L
ALBUMIN SERPL BCP-MCNC: 1.8 G/DL (ref 3.5–5)
ALP SERPL-CCNC: 55 U/L (ref 46–116)
ALT SERPL W P-5'-P-CCNC: 7 U/L (ref 12–78)
ANION GAP SERPL CALCULATED.3IONS-SCNC: 12 MMOL/L (ref 4–13)
ANISOCYTOSIS BLD QL SMEAR: PRESENT
AST SERPL W P-5'-P-CCNC: 13 U/L (ref 5–45)
ATRIAL RATE: 468 BPM
BASOPHILS # BLD MANUAL: 0 THOUSAND/UL (ref 0–0.1)
BASOPHILS NFR MAR MANUAL: 0 % (ref 0–1)
BILIRUB SERPL-MCNC: 0.44 MG/DL (ref 0.2–1)
BUN SERPL-MCNC: 19 MG/DL (ref 5–25)
CALCIUM ALBUM COR SERPL-MCNC: 9.9 MG/DL (ref 8.3–10.1)
CALCIUM SERPL-MCNC: 8.1 MG/DL (ref 8.3–10.1)
CARDIAC TROPONIN I PNL SERPL HS: 12 NG/L
CARDIAC TROPONIN I PNL SERPL HS: 8 NG/L
CHLORIDE SERPL-SCNC: 102 MMOL/L (ref 96–108)
CO2 SERPL-SCNC: 25 MMOL/L (ref 21–32)
CREAT SERPL-MCNC: 1.77 MG/DL (ref 0.6–1.3)
D DIMER PPP FEU-MCNC: 2.04 UG/ML FEU
EOSINOPHIL # BLD MANUAL: 0.07 THOUSAND/UL (ref 0–0.4)
EOSINOPHIL NFR BLD MANUAL: 1 % (ref 0–6)
ERYTHROCYTE [DISTWIDTH] IN BLOOD BY AUTOMATED COUNT: 13.9 % (ref 11.6–15.1)
GFR SERPL CREATININE-BSD FRML MDRD: 44 ML/MIN/1.73SQ M
GLUCOSE SERPL-MCNC: 210 MG/DL (ref 65–140)
HCT VFR BLD AUTO: 39.8 % (ref 36.5–49.3)
HGB BLD-MCNC: 12.8 G/DL (ref 12–17)
LACTATE SERPL-SCNC: 3.4 MMOL/L (ref 0.5–2)
LIPASE SERPL-CCNC: 77 U/L (ref 73–393)
LYMPHOCYTES # BLD AUTO: 0.95 THOUSAND/UL (ref 0.6–4.47)
LYMPHOCYTES # BLD AUTO: 13 % (ref 14–44)
MCH RBC QN AUTO: 31.4 PG (ref 26.8–34.3)
MCHC RBC AUTO-ENTMCNC: 32.2 G/DL (ref 31.4–37.4)
MCV RBC AUTO: 98 FL (ref 82–98)
MONOCYTES # BLD AUTO: 0.88 THOUSAND/UL (ref 0–1.22)
MONOCYTES NFR BLD: 12 % (ref 4–12)
MYELOCYTES NFR BLD MANUAL: 4 % (ref 0–1)
NEUTROPHILS # BLD MANUAL: 5.06 THOUSAND/UL (ref 1.85–7.62)
NEUTS BAND NFR BLD MANUAL: 12 % (ref 0–8)
NEUTS SEG NFR BLD AUTO: 57 % (ref 43–75)
NT-PROBNP SERPL-MCNC: 838 PG/ML
PLATELET # BLD AUTO: 200 THOUSANDS/UL (ref 149–390)
PLATELET BLD QL SMEAR: ADEQUATE
PMV BLD AUTO: 10.3 FL (ref 8.9–12.7)
POTASSIUM SERPL-SCNC: 3.4 MMOL/L (ref 3.5–5.3)
PROT SERPL-MCNC: 6.2 G/DL (ref 6.4–8.4)
QRS AXIS: 53 DEGREES
QRSD INTERVAL: 90 MS
QT INTERVAL: 348 MS
QTC INTERVAL: 446 MS
RBC # BLD AUTO: 4.08 MILLION/UL (ref 3.88–5.62)
SARS-COV-2 RNA RESP QL NAA+PROBE: NEGATIVE
SODIUM SERPL-SCNC: 139 MMOL/L (ref 135–147)
T WAVE AXIS: -12 DEGREES
VARIANT LYMPHS # BLD AUTO: 1 %
VENTRICULAR RATE: 99 BPM
WBC # BLD AUTO: 7.33 THOUSAND/UL (ref 4.31–10.16)

## 2022-09-07 PROCEDURE — 85379 FIBRIN DEGRADATION QUANT: CPT | Performed by: EMERGENCY MEDICINE

## 2022-09-07 PROCEDURE — G1004 CDSM NDSC: HCPCS

## 2022-09-07 PROCEDURE — 85007 BL SMEAR W/DIFF WBC COUNT: CPT | Performed by: EMERGENCY MEDICINE

## 2022-09-07 PROCEDURE — 84484 ASSAY OF TROPONIN QUANT: CPT | Performed by: EMERGENCY MEDICINE

## 2022-09-07 PROCEDURE — 85025 COMPLETE CBC W/AUTO DIFF WBC: CPT | Performed by: EMERGENCY MEDICINE

## 2022-09-07 PROCEDURE — 96368 THER/DIAG CONCURRENT INF: CPT

## 2022-09-07 PROCEDURE — 83690 ASSAY OF LIPASE: CPT | Performed by: EMERGENCY MEDICINE

## 2022-09-07 PROCEDURE — 85027 COMPLETE CBC AUTOMATED: CPT | Performed by: EMERGENCY MEDICINE

## 2022-09-07 PROCEDURE — 96365 THER/PROPH/DIAG IV INF INIT: CPT

## 2022-09-07 PROCEDURE — 71045 X-RAY EXAM CHEST 1 VIEW: CPT

## 2022-09-07 PROCEDURE — U0005 INFEC AGEN DETEC AMPLI PROBE: HCPCS | Performed by: EMERGENCY MEDICINE

## 2022-09-07 PROCEDURE — 80053 COMPREHEN METABOLIC PANEL: CPT | Performed by: EMERGENCY MEDICINE

## 2022-09-07 PROCEDURE — 99285 EMERGENCY DEPT VISIT HI MDM: CPT | Performed by: EMERGENCY MEDICINE

## 2022-09-07 PROCEDURE — 83605 ASSAY OF LACTIC ACID: CPT | Performed by: EMERGENCY MEDICINE

## 2022-09-07 PROCEDURE — 71275 CT ANGIOGRAPHY CHEST: CPT

## 2022-09-07 PROCEDURE — 99223 1ST HOSP IP/OBS HIGH 75: CPT | Performed by: NURSE PRACTITIONER

## 2022-09-07 PROCEDURE — 84145 PROCALCITONIN (PCT): CPT | Performed by: NURSE PRACTITIONER

## 2022-09-07 PROCEDURE — 36415 COLL VENOUS BLD VENIPUNCTURE: CPT | Performed by: EMERGENCY MEDICINE

## 2022-09-07 PROCEDURE — 83880 ASSAY OF NATRIURETIC PEPTIDE: CPT | Performed by: EMERGENCY MEDICINE

## 2022-09-07 PROCEDURE — U0003 INFECTIOUS AGENT DETECTION BY NUCLEIC ACID (DNA OR RNA); SEVERE ACUTE RESPIRATORY SYNDROME CORONAVIRUS 2 (SARS-COV-2) (CORONAVIRUS DISEASE [COVID-19]), AMPLIFIED PROBE TECHNIQUE, MAKING USE OF HIGH THROUGHPUT TECHNOLOGIES AS DESCRIBED BY CMS-2020-01-R: HCPCS | Performed by: EMERGENCY MEDICINE

## 2022-09-07 PROCEDURE — 99285 EMERGENCY DEPT VISIT HI MDM: CPT

## 2022-09-07 PROCEDURE — 93010 ELECTROCARDIOGRAM REPORT: CPT | Performed by: INTERNAL MEDICINE

## 2022-09-07 PROCEDURE — 93005 ELECTROCARDIOGRAM TRACING: CPT

## 2022-09-07 PROCEDURE — 87040 BLOOD CULTURE FOR BACTERIA: CPT | Performed by: EMERGENCY MEDICINE

## 2022-09-07 RX ADMIN — SODIUM CHLORIDE, SODIUM LACTATE, POTASSIUM CHLORIDE, AND CALCIUM CHLORIDE 500 ML: .6; .31; .03; .02 INJECTION, SOLUTION INTRAVENOUS at 22:09

## 2022-09-07 RX ADMIN — CEFEPIME HYDROCHLORIDE 2000 MG: 2 INJECTION, POWDER, FOR SOLUTION INTRAVENOUS at 22:10

## 2022-09-07 RX ADMIN — IOHEXOL 100 ML: 350 INJECTION, SOLUTION INTRAVENOUS at 21:55

## 2022-09-07 NOTE — Clinical Note
Case was discussed with abhi and the patient's admission status was agreed to be Admission Status: inpatient status to the service of Dr Corina Mesa

## 2022-09-08 PROBLEM — B35.4 TINEA CORPORIS: Status: ACTIVE | Noted: 2022-09-08

## 2022-09-08 PROBLEM — N28.9 MILD RENAL INSUFFICIENCY: Status: ACTIVE | Noted: 2022-09-07

## 2022-09-08 PROBLEM — L30.9: Status: ACTIVE | Noted: 2022-09-07

## 2022-09-08 PROBLEM — J98.4 ACUTE PULMONARY INSUFFICIENCY: Status: ACTIVE | Noted: 2022-09-07

## 2022-09-08 LAB
ALBUMIN SERPL BCP-MCNC: 1.9 G/DL (ref 3.5–5)
ALP SERPL-CCNC: 52 U/L (ref 46–116)
ALT SERPL W P-5'-P-CCNC: 6 U/L (ref 12–78)
ANION GAP SERPL CALCULATED.3IONS-SCNC: 4 MMOL/L (ref 4–13)
AST SERPL W P-5'-P-CCNC: 9 U/L (ref 5–45)
ATRIAL RATE: 104 BPM
BASOPHILS # BLD AUTO: 0.02 THOUSANDS/ΜL (ref 0–0.1)
BASOPHILS NFR BLD AUTO: 0 % (ref 0–1)
BILIRUB SERPL-MCNC: 0.47 MG/DL (ref 0.2–1)
BUN SERPL-MCNC: 22 MG/DL (ref 5–25)
CALCIUM ALBUM COR SERPL-MCNC: 9.9 MG/DL (ref 8.3–10.1)
CALCIUM SERPL-MCNC: 8.2 MG/DL (ref 8.3–10.1)
CHLORIDE SERPL-SCNC: 103 MMOL/L (ref 96–108)
CO2 SERPL-SCNC: 31 MMOL/L (ref 21–32)
CREAT SERPL-MCNC: 1.92 MG/DL (ref 0.6–1.3)
EOSINOPHIL # BLD AUTO: 0.02 THOUSAND/ΜL (ref 0–0.61)
EOSINOPHIL NFR BLD AUTO: 0 % (ref 0–6)
ERYTHROCYTE [DISTWIDTH] IN BLOOD BY AUTOMATED COUNT: 14.5 % (ref 11.6–15.1)
GFR SERPL CREATININE-BSD FRML MDRD: 39 ML/MIN/1.73SQ M
GLUCOSE SERPL-MCNC: 145 MG/DL (ref 65–140)
HCT VFR BLD AUTO: 38.1 % (ref 36.5–49.3)
HGB BLD-MCNC: 12.5 G/DL (ref 12–17)
IMM GRANULOCYTES # BLD AUTO: 0.14 THOUSAND/UL (ref 0–0.2)
IMM GRANULOCYTES NFR BLD AUTO: 2 % (ref 0–2)
LACTATE SERPL-SCNC: 1.4 MMOL/L (ref 0.5–2)
LYMPHOCYTES # BLD AUTO: 1.24 THOUSANDS/ΜL (ref 0.6–4.47)
LYMPHOCYTES NFR BLD AUTO: 20 % (ref 14–44)
MCH RBC QN AUTO: 31.6 PG (ref 26.8–34.3)
MCHC RBC AUTO-ENTMCNC: 32.8 G/DL (ref 31.4–37.4)
MCV RBC AUTO: 96 FL (ref 82–98)
MONOCYTES # BLD AUTO: 1.8 THOUSAND/ΜL (ref 0.17–1.22)
MONOCYTES NFR BLD AUTO: 30 % (ref 4–12)
NEUTROPHILS # BLD AUTO: 2.88 THOUSANDS/ΜL (ref 1.85–7.62)
NEUTS SEG NFR BLD AUTO: 48 % (ref 43–75)
NRBC BLD AUTO-RTO: 0 /100 WBCS
PLATELET # BLD AUTO: 201 THOUSANDS/UL (ref 149–390)
PMV BLD AUTO: 9.6 FL (ref 8.9–12.7)
POTASSIUM SERPL-SCNC: 4.8 MMOL/L (ref 3.5–5.3)
PROCALCITONIN SERPL-MCNC: 0.17 NG/ML
PROT SERPL-MCNC: 6.2 G/DL (ref 6.4–8.4)
QRS AXIS: 78 DEGREES
QRSD INTERVAL: 92 MS
QT INTERVAL: 356 MS
QTC INTERVAL: 447 MS
RBC # BLD AUTO: 3.96 MILLION/UL (ref 3.88–5.62)
SODIUM SERPL-SCNC: 138 MMOL/L (ref 135–147)
T WAVE AXIS: 8 DEGREES
VENTRICULAR RATE: 95 BPM
WBC # BLD AUTO: 6.1 THOUSAND/UL (ref 4.31–10.16)

## 2022-09-08 PROCEDURE — 99232 SBSQ HOSP IP/OBS MODERATE 35: CPT | Performed by: INTERNAL MEDICINE

## 2022-09-08 PROCEDURE — 97167 OT EVAL HIGH COMPLEX 60 MIN: CPT

## 2022-09-08 PROCEDURE — 97163 PT EVAL HIGH COMPLEX 45 MIN: CPT | Performed by: PHYSICAL THERAPIST

## 2022-09-08 PROCEDURE — 80053 COMPREHEN METABOLIC PANEL: CPT | Performed by: NURSE PRACTITIONER

## 2022-09-08 PROCEDURE — 99223 1ST HOSP IP/OBS HIGH 75: CPT | Performed by: STUDENT IN AN ORGANIZED HEALTH CARE EDUCATION/TRAINING PROGRAM

## 2022-09-08 PROCEDURE — 85025 COMPLETE CBC W/AUTO DIFF WBC: CPT | Performed by: NURSE PRACTITIONER

## 2022-09-08 PROCEDURE — 83605 ASSAY OF LACTIC ACID: CPT | Performed by: EMERGENCY MEDICINE

## 2022-09-08 PROCEDURE — 36415 COLL VENOUS BLD VENIPUNCTURE: CPT | Performed by: EMERGENCY MEDICINE

## 2022-09-08 PROCEDURE — 94002 VENT MGMT INPAT INIT DAY: CPT

## 2022-09-08 PROCEDURE — 93010 ELECTROCARDIOGRAM REPORT: CPT | Performed by: INTERNAL MEDICINE

## 2022-09-08 RX ORDER — CEFAZOLIN SODIUM 2 G/50ML
2000 SOLUTION INTRAVENOUS EVERY 8 HOURS
Status: DISCONTINUED | OUTPATIENT
Start: 2022-09-08 | End: 2022-09-08

## 2022-09-08 RX ORDER — SIMETHICONE 80 MG
80 TABLET,CHEWABLE ORAL 4 TIMES DAILY PRN
Status: DISCONTINUED | OUTPATIENT
Start: 2022-09-08 | End: 2022-09-14

## 2022-09-08 RX ORDER — SODIUM CHLORIDE 9 MG/ML
100 INJECTION, SOLUTION INTRAVENOUS CONTINUOUS
Status: DISCONTINUED | OUTPATIENT
Start: 2022-09-08 | End: 2022-09-09

## 2022-09-08 RX ORDER — CLONAZEPAM 1 MG/1
1 TABLET ORAL 3 TIMES DAILY
Status: DISCONTINUED | OUTPATIENT
Start: 2022-09-08 | End: 2022-09-15 | Stop reason: HOSPADM

## 2022-09-08 RX ORDER — CLOTRIMAZOLE 1 %
CREAM (GRAM) TOPICAL 2 TIMES DAILY
Status: DISCONTINUED | OUTPATIENT
Start: 2022-09-08 | End: 2022-09-09

## 2022-09-08 RX ORDER — MELATONIN
1000 DAILY
Status: DISCONTINUED | OUTPATIENT
Start: 2022-09-08 | End: 2022-09-15 | Stop reason: HOSPADM

## 2022-09-08 RX ORDER — CLOTRIMAZOLE AND BETAMETHASONE DIPROPIONATE 10; .64 MG/G; MG/G
CREAM TOPICAL 2 TIMES DAILY
Status: DISCONTINUED | OUTPATIENT
Start: 2022-09-08 | End: 2022-09-08

## 2022-09-08 RX ORDER — LEVOTHYROXINE SODIUM 0.03 MG/1
25 TABLET ORAL
Status: DISCONTINUED | OUTPATIENT
Start: 2022-09-08 | End: 2022-09-15 | Stop reason: HOSPADM

## 2022-09-08 RX ORDER — PALIPERIDONE 3 MG/1
6 TABLET, EXTENDED RELEASE ORAL DAILY
Status: DISCONTINUED | OUTPATIENT
Start: 2022-09-08 | End: 2022-09-15 | Stop reason: HOSPADM

## 2022-09-08 RX ORDER — FUROSEMIDE 40 MG/1
40 TABLET ORAL DAILY
Status: DISCONTINUED | OUTPATIENT
Start: 2022-09-08 | End: 2022-09-08

## 2022-09-08 RX ORDER — ACETAMINOPHEN 325 MG/1
975 TABLET ORAL EVERY 6 HOURS PRN
Status: DISCONTINUED | OUTPATIENT
Start: 2022-09-08 | End: 2022-09-09

## 2022-09-08 RX ORDER — METOPROLOL TARTRATE 50 MG/1
50 TABLET, FILM COATED ORAL 2 TIMES DAILY
Status: DISCONTINUED | OUTPATIENT
Start: 2022-09-08 | End: 2022-09-11

## 2022-09-08 RX ORDER — ONDANSETRON 2 MG/ML
4 INJECTION INTRAMUSCULAR; INTRAVENOUS EVERY 6 HOURS PRN
Status: DISCONTINUED | OUTPATIENT
Start: 2022-09-08 | End: 2022-09-10

## 2022-09-08 RX ORDER — BUSPIRONE HYDROCHLORIDE 10 MG/1
10 TABLET ORAL 3 TIMES DAILY
Status: DISCONTINUED | OUTPATIENT
Start: 2022-09-08 | End: 2022-09-15 | Stop reason: HOSPADM

## 2022-09-08 RX ORDER — FLUCONAZOLE 100 MG/1
400 TABLET ORAL DAILY
Status: DISCONTINUED | OUTPATIENT
Start: 2022-09-08 | End: 2022-09-15 | Stop reason: HOSPADM

## 2022-09-08 RX ORDER — DIVALPROEX SODIUM 500 MG/1
1000 TABLET, DELAYED RELEASE ORAL DAILY
Status: DISCONTINUED | OUTPATIENT
Start: 2022-09-08 | End: 2022-09-15 | Stop reason: HOSPADM

## 2022-09-08 RX ORDER — GINSENG 100 MG
1 CAPSULE ORAL 2 TIMES DAILY
Status: DISCONTINUED | OUTPATIENT
Start: 2022-09-08 | End: 2022-09-08

## 2022-09-08 RX ORDER — GABAPENTIN 100 MG/1
100 CAPSULE ORAL 3 TIMES DAILY
Status: DISCONTINUED | OUTPATIENT
Start: 2022-09-08 | End: 2022-09-15 | Stop reason: HOSPADM

## 2022-09-08 RX ORDER — HEPARIN SODIUM 5000 [USP'U]/ML
7500 INJECTION, SOLUTION INTRAVENOUS; SUBCUTANEOUS EVERY 8 HOURS SCHEDULED
Status: DISCONTINUED | OUTPATIENT
Start: 2022-09-08 | End: 2022-09-15 | Stop reason: HOSPADM

## 2022-09-08 RX ORDER — DIVALPROEX SODIUM 500 MG/1
1500 TABLET, DELAYED RELEASE ORAL
Status: DISCONTINUED | OUTPATIENT
Start: 2022-09-08 | End: 2022-09-15 | Stop reason: HOSPADM

## 2022-09-08 RX ORDER — PRAVASTATIN SODIUM 80 MG/1
80 TABLET ORAL
Status: DISCONTINUED | OUTPATIENT
Start: 2022-09-08 | End: 2022-09-15 | Stop reason: HOSPADM

## 2022-09-08 RX ORDER — DIVALPROEX SODIUM 500 MG/1
500 TABLET, DELAYED RELEASE ORAL
Status: DISCONTINUED | OUTPATIENT
Start: 2022-09-08 | End: 2022-09-08

## 2022-09-08 RX ORDER — MIRTAZAPINE 15 MG/1
7.5 TABLET, FILM COATED ORAL
Status: DISCONTINUED | OUTPATIENT
Start: 2022-09-08 | End: 2022-09-15 | Stop reason: HOSPADM

## 2022-09-08 RX ORDER — CITALOPRAM 20 MG/1
40 TABLET ORAL DAILY
Status: DISCONTINUED | OUTPATIENT
Start: 2022-09-08 | End: 2022-09-15 | Stop reason: HOSPADM

## 2022-09-08 RX ORDER — DIPHENHYDRAMINE HCL 25 MG
25 TABLET ORAL EVERY 6 HOURS PRN
Status: DISCONTINUED | OUTPATIENT
Start: 2022-09-08 | End: 2022-09-11

## 2022-09-08 RX ORDER — PANTOPRAZOLE SODIUM 40 MG/1
40 TABLET, DELAYED RELEASE ORAL
Status: DISCONTINUED | OUTPATIENT
Start: 2022-09-08 | End: 2022-09-15 | Stop reason: HOSPADM

## 2022-09-08 RX ORDER — MAGNESIUM HYDROXIDE/ALUMINUM HYDROXICE/SIMETHICONE 120; 1200; 1200 MG/30ML; MG/30ML; MG/30ML
30 SUSPENSION ORAL EVERY 6 HOURS PRN
Status: DISCONTINUED | OUTPATIENT
Start: 2022-09-08 | End: 2022-09-15 | Stop reason: HOSPADM

## 2022-09-08 RX ADMIN — GABAPENTIN 100 MG: 100 CAPSULE ORAL at 09:42

## 2022-09-08 RX ADMIN — GABAPENTIN 100 MG: 100 CAPSULE ORAL at 15:16

## 2022-09-08 RX ADMIN — PANTOPRAZOLE SODIUM 40 MG: 40 TABLET, DELAYED RELEASE ORAL at 06:44

## 2022-09-08 RX ADMIN — BACITRACIN 1 SMALL APPLICATION: 500 OINTMENT TOPICAL at 01:41

## 2022-09-08 RX ADMIN — GABAPENTIN 100 MG: 100 CAPSULE ORAL at 21:09

## 2022-09-08 RX ADMIN — PALIPERIDONE 6 MG: 3 TABLET, EXTENDED RELEASE ORAL at 09:41

## 2022-09-08 RX ADMIN — DIVALPROEX SODIUM 1000 MG: 500 TABLET, DELAYED RELEASE ORAL at 09:41

## 2022-09-08 RX ADMIN — CLONAZEPAM 1 MG: 1 TABLET ORAL at 15:15

## 2022-09-08 RX ADMIN — ACETAMINOPHEN 975 MG: 325 TABLET ORAL at 09:42

## 2022-09-08 RX ADMIN — HEPARIN SODIUM 7500 UNITS: 5000 INJECTION INTRAVENOUS; SUBCUTANEOUS at 10:07

## 2022-09-08 RX ADMIN — METOPROLOL TARTRATE 50 MG: 50 TABLET, FILM COATED ORAL at 09:41

## 2022-09-08 RX ADMIN — CLOTRIMAZOLE: 1 CREAM TOPICAL at 15:15

## 2022-09-08 RX ADMIN — MIRTAZAPINE 7.5 MG: 15 TABLET, FILM COATED ORAL at 21:08

## 2022-09-08 RX ADMIN — LEVOTHYROXINE SODIUM 25 MCG: 25 TABLET ORAL at 06:44

## 2022-09-08 RX ADMIN — SODIUM CHLORIDE 100 ML/HR: 0.9 INJECTION, SOLUTION INTRAVENOUS at 21:17

## 2022-09-08 RX ADMIN — FUROSEMIDE 40 MG: 40 TABLET ORAL at 09:42

## 2022-09-08 RX ADMIN — CLONAZEPAM 1 MG: 1 TABLET ORAL at 09:42

## 2022-09-08 RX ADMIN — BUSPIRONE HYDROCHLORIDE 10 MG: 10 TABLET ORAL at 15:16

## 2022-09-08 RX ADMIN — CEFAZOLIN SODIUM 2000 MG: 2 SOLUTION INTRAVENOUS at 01:43

## 2022-09-08 RX ADMIN — ACETAMINOPHEN 975 MG: 325 TABLET ORAL at 01:40

## 2022-09-08 RX ADMIN — Medication 1000 UNITS: at 09:42

## 2022-09-08 RX ADMIN — DIVALPROEX SODIUM 1500 MG: 500 TABLET, DELAYED RELEASE ORAL at 21:04

## 2022-09-08 RX ADMIN — MIRTAZAPINE 7.5 MG: 15 TABLET, FILM COATED ORAL at 01:41

## 2022-09-08 RX ADMIN — CITALOPRAM HYDROBROMIDE 40 MG: 20 TABLET ORAL at 09:42

## 2022-09-08 RX ADMIN — HEPARIN SODIUM 7500 UNITS: 5000 INJECTION INTRAVENOUS; SUBCUTANEOUS at 01:34

## 2022-09-08 RX ADMIN — CLOTRIMAZOLE: 1 CREAM TOPICAL at 17:49

## 2022-09-08 RX ADMIN — HEPARIN SODIUM 7500 UNITS: 5000 INJECTION INTRAVENOUS; SUBCUTANEOUS at 21:05

## 2022-09-08 RX ADMIN — SODIUM CHLORIDE 100 ML/HR: 0.9 INJECTION, SOLUTION INTRAVENOUS at 11:59

## 2022-09-08 RX ADMIN — FLUCONAZOLE 400 MG: 100 TABLET ORAL at 12:41

## 2022-09-08 RX ADMIN — SODIUM CHLORIDE 100 ML/HR: 0.9 INJECTION, SOLUTION INTRAVENOUS at 01:48

## 2022-09-08 RX ADMIN — BUSPIRONE HYDROCHLORIDE 10 MG: 10 TABLET ORAL at 21:05

## 2022-09-08 RX ADMIN — BUSPIRONE HYDROCHLORIDE 10 MG: 10 TABLET ORAL at 09:41

## 2022-09-08 RX ADMIN — DIVALPROEX SODIUM 1500 MG: 500 TABLET, DELAYED RELEASE ORAL at 01:40

## 2022-09-08 RX ADMIN — HEPARIN SODIUM 7500 UNITS: 5000 INJECTION INTRAVENOUS; SUBCUTANEOUS at 15:15

## 2022-09-08 NOTE — PHYSICAL THERAPY NOTE
Physical Therapy Evaluation    Performed at least 2 patient identifiers during session:  Patient Active Problem List   Diagnosis    Pain and swelling of lower extremity    Primary hypertension    Dyslipidemia    Crohn's disease ([place] [address] )    Bipolar disorder ([place] [address] )    Morbid obesity ([place] [address] )    Hypothyroidism    Ambulatory dysfunction    Anxiety    Multiple open wounds of lower leg    Bilateral leg edema    Binge eating    Cocaine dependence in remission ([place] [address] )    Crohn's disease of large intestine without complication (HCC)    Dietary noncompliance    Fatty liver    Financial difficulties    CORKY (generalized anxiety disorder)    Gastroesophageal reflux disease without esophagitis    History of MRSA infection    ALESSANDRA on CPAP    Peripheral edema    Peripheral polyneuropathy    Pre-diabetes    Venous insufficiency of lower extremity    Venous stasis dermatitis    Folic acid deficiency    Vitamin D deficiency    GERMANIA (acute kidney injury) ([place] [address] )    Rash of body    Cutaneous candidiasis    Weakness    Severe sepsis (HCC)    Elevated serum creatinine    Acute respiratory failure with hypoxia (HCC)    Tinea corporis       Past Medical History:   Diagnosis Date    Bipolar affective disorder ([place] [address] )     Cellulitis     Crohn's disease of large intestine (HCC)     Hyperlipidemia     Hypertension     Scarlet fever        Past Surgical History:   Procedure Laterality Date    ABDOMINAL SURGERY      abdominal mass    FOOT SURGERY      SKIN SURGERY      mrsa abcess removed    TONSILLECTOMY          09/08/22 1052   PT Last Visit   PT Visit Date 09/08/22   Note Type   Note type Evaluation   Pain Assessment   Pain Assessment Tool 0-10   Pain Score 4   Pain Location/Orientation Orientation: Bilateral;Location: Leg   Hospital Pain Intervention(s) Repositioned; Ambulation/increased activity; Emotional support   Restrictions/Precautions   Weight Bearing Precautions Per Order No   Other Precautions Cognitive; Bed Alarm;Multiple lines; Fall Risk;Pain Home Living   Type of Home   ("a home"- denies boarding home)   [address] [facility] to enter with rails  (4 stairs to enter)   [address] [facility]   Additional Comments pt spoke in low volume, fall asleep mid sentence   Prior Function   Level of [place] Independent with ADLs and functional mobility   Lives With Friend(s)   Receives Help From Friend(s)   ADL Assistance Independent   IADLs Independent   Falls in the last 6 months 1 to 4  (at least 2)   Comments pt very drowsy, difficulty obtaining prior level of function, reports skin condition was nice after recent discharge from rehab  reports he fell on 3rd of 4 stairs to enter  General   Additional Pertinent History pt has extensive red welts/ rash on back  excoriations on both legs weeping large amounts of serum  Family/Caregiver Present No   Cognition   Overall Cognitive Status Impaired   Arousal/Participation Lethargic   Orientation Level Oriented to person;Oriented to place;Oriented to situation   Subjective   Subjective no complaint   RUE Assessment   RUE Assessment X  (limited elevation, str 3-/5)   LUE Assessment   LUE Assessment X  (limited elevation, str 3-/5)   RLE Assessment   RLE Assessment X  (str 2/5, extension excoration, weeping , popped blisters, edema  rom limited due to body habitus)   LLE Assessment   LLE Assessment X  (str 2/5, extension excoration, weeping , popped blisters, edema  rom limited due to body habitus)   Coordination   Movements are Fluid and Coordinated 0   Coordination and Movement Description tremulous   Sensation   (not tested due to extensive wounds)   Bed Mobility   Rolling R 2  Maximal assistance   Additional items Assist x 2; Increased time required;Verbal cues;LE management; Bedrails   Rolling L 2  Maximal assistance   Additional items Assist x 2;Bedrails; Increased time required;Verbal cues;LE management   Supine to Sit 1  Dependent   Additional items Assist x 2;Bedrails; Increased time required;Verbal cues;LE management   Sit to Supine 1  Dependent   Additional items Assist x 3; Increased time required;Verbal cues;LE management   Transfers   Sit to Stand Unable to assess   Balance   Static Sitting Fair   Dynamic Sitting Fair -   Endurance Deficit   Endurance Deficit Yes   Endurance Deficit Description lethargy, weakness, spO2 96% on 2L/min O2   Activity Tolerance   Activity Tolerance Treatment limited secondary to medical complications (Comment); Patient limited by pain; Patient limited by fatigue   Medical Staff Made Aware OT   Nurse Made Aware yes- present for return to supine   Assessment   Prognosis Fair   Problem List Decreased strength;Decreased range of motion;Decreased endurance; Impaired balance;Decreased mobility; Impaired judgement;Decreased safety awareness; Obesity; Decreased skin integrity;Pain   Assessment pt admitted with fall  and inability to stand up after being discharged from rehab a few days earlier  pt dx with severe sepsis, cellulitis, hypoxia, pt referred to PT  pt was living with friends in a home with first floor set-up, 4 stairs to enter  pt reports he could do everything for himself  pt denied using assistive devices  pt demonstrated dependent mobility needing 2-3 persons for bed moblity  pt was alb to sit on edge of bed for about 10 minutes  pt hasextensive skin excoriations and ? rash with large areas of weeping skin  pt required 3 persons to return to supine from sitting  pt has deficits in pulmonary function, skin integrity, rom, strength, balance, locomotion, self care and cognition  pt will need skilled PT and rehab  Barriers to Discharge Decreased caregiver support; Inaccessible home environment   Goals   Patient Goals none offered   STG Expiration Date 09/22/22   Short Term Goal #1 mod assist of 1 for bed mobility, transfers, amb with rw for 20'  imrpove strength by 1/2 to 1 grade and balance by 1 grade  demonstrate good safety practices     Plan   Treatment/Interventions Functional transfer training;LE strengthening/ROM; Endurance training;Cognitive reorientation;Patient/family training;Equipment eval/education; Bed mobility;Gait training; Compensatory technique education;Spoke to nursing;OT   PT Frequency 3-5x/wk   Recommendation   PT Discharge Recommendation Post acute rehabilitation services   AM-PAC Basic Mobility Inpatient   Turning in Bed Without Bedrails 1   Lying on Back to Sitting on Edge of Flat Bed 1   Moving Bed to Chair 1   Standing Up From Chair 1   Walk in Room 1   Climb 3-5 Stairs 1   Basic Mobility Inpatient Raw Score 6   Turning Head Towards Sound 3   Follow Simple Instructions 3   Low Function Basic Mobility Raw Score 12   Low Function Basic Mobility Standardized Score 18 33   Highest Level Of Mobility   -HLM Goal 2: Bed activities/Dependent transfer   JH-HLM Achieved 3: Sit at edge of bed       An AM-PAC Basic Mobility standardized score less than 42 9 suggests the patient may benefit from discharge to post-acute rehab services      History: co - morbidities, fall risk, use of assistive device, assist for adl's, cognition, multiple lines  Exam: impairments in locomotion, musculoskeletal, balance,skin integrity,  pulmonary, cognition   Clinical: unstable/unpredictable  Complexity:high  Thiago Cea, PT

## 2022-09-08 NOTE — ASSESSMENT & PLAN NOTE
· Continue home medications BuSpar, citalopram, clonazepam, Depakote, mirtazapine, Invega   · Clonazepam 1 mg t i d  Verified on PDMP

## 2022-09-08 NOTE — H&P
[facility]  H&P- Catina Dodd 1975, 50 y o  male MRN: [id number]  Unit/Bed#: E5 -01 Encounter: [id number]  Primary Care Provider: Joel Capps DO   Date and time admitted to hospital: 9/7/2022  7:08 PM    * Severe sepsis [facility])  Assessment & Plan  · Severe sepsis POA with tachycardia, tachypnea and lactic acidosis  · Longstanding history of B/L lower extremity lymphedema with wounds  Multiple hospital admissions for same  · Source likely cellulitis  · Recent admission at [address] [place] [address] in July, treated with Cefepime   · Will treat with IV Ancef and ask ID to evaluate  · Podiatry consult for local wound care  Add bacitracin for now  · IV hydration; trend lactate until cleared  · Blood culture in process  · ID consult    Acute respiratory failure with hypoxia (HCC)  Assessment & Plan  · Patient had hypoxia in ED 88%  Improved on 4LNC  Multifactorial in setting of deconditioning, morbid obesity and ALESSANDRA  · Elevated BNP, EF 52%, normal systolic and diastolic function  · Elevated D-dimer, CTA study negative for PE  · COVID negative  · Add CPAP q h s  Monitor O2 sats    Tinea corporis  Assessment & Plan  · Diffuse rash and tinea, add Lotrisone  · Wound care consult    Elevated serum creatinine  Assessment & Plan  · Creatinine 1 7, baseline was 0 9-1 1 although since July baseline trends around 1 4-1 6  · Avoid nephrotoxins  Monitor BMP    Venous insufficiency of lower extremity  Assessment & Plan  · Chronic venous insufficiency  · Consider inpatient vascular surgery consult    Pre-diabetes  Assessment & Plan  · HgA1c 5 8 in May  · Add ADA diet    ALESSANDRA on CPAP  Assessment & Plan  · ALESSANDRA on nocturnal CPAP    Gastroesophageal reflux disease without esophagitis  Assessment & Plan  ·     Continue PPI    CORKY (generalized anxiety disorder)  Assessment & Plan  · Continue home medications BuSpar, citalopram, clonazepam, Depakote, mirtazapine, Invega   · Clonazepam 1 mg t i d  Verified on PDMP    Crohn's disease of large intestine without complication ([facility] )  Assessment & Plan  · History of Crohn's    Binge eating  Assessment & Plan  · Add calorie controlled diet    Ambulatory dysfunction  Assessment & Plan  · Recent admission in July, discharged to rehab  Upon discharge from rehab today patient fell to the floor stating he was too weak to walk   · PT OT consult  · Case management consult for disposition    Hypothyroidism  Assessment & Plan  · Levothyroxine 25mcg    Morbid obesity (HCC)  Assessment & Plan  · Weight loss, dietary and lifestyle modifications    [facility] disorder [facility]  Assessment & Plan  · Continue home medications    VTE Prophylaxis: Heparin  / reason for no mechanical VTE prophylaxis Wounds   Code Status: FC  POLST: POLST is not applicable to this patient  Discussion with family:     Anticipated Length of Stay:  Patient will be admitted on an Inpatient basis with an anticipated length of stay of  > 2 midnights  Justification for Hospital Stay: sepsis cellulitis, ambulatory dysfunction in super obese male    Total Time for Visit, including Counseling / Coordination of Care: 60 minutes  Greater than 50% of this total time spent on direct patient counseling and coordination of care  Chief Complaint:   Fall    History of Present Illness:    Toño Ramachandran is a 50 y o  male who presents with c/o fall and weakness  States he arrived from rehab today, he was walking up the steps into his home and made it up 3 steps, states on the 4th step the railing was broken so he held onto a brick, lost balance and fell  Reports his neighbors dragged him into his home and called 911  Chronic lower extremity lymphedema and wounds, states legs "looked beautiful" when he left rehab  Reports chills, denies fevers  Review of Systems:    Review of Systems   Constitutional: Positive for chills  Negative for fever  HENT: Negative  Respiratory: Negative  Cardiovascular: Negative  Gastrointestinal: Negative  Genitourinary: Negative  Musculoskeletal: Positive for gait problem  Skin: Positive for color change and wound  Neurological: Positive for weakness  Negative for dizziness  Psychiatric/Behavioral: Negative  Past Medical and Surgical History:     Past Medical History:   Diagnosis Date    Bipolar affective disorder ([facility] )     Cellulitis     Crohn's disease of large intestine ([facility] )     Hyperlipidemia     Hypertension     Scarlet fever        Past Surgical History:   Procedure Laterality Date    ABDOMINAL SURGERY      abdominal mass    FOOT SURGERY      SKIN SURGERY      mrsa abcess removed    TONSILLECTOMY         Meds/Allergies:    Prior to Admission medications    Medication Sig Start Date End Date Taking? Authorizing Provider   busPIRone (BUSPAR) 10 mg tablet Take 1 tablet (10 mg total) by mouth 3 (three) times a day 6/20/22 7/20/22  Roshni Higuera MD   Cholecalciferol (Vitamin D3) 50 MCG (2000 UT) capsule Take 2,000 Units by mouth daily 3/30/22   Historical Provider, MD   citalopram (CeleXA) 40 mg tablet  5/9/22   Historical Provider, MD   clonazePAM (KlonoPIN) 1 mg tablet Take 1 mg by mouth 3 (three) times a day 3/30/22   Historical Provider, MD   divalproex sodium (DEPAKOTE) 500 mg EC tablet Take 1 tablet (500 mg total) by mouth daily at bedtime 6/20/22 7/20/22  Roshni Higuera MD   divalproex sodium (DEPAKOTE) 500 mg EC tablet Take 2 tablets (1,000 mg total) by mouth 2 (two) times a day 6/20/22 7/20/22  Roshni Higuera MD   furosemide (LASIX) 40 mg tablet Take 40 mg by mouth daily 3/30/22   Historical Provider, MD   gabapentin (NEURONTIN) 100 mg capsule Take 1 capsule (100 mg total) by mouth 3 (three) times a day 6/20/22 7/20/22  Roshni Higuera MD   levothyroxine 25 mcg tablet  5/9/22   Historical Provider, MD   loperamide (IMODIUM) 2 mg capsule TAKE 2 CAPSULES BY MOUTH TWO TIMES DAILY AS NEEDED FOR DIARRHEA   4/18/22   Historical Provider, MD   metoprolol tartrate (LOPRESSOR) 50 mg tablet Take 50 mg by mouth 2 (two) times a day 5/9/22   Historical Provider, MD   mirtazapine (REMERON) 7 5 MG tablet  2/12/22   Historical Provider, MD   nystatin (MYCOSTATIN) powder Apply topically 2 (two) times a day 6/20/22   Gissell Sanchez MD   paliperidone (INVEGA) 6 MG 24 hr tablet  5/9/22   Historical Provider, MD   pantoprazole (PROTONIX) 40 mg tablet  5/9/22   Historical Provider, MD   rosuvastatin (CRESTOR) 10 MG tablet  2/28/22   Historical Provider, MD   silver sulfadiazine (SILVADENE,SSD) 1 % cream Apply topically daily 7/27/22   Mateusz Rocha DO   traZODone (DESYREL) 50 mg tablet  5/9/22   Historical Provider, MD   mupirocin (BACTROBAN) 2 % ointment Apply topically 3 (three) times a day Apply to right foot wound with dressing changes 2-3 times daily until healed 5/10/22 7/20/22  Marely Willis PA-C     I have reviewed home medications using allscripts  Allergies:    Allergies   Allergen Reactions    Mesalamine GI Intolerance     Pt has had severe diarrhea and abdominal pain on mESALAMINE       Social History:     Marital Status:    Occupation: disabled  Patient Pre-hospital Living Situation:  Resides alone in boarding house  Patient Pre-hospital Level of Mobility:  Walker  Patient Pre-hospital Diet Restrictions:   Substance Use History:   Social History     Substance and Sexual Activity   Alcohol Use Never     Social History     Tobacco Use   Smoking Status Former Smoker   Smokeless Tobacco Never Used     Social History     Substance and Sexual Activity   Drug Use Never       Family History:    Family History   Problem Relation Age of Onset    Heart disease Mother     Failure to thrive Father        Physical Exam:     Vitals:   Blood Pressure: 109/70 (09/08/22 0049)  Pulse: 102 (09/07/22 2315)  Temperature: 97 9 °F (36 6 °C) (09/07/22 1917)  Temp Source: Oral (09/07/22 1917)  Respirations: 20 (09/07/22 2315)  SpO2: 98 % (09/07/22 2315)    Physical Exam  Constitutional:       General: He is not in acute distress  Appearance: Normal appearance  He is not ill-appearing, toxic-appearing or diaphoretic  Comments: Super obesity; unkempt   HENT:      Head: Normocephalic and atraumatic  Nose: No congestion or rhinorrhea  Mouth/Throat:      Mouth: Mucous membranes are moist    Eyes:      Conjunctiva/sclera: Conjunctivae normal    Cardiovascular:      Rate and Rhythm: Regular rhythm  Tachycardia present  Heart sounds: Normal heart sounds  Pulmonary:      Effort: Pulmonary effort is normal       Breath sounds: Normal breath sounds  Abdominal:      General: Bowel sounds are normal    Musculoskeletal:         General: Tenderness present  Right lower leg: Edema present  Left lower leg: Edema present  Skin:     General: Skin is warm  Comments: B/LLE PVD with erythema and excoriations extending up into thighs; serosanguineous drainage  Rash and tinea over chest, legs, back, body folds   Neurological:      Mental Status: He is alert and oriented to person, place, and time  Psychiatric:         Thought Content: Thought content normal          Judgment: Judgment normal       Comments: Flat affect       Additional Data:     Lab Results: I have personally reviewed pertinent reports        Results from last 7 days   Lab Units 09/07/22  1950   WBC Thousand/uL 7 33   HEMOGLOBIN g/dL 12 8   HEMATOCRIT % 39 8   PLATELETS Thousands/uL 200   BANDS PCT % 12*   LYMPHO PCT % 13*   MONO PCT % 12   EOS PCT % 1     Results from last 7 days   Lab Units 09/07/22  1950   SODIUM mmol/L 139   POTASSIUM mmol/L 3 4*   CHLORIDE mmol/L 102   CO2 mmol/L 25   BUN mg/dL 19   CREATININE mg/dL 1 77*   ANION GAP mmol/L 12   CALCIUM mg/dL 8 1*   ALBUMIN g/dL 1 8*   TOTAL BILIRUBIN mg/dL 0 44   ALK PHOS U/L 55   ALT U/L 7*   AST U/L 13   GLUCOSE RANDOM mg/dL 210*                 Results from last 7 days   Lab Units 09/08/22  0012 09/07/22  2132   LACTIC ACID mmol/L 1 4 3 4*       Imaging: I have personally reviewed pertinent reports  CTA ED chest PE study   Final Result by Felicia Farnsworth MD (09/07 2259)      No evidence of pulmonary embolus  No evidence of acute intrathoracic pathology                  Workstation performed: [id number]         XR chest 1 view portable   ED Interpretation by Judah Perkins MD (09/07 2049)   Primary reviewed: no acute abnormality          EKG, Pathology, and Other Studies Reviewed on Admission:   · CTA echo    Allscripts / Epic Records Reviewed: Yes     ** Please Note: This note has been constructed using a voice recognition system   **

## 2022-09-08 NOTE — ASSESSMENT & PLAN NOTE
· Recent admission in July, discharged to rehab    Upon discharge from rehab today patient fell to the floor stating he was too weak to walk   · PT OT consult  · Case management consult for disposition

## 2022-09-08 NOTE — OCCUPATIONAL THERAPY NOTE
Occupational Therapy Evaluation     Patient Name: Sammy Muñoz  Today's Date: 9/8/2022  Problem List  Principal Problem:    Severe sepsis [facility])  Active Problems:    Bipolar disorder ([place] [address] )    Morbid obesity ([place] [address] )    Hypothyroidism    Ambulatory dysfunction    Binge eating    Crohn's disease of large intestine without complication ([facility] )    CORKY (generalized anxiety disorder)    Gastroesophageal reflux disease without esophagitis    ALESSANDRA on CPAP    Pre-diabetes    Venous insufficiency of lower extremity    Elevated serum creatinine    Acute respiratory failure with hypoxia ([place] [address] )    Tinea corporis    Past Medical History  Past Medical History:   Diagnosis Date    Bipolar affective disorder ([facility] )     Cellulitis     Crohn's disease of large intestine ([facility] )     Hyperlipidemia     Hypertension     Scarlet fever      Past Surgical History  Past Surgical History:   Procedure Laterality Date    ABDOMINAL SURGERY      abdominal mass    FOOT SURGERY      SKIN SURGERY      mrsa abcess removed    TONSILLECTOMY        09/08/22 1035   OT Last Visit   OT Visit Date 09/08/22   Note Type   Note type Evaluation   Additional Comments Pt presents supine in bed, agreeable to therapy   Restrictions/Precautions   Weight Bearing Precautions Per Order No   Other Precautions Cognitive; Bed Alarm;Multiple lines; Fall Risk;Pain   Pain Assessment   Pain Assessment Tool 0-10   Pain Score 4   Pain Location/Orientation Orientation: Bilateral;Location: Leg   Home Living   Type of Home Other (Comment)  ("a home"- denies boarding home)   Home Layout Stairs to enter with rails   Bathroom Shower/Tub Walk-in shower   [address] Walker   Additional Comments Pt lethargic today, often falling asleep mid sentence   Prior Function   Level of [place] Independent with ADLs and functional mobility   Lives With Friend(s)   Receives Help From Friend(s)   ADL Assistance Independent   IADLs Independent   Falls in the last 6 months 1 to 4   Comments pt very drowsy, difficulty obtaining prior level of function, reports skin condition was nice after recent discharge from rehab  reports he fell on 3rd of 4 stairs to enter  Lifestyle   Autonomy Pt lives in some sort of house with 4 TARA  (I) with ADLs/IADLs  Intrinsic Gratification Cooking   Subjective   Subjective "My legs looked good a couple days ago   ADL   Where Assessed Edge of bed   Eating Assistance 4  Minimal Assistance   Grooming Assistance 4  Minimal Assistance   UB Bathing Assistance 3  Moderate Assistance   LB Bathing Assistance 2  Maximal Assistance   UB Dressing Assistance 4  Minimal Assistance   LB Dressing Assistance 1  Total [address]  1  Total Assistance   Bed Mobility   Rolling R 2  Maximal assistance   Additional items Assist x 2;Bedrails; Increased time required;Verbal cues   Rolling L 2  Maximal assistance   Additional items Assist x 2;Bedrails; Increased time required;Verbal cues   Supine to Sit 1  Dependent   Additional items Assist x 2;HOB elevated; Bedrails; Increased time required;Verbal cues;LE management   Sit to Supine 1  Dependent   Additional items Assist x 3; Increased time required;LE management;Verbal cues   Transfers   Sit to Stand Unable to assess   Balance   Static Sitting Fair -   Dynamic Sitting Poor +   Activity Tolerance   Activity Tolerance Patient limited by pain; Patient limited by fatigue;Treatment limited secondary to medical complications (Comment)   Medical Staff Made Aware Portia Craig PT   Nurse Made Aware KATI [place] Assessment   RUE Assessment X  (3-/5 shoulder plane)   LUE Assessment   LUE Assessment X  (3-/5 shoulder plane)   Hand Function   Gross Motor Coordination Functional   Fine Motor Coordination Functional   Sensation   Light Touch Partial deficits in the RLE;Partial deficits in the LLE   Proprioception   Proprioception No apparent deficits   Vision-Basic Assessment   Current Vision No visual deficits   Vision - Complex Assessment   Ocular Range of Motion [facility]   Perception   Inattention/Neglect Appears intact   Cognition   Overall Cognitive Status Impaired   Arousal/Participation Lethargic;Persistent stimuli required   Attention Difficulty attending to directions   Orientation Level Oriented to person;Oriented to place;Oriented to situation   Memory Decreased recall of precautions;Decreased short term memory   Following Commands Follows one step commands with increased time or repetition   Comments Pt very lethargic and needs consistent cuing to answer questions   Assessment   Limitation Decreased ADL status; Decreased UE ROM; Decreased UE strength;Decreased Safe judgement during ADL;Decreased cognition;Decreased endurance;Decreased sensation;Decreased self-care trans;Decreased high-level ADLs   Prognosis Fair   Assessment Patient is a 50y o  year old male seen for OT eval s/p admit to [facility] on 9/7/2022 with severe sepsis and acute respiratory failure with hypoxia  Comorbidities affecting patients functional performance at time of assistance include: Bipolar Disorder, Morbid Obesity, hypothyroidism, binge eating, chron's diseases, CORKY, GERD, ALESSANDRA on CPAP, pre-diabetes, HTN, mulitple open wounds of LE, BLE edema, cocaine dependence in remission, peripheral polyneuropathy, rash of body  Personal factors affecting pt at time of IE include: difficulty performing ADLs and IADLs, difficulty with functional mobility/transfers  Prior to admission, patient was (I) with ADLs/IADLs   Upon evaluation, patients functional status as follows: eating: Yovana, grooming: Yovana, UB bathing: modA, LB bathing: maxA, UB dressing: Yovana, LB dressing: totalA, toileting: totalA; functional transfers: DNT, bed mobility: dependentx2-3, functional mobility: unable, sitting tolerance: ~10 minutes with SBA-CGA and BUE support - due to the following deficits impacting occupational performance: decreased B UE strength, decreased static/dynamic sitting/standing balance, decreased activity tolerance, decreased safety awareness, and increased pain  Patient would benefit from continued skilled OT therapy while in acute setting to address deficits as defined above and maximize (I) with ADLs and functional mobility  Occupational performance areas to address include: grooming, bathing, toileting, UB/LB dressing, functional mobility, household maintenance, and medication management  Co treatment with PT secondary to complex medical condition of pt, possible A of 2 required to achieve and maintain transitional movements, requiring the need of skilled therapeutic intervention of 2 therapists to achieve delivery of services  The patient's raw score on the AM-PAC Daily Activity inpatient short form is 12, standardized score is 30 6, less than 39 4  Patients at this level are likely to benefit from discharge to post-acute rehabilitation services  Please refer to the recommendation of the Occupational Therapist for safe discharge planning  Goals   Patient Goals None offered   [facility] Time Frame 10-14   Plan   Treatment Interventions ADL retraining;Functional transfer training;UE strengthening/ROM; Endurance training;Cognitive reorientation;Patient/family training;Equipment evaluation/education; Neuromuscular reeducation; Compensatory technique education;Continued evaluation; Energy conservation; Activityengagement   Goal Expiration Date 09/22/22   OT Treatment Day 0   OT Frequency 3-5x/wk   Recommendation   OT Discharge Recommendation Post acute rehabilitation services   [facility] Daily Activity Inpatient   Lower Body Dressing 1   Bathing 2   Toileting 1   Upper Body Dressing 2   Grooming 3   Eating 3   Daily Activity Raw Score 12   Daily Activity Standardized Score (Calc for Raw Score >=11) 30 6   AM-[facility] Applied Cognition Inpatient   Following a Speech/Presentation 3   Understanding Ordinary Conversation 3   Taking Medications 1   Remembering Where Things Are Placed or Put Away 2 Remembering List of 4-5 Errands 2   Taking Care of Complicated Tasks 1   Applied Cognition Raw Score 12   Applied Cognition Standardized Score 28 82     Occupational Therapy goals: In 7-14 days:     1- Patient will verbalize and demonstrate use of energy conservation/deep breathing technique and work simplification skills during functional activity with no verbal cues  2- Patient will verbalize and demonstrate good body mechanics and joint protection techniques during ADLs/IADLs with no verbal cues     3- Pt will complete bed mobility at a Mod I level w/ G balance/safety demonstrated to decrease caregiver assistance required     4- Patient will increase OOB/ sitting tolerance to 2-4 hours per day for increased participation in self care and leisure tasks with no s/s of exertion  5- Patient will increase sitting tolerance at edge of bed to 20 minutes to complete UB ADLs @ set up assist level       6- Pt will tolerate continued OOB assessment and appropriate functional transfer/mobility goals will be established by OTR as applicable     7- Patient will complete UB ADLs with Malvin utilizing appropriate DME/AE PRN     8- Patient will complete LB ADLs with Malvin utilizing appropriate DME/AE PRN     9- Patient will complete toileting hygiene with Malvin with G hygiene/thoroughness utilizing appropriate DME/AE PRN     10- Pt will be attentive 100% of the time during ongoing cognitive assessment w/ G participation to assist w/ safe d/c planning/recommendations     11- Pt will participate in simulated IADL management task to increase independence to Mod I w/ G safety and endurance       MAX Koroma/MONICO

## 2022-09-08 NOTE — PLAN OF CARE
Problem: PAIN - ADULT  Goal: Verbalizes/displays adequate comfort level or baseline comfort level  Description: Interventions:  - Encourage patient to monitor pain and request assistance  - Assess pain using appropriate pain scale  - Administer analgesics based on type and severity of pain and evaluate response  - Implement non-pharmacological measures as appropriate and evaluate response  - Consider cultural and social influences on pain and pain management  - Notify physician/advanced practitioner if interventions unsuccessful or patient reports new pain  Outcome: Progressing     Problem: INFECTION - ADULT  Goal: Absence or prevention of progression during hospitalization  Description: INTERVENTIONS:  - Assess and monitor for signs and symptoms of infection  - Monitor lab/diagnostic results  - Monitor all insertion sites, i e  indwelling lines, tubes, and drains  - Monitor endotracheal if appropriate and nasal secretions for changes in amount and color  - Buda appropriate cooling/warming therapies per order  - Administer medications as ordered  - Instruct and encourage patient and family to use good hand hygiene technique  - Identify and instruct in appropriate isolation precautions for identified infection/condition  Outcome: Progressing  Goal: Absence of fever/infection during neutropenic period  Description: INTERVENTIONS:  - Monitor WBC    Outcome: Progressing     Problem: SAFETY ADULT  Goal: Patient will remain free of falls  Description: INTERVENTIONS:  - Educate patient/family on patient safety including physical limitations  - Instruct patient to call for assistance with activity   - Consult OT/PT to assist with strengthening/mobility   - Keep Call bell within reach  - Keep bed low and locked with side rails adjusted as appropriate  - Keep care items and personal belongings within reach  - Initiate and maintain comfort rounds  - Make Fall Risk Sign visible to staff  - Offer Toileting every  Hours, in advance of need  - Initiate/Maintain alarm  - Obtain necessary fall risk management equipment:   - Apply yellow socks and bracelet for high fall risk patients  - Consider moving patient to room near nurses station  Outcome: Progressing  Goal: Maintain or return to baseline ADL function  Description: INTERVENTIONS:  -  Assess patient's ability to carry out ADLs; assess patient's baseline for ADL function and identify physical deficits which impact ability to perform ADLs (bathing, care of mouth/teeth, toileting, grooming, dressing, etc )  - Assess/evaluate cause of self-care deficits   - Assess range of motion  - Assess patient's mobility; develop plan if impaired  - Assess patient's need for assistive devices and provide as appropriate  - Encourage maximum independence but intervene and supervise when necessary  - Involve family in performance of ADLs  - Assess for home care needs following discharge   - Consider OT consult to assist with ADL evaluation and planning for discharge  - Provide patient education as appropriate  Outcome: Progressing  Goal: Maintains/Returns to pre admission functional level  Description: INTERVENTIONS:  - Perform BMAT or MOVE assessment daily    - Set and communicate daily mobility goal to care team and patient/family/caregiver  - Collaborate with rehabilitation services on mobility goals if consulted  - Perform Range of Motion  times a day  - Reposition patient every  hours    - Dangle patient  times a day  - Stand patient  times a day  - Ambulate patient  times a day  - Out of bed to chair  times a day   - Out of bed for meals  times a day  - Out of bed for toileting  - Record patient progress and toleration of activity level   Outcome: Progressing     Problem: SAFETY ADULT  Goal: Patient will remain free of falls  Description: INTERVENTIONS:  - Educate patient/family on patient safety including physical limitations  - Instruct patient to call for assistance with activity   - Consult OT/PT to assist with strengthening/mobility   - Keep Call bell within reach  - Keep bed low and locked with side rails adjusted as appropriate  - Keep care items and personal belongings within reach  - Initiate and maintain comfort rounds  - Make Fall Risk Sign visible to staff  - Offer Toileting every  Hours, in advance of need  - Initiate/Maintain alarm  - Obtain necessary fall risk management equipment:   - Apply yellow socks and bracelet for high fall risk patients  - Consider moving patient to room near nurses station  Outcome: Progressing  Goal: Maintain or return to baseline ADL function  Description: INTERVENTIONS:  -  Assess patient's ability to carry out ADLs; assess patient's baseline for ADL function and identify physical deficits which impact ability to perform ADLs (bathing, care of mouth/teeth, toileting, grooming, dressing, etc )  - Assess/evaluate cause of self-care deficits   - Assess range of motion  - Assess patient's mobility; develop plan if impaired  - Assess patient's need for assistive devices and provide as appropriate  - Encourage maximum independence but intervene and supervise when necessary  - Involve family in performance of ADLs  - Assess for home care needs following discharge   - Consider OT consult to assist with ADL evaluation and planning for discharge  - Provide patient education as appropriate  Outcome: Progressing  Goal: Maintains/Returns to pre admission functional level  Description: INTERVENTIONS:  - Perform BMAT or MOVE assessment daily    - Set and communicate daily mobility goal to care team and patient/family/caregiver  - Collaborate with rehabilitation services on mobility goals if consulted  - Perform Range of Motion  times a day  - Reposition patient every  hours    - Dangle patient  times a day  - Stand patient  times a day  - Ambulate patient  times a day  - Out of bed to chair  times a day   - Out of bed for meals  times a day  - Out of bed for toileting  - Record patient progress and toleration of activity level   Outcome: Progressing     Problem: DISCHARGE PLANNING  Goal: Discharge to home or other facility with appropriate resources  Description: INTERVENTIONS:  - Identify barriers to discharge w/patient and caregiver  - Arrange for needed discharge resources and transportation as appropriate  - Identify discharge learning needs (meds, wound care, etc )  - Arrange for interpretive services to assist at discharge as needed  - Refer to Case Management Department for coordinating discharge planning if the patient needs post-hospital services based on physician/advanced practitioner order or complex needs related to functional status, cognitive ability, or social support system  Outcome: Progressing     Problem: Knowledge Deficit  Goal: Patient/family/caregiver demonstrates understanding of disease process, treatment plan, medications, and discharge instructions  Description: Complete learning assessment and assess knowledge base    Interventions:  - Provide teaching at level of understanding  - Provide teaching via preferred learning methods  Outcome: Progressing     Problem: Prexisting or High Potential for Compromised Skin Integrity  Goal: Skin integrity is maintained or improved  Description: INTERVENTIONS:  - Identify patients at risk for skin breakdown  - Assess and monitor skin integrity  - Assess and monitor nutrition and hydration status  - Monitor labs   - Assess for incontinence   - Turn and reposition patient  - Assist with mobility/ambulation  - Relieve pressure over bony prominences  - Avoid friction and shearing  - Provide appropriate hygiene as needed including keeping skin clean and dry  - Evaluate need for skin moisturizer/barrier cream  - Collaborate with interdisciplinary team   - Patient/family teaching  - Consider wound care consult   Outcome: Progressing     Problem: Potential for Falls  Goal: Patient will remain free of falls  Description: INTERVENTIONS:  - Educate patient/family on patient safety including physical limitations  - Instruct patient to call for assistance with activity   - Consult OT/PT to assist with strengthening/mobility   - Keep Call bell within reach  - Keep bed low and locked with side rails adjusted as appropriate  - Keep care items and personal belongings within reach  - Initiate and maintain comfort rounds  - Make Fall Risk Sign visible to staff  - Offer Toileting every  Hours, in advance of need  - Initiate/Maintain alarm  - Obtain necessary fall risk management equipment:   - Apply yellow socks and bracelet for high fall risk patients  - Consider moving patient to room near nurses station  Outcome: Progressing     Problem: MOBILITY - ADULT  Goal: Maintain or return to baseline ADL function  Description: INTERVENTIONS:  -  Assess patient's ability to carry out ADLs; assess patient's baseline for ADL function and identify physical deficits which impact ability to perform ADLs (bathing, care of mouth/teeth, toileting, grooming, dressing, etc )  - Assess/evaluate cause of self-care deficits   - Assess range of motion  - Assess patient's mobility; develop plan if impaired  - Assess patient's need for assistive devices and provide as appropriate  - Encourage maximum independence but intervene and supervise when necessary  - Involve family in performance of ADLs  - Assess for home care needs following discharge   - Consider OT consult to assist with ADL evaluation and planning for discharge  - Provide patient education as appropriate  Outcome: Progressing  Goal: Maintains/Returns to pre admission functional level  Description: INTERVENTIONS:  - Perform BMAT or MOVE assessment daily    - Set and communicate daily mobility goal to care team and patient/family/caregiver  - Collaborate with rehabilitation services on mobility goals if consulted  - Perform Range of Motion  times a day    - Reposition patient every hours   - Dangle patient  times a day  - Stand patient  times a day  - Ambulate patient  times a day  - Out of bed to chair  times a day   - Out of bed for meals times a day  - Out of bed for toileting  - Record patient progress and toleration of activity level   Outcome: Progressing

## 2022-09-08 NOTE — PROGRESS NOTES
[address] [facility]  Progress Note - Lucero Hernandez 1975, 50 y o  male MRN: [id number]  Unit/Bed#: E5 -01 Encounter: [id number]  Primary Care Provider: Juhi Perry DO   Date and time admitted to hospital: 9/7/2022  7:08 PM    * Diffuse dermatitis  Assessment & Plan  24-year-old man discharged from rehab upon entering house fell and was brought to the hospital where he was found have diffuse dermatitis  · Seen by infectious disease  No evidence of bacterial superinfection  Antibiotics discontinued  · No sepsis on admission due to lack of infection/cellulitis  · Fluconazole started by infectious disease with topical treatment  · Disposition:  PT/OT recommending rehab placement    Acute pulmonary insufficiency  Assessment & Plan  · May be secondary to poor inspiratory effort  CTA negative for pulmonary embolism  · Wean oxygen when able    Mild renal insufficiency  Assessment & Plan  · With CKD 3 fluctuating baseline creatinine approximately 1 6  · Continue IV fluids and discontinue furosemide for now given hypotension    Results from last 7 days   Lab Units 09/08/22  0449 09/07/22  1950   BUN mg/dL 22 19   CREATININE mg/dL 1 92* 1 77*   EGFR ml/min/1 73sq m 39 44       Venous insufficiency of lower extremity  Assessment & Plan  · On furosemide but will hold for now given hypotension    Hypothyroidism  Assessment & Plan  · Continue levothyroxine    Morbid obesity ([address] )  Assessment & Plan  Estimated body mass index is 56 35 kg/m² as calculated from the following:    Height as of 8/5/22: 6' 4" (1 93 m)  Weight as of 8/5/22: 210 kg (462 lb 15 5 oz)  Bipolar disorder ([address] )  Assessment & Plan  · Stable continue buspirone depakote citalopram invega and mirtazapine      VTE Pharmacologic Prophylaxis: VTE Score: 8 High Risk (Score >/= 5) - Pharmacological DVT Prophylaxis Ordered: heparin  Sequential Compression Devices Ordered      Patient Centered Rounds: I have performed bedside rounds with nursing staff today  Discussions with Specialists or Other Care Team Provider:  Infectious Disease and case management    Education and Discussions with Family / Patient: Patient declined call to   Time Spent for Care: 20 mins  More than 50% of total time spent on counseling and coordination of care as described above  Current Length of Stay: 1 day(s)  Current Patient Status: Inpatient   Certification Statement: The patient will continue to require additional inpatient hospital stay due to diffuse dermatitis and will need rehab placement  Discharge Plan / Estimated Discharge Date: Anticipate discharge in 48-72 hrs to rehab facility  Code Status: Level 1 - Full Code      Subjective:   Patient seen and examined  No new complaints  Sleepy  Objective:   Vitals: Blood pressure (!) 82/47, pulse 73, temperature 97 6 °F (36 4 °C), resp  rate 20, SpO2 95 %  Intake/Output Summary (Last 24 hours) at 9/8/2022 1835  Last data filed at 9/8/2022 1505  Gross per 24 hour   Intake --   Output 550 ml   Net -550 ml       Physical Exam  Vitals reviewed  Constitutional:       General: He is not in acute distress  Appearance: He is obese  HENT:      Head: Atraumatic  Cardiovascular:      Rate and Rhythm: Regular rhythm  Heart sounds: Normal heart sounds  Pulmonary:      Effort: Pulmonary effort is normal       Breath sounds: Decreased breath sounds present  No wheezing  Abdominal:      General: Bowel sounds are normal       Palpations: Abdomen is soft  Tenderness: There is no abdominal tenderness  There is no rebound  Musculoskeletal:         General: No swelling or tenderness  Skin:     Findings: Erythema (Diffuse dermatitis) present  Neurological:      Mental Status: He is easily aroused  Cranial Nerves: No cranial nerve deficit     Psychiatric:         Mood and Affect: Mood normal        Additional Data:   Labs:  Results from last 7 days   Lab Units 09/08/22  3127 09/07/22  1950   WBC Thousand/uL 6 10 7 33   HEMOGLOBIN g/dL 12 5 12 8   PLATELETS Thousands/uL 201 200   MCV fL 96 98   TOTAL NEUT ABS Thousand/uL  --  5 06   BANDS PCT %  --  12*     Results from last 7 days   Lab Units 09/08/22  0449 09/07/22  1950   SODIUM mmol/L 138 139   POTASSIUM mmol/L 4 8 3 4*   CHLORIDE mmol/L 103 102   CO2 mmol/L 31 25   ANION GAP mmol/L 4 12   BUN mg/dL 22 19   CREATININE mg/dL 1 92* 1 77*   CALCIUM mg/dL 8 2* 8 1*   ALBUMIN g/dL 1 9* 1 8*   TOTAL BILIRUBIN mg/dL 0 47 0 44   ALK PHOS U/L 52 55   ALT U/L 6* 7*   AST U/L 9 13   EGFR ml/min/1 73sq m 39 44   GLUCOSE RANDOM mg/dL 145* 210*             Results from last 7 days   Lab Units 09/07/22  2148 09/07/22  1950   HS TNI 0HR ng/L  --  8   HS TNI 2HR ng/L 12  --      Results from last 7 days   Lab Units 09/07/22  1950   NT-PRO BNP pg/mL 838*      Results from last 7 days   Lab Units 09/08/22  0012 09/07/22 2132 09/07/22 1950   LACTIC ACID mmol/L 1 4 3 4*  --    PROCALCITONIN ng/ml  --   --  0 17                 * I Have Reviewed All Lab Data Listed Above  Cultures:   Results from last 7 days   Lab Units 09/07/22 2135 09/07/22 2132   BLOOD CULTURE  Received in Microbiology Lab  Culture in Progress  Received in Microbiology Lab  Culture in Progress  Results from last 7 days   Lab Units 09/07/22  1950   SARS-COV-2  Negative           Lines/Drains:  Invasive Devices  Report    Peripheral Intravenous Line  Duration           Peripheral IV 09/07/22 Right;Ventral (anterior) 1 day              Telemetry:      Imaging:  Imaging Reports Reviewed Today Include:   XR chest 1 view portable    Result Date: 9/8/2022  Impression: No acute cardiopulmonary disease  Workstation performed: [id number]     CTA ED chest PE study    Result Date: 9/7/2022  Impression: No evidence of pulmonary embolus   No evidence of acute intrathoracic pathology Workstation performed: [id number]       Scheduled Meds:  Current Facility-Administered Medications Medication Dose Route Frequency Provider Last Rate    acetaminophen  975 mg Oral Q6H PRN Verba Farnsworth, CRNP      aluminum-magnesium hydroxide-simethicone  30 mL Oral Q6H PRN Verba Farnsworth, CRNP      bisacodyl  10 mg Oral Daily PRN Verba Farnsworth, CRNP      busPIRone  10 mg Oral TID Verba Farnsworth, CRNP      cholecalciferol  1,000 Units Oral Daily Verba Farnsworth, CRNP      citalopram  40 mg Oral Daily Verba Farnsworth, CRNP      clonazePAM  1 mg Oral TID Verba Farnsworth, CRNP      clotrimazole   Topical BID Jacob Gaytan DO      diphenhydrAMINE  25 mg Oral Q6H PRN Verba Farnsworth, CRNP      divalproex sodium  1,000 mg Oral Daily Verba Farnsworth, CRNP      divalproex sodium  1,500 mg Oral HS Verba Farnsworth, CRNP      fluconazole  400 mg Oral Daily [place], [place]      furosemide  40 mg Oral Daily [facility], [place]      gabapentin  100 mg Oral TID Verba Farnsworth, CRNP      heparin (porcine)  7,500 Units Subcutaneous [facility], [place]      levothyroxine  25 mcg Oral Early Morning Verba Farnsworth, CRNP      metoprolol tartrate  50 mg Oral BID Verba Farnsworth, CRNP      mirtazapine  7 5 mg Oral HS Verba Farnsworth, CRNP      ondansetron  4 mg Intravenous Q6H PRN Verba Farnsworth, CRNP      paliperidone  6 mg Oral Daily Verba Farnsworth, CRNP      pantoprazole  40 mg Oral Early Morning Verba Farnsworth, CRNP      pravastatin  80 mg Oral Daily With [employer], CRNP      simethicone  80 mg Oral 4x Daily PRN Verba Farnsworth, CRNP      sodium chloride  100 mL/hr Intravenous Continuous Verba Farnsworth, CRNP 100 mL/hr (09/08/22 1159)       Today, Patient Was Seen By: Jacob Gaytan DO    ** Please Note: Dictation voice to text software may have been used in the creation of this document   **

## 2022-09-08 NOTE — ASSESSMENT & PLAN NOTE
· With CKD 3 fluctuating baseline creatinine approximately 1 6  · Continue IV fluids and discontinue furosemide for now given hypotension    Results from last 7 days   Lab Units 09/08/22  0449 09/07/22  1950   BUN mg/dL 22 19   CREATININE mg/dL 1 92* 1 77*   EGFR ml/min/1 73sq m 39 44

## 2022-09-08 NOTE — ASSESSMENT & PLAN NOTE
· Severe sepsis POA with tachycardia, tachypnea and lactic acidosis  · Longstanding history of B/L lower extremity lymphedema with wounds  Multiple hospital admissions for same  · Source likely cellulitis  · Recent admission at [address] [place] [address] in July, treated with Cefepime   · Will treat with IV Ancef and ask ID to evaluate  · Podiatry consult for local wound care    Add bacitracin for now  · IV hydration; trend lactate until cleared  · Blood culture in process  · ID consult
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
## 2022-09-08 NOTE — CONSULTS
Podiatry - Consultation    Patient Information:   Pankaj Bocanegra 50 y o  male MRN: [id number]  Unit/Bed#: E5 -01 Encounter: [id number]  PCP: Marcel Spence DO  Date of Admission:  9/7/2022  Date of Consultation: 09/08/22  Requesting Physician: Brandie Uriostegui DO      ASSESSMENT:    Pankaj Bocanegra is a 50 y o  male with:    1  B/l lower extremity venous stasis dermatitis with likely superimposed tinea corporis with skin abrasions  2  Lymphedema    PLAN:    · Low suspicion for cellulitis  No plans for surgical intervention at this time  Patient will benefit from bilateral lower extremity elevation and compression  · Wound care instructions placed  · F/u ID recommendations  · Rest of care per primary team   · Will discuss this plan with my attending and update as needed  Weightbearing status: Weightbearing as tolerated    SUBJECTIVE:    History of Present Illness:    Pankaj Bocanegra is a 50 y o  male who is originally admitted 9/7/2022 due to following weakness  Patient has a past medical history of tinea corporis, prediabetes, ALESSANDRA on CPAP, GERD, Crohn's disease, ambulatory dysfunction, morbid obesity, hypothyroidism, bipolar disorder  We are consulted for bilateral lower extremity erythema in the skin abrasions  Patient denies pain to bilateral lower legs  Patient denies any acute change in her legs  Patient states that wounds previously present to bilateral lower extremity have improved significantly since he went to the nursing home  Patient denies any change of bilateral lower legs  Patient denies nausea vomiting chills fevers at this time  Review of Systems:    Constitutional: Negative  HENT: Negative  Eyes: Negative  Respiratory: Negative  Cardiovascular: Negative  Gastrointestinal: Negative  Musculoskeletal: negative   Skin:b/l LE wounds   Neurological: negative   Psych: Negative       Past Medical and Surgical History:     Past Medical History:   Diagnosis Date    Bipolar affective disorder ([facility] [address] )     Cellulitis     Crohn's disease of large intestine ([facility] [address] [facility]     Hyperlipidemia     Hypertension     Scarlet fever        Past Surgical History:   Procedure Laterality Date    ABDOMINAL SURGERY      abdominal mass    FOOT SURGERY      SKIN SURGERY      mrsa abcess removed    TONSILLECTOMY         Meds/Allergies:    Medications Prior to Admission   Medication    busPIRone (BUSPAR) 10 mg tablet    Cholecalciferol (Vitamin D3) 50 MCG (2000 UT) capsule    citalopram (CeleXA) 40 mg tablet    clonazePAM (KlonoPIN) 1 mg tablet    divalproex sodium (DEPAKOTE) 500 mg EC tablet    divalproex sodium (DEPAKOTE) 500 mg EC tablet    furosemide (LASIX) 40 mg tablet    gabapentin (NEURONTIN) 100 mg capsule    levothyroxine 25 mcg tablet    loperamide (IMODIUM) 2 mg capsule    metoprolol tartrate (LOPRESSOR) 50 mg tablet    mirtazapine (REMERON) 7 5 MG tablet    nystatin (MYCOSTATIN) powder    paliperidone (INVEGA) 6 MG 24 hr tablet    pantoprazole (PROTONIX) 40 mg tablet    rosuvastatin (CRESTOR) 10 MG tablet    silver sulfadiazine (SILVADENE,SSD) 1 % cream    traZODone (DESYREL) 50 mg tablet       Allergies   Allergen Reactions    Mesalamine GI Intolerance     Pt has had severe diarrhea and abdominal pain on mESALAMINE       Social History:     Marital Status:     Substance Use History:   Social History     Substance and Sexual Activity   Alcohol Use Never     Social History     Tobacco Use   Smoking Status Former Smoker   Smokeless Tobacco Never Used     Social History     Substance and Sexual Activity   Drug Use Never       Family History:    Family History   Problem Relation Age of Onset    Heart disease Mother     Failure to thrive Father          OBJECTIVE:    Vitals:   Blood Pressure: 101/67 (09/08/22 0708)  Pulse: (!) 125 (09/08/22 0708)  Temperature: 97 9 °F (36 6 °C) (09/07/22 1917)  Temp Source: Oral (09/07/22 1917)  Respirations: 20 (09/07/22 2315)  SpO2: 95 % (09/08/22 0708)    Physical Exam:    General Appearance: Alert, cooperative, no distress  HEENT: Head normocephalic, atraumatic, without obvious abnormality  Heart: Normal rate and rhythm  Lungs: Non-labored breathing  No respiratory distress  Abdomen: Without distension  Psychiatric: AAOx3  Lower Extremity:    Vascular:   DP: Right: doppler signal Left: doppler signal  PT: Right: doppler signal Left: doppler signal  CRT < 3 seconds at the digits  +2/4 edema noted at bilateral lower extremities  Pedal hair is absent  Skin temperature is WNL bilaterally  Musculoskeletal:  MMT is 4/5 in all muscle compartments bilaterally  ROM at the 1st MPJ and ankle joint are decreased bilaterally with the leg extended  No Pain on palpation of b/l legs  Scaly, shiny erythematous changes to bilateral lower extremity    Dermatological:  No fluctuance or bogginess noted to bilateral legs,     Neurological:  Gross sensation is intact  Light touch is intact  Protective sensation is intact  Clinical Images 09/08/22: Additional data:     Lab Results: I have personally reviewed pertinent labs including:    Results from last 7 days   Lab Units 09/08/22  0449   WBC Thousand/uL 6 10   HEMOGLOBIN g/dL 12 5   HEMATOCRIT % 38 1   PLATELETS Thousands/uL 201   NEUTROS PCT % 48   LYMPHS PCT % 20   MONOS PCT % 30*   EOS PCT % 0     Results from last 7 days   Lab Units 09/08/22  0449   POTASSIUM mmol/L 4 8   CHLORIDE mmol/L 103   CO2 mmol/L 31   BUN mg/dL 22   CREATININE mg/dL 1 92*   CALCIUM mg/dL 8 2*   ALK PHOS U/L 52   ALT U/L 6*   AST U/L 9           Cultures: I have personally reviewed pertinent cultures including:    Results from last 7 days   Lab Units 09/07/22 2135 09/07/22 2132   BLOOD CULTURE  Received in Microbiology Lab  Culture in Progress  Received in Microbiology Lab  Culture in Progress  Imaging: I have personally reviewed pertinent reports in PACS    EKG, Pathology, and Other Studies: I have personally reviewed pertinent reports  Time Spent for Care: 30 minutes  More than 50% of total time spent on counseling and coordination of care as described above  ** Please Note: Portions of the record may have been created with voice recognition software  Occasional wrong word or "sound a like" substitutions may have occurred due to the inherent limitations of voice recognition software  Read the chart carefully and recognize, using context, where substitutions have occurred   **

## 2022-09-08 NOTE — ASSESSMENT & PLAN NOTE
Estimated body mass index is 56 35 kg/m² as calculated from the following:    Height as of 8/5/22: 6' 4" (1 93 m)  Weight as of 8/5/22: 210 kg (462 lb 15 5 oz)
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
## 2022-09-08 NOTE — PLAN OF CARE
Problem: PAIN - ADULT  Goal: Verbalizes/displays adequate comfort level or baseline comfort level  Description: Interventions:  - Encourage patient to monitor pain and request assistance  - Assess pain using appropriate pain scale  - Administer analgesics based on type and severity of pain and evaluate response  - Implement non-pharmacological measures as appropriate and evaluate response  - Consider cultural and social influences on pain and pain management  - Notify physician/advanced practitioner if interventions unsuccessful or patient reports new pain  Outcome: Progressing     Problem: INFECTION - ADULT  Goal: Absence or prevention of progression during hospitalization  Description: INTERVENTIONS:  - Assess and monitor for signs and symptoms of infection  - Monitor lab/diagnostic results  - Monitor all insertion sites, i e  indwelling lines, tubes, and drains  - Monitor endotracheal if appropriate and nasal secretions for changes in amount and color  - [place] appropriate cooling/warming therapies per order  - Administer medications as ordered  - Instruct and encourage patient and family to use good hand hygiene technique  - Identify and instruct in appropriate isolation precautions for identified infection/condition  Outcome: Progressing  Goal: Absence of fever/infection during neutropenic period  Description: INTERVENTIONS:  - Monitor WBC    Outcome: Progressing     Problem: SAFETY ADULT  Goal: Patient will remain free of falls  Description: INTERVENTIONS:  - Educate patient/family on patient safety including physical limitations  - Instruct patient to call for assistance with activity   - Consult OT/PT to assist with strengthening/mobility   - Keep Call bell within reach  - Keep bed low and locked with side rails adjusted as appropriate  - Keep care items and personal belongings within reach  - Initiate and maintain comfort rounds  - Make Fall Risk Sign visible to staff  - Offer Toileting every 2 Hours, in advance of need  - Initiate/Maintain bed alarm  - Obtain necessary fall risk management equipment: bed alarm  - Apply yellow socks and bracelet for high fall risk patients  - Consider moving patient to room near nurses station  Outcome: Progressing  Goal: Maintain or return to baseline ADL function  Description: INTERVENTIONS:  -  Assess patient's ability to carry out ADLs; assess patient's baseline for ADL function and identify physical deficits which impact ability to perform ADLs (bathing, care of mouth/teeth, toileting, grooming, dressing, etc )  - Assess/evaluate cause of self-care deficits   - Assess range of motion  - Assess patient's mobility; develop plan if impaired  - Assess patient's need for assistive devices and provide as appropriate  - Encourage maximum independence but intervene and supervise when necessary  - Involve family in performance of ADLs  - Assess for home care needs following discharge   - Consider OT consult to assist with ADL evaluation and planning for discharge  - Provide patient education as appropriate  Outcome: Progressing  Goal: Maintains/Returns to pre admission functional level  Description: INTERVENTIONS:  - Perform BMAT or MOVE assessment daily    - Set and communicate daily mobility goal to care team and patient/family/caregiver  - Collaborate with rehabilitation services on mobility goals if consulted  - Perform Range of Motion 3 times a day  - Reposition patient every 2 hours    - Dangle patient 3 times a day  - Stand patient 3 times a day  - Ambulate patient 3 times a day  - Out of bed to chair 3 times a day   - Out of bed for meals 3 times a day  - Out of bed for toileting  - Record patient progress and toleration of activity level   Outcome: Progressing     Problem: DISCHARGE PLANNING  Goal: Discharge to home or other facility with appropriate resources  Description: INTERVENTIONS:  - Identify barriers to discharge w/patient and caregiver  - Arrange for needed discharge resources and transportation as appropriate  - Identify discharge learning needs (meds, wound care, etc )  - Arrange for interpretive services to assist at discharge as needed  - Refer to Case Management Department for coordinating discharge planning if the patient needs post-hospital services based on physician/advanced practitioner order or complex needs related to functional status, cognitive ability, or social support system  Outcome: Progressing     Problem: Knowledge Deficit  Goal: Patient/family/caregiver demonstrates understanding of disease process, treatment plan, medications, and discharge instructions  Description: Complete learning assessment and assess knowledge base    Interventions:  - Provide teaching at level of understanding  - Provide teaching via preferred learning methods  Outcome: Progressing

## 2022-09-08 NOTE — ASSESSMENT & PLAN NOTE
· May be secondary to poor inspiratory effort    CTA negative for pulmonary embolism  · Wean oxygen when able

## 2022-09-08 NOTE — ASSESSMENT & PLAN NOTE
· Creatinine 1 7, baseline was 0 9-1 1 although since July baseline trends around 1 4-1 6  · Avoid nephrotoxins    Monitor BMP

## 2022-09-08 NOTE — ASSESSMENT & PLAN NOTE
· Patient had hypoxia in ED 88%  Improved on 4LNC  Multifactorial in setting of deconditioning, morbid obesity and ALESSANDRA  · Elevated BNP, EF 10%, normal systolic and diastolic function  · Elevated D-dimer, CTA study negative for PE  · COVID negative  · Add CPAP q h s    Monitor O2 sats

## 2022-09-08 NOTE — ASSESSMENT & PLAN NOTE
59-year-old man discharged from rehab upon entering house fell and was brought to the hospital where he was found have diffuse dermatitis  · Seen by infectious disease  No evidence of bacterial superinfection  Antibiotics discontinued    · No sepsis on admission due to lack of infection/cellulitis  · Fluconazole started by infectious disease with topical treatment  · Disposition:  PT/OT recommending rehab placement

## 2022-09-08 NOTE — PLAN OF CARE
Problem: PHYSICAL THERAPY ADULT  Goal: Performs mobility at highest level of function for planned discharge setting  See evaluation for individualized goals  Description: Treatment/Interventions: Functional transfer training, LE strengthening/ROM, Endurance training, Cognitive reorientation, Patient/family training, Equipment eval/education, Bed mobility, Gait training, Compensatory technique education, Spoke to nursing, OT          See flowsheet documentation for full assessment, interventions and recommendations  Note: Prognosis: Fair  Problem List: Decreased strength, Decreased range of motion, Decreased endurance, Impaired balance, Decreased mobility, Impaired judgement, Decreased safety awareness, Obesity, Decreased skin integrity, Pain  Assessment: pt admitted with fall  and inability to stand up after being discharged from rehab a few days earlier  pt dx with severe sepsis, cellulitis, hypoxia, pt referred to PT  pt was living with friends in a home with first floor set-up, 4 stairs to enter  pt reports he could do everything for himself  pt denied using assistive devices  pt demonstrated dependent mobility needing 2-3 persons for bed moblity  pt was alb to sit on edge of bed for about 10 minutes  pt hasextensive skin excoriations and ? rash with large areas of weeping skin  pt required 3 persons to return to supine from sitting  pt has deficits in pulmonary function, skin integrity, rom, strength, balance, locomotion, self care and cognition  pt will need skilled PT and rehab  Barriers to Discharge: Decreased caregiver support, Inaccessible home environment     PT Discharge Recommendation: Post acute rehabilitation services    See flowsheet documentation for full assessment

## 2022-09-09 LAB
ALBUMIN SERPL BCP-MCNC: 1.6 G/DL (ref 3.5–5)
ALP SERPL-CCNC: 51 U/L (ref 46–116)
ALT SERPL W P-5'-P-CCNC: 7 U/L (ref 12–78)
ANION GAP SERPL CALCULATED.3IONS-SCNC: 4 MMOL/L (ref 4–13)
AST SERPL W P-5'-P-CCNC: 11 U/L (ref 5–45)
BILIRUB SERPL-MCNC: 0.33 MG/DL (ref 0.2–1)
BUN SERPL-MCNC: 22 MG/DL (ref 5–25)
CALCIUM ALBUM COR SERPL-MCNC: 10.1 MG/DL (ref 8.3–10.1)
CALCIUM SERPL-MCNC: 8.2 MG/DL (ref 8.3–10.1)
CHLORIDE SERPL-SCNC: 105 MMOL/L (ref 96–108)
CO2 SERPL-SCNC: 33 MMOL/L (ref 21–32)
CREAT SERPL-MCNC: 1.36 MG/DL (ref 0.6–1.3)
ERYTHROCYTE [DISTWIDTH] IN BLOOD BY AUTOMATED COUNT: 14.5 % (ref 11.6–15.1)
GFR SERPL CREATININE-BSD FRML MDRD: 60 ML/MIN/1.73SQ M
GLUCOSE SERPL-MCNC: 92 MG/DL (ref 65–140)
HCT VFR BLD AUTO: 33.9 % (ref 36.5–49.3)
HGB BLD-MCNC: 10.7 G/DL (ref 12–17)
MCH RBC QN AUTO: 31.2 PG (ref 26.8–34.3)
MCHC RBC AUTO-ENTMCNC: 31.6 G/DL (ref 31.4–37.4)
MCV RBC AUTO: 99 FL (ref 82–98)
PLATELET # BLD AUTO: 175 THOUSANDS/UL (ref 149–390)
PMV BLD AUTO: 9.9 FL (ref 8.9–12.7)
POTASSIUM SERPL-SCNC: 4 MMOL/L (ref 3.5–5.3)
PROCALCITONIN SERPL-MCNC: 0.56 NG/ML
PROT SERPL-MCNC: 5.5 G/DL (ref 6.4–8.4)
RBC # BLD AUTO: 3.43 MILLION/UL (ref 3.88–5.62)
SODIUM SERPL-SCNC: 142 MMOL/L (ref 135–147)
WBC # BLD AUTO: 3.85 THOUSAND/UL (ref 4.31–10.16)

## 2022-09-09 PROCEDURE — 99231 SBSQ HOSP IP/OBS SF/LOW 25: CPT | Performed by: STUDENT IN AN ORGANIZED HEALTH CARE EDUCATION/TRAINING PROGRAM

## 2022-09-09 PROCEDURE — 85027 COMPLETE CBC AUTOMATED: CPT | Performed by: INTERNAL MEDICINE

## 2022-09-09 PROCEDURE — 84145 PROCALCITONIN (PCT): CPT | Performed by: NURSE PRACTITIONER

## 2022-09-09 PROCEDURE — 80053 COMPREHEN METABOLIC PANEL: CPT | Performed by: INTERNAL MEDICINE

## 2022-09-09 PROCEDURE — 94660 CPAP INITIATION&MGMT: CPT

## 2022-09-09 PROCEDURE — 99232 SBSQ HOSP IP/OBS MODERATE 35: CPT | Performed by: INTERNAL MEDICINE

## 2022-09-09 RX ORDER — DIPHENOXYLATE HYDROCHLORIDE AND ATROPINE SULFATE 2.5; .025 MG/1; MG/1
1 TABLET ORAL ONCE
Status: COMPLETED | OUTPATIENT
Start: 2022-09-09 | End: 2022-09-09

## 2022-09-09 RX ORDER — ACETAMINOPHEN 325 MG/1
975 TABLET ORAL EVERY 6 HOURS PRN
Status: DISCONTINUED | OUTPATIENT
Start: 2022-09-09 | End: 2022-09-15 | Stop reason: HOSPADM

## 2022-09-09 RX ORDER — OXYCODONE HYDROCHLORIDE 5 MG/1
5 TABLET ORAL EVERY 4 HOURS PRN
Status: DISCONTINUED | OUTPATIENT
Start: 2022-09-09 | End: 2022-09-15 | Stop reason: HOSPADM

## 2022-09-09 RX ORDER — FUROSEMIDE 40 MG/1
40 TABLET ORAL DAILY
Status: DISCONTINUED | OUTPATIENT
Start: 2022-09-10 | End: 2022-09-15 | Stop reason: HOSPADM

## 2022-09-09 RX ADMIN — CITALOPRAM HYDROBROMIDE 40 MG: 20 TABLET ORAL at 08:18

## 2022-09-09 RX ADMIN — CLONAZEPAM 1 MG: 1 TABLET ORAL at 08:18

## 2022-09-09 RX ADMIN — LEVOTHYROXINE SODIUM 25 MCG: 25 TABLET ORAL at 05:29

## 2022-09-09 RX ADMIN — FLUCONAZOLE 400 MG: 100 TABLET ORAL at 08:18

## 2022-09-09 RX ADMIN — Medication 1000 UNITS: at 08:18

## 2022-09-09 RX ADMIN — DIVALPROEX SODIUM 1000 MG: 500 TABLET, DELAYED RELEASE ORAL at 08:18

## 2022-09-09 RX ADMIN — MIRTAZAPINE 7.5 MG: 15 TABLET, FILM COATED ORAL at 21:51

## 2022-09-09 RX ADMIN — GABAPENTIN 100 MG: 100 CAPSULE ORAL at 21:50

## 2022-09-09 RX ADMIN — OXYCODONE 5 MG: 5 TABLET ORAL at 21:22

## 2022-09-09 RX ADMIN — PANTOPRAZOLE SODIUM 40 MG: 40 TABLET, DELAYED RELEASE ORAL at 05:29

## 2022-09-09 RX ADMIN — METOPROLOL TARTRATE 50 MG: 50 TABLET, FILM COATED ORAL at 17:53

## 2022-09-09 RX ADMIN — OXYCODONE 5 MG: 5 TABLET ORAL at 15:48

## 2022-09-09 RX ADMIN — PRAVASTATIN SODIUM 80 MG: 80 TABLET ORAL at 16:49

## 2022-09-09 RX ADMIN — PALIPERIDONE 6 MG: 3 TABLET, EXTENDED RELEASE ORAL at 08:18

## 2022-09-09 RX ADMIN — GABAPENTIN 100 MG: 100 CAPSULE ORAL at 08:18

## 2022-09-09 RX ADMIN — CLONAZEPAM 1 MG: 1 TABLET ORAL at 21:50

## 2022-09-09 RX ADMIN — DIPHENOXYLATE HYDROCHLORIDE AND ATROPINE SULFATE 1 TABLET: 2.5; .025 TABLET ORAL at 15:12

## 2022-09-09 RX ADMIN — ACETAMINOPHEN 975 MG: 325 TABLET ORAL at 08:18

## 2022-09-09 RX ADMIN — CLONAZEPAM 1 MG: 1 TABLET ORAL at 16:49

## 2022-09-09 RX ADMIN — BUSPIRONE HYDROCHLORIDE 10 MG: 10 TABLET ORAL at 21:49

## 2022-09-09 RX ADMIN — MICONAZOLE NITRATE: 20 CREAM TOPICAL at 18:05

## 2022-09-09 RX ADMIN — HEPARIN SODIUM 7500 UNITS: 5000 INJECTION INTRAVENOUS; SUBCUTANEOUS at 15:13

## 2022-09-09 RX ADMIN — BUSPIRONE HYDROCHLORIDE 10 MG: 10 TABLET ORAL at 16:49

## 2022-09-09 RX ADMIN — CLOTRIMAZOLE: 1 CREAM TOPICAL at 11:37

## 2022-09-09 RX ADMIN — HEPARIN SODIUM 7500 UNITS: 5000 INJECTION INTRAVENOUS; SUBCUTANEOUS at 21:50

## 2022-09-09 RX ADMIN — GABAPENTIN 100 MG: 100 CAPSULE ORAL at 16:49

## 2022-09-09 RX ADMIN — DIVALPROEX SODIUM 1500 MG: 500 TABLET, DELAYED RELEASE ORAL at 21:50

## 2022-09-09 RX ADMIN — BUSPIRONE HYDROCHLORIDE 10 MG: 10 TABLET ORAL at 08:18

## 2022-09-09 RX ADMIN — HEPARIN SODIUM 7500 UNITS: 5000 INJECTION INTRAVENOUS; SUBCUTANEOUS at 05:29

## 2022-09-09 NOTE — WOUND OSTOMY CARE
Consult Note - Wound   Pankaj Bocanegra 50 y o  male MRN: [id number]  Unit/Bed#: E5 -01 Encounter: [id number]      History and Present Illness:  50year old male presented to the hospital with fall and weakness  Patient's history significant for obesity, Crohn's disease, venous insufficiency, MRSA abscess to abdomen  Assessment Findings:   Patient agreeable to assessment  Assessed along with primary RN  Patient requires assistance to turn  On Kreg bed  Nursing team reporting incontinence  However, patient denies  Bilateral lower extremities wrapped per podiatry team     1   Fungal rash/intertriginous dermatitis to neck, posterior arms, back, abdominal/groin folds, posterior knees, and lower legs--pink, dry, blanchable, flaky, scaling, mildly pruritic, no foul odor  No open areas at this time  Patient being treated with oral fluconazole  Would recommend changing topical treatment to 2% miconazole  Rash is significantly worse than on previous admission (July 2022)--patient should follow-up with dermatologist     2   Present on admission left proximal buttock wound--likely due to pressure and friction  Beefy red, non-blanchable center with surrounding blanchable, purple hyperpigmentation  No drainage  3   Present on admission left distal buttock wound--likely due to friction  However, cannot rule out pressure component  Beefy red  Neela-wound pink, blanchable intact  No drainage  See flowsheet for wound details  Wound Care Plan:   1-Lower extremities per podiatry team    2-Elevate heels off of bed/chair surface to offload pressure  3-Bariatric offloading air cushion in chair when out of bed  4-Moisturize skin daily with skin nourishing cream   5-Turn/reposition every 2 hours while in bed using positioning wedges; and weight shift frequently while in chair for pressure re-distribution on skin  6-Bariatric bed  7-Left buttocks--cleanse with soap and water, pat dry    Apply Calazime paste to open areas three times daily and as needed with incontinence care  8-Generalized rash--apply miconazole cream twice daily  Wound care team to follow  Plan of care reviewed with primary RN  Wound 07/20/22 SLIM Dermatologic/Rash Abdomen Anterior;Right;Left (Active)   Wound Image     09/09/22 1121   Wound Description Nathalie 09/09/22 1121   Neela-wound Assessment Intact 09/09/22 1121   Dressing Open to air 09/09/22 1121       Wound 09/08/22 Dermatologic/Rash Back Mid (Active)   Wound Image   09/09/22 1127   Wound Description Pink;Dry 09/09/22 1127   Drainage Amount None 09/09/22 1127   Dressing Open to air 09/09/22 1127       Wound 09/09/22 Buttocks Left;Proximal (Active)   Wound Image   09/09/22 1127   Wound Description Beefy red;Non-blanchable erythema 09/09/22 1127   Neela-wound Assessment Hyperpigmented; Purple 09/09/22 1127   Wound Length (cm) 0 5 cm 09/09/22 1127   Wound Width (cm) 0 5 cm 09/09/22 1127   Wound Depth (cm) 0 1 cm 09/09/22 1127   Wound Surface Area (cm^2) 0 25 cm^2 09/09/22 1127   Wound Volume (cm^3) 0 025 cm^3 09/09/22 1127   Calculated Wound Volume (cm^3) 0 03 cm^3 09/09/22 1127   Drainage Amount None 09/09/22 1127   Non-staged Wound Description Partial thickness 09/09/22 1127   Treatments Cleansed 09/09/22 1127   Patient Tolerance Tolerated well 09/09/22 1127       Wound 09/09/22 Buttocks Left;Distal (Active)   Wound Description Beefy red 09/09/22 1127   Neela-wound Assessment Intact; Nathalie 09/09/22 1127   Wound Length (cm) 0 6 cm 09/09/22 1127   Wound Width (cm) 1 cm 09/09/22 1127   Wound Depth (cm) 0 1 cm 09/09/22 1127   Wound Surface Area (cm^2) 0 6 cm^2 09/09/22 1127   Wound Volume (cm^3) 0 06 cm^3 09/09/22 1127   Calculated Wound Volume (cm^3) 0 06 cm^3 09/09/22 1127   Drainage Amount None 09/09/22 1127   Non-staged Wound Description Partial thickness 09/09/22 1127   Treatments Cleansed 09/09/22 1127   Dressing Open to air 09/09/22 1127   Patient Tolerance Tolerated well 09/09/22 110 [facility] BSN, RN, [employer]

## 2022-09-09 NOTE — PLAN OF CARE
Problem: PAIN - ADULT  Goal: Verbalizes/displays adequate comfort level or baseline comfort level  Description: Interventions:  - Encourage patient to monitor pain and request assistance  - Assess pain using appropriate pain scale  - Administer analgesics based on type and severity of pain and evaluate response  - Implement non-pharmacological measures as appropriate and evaluate response  - Consider cultural and social influences on pain and pain management  - Notify physician/advanced practitioner if interventions unsuccessful or patient reports new pain  Outcome: Progressing     Problem: INFECTION - ADULT  Goal: Absence or prevention of progression during hospitalization  Description: INTERVENTIONS:  - Assess and monitor for signs and symptoms of infection  - Monitor lab/diagnostic results  - Monitor all insertion sites, i e  indwelling lines, tubes, and drains  - Monitor endotracheal if appropriate and nasal secretions for changes in amount and color  - [place] appropriate cooling/warming therapies per order  - Administer medications as ordered  - Instruct and encourage patient and family to use good hand hygiene technique  - Identify and instruct in appropriate isolation precautions for identified infection/condition  Outcome: Progressing     Problem: INFECTION - ADULT  Goal: Absence of fever/infection during neutropenic period  Description: INTERVENTIONS:  - Monitor WBC    Outcome: Progressing regular

## 2022-09-09 NOTE — CASE MANAGEMENT
Case Management Discharge Planning Note    Patient name Natanael Monahan  Location [address] [place] [address] /E5 MS [phone] MRN [id number]  : 1975 Date 2022       Current Admission Date: 2022  Current Admission Diagnosis:Diffuse dermatitis   Patient Active Problem List    Diagnosis Date Noted    Tinea corporis 2022    Diffuse dermatitis 2022    Mild renal insufficiency 2022    Acute pulmonary insufficiency 2022    Weakness 2022    Cutaneous candidiasis 2022    Rash of body 07/15/2022    GERMANIA (acute kidney injury) ([facility] ) 2022    CORKY (generalized anxiety disorder) 2022    Pain and swelling of lower extremity 2022    Primary hypertension 2022    Dyslipidemia 2022    Crohn's disease ([facility] ) 2022    Bipolar disorder ([place] [address] ) 2022    Morbid obesity ([place] [address] ) 2022    Hypothyroidism 2022    Multiple open wounds of lower leg 2022    Peripheral edema 2022    Anxiety 2022    Dietary noncompliance 2021    Ambulatory dysfunction 2021    Venous stasis dermatitis 2021    Venous insufficiency of lower extremity 2021    Financial difficulties 10/26/2020    Bilateral leg edema 2020    Binge eating     Folic acid deficiency 39/15/9404    ALESSANDRA on CPAP 2019    Fatty liver 2019    History of MRSA infection 2019    Pre-diabetes 2019    Peripheral polyneuropathy 2017    Vitamin D deficiency 2015    Gastroesophageal reflux disease without esophagitis 2015    Crohn's disease of large intestine without complication ([facility] )     Cocaine dependence in remission ([facility] ) 2011      LOS (days): 2  Geometric Mean LOS (GMLOS) (days): 4 80  Days to GMLOS:3 2     OBJECTIVE:  Risk of Unplanned Readmission Score: 45 8         Current admission status: Inpatient   Preferred Pharmacy:   [facility] [address]  [place] [place] [address]  Phone: [phone] Fax: [phone]    Primary Care Provider: Joel Capps DO    Primary Insurance: MEDICARE  Secondary Insurance:     DISCHARGE DETAILS:    CM attempted to meet with patient to discuss discharge planning, patient stated he needed the bedpan  CM will attempt again later

## 2022-09-09 NOTE — ASSESSMENT & PLAN NOTE
49-year-old man discharged from rehab at [facility] upon entering house fell and was brought to the hospital where he was found have diffuse dermatitis  · Seen by infectious disease  No evidence of bacterial superinfection  Antibiotics discontinued    · No sepsis on admission due to lack of infection/cellulitis  · Fluconazole started by infectious disease with topical miconazole  · Disposition:  PT/OT recommending rehab placement

## 2022-09-09 NOTE — PROGRESS NOTES
Progress Note - Infectious Disease   Ronny Garcia 50 y o  male MRN: [id number]  Unit/Bed#: E5 -01 Encounter: [id number]      Impression/Plan:    1  Diffuse skin rash, likely cutaneous candidiasis  Low concern for bacterial superinfection   2  Seborrheic dermatitis    3  GERMANIA   4  Venous stasis    5  Morbid obesity BMI 56       Patient appears to have extensive cutaneous candidiasis, seborrheic dermatitis  Low concern for bacterial infection at this time given appearance of skin rash, clinical stability, and lack of fever, leukocytosis  I suspect that sweating, poor hygiene, and obesity are the biggest contributing factors  The patient was on low dose weekly fluconazole, which was likely not sufficient dosing for current extension infection  Continue  PO fluconazole 400mg daily, anticipate a 1-2 week treatment course based on clinical response  Continue topical antifungal  Recommend wound care consult and he will need outpatient wound care follow up        Plan discussed with the primary team   ID will see again 22 if remains inpatient, please call with questions  Antibiotics:  Fluconazole day 2    Subjective:  Patient is more awake today  Reporting diffuse pain from skin breakdown  No fever, chills, shortness of breath      Objective:  Vitals:  Temp:  [97 4 °F (36 3 °C)-99 2 °F (37 3 °C)] 97 5 °F (36 4 °C)  HR:  [72-83] 83  BP: ()/(45-69) 115/65  SpO2:  [96 %-100 %] 97 %  Temp (24hrs), Av 9 °F (36 6 °C), Min:97 4 °F (36 3 °C), Max:99 2 °F (37 3 °C)  Current: Temperature: 97 5 °F (36 4 °C)    Physical Exam:   General: morbidly obese, lethargic, chronically ill appearing    Head: Seborrheic dermatitis    Mouth: no oral or pharyngeal lesions    Neck: trachea midline    CV: RRR, no murmurs    Lungs: clear to auscultation bilaterally    Abdomen: no distension or tenderness to palpation    Extremities: lower extremity venous stasis, diffuse erythema and scaling on legs, many areas of skin breakdown   Skin: erythema and scaling on lower extremities, diffuse scaly erythema over back and in areas of skin rubbing  Intertrigo groin, under pannus   Neuro: lethargic, but moving all extremities spontaneously      Labs: All pertinent labs and imaging studies were personally reviewed  Results from last 7 days   Lab Units 09/09/22  0537 09/08/22  0449 09/07/22  1950   WBC Thousand/uL 3 85* 6 10 7 33   HEMOGLOBIN g/dL 10 7* 12 5 12 8   PLATELETS Thousands/uL 175 201 200     Results from last 7 days   Lab Units 09/09/22  0537 09/08/22  0449 09/07/22  1950   SODIUM mmol/L 142 138 139   POTASSIUM mmol/L 4 0 4 8 3 4*   CHLORIDE mmol/L 105 103 102   CO2 mmol/L 33* 31 25   BUN mg/dL 22 22 19   CREATININE mg/dL 1 36* 1 92* 1 77*   EGFR ml/min/1 73sq m 60 39 44   CALCIUM mg/dL 8 2* 8 2* 8 1*   AST U/L 11 9 13   ALT U/L 7* 6* 7*   ALK PHOS U/L 51 52 55     Results from last 7 days   Lab Units 09/09/22  0537 09/07/22  1950   PROCALCITONIN ng/ml 0 56* 0 17             Results from last 7 days   Lab Units 09/07/22  1950   D-DIMER QUANTITATIVE ug/ml FEU 2 04*       Micro:  Results from last 7 days   Lab Units 09/07/22  2135 09/07/22 2132   BLOOD CULTURE  No Growth at 24 hrs  No Growth at 24 hrs  Imaging:  I have personally reviewed pertinent imaging reports and images in PACS

## 2022-09-09 NOTE — ASSESSMENT & PLAN NOTE
· May be secondary to poor inspiratory effort    CTA negative for pulmonary embolism  · Weaned off supplemental oxygen

## 2022-09-09 NOTE — PLAN OF CARE
Problem: PAIN - ADULT  Goal: Verbalizes/displays adequate comfort level or baseline comfort level  Description: Interventions:  - Encourage patient to monitor pain and request assistance  - Assess pain using appropriate pain scale  - Administer analgesics based on type and severity of pain and evaluate response  - Implement non-pharmacological measures as appropriate and evaluate response  - Consider cultural and social influences on pain and pain management  - Notify physician/advanced practitioner if interventions unsuccessful or patient reports new pain  Outcome: Progressing     Problem: INFECTION - ADULT  Goal: Absence or prevention of progression during hospitalization  Description: INTERVENTIONS:  - Assess and monitor for signs and symptoms of infection  - Monitor lab/diagnostic results  - Monitor all insertion sites, i e  indwelling lines, tubes, and drains  - Monitor endotracheal if appropriate and nasal secretions for changes in amount and color  - [place] appropriate cooling/warming therapies per order  - Administer medications as ordered  - Instruct and encourage patient and family to use good hand hygiene technique  - Identify and instruct in appropriate isolation precautions for identified infection/condition  Outcome: Progressing  Goal: Absence of fever/infection during neutropenic period  Description: INTERVENTIONS:  - Monitor WBC    Outcome: Progressing     Problem: SAFETY ADULT  Goal: Patient will remain free of falls  Description: INTERVENTIONS:  - Educate patient/family on patient safety including physical limitations  - Instruct patient to call for assistance with activity   - Consult OT/PT to assist with strengthening/mobility   - Keep Call bell within reach  - Keep bed low and locked with side rails adjusted as appropriate  - Keep care items and personal belongings within reach  - Initiate and maintain comfort rounds  - Make Fall Risk Sign visible to staff  - Offer Toileting every 2 Hours, in advance of need  - Initiate/Maintain bed alarm  - Obtain necessary fall risk management equipment:   - Apply yellow socks and bracelet for high fall risk patients  - Consider moving patient to room near nurses station  Outcome: Progressing  Goal: Maintain or return to baseline ADL function  Description: INTERVENTIONS:  -  Assess patient's ability to carry out ADLs; assess patient's baseline for ADL function and identify physical deficits which impact ability to perform ADLs (bathing, care of mouth/teeth, toileting, grooming, dressing, etc )  - Assess/evaluate cause of self-care deficits   - Assess range of motion  - Assess patient's mobility; develop plan if impaired  - Assess patient's need for assistive devices and provide as appropriate  - Encourage maximum independence but intervene and supervise when necessary  - Involve family in performance of ADLs  - Assess for home care needs following discharge   - Consider OT consult to assist with ADL evaluation and planning for discharge  - Provide patient education as appropriate  Outcome: Progressing  Goal: Maintains/Returns to pre admission functional level  Description: INTERVENTIONS:  - Perform BMAT or MOVE assessment daily    - Set and communicate daily mobility goal to care team and patient/family/caregiver  - Collaborate with rehabilitation services on mobility goals if consulted  - Perform Range of Motion 3 times a day  - Reposition patient every 2 hours    - Dangle patient 3 times a day  - Stand patient 3 times a day  - Ambulate patient 3 times a day  - Out of bed to chair 3 times a day   - Out of bed for meals 3 times a day  - Out of bed for toileting  - Record patient progress and toleration of activity level   Outcome: Progressing     Problem: DISCHARGE PLANNING  Goal: Discharge to home or other facility with appropriate resources  Description: INTERVENTIONS:  - Identify barriers to discharge w/patient and caregiver  - Arrange for needed discharge resources and transportation as appropriate  - Identify discharge learning needs (meds, wound care, etc )  - Arrange for interpretive services to assist at discharge as needed  - Refer to Case Management Department for coordinating discharge planning if the patient needs post-hospital services based on physician/advanced practitioner order or complex needs related to functional status, cognitive ability, or social support system  Outcome: Progressing     Problem: Knowledge Deficit  Goal: Patient/family/caregiver demonstrates understanding of disease process, treatment plan, medications, and discharge instructions  Description: Complete learning assessment and assess knowledge base    Interventions:  - Provide teaching at level of understanding  - Provide teaching via preferred learning methods  Outcome: Progressing     Problem: Prexisting or High Potential for Compromised Skin Integrity  Goal: Skin integrity is maintained or improved  Description: INTERVENTIONS:  - Identify patients at risk for skin breakdown  - Assess and monitor skin integrity  - Assess and monitor nutrition and hydration status  - Monitor labs   - Assess for incontinence   - Turn and reposition patient  - Assist with mobility/ambulation  - Relieve pressure over bony prominences  - Avoid friction and shearing  - Provide appropriate hygiene as needed including keeping skin clean and dry  - Evaluate need for skin moisturizer/barrier cream  - Collaborate with interdisciplinary team   - Patient/family teaching  - Consider wound care consult   Outcome: Progressing     Problem: Potential for Falls  Goal: Patient will remain free of falls  Description: INTERVENTIONS:  - Educate patient/family on patient safety including physical limitations  - Instruct patient to call for assistance with activity   - Consult OT/PT to assist with strengthening/mobility   - Keep Call bell within reach  - Keep bed low and locked with side rails adjusted as appropriate  - Keep care items and personal belongings within reach  - Initiate and maintain comfort rounds  - Make Fall Risk Sign visible to staff  - Offer Toileting every 2 Hours, in advance of need  - Initiate/Maintain bed alarm  - Obtain necessary fall risk management equipment:   - Apply yellow socks and bracelet for high fall risk patients  - Consider moving patient to room near nurses station  Outcome: Progressing     Problem: MOBILITY - ADULT  Goal: Maintain or return to baseline ADL function  Description: INTERVENTIONS:  -  Assess patient's ability to carry out ADLs; assess patient's baseline for ADL function and identify physical deficits which impact ability to perform ADLs (bathing, care of mouth/teeth, toileting, grooming, dressing, etc )  - Assess/evaluate cause of self-care deficits   - Assess range of motion  - Assess patient's mobility; develop plan if impaired  - Assess patient's need for assistive devices and provide as appropriate  - Encourage maximum independence but intervene and supervise when necessary  - Involve family in performance of ADLs  - Assess for home care needs following discharge   - Consider OT consult to assist with ADL evaluation and planning for discharge  - Provide patient education as appropriate  Outcome: Progressing  Goal: Maintains/Returns to pre admission functional level  Description: INTERVENTIONS:  - Perform BMAT or MOVE assessment daily    - Set and communicate daily mobility goal to care team and patient/family/caregiver  - Collaborate with rehabilitation services on mobility goals if consulted  - Perform Range of Motion 3 times a day  - Reposition patient every 2 hours    - Dangle patient 3 times a day  - Stand patient 3 times a day  - Ambulate patient 3 times a day  - Out of bed to chair 3 times a day   - Out of bed for meals 3 times a day  - Out of bed for toileting  - Record patient progress and toleration of activity level   Outcome: Progressing     Problem: Nutrition/Hydration-ADULT  Goal: Nutrient/Hydration intake appropriate for improving, restoring or maintaining nutritional needs  Description: Monitor and assess patient's nutrition/hydration status for malnutrition  Collaborate with interdisciplinary team and initiate plan and interventions as ordered  Monitor patient's weight and dietary intake as ordered or per policy  Utilize nutrition screening tool and intervene as necessary  Determine patient's food preferences and provide high-protein, high-caloric foods as appropriate       INTERVENTIONS:  - Monitor oral intake, urinary output, labs, and treatment plans  - Assess nutrition and hydration status and recommend course of action  - Evaluate amount of meals eaten  - Assist patient with eating if necessary   - Allow adequate time for meals  - Recommend/ encourage appropriate diets, oral nutritional supplements, and vitamin/mineral supplements  - Order, calculate, and assess calorie counts as needed  - Recommend, monitor, and adjust tube feedings and TPN/PPN based on assessed needs  - Assess need for intravenous fluids  - Provide specific nutrition/hydration education as appropriate  - Include patient/family/caregiver in decisions related to nutrition  Outcome: Progressing

## 2022-09-09 NOTE — ASSESSMENT & PLAN NOTE
· With CKD 3 fluctuating baseline creatinine approximately 1 6  · Renal function has improved with IV fluids    Will discontinue restart furosemide tomorrow    Results from last 7 days   Lab Units 09/09/22  0537 09/08/22  0449 09/07/22  1950   BUN mg/dL 22 22 19   CREATININE mg/dL 1 36* 1 92* 1 77*   EGFR ml/min/1 73sq m 60 39 44

## 2022-09-09 NOTE — DISCHARGE INSTR - OTHER ORDERS
Wound Care Plan:   1-Lower extremities per podiatry team    2-Elevate heels off of bed/chair surface to offload pressure  3-Bariatric offloading air cushion in chair when out of bed  4-Moisturize skin daily with skin nourishing cream   5-Turn/reposition every 2 hours while in bed using positioning wedges; and weight shift frequently while in chair for pressure re-distribution on skin  6-Left buttocks--cleanse with soap and water, pat dry  Apply Calazime paste to open areas three times daily and as needed with incontinence care  7-Generalized rash--apply miconazole cream twice daily  See a dermatologist for ongoing skin rash

## 2022-09-09 NOTE — PROGRESS NOTES
[facility]  Progress Note - Doris Mims 1975, 50 y o  male MRN: [id number]  Unit/Bed#: E5 -01 Encounter: [id number]  Primary Care Provider: Carlyn Shah DO   Date and time admitted to hospital: 9/7/2022  7:08 PM    * Diffuse dermatitis  Assessment & Plan  13-year-old man discharged from rehab at [facility] upon entering house fell and was brought to the hospital where he was found have diffuse dermatitis  · Seen by infectious disease  No evidence of bacterial superinfection  Antibiotics discontinued  · No sepsis on admission due to lack of infection/cellulitis  · Fluconazole started by infectious disease with topical miconazole  · Disposition:  PT/OT recommending rehab placement    Acute pulmonary insufficiency  Assessment & Plan  · May be secondary to poor inspiratory effort  CTA negative for pulmonary embolism  · Weaned off supplemental oxygen    Mild renal insufficiency  Assessment & Plan  · With CKD 3 fluctuating baseline creatinine approximately 1 6  · Renal function has improved with IV fluids  Will discontinue restart furosemide tomorrow    Results from last 7 days   Lab Units 09/09/22  0537 09/08/22  0449 09/07/22  1950   BUN mg/dL 22 22 19   CREATININE mg/dL 1 36* 1 92* 1 77*   EGFR ml/min/1 73sq m 60 39 44       Venous insufficiency of lower extremity  Assessment & Plan  · On furosemide but has been on hold given hypotension  · Will restart tomorrow    Ambulatory dysfunction  Assessment & Plan  · Was just discharged from [facility]    Hypothyroidism  Assessment & Plan  · Continue levothyroxine    Morbid obesity ([facility] )  Assessment & Plan  Estimated body mass index is 56 35 kg/m² as calculated from the following:    Height as of 8/5/22: 6' 4" (1 93 m)  Weight as of 8/5/22: 210 kg (462 lb 15 5 oz)      Bipolar disorder ([facility] [address] )  Assessment & Plan  · Stable continue buspirone depakote citalopram invega and mirtazapine      VTE Pharmacologic Prophylaxis: VTE Score: 8 High Risk (Score >/= 5) - Pharmacological DVT Prophylaxis Ordered: heparin  Sequential Compression Devices Ordered  Patient Centered Rounds: I have performed bedside rounds with nursing staff today  Discussions with Specialists or Other Care Team Provider:  Case management    Education and Discussions with Family / Patient: Left voicemail for relative  Nicolle Alvarado)    Time Spent for Care: 20 mins  More than 50% of total time spent on counseling and coordination of care as described above  Current Length of Stay: 2 day(s)  Current Patient Status: Inpatient   Certification Statement: The patient will continue to require additional inpatient hospital stay due to tinea corporis  Discharge Plan / Estimated Discharge Date: Anticipate discharge in 48-72 hrs to rehab facility  However patient is preferring to go back home instead    Code Status: Level 1 - Full Code      Subjective:   Patient seen and examined  No new complaints, having frequent loose stooling    Objective:   Vitals: Blood pressure 115/65, pulse 83, temperature 97 5 °F (36 4 °C), resp  rate 20, SpO2 97 %  Intake/Output Summary (Last 24 hours) at 9/9/2022 1538  Last data filed at 9/9/2022 0925  Gross per 24 hour   Intake 400 ml   Output 500 ml   Net -100 ml       Physical Exam  Vitals reviewed  Constitutional:       General: He is not in acute distress  Appearance: He is obese  HENT:      Head: Atraumatic  Cardiovascular:      Rate and Rhythm: Normal rate and regular rhythm  Heart sounds: Normal heart sounds  Pulmonary:      Effort: Pulmonary effort is normal       Breath sounds: Decreased breath sounds present  No wheezing  Abdominal:      Palpations: Abdomen is soft  Tenderness: There is no abdominal tenderness  There is no rebound  Musculoskeletal:         General: Swelling present  No tenderness  Cervical back: Normal range of motion     Skin:     Comments: + diffuse excoriations Neurological:      General: No focal deficit present  Mental Status: He is alert  Cranial Nerves: No cranial nerve deficit  Psychiatric:         Mood and Affect: Mood normal        Additional Data:   Labs:  Results from last 7 days   Lab Units 09/09/22 0537 09/08/22 0449 09/07/22  1950   WBC Thousand/uL 3 85* 6 10 7 33   HEMOGLOBIN g/dL 10 7* 12 5 12 8   PLATELETS Thousands/uL 175 201 200   MCV fL 99* 96 98   TOTAL NEUT ABS Thousand/uL  --   --  5 06   BANDS PCT %  --   --  12*     Results from last 7 days   Lab Units 09/09/22 0537 09/08/22 0449 09/07/22  1950   SODIUM mmol/L 142 138 139   POTASSIUM mmol/L 4 0 4 8 3 4*   CHLORIDE mmol/L 105 103 102   CO2 mmol/L 33* 31 25   ANION GAP mmol/L 4 4 12   BUN mg/dL 22 22 19   CREATININE mg/dL 1 36* 1 92* 1 77*   CALCIUM mg/dL 8 2* 8 2* 8 1*   ALBUMIN g/dL 1 6* 1 9* 1 8*   TOTAL BILIRUBIN mg/dL 0 33 0 47 0 44   ALK PHOS U/L 51 52 55   ALT U/L 7* 6* 7*   AST U/L 11 9 13   EGFR ml/min/1 73sq m 60 39 44   GLUCOSE RANDOM mg/dL 92 145* 210*             Results from last 7 days   Lab Units 09/07/22 2148 09/07/22 1950   HS TNI 0HR ng/L  --  8   HS TNI 2HR ng/L 12  --      Results from last 7 days   Lab Units 09/07/22  1950   NT-PRO BNP pg/mL 838*      Results from last 7 days   Lab Units 09/09/22 0537 09/08/22 0012 09/07/22 2132   LACTIC ACID mmol/L  --  1 4 3 4*   PROCALCITONIN ng/ml 0 56*  --   --                  * I Have Reviewed All Lab Data Listed Above  Cultures:   Results from last 7 days   Lab Units 09/07/22 2135 09/07/22 2132   BLOOD CULTURE  No Growth at 24 hrs  No Growth at 24 hrs         Results from last 7 days   Lab Units 09/07/22 1950   SARS-COV-2  Negative           Lines/Drains:  Invasive Devices  Report    Peripheral Intravenous Line  Duration           Peripheral IV 09/07/22 Right;Ventral (anterior) 2 days              Telemetry:      Imaging:  Imaging Reports Reviewed Today Include:   XR chest 1 view portable    Result Date: 9/8/2022  Impression: No acute cardiopulmonary disease  Workstation performed: [id number]     CTA ED chest PE study    Result Date: 9/7/2022  Impression: No evidence of pulmonary embolus   No evidence of acute intrathoracic pathology Workstation performed: [id number]       Scheduled Meds:  Current Facility-Administered Medications   Medication Dose Route Frequency Provider Last Rate    acetaminophen  975 mg Oral Q6H PRN Uli Fleet, CRNP      aluminum-magnesium hydroxide-simethicone  30 mL Oral Q6H PRN [place] Fleet, CRNP      bisacodyl  10 mg Oral Daily PRN Uli Fleet, CRNP      busPIRone  10 mg Oral TID Uli Fleet, CRNP      cholecalciferol  1,000 Units Oral Daily Uli Fleet, CRNP      citalopram  40 mg Oral Daily [place] Fleet, CRNP      clonazePAM  1 mg Oral TID Uli Fleet, CRNP      diphenhydrAMINE  25 mg Oral Q6H PRN [place] Fleet, CRNP      divalproex sodium  1,000 mg Oral Daily Uli Fleet, CRNP      divalproex sodium  1,500 mg Oral HS [place] Fleet, CRNP      fluconazole  400 mg Oral Daily [place], [address]      gabapentin  100 mg Oral TID [place] Fleet, CRNP      heparin (porcine)  7,500 Units Subcutaneous [facility] [place] Fleet, [address]      levothyroxine  25 mcg Oral Early Morning Uli Fleet, CRNP      metoprolol tartrate  50 mg Oral BID Uli Fleet, CRNP      miconazole   Topical BID Chandler Magic, DO      mirtazapine  7 5 mg Oral HS [place] Fleet, CRNP      ondansetron  4 mg Intravenous Q6H PRN Uli Fleet, CRNP      paliperidone  6 mg Oral Daily [place] Fleet, CRNP      pantoprazole  40 mg Oral Early Morning [place] Fleet, CRNP      pravastatin  80 mg Oral Daily With [employer], CRNP      simethicone  80 mg Oral 4x Daily PRN Uli Fleet, CRNP      sodium chloride  100 mL/hr Intravenous Continuous Uli Fleet, CRNP 100 mL/hr (09/08/22 2117) Today, Patient Was Seen By: Jacob Gaytan DO    ** Please Note: Dictation voice to text software may have been used in the creation of this document   **

## 2022-09-10 LAB
ALBUMIN SERPL BCP-MCNC: 1.7 G/DL (ref 3.5–5)
ALP SERPL-CCNC: 50 U/L (ref 46–116)
ALT SERPL W P-5'-P-CCNC: 8 U/L (ref 12–78)
ANION GAP SERPL CALCULATED.3IONS-SCNC: 4 MMOL/L (ref 4–13)
AST SERPL W P-5'-P-CCNC: 9 U/L (ref 5–45)
BILIRUB SERPL-MCNC: 0.43 MG/DL (ref 0.2–1)
BUN SERPL-MCNC: 18 MG/DL (ref 5–25)
CALCIUM ALBUM COR SERPL-MCNC: 10 MG/DL (ref 8.3–10.1)
CALCIUM SERPL-MCNC: 8.2 MG/DL (ref 8.3–10.1)
CHLORIDE SERPL-SCNC: 107 MMOL/L (ref 96–108)
CO2 SERPL-SCNC: 33 MMOL/L (ref 21–32)
CREAT SERPL-MCNC: 1.03 MG/DL (ref 0.6–1.3)
ERYTHROCYTE [DISTWIDTH] IN BLOOD BY AUTOMATED COUNT: 14.5 % (ref 11.6–15.1)
GFR SERPL CREATININE-BSD FRML MDRD: 84 ML/MIN/1.73SQ M
GLUCOSE SERPL-MCNC: 83 MG/DL (ref 65–140)
HCT VFR BLD AUTO: 34 % (ref 36.5–49.3)
HGB BLD-MCNC: 10.7 G/DL (ref 12–17)
MCH RBC QN AUTO: 31.4 PG (ref 26.8–34.3)
MCHC RBC AUTO-ENTMCNC: 31.5 G/DL (ref 31.4–37.4)
MCV RBC AUTO: 100 FL (ref 82–98)
PLATELET # BLD AUTO: 167 THOUSANDS/UL (ref 149–390)
PMV BLD AUTO: 10 FL (ref 8.9–12.7)
POTASSIUM SERPL-SCNC: 4.4 MMOL/L (ref 3.5–5.3)
PROT SERPL-MCNC: 5.8 G/DL (ref 6.4–8.4)
RBC # BLD AUTO: 3.41 MILLION/UL (ref 3.88–5.62)
SODIUM SERPL-SCNC: 144 MMOL/L (ref 135–147)
WBC # BLD AUTO: 3.77 THOUSAND/UL (ref 4.31–10.16)

## 2022-09-10 PROCEDURE — 80053 COMPREHEN METABOLIC PANEL: CPT | Performed by: INTERNAL MEDICINE

## 2022-09-10 PROCEDURE — 94660 CPAP INITIATION&MGMT: CPT

## 2022-09-10 PROCEDURE — 85027 COMPLETE CBC AUTOMATED: CPT | Performed by: INTERNAL MEDICINE

## 2022-09-10 PROCEDURE — 99232 SBSQ HOSP IP/OBS MODERATE 35: CPT | Performed by: INTERNAL MEDICINE

## 2022-09-10 RX ADMIN — OXYCODONE 5 MG: 5 TABLET ORAL at 05:16

## 2022-09-10 RX ADMIN — MIRTAZAPINE 7.5 MG: 15 TABLET, FILM COATED ORAL at 21:24

## 2022-09-10 RX ADMIN — HEPARIN SODIUM 7500 UNITS: 5000 INJECTION INTRAVENOUS; SUBCUTANEOUS at 15:32

## 2022-09-10 RX ADMIN — MICONAZOLE NITRATE: 20 CREAM TOPICAL at 06:17

## 2022-09-10 RX ADMIN — DIVALPROEX SODIUM 1000 MG: 500 TABLET, DELAYED RELEASE ORAL at 09:02

## 2022-09-10 RX ADMIN — GABAPENTIN 100 MG: 100 CAPSULE ORAL at 16:35

## 2022-09-10 RX ADMIN — DIVALPROEX SODIUM 1500 MG: 500 TABLET, DELAYED RELEASE ORAL at 21:24

## 2022-09-10 RX ADMIN — FUROSEMIDE 40 MG: 40 TABLET ORAL at 09:02

## 2022-09-10 RX ADMIN — OXYCODONE 5 MG: 5 TABLET ORAL at 21:24

## 2022-09-10 RX ADMIN — HEPARIN SODIUM 7500 UNITS: 5000 INJECTION INTRAVENOUS; SUBCUTANEOUS at 21:24

## 2022-09-10 RX ADMIN — GABAPENTIN 100 MG: 100 CAPSULE ORAL at 21:24

## 2022-09-10 RX ADMIN — GABAPENTIN 100 MG: 100 CAPSULE ORAL at 09:03

## 2022-09-10 RX ADMIN — BUSPIRONE HYDROCHLORIDE 10 MG: 10 TABLET ORAL at 09:03

## 2022-09-10 RX ADMIN — PALIPERIDONE 6 MG: 3 TABLET, EXTENDED RELEASE ORAL at 09:02

## 2022-09-10 RX ADMIN — MICONAZOLE NITRATE: 20 CREAM TOPICAL at 18:26

## 2022-09-10 RX ADMIN — ACETAMINOPHEN 975 MG: 325 TABLET ORAL at 16:38

## 2022-09-10 RX ADMIN — LEVOTHYROXINE SODIUM 25 MCG: 25 TABLET ORAL at 05:17

## 2022-09-10 RX ADMIN — FLUCONAZOLE 400 MG: 100 TABLET ORAL at 09:02

## 2022-09-10 RX ADMIN — PANTOPRAZOLE SODIUM 40 MG: 40 TABLET, DELAYED RELEASE ORAL at 05:17

## 2022-09-10 RX ADMIN — BUSPIRONE HYDROCHLORIDE 10 MG: 10 TABLET ORAL at 16:35

## 2022-09-10 RX ADMIN — CITALOPRAM HYDROBROMIDE 40 MG: 20 TABLET ORAL at 09:03

## 2022-09-10 RX ADMIN — HEPARIN SODIUM 7500 UNITS: 5000 INJECTION INTRAVENOUS; SUBCUTANEOUS at 05:16

## 2022-09-10 RX ADMIN — BUSPIRONE HYDROCHLORIDE 10 MG: 10 TABLET ORAL at 21:24

## 2022-09-10 RX ADMIN — CLONAZEPAM 1 MG: 1 TABLET ORAL at 09:02

## 2022-09-10 RX ADMIN — CLONAZEPAM 1 MG: 1 TABLET ORAL at 16:35

## 2022-09-10 RX ADMIN — CLONAZEPAM 1 MG: 1 TABLET ORAL at 21:24

## 2022-09-10 RX ADMIN — DIPHENHYDRAMINE HCL 25 MG: 25 TABLET, COATED ORAL at 06:15

## 2022-09-10 RX ADMIN — Medication 1000 UNITS: at 09:03

## 2022-09-10 RX ADMIN — PRAVASTATIN SODIUM 80 MG: 80 TABLET ORAL at 16:35

## 2022-09-10 RX ADMIN — DIPHENHYDRAMINE HCL 25 MG: 25 TABLET, COATED ORAL at 18:23

## 2022-09-10 NOTE — CASE MANAGEMENT
Case Management Assessment    Patient name Nanda Funez  Location [address] [place] [address] /E5 MS [phone] MRN [id number]  : 1975 Date 9/10/2022       Current Admission Date: 2022  Current Admission Diagnosis:Diffuse dermatitis   Patient Active Problem List    Diagnosis Date Noted    Tinea corporis 2022    Diffuse dermatitis 2022    Mild renal insufficiency 2022    Acute pulmonary insufficiency 2022    Weakness 2022    Cutaneous candidiasis 2022    Rash of body 07/15/2022    [facility]IA [facility] 2022    CORKY (generalized anxiety disorder) 2022    Pain and swelling of lower extremity 2022    Primary hypertension 2022    Dyslipidemia 2022    Crohn's disease ([facility] ) 2022    Bipolar disorder ([facility] ) 2022    Morbid obesity ([facility] ) 2022    Hypothyroidism 2022    Multiple open wounds of lower leg 2022    Peripheral edema 2022    Anxiety 2022    Dietary noncompliance 2021    Ambulatory dysfunction 2021    Venous stasis dermatitis 2021    Venous insufficiency of lower extremity 2021    Financial difficulties 10/26/2020    Bilateral leg edema 2020    Binge eating     Folic acid deficiency     ALESSANDRA on CPAP 2019    Fatty liver 2019    History of MRSA infection 2019    Pre-diabetes 2019    Peripheral polyneuropathy 2017    Vitamin D deficiency 2015    Gastroesophageal reflux disease without esophagitis 2015    Crohn's disease of large intestine without complication ([facility] )     Cocaine dependence in remission ([facility] ) 2011      LOS (days): 3  Geometric Mean LOS (GMLOS) (days): 4 80  Days to GMLOS:2 1     OBJECTIVE:    Risk of Unplanned Readmission Score: 44 61         Current admission status: Inpatient       Preferred Pharmacy:     [facility] [address] [place] [address] [address] [facility] [address]  Phone: [phone] Fax: [phone]    Primary Care Provider: Андрей Serrano DO    Primary Insurance: MEDICARE  Secondary Insurance:     ASSESSMENT:  Active Health Care Proxies    There are no active Health Care Proxies on file  Advance Directives  Does patient have a [address]?: No  Does patient have Advance Directives?: No  Primary Contact: Radha Flowers [phone]    Readmission Root Cause  30 Day Readmission: No    Patient Information  Admitted from[de-identified] Home  Mental Status: Alert  During Assessment patient was accompanied by: Not accompanied during assessment  Assessment information provided by[de-identified] Patient  Primary Caregiver: Self  Support Systems: Self (patient claims no family, there is a Radha Flowers listed in his emergency contact as a relative [phone])  Home entry access options   Select all that apply : Stairs  Number of steps to enter home : 5  Do the steps have railings?: No  Type of Current Residence: Apartment  Floor Level: 1  Upon entering residence, is there a bedroom on the main floor (no further steps)?: Yes  Upon entering residence, is there a bathroom on the main floor (no further steps)?: Yes  Living Arrangements: Other (Comment) (lives in a room in a home w/ roomates)    Activities of Daily Living Prior to Admission  Functional Status: Independent  Completes ADLs independently?: Yes  Ambulates independently?: Yes  Does patient use assisted devices?: Yes  Assisted Devices (DME) used: Aron Selby  Does patient currently own DME?: Yes  What DME does the patient currently own?: Aron Selby  Does patient have a history of Outpatient Therapy (PT/OT)?: No  Does the patient have a history of Short-Term Rehab?: Yes Gilman Castleman)  Does patient have a history of HHC?: Yes (states [facility] was supposed to come but has not)  Does patient currently have [facility]?: No    Patient Information Continued  Does patient have a history of substance abuse?: Yes  Does patient have a history of Mental Health Diagnosis?: Yes  Has patient received inpatient treatment related to mental health in the last 2 years?: No (states placement was at a numerous amount of places 10 years ago  Placed for drugs and MH)    Means of Transportation  Means of Transport to Appts[de-identified] Mohan Hanson    Patient lives in a room in a house with roommates  Patient states he has 5 jayy his home, they have no railing and that is how he fell  Patient states he does not want rehab, wants home therapy and nursing  CM explained to patient, he is using the bed pan here and would have to show he is capable of getting himself to the bathroom to consider going home  Patient states he has no family, no POA   Uses [employer] to get around,

## 2022-09-10 NOTE — PROGRESS NOTES
Progress Note - Amrita Smith 50 y o  male MRN: [id number]    Unit/Bed#: E5 -01 Encounter: [id number]    Assessment/Plan:    Ambulatory dysfunction  ongoing physical therapy recommending rehab    Diffuse dermatitis   fluconazole initiated with topical miconazole continue local wound care    Acute pulmonary insufficiency used CPAP at night now stable on room air encourage deep breathing    GERMANIA/CKD 3    creatinine stable 1 36, appears baseline 1 3 to 1 5    Morbid obesity    would benefit from weight loss      Hypothyroid    continue Synthroid 25 micrograms daily    Bipolar disorder   mood stable with BuSpar, Depakote, Remeron and Invega with Klonopin    Dyslipidemia    continue statin for LDL control    LE venous insufficiency  keep legs elevated and continue Lasix    Subjective:   Denies chest pain or shortness of breath no nausea vomiting no fevers chills appetite good      Objective:     Vitals: Blood pressure 102/63, pulse 83, temperature (!) 97 4 °F (36 3 °C), resp  rate 18, SpO2 97 %  ,There is no height or weight on file to calculate BMI        Results from last 7 days   Lab Units 09/10/22  0538   WBC Thousand/uL 3 77*   HEMOGLOBIN g/dL 10 7*   HEMATOCRIT % 34 0*   PLATELETS Thousands/uL 167     Results from last 7 days   Lab Units 09/09/22  0537   POTASSIUM mmol/L 4 0   CHLORIDE mmol/L 105   CO2 mmol/L 33*   BUN mg/dL 22   CREATININE mg/dL 1 36*   CALCIUM mg/dL 8 2*   ALK PHOS U/L 51   ALT U/L 7*   AST U/L 11       Scheduled Meds:  Current Facility-Administered Medications   Medication Dose Route Frequency Provider Last Rate    acetaminophen  975 mg Oral Q6H PRN Laron Kamara DO      aluminum-magnesium hydroxide-simethicone  30 mL Oral Q6H PRN Relda Qualia, CRNP      bisacodyl  10 mg Oral Daily PRN Relda Qualia, CRNP      busPIRone  10 mg Oral TID Relda Qualia, CRNP      cholecalciferol  1,000 Units Oral Daily Relda Qualia, CRNP      citalopram  40 mg Oral Daily Chandni Blocker Esther Moore, BRETT      clonazePAM  1 mg Oral TID Hilaria Simon, CRNP      diphenhydrAMINE  25 mg Oral Q6H PRN Hilaria Simon, CRNP      divalproex sodium  1,000 mg Oral Daily Hilaria Simon, CRNP      divalproex sodium  1,500 mg Oral HS Hilaria Simon, CRNP      fluconazole  400 mg Oral Daily [place], [place]      furosemide  40 mg Oral Daily Leslie Edwards, DO      gabapentin  100 mg Oral TID Hilaria Simon, CRNP      heparin (porcine)  7,500 Units Subcutaneous [facility]      levothyroxine  25 mcg Oral Early Morning Hilaria Simon, CRNP      metoprolol tartrate  50 mg Oral BID Hilaria Simon, CRNP      miconazole   Topical BID Leslie Edwards, DO      mirtazapine  7 5 mg Oral HS Hilaria Simon, CRNP      ondansetron  4 mg Intravenous Q6H PRN Hilaria Simon, CRNP      oxyCODONE  5 mg Oral Q4H PRN Leslie Edwards, DO      paliperidone  6 mg Oral Daily Hilaria Simon, CRNP      pantoprazole  40 mg Oral Early Morning Hilaria Simon, CRNP      pravastatin  80 mg Oral Daily With [employer], CRNP      simethicone  80 mg Oral 4x Daily PRN Hilaria Simon, CRNP         Continuous Infusions:         Physical exam:  General appearance:  Alert no distress interaction appropriate  Head/Eyes:  Nonicteric PERRL EOMI  Neck:  Supple  Lungs:  Decreased BS bilateral no wheezing rhonchi or rales  Heart: normal S1 S2 regular  Abdomen:  Obese nontender with bowel sounds  Extremities:  Trace edema  Skin:  Diffuse scaly rash    Invasive Devices  Report    Peripheral Intravenous Line  Duration           Peripheral IV 09/07/22 Right;Ventral (anterior) 3 days                  VTE Pharmacologic Prophylaxis:  Heparin                Counseling / Coordination of Care  Total floor / unit time spent today 30  minutes  Greater than 50% of total time was spent with the patient and / or family counseling and / or coordination of care    A description of the counseling / coordination of care: Anastasia Shah

## 2022-09-10 NOTE — PLAN OF CARE
Problem: PAIN - ADULT  Goal: Verbalizes/displays adequate comfort level or baseline comfort level  Description: Interventions:  - Encourage patient to monitor pain and request assistance  - Assess pain using appropriate pain scale  - Administer analgesics based on type and severity of pain and evaluate response  - Implement non-pharmacological measures as appropriate and evaluate response  - Consider cultural and social influences on pain and pain management  - Notify physician/advanced practitioner if interventions unsuccessful or patient reports new pain  Outcome: Progressing     Problem: INFECTION - ADULT  Goal: Absence or prevention of progression during hospitalization  Description: INTERVENTIONS:  - Assess and monitor for signs and symptoms of infection  - Monitor lab/diagnostic results  - Monitor all insertion sites, i e  indwelling lines, tubes, and drains  - Monitor endotracheal if appropriate and nasal secretions for changes in amount and color  - [place] appropriate cooling/warming therapies per order  - Administer medications as ordered  - Instruct and encourage patient and family to use good hand hygiene technique  - Identify and instruct in appropriate isolation precautions for identified infection/condition  Outcome: Progressing  Goal: Absence of fever/infection during neutropenic period  Description: INTERVENTIONS:  - Monitor WBC    Outcome: Progressing     Problem: SAFETY ADULT  Goal: Patient will remain free of falls  Description: INTERVENTIONS:  - Educate patient/family on patient safety including physical limitations  - Instruct patient to call for assistance with activity   - Consult OT/PT to assist with strengthening/mobility   - Keep Call bell within reach  - Keep bed low and locked with side rails adjusted as appropriate  - Keep care items and personal belongings within reach  - Initiate and maintain comfort rounds  - Make Fall Risk Sign visible to staff  - Offer Toileting every 2 Hours, in advance of need  - Initiate/Maintain bed alarm  - Obtain necessary fall risk management equipment:   - Apply yellow socks and bracelet for high fall risk patients  - Consider moving patient to room near nurses station  Outcome: Progressing  Goal: Maintain or return to baseline ADL function  Description: INTERVENTIONS:  -  Assess patient's ability to carry out ADLs; assess patient's baseline for ADL function and identify physical deficits which impact ability to perform ADLs (bathing, care of mouth/teeth, toileting, grooming, dressing, etc )  - Assess/evaluate cause of self-care deficits   - Assess range of motion  - Assess patient's mobility; develop plan if impaired  - Assess patient's need for assistive devices and provide as appropriate  - Encourage maximum independence but intervene and supervise when necessary  - Involve family in performance of ADLs  - Assess for home care needs following discharge   - Consider OT consult to assist with ADL evaluation and planning for discharge  - Provide patient education as appropriate  Outcome: Progressing  Goal: Maintains/Returns to pre admission functional level  Description: INTERVENTIONS:  - Perform BMAT or MOVE assessment daily    - Set and communicate daily mobility goal to care team and patient/family/caregiver  - Collaborate with rehabilitation services on mobility goals if consulted  - Perform Range of Motion 3 times a day  - Reposition patient every 2 hours    - Dangle patient 3 times a day  - Stand patient 3 times a day  - Ambulate patient 3 times a day  - Out of bed to chair 3 times a day   - Out of bed for meals 3 times a day  - Out of bed for toileting  - Record patient progress and toleration of activity level   Outcome: Progressing     Problem: DISCHARGE PLANNING  Goal: Discharge to home or other facility with appropriate resources  Description: INTERVENTIONS:  - Identify barriers to discharge w/patient and caregiver  - Arrange for needed discharge resources and transportation as appropriate  - Identify discharge learning needs (meds, wound care, etc )  - Arrange for interpretive services to assist at discharge as needed  - Refer to Case Management Department for coordinating discharge planning if the patient needs post-hospital services based on physician/advanced practitioner order or complex needs related to functional status, cognitive ability, or social support system  Outcome: Progressing     Problem: Knowledge Deficit  Goal: Patient/family/caregiver demonstrates understanding of disease process, treatment plan, medications, and discharge instructions  Description: Complete learning assessment and assess knowledge base    Interventions:  - Provide teaching at level of understanding  - Provide teaching via preferred learning methods  Outcome: Progressing     Problem: Prexisting or High Potential for Compromised Skin Integrity  Goal: Skin integrity is maintained or improved  Description: INTERVENTIONS:  - Identify patients at risk for skin breakdown  - Assess and monitor skin integrity  - Assess and monitor nutrition and hydration status  - Monitor labs   - Assess for incontinence   - Turn and reposition patient  - Assist with mobility/ambulation  - Relieve pressure over bony prominences  - Avoid friction and shearing  - Provide appropriate hygiene as needed including keeping skin clean and dry  - Evaluate need for skin moisturizer/barrier cream  - Collaborate with interdisciplinary team   - Patient/family teaching  - Consider wound care consult   Outcome: Progressing     Problem: Potential for Falls  Goal: Patient will remain free of falls  Description: INTERVENTIONS:  - Educate patient/family on patient safety including physical limitations  - Instruct patient to call for assistance with activity   - Consult OT/PT to assist with strengthening/mobility   - Keep Call bell within reach  - Keep bed low and locked with side rails adjusted as appropriate  - Keep care items and personal belongings within reach  - Initiate and maintain comfort rounds  - Make Fall Risk Sign visible to staff  - Offer Toileting every 2 Hours, in advance of need  - Initiate/Maintain bed alarm  - Obtain necessary fall risk management equipment:   - Apply yellow socks and bracelet for high fall risk patients  - Consider moving patient to room near nurses station  Outcome: Progressing     Problem: MOBILITY - ADULT  Goal: Maintain or return to baseline ADL function  Description: INTERVENTIONS:  -  Assess patient's ability to carry out ADLs; assess patient's baseline for ADL function and identify physical deficits which impact ability to perform ADLs (bathing, care of mouth/teeth, toileting, grooming, dressing, etc )  - Assess/evaluate cause of self-care deficits   - Assess range of motion  - Assess patient's mobility; develop plan if impaired  - Assess patient's need for assistive devices and provide as appropriate  - Encourage maximum independence but intervene and supervise when necessary  - Involve family in performance of ADLs  - Assess for home care needs following discharge   - Consider OT consult to assist with ADL evaluation and planning for discharge  - Provide patient education as appropriate  Outcome: Progressing  Goal: Maintains/Returns to pre admission functional level  Description: INTERVENTIONS:  - Perform BMAT or MOVE assessment daily    - Set and communicate daily mobility goal to care team and patient/family/caregiver  - Collaborate with rehabilitation services on mobility goals if consulted  - Perform Range of Motion 3 times a day  - Reposition patient every 2 hours    - Dangle patient 3 times a day  - Stand patient 3 times a day  - Ambulate patient 3 times a day  - Out of bed to chair 3 times a day   - Out of bed for meals 3 times a day  - Out of bed for toileting  - Record patient progress and toleration of activity level   Outcome: Progressing     Problem: Nutrition/Hydration-ADULT  Goal: Nutrient/Hydration intake appropriate for improving, restoring or maintaining nutritional needs  Description: Monitor and assess patient's nutrition/hydration status for malnutrition  Collaborate with interdisciplinary team and initiate plan and interventions as ordered  Monitor patient's weight and dietary intake as ordered or per policy  Utilize nutrition screening tool and intervene as necessary  Determine patient's food preferences and provide high-protein, high-caloric foods as appropriate       INTERVENTIONS:  - Monitor oral intake, urinary output, labs, and treatment plans  - Assess nutrition and hydration status and recommend course of action  - Evaluate amount of meals eaten  - Assist patient with eating if necessary   - Allow adequate time for meals  - Recommend/ encourage appropriate diets, oral nutritional supplements, and vitamin/mineral supplements  - Order, calculate, and assess calorie counts as needed  - Recommend, monitor, and adjust tube feedings and TPN/PPN based on assessed needs  - Assess need for intravenous fluids  - Provide specific nutrition/hydration education as appropriate  - Include patient/family/caregiver in decisions related to nutrition  Outcome: Progressing

## 2022-09-11 LAB
GLUCOSE SERPL-MCNC: 132 MG/DL (ref 65–140)
GLUCOSE SERPL-MCNC: 137 MG/DL (ref 65–140)

## 2022-09-11 PROCEDURE — 94660 CPAP INITIATION&MGMT: CPT

## 2022-09-11 PROCEDURE — 97535 SELF CARE MNGMENT TRAINING: CPT

## 2022-09-11 PROCEDURE — 97530 THERAPEUTIC ACTIVITIES: CPT | Performed by: PHYSICAL THERAPIST

## 2022-09-11 PROCEDURE — 97110 THERAPEUTIC EXERCISES: CPT | Performed by: PHYSICAL THERAPIST

## 2022-09-11 PROCEDURE — 97530 THERAPEUTIC ACTIVITIES: CPT

## 2022-09-11 PROCEDURE — 99232 SBSQ HOSP IP/OBS MODERATE 35: CPT | Performed by: INTERNAL MEDICINE

## 2022-09-11 PROCEDURE — 82948 REAGENT STRIP/BLOOD GLUCOSE: CPT

## 2022-09-11 RX ORDER — DIPHENHYDRAMINE HCL 25 MG
50 TABLET ORAL EVERY 6 HOURS PRN
Status: DISCONTINUED | OUTPATIENT
Start: 2022-09-11 | End: 2022-09-15 | Stop reason: HOSPADM

## 2022-09-11 RX ADMIN — GABAPENTIN 100 MG: 100 CAPSULE ORAL at 08:48

## 2022-09-11 RX ADMIN — ACETAMINOPHEN 975 MG: 325 TABLET ORAL at 21:10

## 2022-09-11 RX ADMIN — OXYCODONE 5 MG: 5 TABLET ORAL at 15:54

## 2022-09-11 RX ADMIN — PALIPERIDONE 6 MG: 3 TABLET, EXTENDED RELEASE ORAL at 08:48

## 2022-09-11 RX ADMIN — FUROSEMIDE 40 MG: 40 TABLET ORAL at 08:47

## 2022-09-11 RX ADMIN — FLUCONAZOLE 400 MG: 100 TABLET ORAL at 08:47

## 2022-09-11 RX ADMIN — MICONAZOLE NITRATE: 20 CREAM TOPICAL at 18:09

## 2022-09-11 RX ADMIN — BUSPIRONE HYDROCHLORIDE 10 MG: 10 TABLET ORAL at 21:10

## 2022-09-11 RX ADMIN — METOPROLOL TARTRATE 50 MG: 50 TABLET, FILM COATED ORAL at 08:48

## 2022-09-11 RX ADMIN — LEVOTHYROXINE SODIUM 25 MCG: 25 TABLET ORAL at 05:07

## 2022-09-11 RX ADMIN — CLONAZEPAM 1 MG: 1 TABLET ORAL at 08:47

## 2022-09-11 RX ADMIN — CLONAZEPAM 1 MG: 1 TABLET ORAL at 21:10

## 2022-09-11 RX ADMIN — CLONAZEPAM 1 MG: 1 TABLET ORAL at 15:54

## 2022-09-11 RX ADMIN — METOPROLOL TARTRATE 75 MG: 50 TABLET, FILM COATED ORAL at 18:09

## 2022-09-11 RX ADMIN — HEPARIN SODIUM 7500 UNITS: 5000 INJECTION INTRAVENOUS; SUBCUTANEOUS at 05:07

## 2022-09-11 RX ADMIN — PANTOPRAZOLE SODIUM 40 MG: 40 TABLET, DELAYED RELEASE ORAL at 05:07

## 2022-09-11 RX ADMIN — BUSPIRONE HYDROCHLORIDE 10 MG: 10 TABLET ORAL at 08:47

## 2022-09-11 RX ADMIN — DIVALPROEX SODIUM 1000 MG: 500 TABLET, DELAYED RELEASE ORAL at 08:47

## 2022-09-11 RX ADMIN — MIRTAZAPINE 7.5 MG: 15 TABLET, FILM COATED ORAL at 21:11

## 2022-09-11 RX ADMIN — HEPARIN SODIUM 7500 UNITS: 5000 INJECTION INTRAVENOUS; SUBCUTANEOUS at 21:11

## 2022-09-11 RX ADMIN — PRAVASTATIN SODIUM 80 MG: 80 TABLET ORAL at 15:54

## 2022-09-11 RX ADMIN — DIPHENHYDRAMINE HCL 25 MG: 25 TABLET, COATED ORAL at 05:54

## 2022-09-11 RX ADMIN — DIPHENHYDRAMINE HCL 50 MG: 25 TABLET, COATED ORAL at 18:10

## 2022-09-11 RX ADMIN — Medication 1000 UNITS: at 08:47

## 2022-09-11 RX ADMIN — OXYCODONE 5 MG: 5 TABLET ORAL at 05:37

## 2022-09-11 RX ADMIN — CITALOPRAM HYDROBROMIDE 40 MG: 20 TABLET ORAL at 08:47

## 2022-09-11 RX ADMIN — GABAPENTIN 100 MG: 100 CAPSULE ORAL at 15:54

## 2022-09-11 RX ADMIN — DIVALPROEX SODIUM 1500 MG: 500 TABLET, DELAYED RELEASE ORAL at 21:10

## 2022-09-11 RX ADMIN — MICONAZOLE NITRATE: 20 CREAM TOPICAL at 08:49

## 2022-09-11 RX ADMIN — HEPARIN SODIUM 7500 UNITS: 5000 INJECTION INTRAVENOUS; SUBCUTANEOUS at 14:00

## 2022-09-11 RX ADMIN — GABAPENTIN 100 MG: 100 CAPSULE ORAL at 21:11

## 2022-09-11 RX ADMIN — BUSPIRONE HYDROCHLORIDE 10 MG: 10 TABLET ORAL at 15:54

## 2022-09-11 NOTE — PLAN OF CARE
Problem: PHYSICAL THERAPY ADULT  Goal: Performs mobility at highest level of function for planned discharge setting  See evaluation for individualized goals  Description: Treatment/Interventions: Functional transfer training, LE strengthening/ROM, Endurance training, Cognitive reorientation, Patient/family training, Equipment eval/education, Bed mobility, Gait training, Compensatory technique education, Spoke to nursing, OT          See flowsheet documentation for full assessment, interventions and recommendations  Outcome: Progressing  Note: Prognosis: Fair  Problem List: Decreased strength, Decreased range of motion, Decreased endurance, Impaired balance, Decreased mobility, Obesity, Decreased skin integrity, Pain  Assessment: Patient was seen today per POC  Overall, pt demonstrated improved mobility and inc tolerance to activity  Pt very motivated to perform functional mobility  Pain in bilateral LE and ankles 4/10  Continues to have increased open wounds in LE and feet  Upon arrival pt on 3L O2 NC  No use of O2 throughout session with SpO2 maintained at 97%  Use of surgical shoes for ambulation 2* pt losing a toenail and not wanting socks  S provided for bed mobility  ModAx1 for sit to stand and minAx1 for stand to sit 2* impulsive upon sitting  Upon initial sit to stand, pt HR inc to 162 bpm in static standing position  Pt educated to sit and vitals assessed  HR decreased to 127 bpm in seated position  Performed seated marching and LAQ; HR inc to 147 bpm with seated interventions  Discussed and educated pt on vital signs  Pt reported he was very nervous overall during session and fearful that his HR inc would not allow him to be discharged home which is pts wish  Educated pt on limited mobility able to be performed due to his vital signs  Reinforced education with inc understanding  Following significant rest, pt able to step 3 steps from bed to recliner with use of minAx1 and bariatric RW   Cues required for proper hand placement and RW management  Required AAROM while performing interventions in supine due to limited space in bed  No reports of dizziness t/o session  HR at end of session seated in recliner 91 bpm and 97% Spo2 RA  Will continue to see pt per POC as tolerated  The patient's AM-PAC Basic Mobility Inpatient Short Form Raw Score is 16  A Raw score of less than or equal to 16 suggests the patient may benefit from discharge to post-acute rehabilitation services  Please also refer to the recommendation of the Physical Therapist for safe discharge planning  From PT standpoint continued recommendation for STR at D/C when medically cleared based dec caregiver support, inaccessible home, ongoing medical status and current function  Nsg staff to continue to mobilized pt (OOB in chair for all meals & ambulate in room/unit) as tolerated to prevent further decline in function  Nsg staff notified  Barriers to Discharge: Decreased caregiver support, Inaccessible home environment     PT Discharge Recommendation: Post acute rehabilitation services    See flowsheet documentation for full assessment

## 2022-09-11 NOTE — PROGRESS NOTES
Progress Note - Sammy Muñoz 50 y o  male MRN: [id number]    Unit/Bed#: E5 -01 Encounter: [id number]    Assessment/Plan:    Ambulatory dysfunction  ongoing physical therapy, recommending rehab, however patient wants to return home on discharge    Diffuse dermatitis   initiated fluconazole with topical miconazole continue local wound care    Acute pulmonary insufficiency recommending CPAP at night, encourage deep breathing, currently stable on room air    A KI/CKD 3    creatinine now 1 03    Morbid obesity    would greatly benefit from weight loss    Hypothyroid    continue Synthroid 25 micrograms daily    Bipolar disorder   continue BuSpar Depakote Remeron with Klonopin amd Invega    Dyslipidemia    continue statin for LDL control    LE venous insufficiency  keep legs elevated, continue Lasix p o  Subjective:   Denies chest pain or shortness of breath, no nausea vomiting no fevers chills appetite is very good      Objective:     Vitals: Blood pressure 116/73, pulse 98, temperature 97 9 °F (36 6 °C), resp  rate 18, SpO2 98 %  ,There is no height or weight on file to calculate BMI        Results from last 7 days   Lab Units 09/10/22  0538   WBC Thousand/uL 3 77*   HEMOGLOBIN g/dL 10 7*   HEMATOCRIT % 34 0*   PLATELETS Thousands/uL 167     Results from last 7 days   Lab Units 09/10/22  0538   POTASSIUM mmol/L 4 4   CHLORIDE mmol/L 107   CO2 mmol/L 33*   BUN mg/dL 18   CREATININE mg/dL 1 03   CALCIUM mg/dL 8 2*   ALK PHOS U/L 50   ALT U/L 8*   AST U/L 9       Scheduled Meds:  Current Facility-Administered Medications   Medication Dose Route Frequency Provider Last Rate    acetaminophen  975 mg Oral Q6H PRN Laron Kamara DO      aluminum-magnesium hydroxide-simethicone  30 mL Oral Q6H PRN KarissaBRETT Wing      bisacodyl  10 mg Oral Daily PRN KarissaBRETT Wing      busPIRone  10 mg Oral TID BRETT Walters      cholecalciferol  1,000 Units Oral Daily BRETT Walters citalopram  40 mg Oral Daily Uli Warren, CRNP      clonazePAM  1 mg Oral TID Uli Warren, CRNP      diphenhydrAMINE  25 mg Oral Q6H PRN Uli Warren, CRNP      divalproex sodium  1,000 mg Oral Daily Uli Warren, CRNP      divalproex sodium  1,500 mg Oral HS Uli Warren, CRNP      fluconazole  400 mg Oral Daily [place], [address]      furosemide  40 mg Oral Daily Chandler Magic, DO      gabapentin  100 mg Oral TID Uli Warren, CRNP      heparin (porcine)  7,500 Units Subcutaneous [facility] Uli Warren, [address]      levothyroxine  25 mcg Oral Early Morning Uli Warren, CRNP      metoprolol tartrate  50 mg Oral BID Uli Warren, CRNP      miconazole   Topical BID Chandler Magic, DO      mirtazapine  7 5 mg Oral HS Uli Warren, CRNP      oxyCODONE  5 mg Oral Q4H PRN Chandler Magic, DO      paliperidone  6 mg Oral Daily Uli Warren, CRNP      pantoprazole  40 mg Oral Early Morning Uli Warren, CRNP      pravastatin  80 mg Oral Daily With [employer], CRNP      simethicone  80 mg Oral 4x Daily PRN Uli Warren, CRNP         Continuous Infusions:         Physical exam:  General appearance:  Alert no distress interaction appropriate  Head/Eyes:  Nonicteric PERRL EOMI   Neck:  Supple  Lungs:  Decreased BS bilateral no wheezing rhonchi or rales  Heart: normal S1 S2 regular  Abdomen:  Obese nontender with bowel sounds  Extremities:  1+ edema  Skin: diffuse scaly lesions    Invasive Devices  Report    None                   VTE Pharmacologic Prophylaxis:  Heparin      Counseling / Coordination of Care  Total floor / unit time spent today 30  minutes  Greater than 50% of total time was spent with the patient and / or family counseling and / or coordination of care    A description of the counseling / coordination of care:

## 2022-09-11 NOTE — PLAN OF CARE
Problem: OCCUPATIONAL THERAPY ADULT  Goal: Performs self-care activities at highest level of function for planned discharge setting  See evaluation for individualized goals  Description: Treatment Interventions: ADL retraining, Functional transfer training, UE strengthening/ROM, Cognitive reorientation, Patient/family training, Equipment evaluation/education, Compensatory technique education          See flowsheet documentation for full assessment, interventions and recommendations  Outcome: Progressing  Note: Limitation: Decreased ADL status, Decreased UE ROM, Decreased UE strength, Decreased Safe judgement during ADL, Decreased cognition, Decreased endurance, Decreased sensation, Decreased self-care trans, Decreased high-level ADLs  Prognosis: Fair  Assessment: Pt seen for 48min tx session with focus on functional balance, functional mobility, ADL status, transfer safety, and cognition  Pt able to tolerate OOB mobility; sitting balance=f+/f, standing balance=f/f-  Pt required verbal/physical cues to maintain transfer safety  Pt able to assist with UE/LE ADLs  Pt demonstrate good cognition(i e orientation, direction-following)  Limited functional mobility/functional activity 2* noted tachycardia(i e [id number] with mobility); pt with limited insight into medical restrictions(i e why not to increase activity with tachycardia)  Pt continues to demonstrate appropriateness for inpt rehab to improve his overall level of independence  Pt pleasant and cooperative with tx session  Will continue  See below noted for updated functional mobility goals       OT Discharge Recommendation: Post acute rehabilitation services

## 2022-09-11 NOTE — PLAN OF CARE
Problem: PAIN - ADULT  Goal: Verbalizes/displays adequate comfort level or baseline comfort level  Description: Interventions:  - Encourage patient to monitor pain and request assistance  - Assess pain using appropriate pain scale  - Administer analgesics based on type and severity of pain and evaluate response  - Implement non-pharmacological measures as appropriate and evaluate response  - Consider cultural and social influences on pain and pain management  - Notify physician/advanced practitioner if interventions unsuccessful or patient reports new pain  Outcome: Progressing     Problem: INFECTION - ADULT  Goal: Absence or prevention of progression during hospitalization  Description: INTERVENTIONS:  - Assess and monitor for signs and symptoms of infection  - Monitor lab/diagnostic results  - Monitor all insertion sites, i e  indwelling lines, tubes, and drains  - Monitor endotracheal if appropriate and nasal secretions for changes in amount and color  - [place] appropriate cooling/warming therapies per order  - Administer medications as ordered  - Instruct and encourage patient and family to use good hand hygiene technique  - Identify and instruct in appropriate isolation precautions for identified infection/condition  Outcome: Progressing     Problem: SAFETY ADULT  Goal: Patient will remain free of falls  Description: INTERVENTIONS:  - Educate patient/family on patient safety including physical limitations  - Instruct patient to call for assistance with activity   - Consult OT/PT to assist with strengthening/mobility   - Keep Call bell within reach  - Keep bed low and locked with side rails adjusted as appropriate  - Keep care items and personal belongings within reach  - Initiate and maintain comfort rounds  - Make Fall Risk Sign visible to staff  - Apply yellow socks and bracelet for high fall risk patients  - Consider moving patient to room near nurses station  Outcome: Progressing  Goal: Maintain or return to baseline ADL function  Description: INTERVENTIONS:  -  Assess patient's ability to carry out ADLs; assess patient's baseline for ADL function and identify physical deficits which impact ability to perform ADLs (bathing, care of mouth/teeth, toileting, grooming, dressing, etc )  - Assess/evaluate cause of self-care deficits   - Assess range of motion  - Assess patient's mobility; develop plan if impaired  - Assess patient's need for assistive devices and provide as appropriate  - Encourage maximum independence but intervene and supervise when necessary  - Involve family in performance of ADLs  - Assess for home care needs following discharge   - Consider OT consult to assist with ADL evaluation and planning for discharge  - Provide patient education as appropriate  Outcome: Progressing  Goal: Maintains/Returns to pre admission functional level  Description: INTERVENTIONS:  - Perform BMAT or MOVE assessment daily    - Set and communicate daily mobility goal to care team and patient/family/caregiver     - Collaborate with rehabilitation services on mobility goals if consulted  - Out of bed for toileting  - Record patient progress and toleration of activity level   Outcome: Progressing     Problem: DISCHARGE PLANNING  Goal: Discharge to home or other facility with appropriate resources  Description: INTERVENTIONS:  - Identify barriers to discharge w/patient and caregiver  - Arrange for needed discharge resources and transportation as appropriate  - Identify discharge learning needs (meds, wound care, etc )  - Arrange for interpretive services to assist at discharge as needed  - Refer to Case Management Department for coordinating discharge planning if the patient needs post-hospital services based on physician/advanced practitioner order or complex needs related to functional status, cognitive ability, or social support system  Outcome: Progressing     Problem: Knowledge Deficit  Goal: Patient/family/caregiver demonstrates understanding of disease process, treatment plan, medications, and discharge instructions  Description: Complete learning assessment and assess knowledge base    Interventions:  - Provide teaching at level of understanding  - Provide teaching via preferred learning methods  Outcome: Progressing     Problem: Prexisting or High Potential for Compromised Skin Integrity  Goal: Skin integrity is maintained or improved  Description: INTERVENTIONS:  - Identify patients at risk for skin breakdown  - Assess and monitor skin integrity  - Assess and monitor nutrition and hydration status  - Monitor labs   - Assess for incontinence   - Turn and reposition patient  - Assist with mobility/ambulation  - Relieve pressure over bony prominences  - Avoid friction and shearing  - Provide appropriate hygiene as needed including keeping skin clean and dry  - Evaluate need for skin moisturizer/barrier cream  - Collaborate with interdisciplinary team   - Patient/family teaching  - Consider wound care consult   Outcome: Progressing     Problem: Potential for Falls  Goal: Patient will remain free of falls  Description: INTERVENTIONS:  - Educate patient/family on patient safety including physical limitations  - Instruct patient to call for assistance with activity   - Consult OT/PT to assist with strengthening/mobility   - Keep Call bell within reach  - Keep bed low and locked with side rails adjusted as appropriate  - Keep care items and personal belongings within reach  - Initiate and maintain comfort rounds  - Make Fall Risk Sign visible to staff  - Apply yellow socks and bracelet for high fall risk patients  - Consider moving patient to room near nurses station  Outcome: Progressing     Problem: MOBILITY - ADULT  Goal: Maintain or return to baseline ADL function  Description: INTERVENTIONS:  -  Assess patient's ability to carry out ADLs; assess patient's baseline for ADL function and identify physical deficits which impact ability to perform ADLs (bathing, care of mouth/teeth, toileting, grooming, dressing, etc )  - Assess/evaluate cause of self-care deficits   - Assess range of motion  - Assess patient's mobility; develop plan if impaired  - Assess patient's need for assistive devices and provide as appropriate  - Encourage maximum independence but intervene and supervise when necessary  - Involve family in performance of ADLs  - Assess for home care needs following discharge   - Consider OT consult to assist with ADL evaluation and planning for discharge  - Provide patient education as appropriate  Outcome: Progressing  Goal: Maintains/Returns to pre admission functional level  Description: INTERVENTIONS:  - Perform BMAT or MOVE assessment daily    - Set and communicate daily mobility goal to care team and patient/family/caregiver  - Collaborate with rehabilitation services on mobility goals if consulted  - Out of bed for toileting  - Record patient progress and toleration of activity level   Outcome: Progressing     Problem: Nutrition/Hydration-ADULT  Goal: Nutrient/Hydration intake appropriate for improving, restoring or maintaining nutritional needs  Description: Monitor and assess patient's nutrition/hydration status for malnutrition  Collaborate with interdisciplinary team and initiate plan and interventions as ordered  Monitor patient's weight and dietary intake as ordered or per policy  Utilize nutrition screening tool and intervene as necessary  Determine patient's food preferences and provide high-protein, high-caloric foods as appropriate       INTERVENTIONS:  - Monitor oral intake, urinary output, labs, and treatment plans  - Assess nutrition and hydration status and recommend course of action  - Evaluate amount of meals eaten  - Assist patient with eating if necessary   - Allow adequate time for meals  - Recommend/ encourage appropriate diets, oral nutritional supplements, and vitamin/mineral supplements  - Order, calculate, and assess calorie counts as needed  - Recommend, monitor, and adjust tube feedings and TPN/PPN based on assessed needs  - Assess need for intravenous fluids  - Provide specific nutrition/hydration education as appropriate  - Include patient/family/caregiver in decisions related to nutrition  Outcome: Progressing

## 2022-09-11 NOTE — PHYSICAL THERAPY NOTE
PT PROGRESS NOTE    Name: Richard Bonilla  AGE: 50 y o  MRN: [id number]  LENGTH OF STAY: 4          09/11/22 1033   PT Last Visit   PT Visit Date 09/11/22   Note Type   Note Type Treatment   Pain Assessment   Pain Assessment Tool 0-10   Pain Score 4   Pain Location/Orientation Orientation: Bilateral;Orientation: Lower; Location: Foot; Location: Leg   Hospital Pain Intervention(s) Repositioned; Ambulation/increased activity; Elevated; Emotional support; Rest   Restrictions/Precautions   Weight Bearing Precautions Per Order No   Other Precautions O2;Fall Risk;Pain  (Masimo; 3L O2 NC)   General   Chart Reviewed Yes   Family/Caregiver Present No   Cognition   Overall Cognitive Status WFL   Arousal/Participation Alert   Attention Attends with cues to redirect   Orientation Level Oriented X4   Following Commands Follows one step commands without difficulty   Comments Pt very nervous during treatment session with increase body shakes and tremors; motivated to ambulate   Subjective   Subjective I am ready to work with therapy   Bed Mobility   Supine to Sit 5  Supervision   Additional items Bedrails; Increased time required;Verbal cues   Sit to Supine Unable to assess   Additional Comments Cues for technique and safety; pt seated in recliner at end of session   Transfers   Sit to Stand 3  Moderate assistance   Additional items Assist x 1; Armrests; Increased time required;Verbal cues   Stand to Sit 4  Minimal assistance   Additional items Assist x 1; Impulsive;Verbal cues   Additional Comments Performed multiple sit<>stand transfers; impulsive occasionally on stand to sit   Ambulation/Elevation   Gait pattern Improper Weight shift; Wide VESNA; Decreased foot clearance; Short stride; Foward flexed   Gait Assistance 4  Minimal assist   Additional items Assist x 1;Verbal cues; Tactile cues   Assistive Device Bariatric Rolling walker   Distance 3 steps from bed to recliner   Ambulation/Elevation Additional Comments Cues for proper RW management   Balance   Static Sitting Fair +   Dynamic Sitting Fair   Static Standing Fair -   Dynamic Standing Poor +   Ambulatory Poor +   Endurance Deficit   Endurance Deficit Yes   Endurance Deficit Description pain, weakness, elevated HR   Activity Tolerance   Activity Tolerance Treatment limited secondary to medical complications (Comment); Patient limited by fatigue   Medical Staff Made Aware RAMON Robert   Nurse Made Aware KATI Mendoza Stajessica   Exercises   Heelslides Supine;10 reps;AAROM; Bilateral   Knee AROM Long Arc Quad Sitting;10 reps;AROM; Bilateral   Ankle Pumps Supine;10 reps;AROM; Bilateral   Marching Sitting;10 reps;AROM; Bilateral   Balance training  Pt able to stand 3 trials for ~1 min with use of RW without LOB or lateral sway   Assessment   Prognosis Fair   Problem List Decreased strength;Decreased range of motion;Decreased endurance; Impaired balance;Decreased mobility;Obesity; Decreased skin integrity;Pain   Assessment Patient was seen today per POC  Overall, pt demonstrated improved mobility and inc tolerance to activity  Pt very motivated to perform functional mobility  Pain in bilateral LE and ankles 4/10  Continues to have increased open wounds in LE and feet  Upon arrival pt on 3L O2 NC  No use of O2 throughout session with SpO2 maintained at 97%  Use of surgical shoes for ambulation 2* pt losing a toenail and not wanting socks  S provided for bed mobility  ModAx1 for sit to stand and minAx1 for stand to sit 2* impulsive upon sitting  Upon initial sit to stand, pt HR inc to 162 bpm in static standing position  Pt educated to sit and vitals assessed  HR decreased to 127 bpm in seated position  Performed seated marching and LAQ; HR inc to 147 bpm with seated interventions  Discussed and educated pt on vital signs  Pt reported he was very nervous overall during session and fearful that his HR inc would not allow him to be discharged home which is pts wish   Educated pt on limited mobility able to be performed due to his vital signs  Reinforced education with inc understanding  Following significant rest, pt able to step 3 steps from bed to recliner with use of minAx1 and bariatric RW  Cues required for proper hand placement and RW management  Required AAROM while performing interventions in supine due to limited space in bed  No reports of dizziness t/o session  HR at end of session seated in recliner 91 bpm and 97% Spo2 RA  Will continue to see pt per POC as tolerated  The patient's AM-PAC Basic Mobility Inpatient Short Form Raw Score is 16  A Raw score of less than or equal to 16 suggests the patient may benefit from discharge to post-acute rehabilitation services  Please also refer to the recommendation of the Physical Therapist for safe discharge planning  From PT standpoint continued recommendation for STR at D/C when medically cleared based dec caregiver support, inaccessible home, ongoing medical status and current function  Nsg staff to continue to mobilized pt (OOB in chair for all meals & ambulate in room/unit) as tolerated to prevent further decline in function  Nsg staff notified  Goals   Patient Goals to walk so he can go home   PT Treatment Day 1   Plan   Treatment/Interventions Functional transfer training;LE strengthening/ROM; Elevations; Therapeutic exercise; Endurance training;Patient/family training;Bed mobility;Gait training;Spoke to nursing;OT   Progress Progressing toward goals   Recommendation   PT Discharge Recommendation Post acute rehabilitation services   AM-PAC Basic Mobility Inpatient   Turning in Bed Without Bedrails 2   Lying on Back to Sitting on Edge of Flat Bed 3   Moving Bed to Chair 3   Standing Up From Chair 3   Walk in Room 3   Climb 3-5 Stairs 2   Basic Mobility Inpatient Raw Score 16   Basic Mobility Standardized Score 38 32   Highest Level Of Mobility   JH-HLM Goal 5: Stand one or more mins   JH-HLM Achieved 5: Stand (1 or more minutes)   Education   Education Provided Mobility training;Home exercise program;Assistive device   Patient Demonstrates acceptance/verbal understanding;Reinforcement needed   End of Consult   Patient Position at End of Consult Bedside chair; All needs within reach   End of Consult Comments Pt in stable condition at end of session  Nsg notified         Reagan Greene, JOHN

## 2022-09-11 NOTE — PLAN OF CARE
Problem: PAIN - ADULT  Goal: Verbalizes/displays adequate comfort level or baseline comfort level  Description: Interventions:  - Encourage patient to monitor pain and request assistance  - Assess pain using appropriate pain scale  - Administer analgesics based on type and severity of pain and evaluate response  - Implement non-pharmacological measures as appropriate and evaluate response  - Consider cultural and social influences on pain and pain management  - Notify physician/advanced practitioner if interventions unsuccessful or patient reports new pain  Outcome: Progressing     Problem: INFECTION - ADULT  Goal: Absence or prevention of progression during hospitalization  Description: INTERVENTIONS:  - Assess and monitor for signs and symptoms of infection  - Monitor lab/diagnostic results  - Monitor all insertion sites, i e  indwelling lines, tubes, and drains  - Monitor endotracheal if appropriate and nasal secretions for changes in amount and color  - [place] appropriate cooling/warming therapies per order  - Administer medications as ordered  - Instruct and encourage patient and family to use good hand hygiene technique  - Identify and instruct in appropriate isolation precautions for identified infection/condition  Outcome: Progressing     Problem: SAFETY ADULT  Goal: Patient will remain free of falls  Description: INTERVENTIONS:  - Educate patient/family on patient safety including physical limitations  - Instruct patient to call for assistance with activity   - Consult OT/PT to assist with strengthening/mobility   - Keep Call bell within reach  - Keep bed low and locked with side rails adjusted as appropriate  - Keep care items and personal belongings within reach  - Initiate and maintain comfort rounds  - Make Fall Risk Sign visible to staff  - Apply yellow socks and bracelet for high fall risk patients  - Consider moving patient to room near nurses station  Outcome: Progressing  Goal: Maintain or return to baseline ADL function  Description: INTERVENTIONS:  -  Assess patient's ability to carry out ADLs; assess patient's baseline for ADL function and identify physical deficits which impact ability to perform ADLs (bathing, care of mouth/teeth, toileting, grooming, dressing, etc )  - Assess/evaluate cause of self-care deficits   - Assess range of motion  - Assess patient's mobility; develop plan if impaired  - Assess patient's need for assistive devices and provide as appropriate  - Encourage maximum independence but intervene and supervise when necessary  - Involve family in performance of ADLs  - Assess for home care needs following discharge   - Consider OT consult to assist with ADL evaluation and planning for discharge  - Provide patient education as appropriate  Outcome: Progressing     Problem: DISCHARGE PLANNING  Goal: Discharge to home or other facility with appropriate resources  Description: INTERVENTIONS:  - Identify barriers to discharge w/patient and caregiver  - Arrange for needed discharge resources and transportation as appropriate  - Identify discharge learning needs (meds, wound care, etc )  - Arrange for interpretive services to assist at discharge as needed  - Refer to Case Management Department for coordinating discharge planning if the patient needs post-hospital services based on physician/advanced practitioner order or complex needs related to functional status, cognitive ability, or social support system  Outcome: Progressing     Problem: Knowledge Deficit  Goal: Patient/family/caregiver demonstrates understanding of disease process, treatment plan, medications, and discharge instructions  Description: Complete learning assessment and assess knowledge base    Interventions:  - Provide teaching at level of understanding  - Provide teaching via preferred learning methods  Outcome: Progressing     Problem: Prexisting or High Potential for Compromised Skin Integrity  Goal: Skin integrity is maintained or improved  Description: INTERVENTIONS:  - Identify patients at risk for skin breakdown  - Assess and monitor skin integrity  - Assess and monitor nutrition and hydration status  - Monitor labs   - Assess for incontinence   - Turn and reposition patient  - Assist with mobility/ambulation  - Relieve pressure over bony prominences  - Avoid friction and shearing  - Provide appropriate hygiene as needed including keeping skin clean and dry  - Evaluate need for skin moisturizer/barrier cream  - Collaborate with interdisciplinary team   - Patient/family teaching  - Consider wound care consult   Outcome: Progressing     Problem: Potential for Falls  Goal: Patient will remain free of falls  Description: INTERVENTIONS:  - Educate patient/family on patient safety including physical limitations  - Instruct patient to call for assistance with activity   - Consult OT/PT to assist with strengthening/mobility   - Keep Call bell within reach  - Keep bed low and locked with side rails adjusted as appropriate  - Keep care items and personal belongings within reach  - Initiate and maintain comfort rounds  - Make Fall Risk Sign visible to staff  - Apply yellow socks and bracelet for high fall risk patients  - Consider moving patient to room near nurses station  Outcome: Progressing     Problem: MOBILITY - ADULT  Goal: Maintain or return to baseline ADL function  Description: INTERVENTIONS:  -  Assess patient's ability to carry out ADLs; assess patient's baseline for ADL function and identify physical deficits which impact ability to perform ADLs (bathing, care of mouth/teeth, toileting, grooming, dressing, etc )  - Assess/evaluate cause of self-care deficits   - Assess range of motion  - Assess patient's mobility; develop plan if impaired  - Assess patient's need for assistive devices and provide as appropriate  - Encourage maximum independence but intervene and supervise when necessary  - Involve family in performance of ADLs  - Assess for home care needs following discharge   - Consider OT consult to assist with ADL evaluation and planning for discharge  - Provide patient education as appropriate  Outcome: Progressing     Problem: Nutrition/Hydration-ADULT  Goal: Nutrient/Hydration intake appropriate for improving, restoring or maintaining nutritional needs  Description: Monitor and assess patient's nutrition/hydration status for malnutrition  Collaborate with interdisciplinary team and initiate plan and interventions as ordered  Monitor patient's weight and dietary intake as ordered or per policy  Utilize nutrition screening tool and intervene as necessary  Determine patient's food preferences and provide high-protein, high-caloric foods as appropriate       INTERVENTIONS:  - Monitor oral intake, urinary output, labs, and treatment plans  - Assess nutrition and hydration status and recommend course of action  - Evaluate amount of meals eaten  - Assist patient with eating if necessary   - Allow adequate time for meals  - Recommend/ encourage appropriate diets, oral nutritional supplements, and vitamin/mineral supplements  - Order, calculate, and assess calorie counts as needed  - Recommend, monitor, and adjust tube feedings and TPN/PPN based on assessed needs  - Assess need for intravenous fluids  - Provide specific nutrition/hydration education as appropriate  - Include patient/family/caregiver in decisions related to nutrition  Outcome: Progressing

## 2022-09-11 NOTE — OCCUPATIONAL THERAPY NOTE
Occupational Therapy Progress Note(vdxc=[id number])     Patient Name: Nataliia AMEZQUITA'GLORY Date: 9/11/2022  Problem List  Principal Problem:    Diffuse dermatitis  Active Problems:    Bipolar disorder ([facility])    Morbid obesity ([facility])    Hypothyroidism    Ambulatory dysfunction    Crohn's disease of large intestine without complication (HCC)    CORKY (generalized anxiety disorder)    Gastroesophageal reflux disease without esophagitis    ALESSANDRA on CPAP    Pre-diabetes    Venous insufficiency of lower extremity    Mild renal insufficiency    Acute pulmonary insufficiency    Tinea corporis          09/11/22 0947   Note Type   Note Type Treatment   Restrictions/Precautions   Weight Bearing Precautions Per Order No   Other Precautions Multiple lines; Fall Risk;O2   Pain Assessment   Pain Assessment Tool 0-10   Pain Score 4   Pain Location/Orientation Orientation: Bilateral;Location: Leg   ADL   Where Assessed Edge of bed   UB Dressing Assistance 5  Supervision/Setup   UB Dressing Deficit Thread RUE; Thread LUE; Increased time to complete; Fasteners   LB Dressing Assistance 4  Minimal Assistance   LB Dressing Deficit Don/doff R shoe;Don/doff L shoe   Functional Standing Tolerance   Time 1-2mins   Bed Mobility   Supine to Sit 5  Supervision   Additional items Increased time required;Verbal cues   Transfers   Sit to Stand 3  Moderate assistance   Additional items Assist x 1;Verbal cues   Stand to Sit 4  Minimal assistance   Additional items Assist x 1; Increased time required;Verbal cues   Additional Comments limited ambulation 2* tachycardia--MX=[id number](EOB--with mobility); SPO2=97% on RA; nsg/MD made aware   Functional Mobility   Functional Mobility 4  Minimal assistance   Additional Comments x1   Additional items Rolling walker   Cognition   Overall Cognitive Status WFL   Arousal/Participation Alert   Attention Within functional limits   Orientation Level Oriented X4   Memory Within functional limits   Following Commands Follows one step commands without difficulty   Comments noted b/l UE tremors; pt stating being nervous   Activity Tolerance   Activity Tolerance Patient limited by fatigue;Patient limited by pain;Treatment limited secondary to medical complications (Comment)   Medical Staff Made Aware THOMAS craven MD   Assessment   Assessment Pt seen for 48min tx session with focus on functional balance, functional mobility, ADL status, transfer safety, and cognition  Pt able to tolerate OOB mobility; sitting balance=f+/f, standing balance=f/f-  Pt required verbal/physical cues to maintain transfer safety  Pt able to assist with UE/LE ADLs  Pt demonstrate good cognition(i e orientation, direction-following)  Limited functional mobility/functional activity 2* noted tachycardia(i e AZ=182 with mobility); pt with limited insight into medical restrictions(i e why not to increase activity with tachycardia)  Pt continues to demonstrate appropriateness for inpt rehab to improve his overall level of independence  Pt pleasant and cooperative with tx session  Will continue  See below noted for updated functional mobility goals  Plan   Treatment Interventions ADL retraining;Functional transfer training;UE strengthening/ROM; Cognitive reorientation;Patient/family training;Equipment evaluation/education; Compensatory technique education   Goal Expiration Date 09/22/22   OT Treatment Day 1   OT Frequency 3-5x/wk   Recommendation   OT Discharge Recommendation Post acute rehabilitation services   AM-PAC Daily Activity Inpatient   Lower Body Dressing 2   Bathing 2   Toileting 2   Upper Body Dressing 3   Grooming 4   Eating 4   Daily Activity Raw Score 17   Daily Activity Standardized Score (Calc for Raw Score >=11) 37 26   AM-PAC Applied Cognition Inpatient   Following a Speech/Presentation 4   Understanding Ordinary Conversation 4   Taking Medications 3   Remembering Where Things Are Placed or Put Away 3   Remembering List of 4-5 Errands 3   Taking Care of Complicated Tasks 3   Applied Cognition Raw Score 20   Applied Cognition Standardized Score 41 76   Updated goals:  Pt will demonstrate proper walker/transfer safety 100% of the time  Pt will demonstrate mod I with his sit-stand transfers to assist with ADLs  Pt will demonstrate g/g- balance with all functional activities   Agustina Ibarra, OT

## 2022-09-12 ENCOUNTER — HOME HEALTH ADMISSION (OUTPATIENT)
Dept: HOME HEALTH SERVICES | Facility: HOME HEALTHCARE | Age: 47
End: 2022-09-12

## 2022-09-12 LAB
ANION GAP SERPL CALCULATED.3IONS-SCNC: 3 MMOL/L (ref 4–13)
BUN SERPL-MCNC: 17 MG/DL (ref 5–25)
CALCIUM SERPL-MCNC: 8.8 MG/DL (ref 8.3–10.1)
CHLORIDE SERPL-SCNC: 104 MMOL/L (ref 96–108)
CO2 SERPL-SCNC: 34 MMOL/L (ref 21–32)
CREAT SERPL-MCNC: 1.35 MG/DL (ref 0.6–1.3)
GFR SERPL CREATININE-BSD FRML MDRD: 61 ML/MIN/1.73SQ M
GLUCOSE SERPL-MCNC: 115 MG/DL (ref 65–140)
GLUCOSE SERPL-MCNC: 91 MG/DL (ref 65–140)
GLUCOSE SERPL-MCNC: 98 MG/DL (ref 65–140)
POTASSIUM SERPL-SCNC: 4.8 MMOL/L (ref 3.5–5.3)
SODIUM SERPL-SCNC: 141 MMOL/L (ref 135–147)

## 2022-09-12 PROCEDURE — 97530 THERAPEUTIC ACTIVITIES: CPT

## 2022-09-12 PROCEDURE — 82948 REAGENT STRIP/BLOOD GLUCOSE: CPT

## 2022-09-12 PROCEDURE — 99232 SBSQ HOSP IP/OBS MODERATE 35: CPT | Performed by: INTERNAL MEDICINE

## 2022-09-12 PROCEDURE — 80048 BASIC METABOLIC PNL TOTAL CA: CPT | Performed by: INTERNAL MEDICINE

## 2022-09-12 PROCEDURE — 97116 GAIT TRAINING THERAPY: CPT

## 2022-09-12 RX ADMIN — DIVALPROEX SODIUM 1000 MG: 500 TABLET, DELAYED RELEASE ORAL at 09:17

## 2022-09-12 RX ADMIN — PALIPERIDONE 6 MG: 3 TABLET, EXTENDED RELEASE ORAL at 09:18

## 2022-09-12 RX ADMIN — GABAPENTIN 100 MG: 100 CAPSULE ORAL at 17:13

## 2022-09-12 RX ADMIN — CITALOPRAM HYDROBROMIDE 40 MG: 20 TABLET ORAL at 09:17

## 2022-09-12 RX ADMIN — PANTOPRAZOLE SODIUM 40 MG: 40 TABLET, DELAYED RELEASE ORAL at 05:21

## 2022-09-12 RX ADMIN — BUSPIRONE HYDROCHLORIDE 10 MG: 10 TABLET ORAL at 09:17

## 2022-09-12 RX ADMIN — HEPARIN SODIUM 7500 UNITS: 5000 INJECTION INTRAVENOUS; SUBCUTANEOUS at 21:43

## 2022-09-12 RX ADMIN — Medication 1000 UNITS: at 09:17

## 2022-09-12 RX ADMIN — HEPARIN SODIUM 7500 UNITS: 5000 INJECTION INTRAVENOUS; SUBCUTANEOUS at 14:21

## 2022-09-12 RX ADMIN — GABAPENTIN 100 MG: 100 CAPSULE ORAL at 21:43

## 2022-09-12 RX ADMIN — METOPROLOL TARTRATE 75 MG: 50 TABLET, FILM COATED ORAL at 17:13

## 2022-09-12 RX ADMIN — PRAVASTATIN SODIUM 80 MG: 80 TABLET ORAL at 17:14

## 2022-09-12 RX ADMIN — BUSPIRONE HYDROCHLORIDE 10 MG: 10 TABLET ORAL at 17:13

## 2022-09-12 RX ADMIN — BUSPIRONE HYDROCHLORIDE 10 MG: 10 TABLET ORAL at 21:43

## 2022-09-12 RX ADMIN — MICONAZOLE NITRATE: 20 CREAM TOPICAL at 17:14

## 2022-09-12 RX ADMIN — DIVALPROEX SODIUM 1500 MG: 500 TABLET, DELAYED RELEASE ORAL at 21:43

## 2022-09-12 RX ADMIN — GABAPENTIN 100 MG: 100 CAPSULE ORAL at 09:18

## 2022-09-12 RX ADMIN — OXYCODONE 5 MG: 5 TABLET ORAL at 08:49

## 2022-09-12 RX ADMIN — OXYCODONE 5 MG: 5 TABLET ORAL at 17:14

## 2022-09-12 RX ADMIN — MIRTAZAPINE 7.5 MG: 15 TABLET, FILM COATED ORAL at 21:43

## 2022-09-12 RX ADMIN — CLONAZEPAM 1 MG: 1 TABLET ORAL at 21:43

## 2022-09-12 RX ADMIN — MICONAZOLE NITRATE 1 APPLICATION: 20 CREAM TOPICAL at 09:23

## 2022-09-12 RX ADMIN — CLONAZEPAM 1 MG: 1 TABLET ORAL at 17:13

## 2022-09-12 RX ADMIN — CLONAZEPAM 1 MG: 1 TABLET ORAL at 09:17

## 2022-09-12 RX ADMIN — LEVOTHYROXINE SODIUM 25 MCG: 25 TABLET ORAL at 05:21

## 2022-09-12 RX ADMIN — FLUCONAZOLE 400 MG: 100 TABLET ORAL at 09:17

## 2022-09-12 RX ADMIN — HEPARIN SODIUM 7500 UNITS: 5000 INJECTION INTRAVENOUS; SUBCUTANEOUS at 05:20

## 2022-09-12 NOTE — PLAN OF CARE
Problem: OCCUPATIONAL THERAPY ADULT  Goal: Performs self-care activities at highest level of function for planned discharge setting  See evaluation for individualized goals  Description: Treatment Interventions: ADL retraining, Functional transfer training, UE strengthening/ROM, Endurance training, Cognitive reorientation, Patient/family training, Equipment evaluation/education, Neuromuscular reeducation, Compensatory technique education, Continued evaluation, Energy conservation, Activityengagement          See flowsheet documentation for full assessment, interventions and recommendations  Outcome: Progressing  Note: Limitation: Decreased ADL status, Decreased UE ROM, Decreased UE strength, Decreased Safe judgement during ADL, Decreased cognition, Decreased endurance, Decreased sensation, Decreased self-care trans, Decreased high-level ADLs  Prognosis: Fair  Assessment: Patient participated in skilled OT session this date with interventions consisting of therapeutic activities to increase functional endurance/activity tolerance, static/dynamic sitting/standing balance, and overall safety awareness to increase (I) with ADLs/IADLs to return to PLOF  Provided education on energy conservation techniques, deep breathing techniques, fall prevention strategies, and overall safety awareness  Patient agreeable to OT treatment session, upon arrival patient was found seated OOB to Recliner  Patient adamantly refusing any sort of footwear, stating "I put socks on for no one!" Attempted to reposition chair for safer transfer with patient adamantly refusing and becoming frustrated  STS first attempt unable despite 2 person assist, trialed again with modAx2  Functional mobility to other side of bed with minAx2 and BFWW for safety  Stand to sit with minAx2 with VCs for controlled descent and B UE placement  Sit to supine with minAx2 for B LE management   Patient then became very upset re: rec for rehab, as prior session the therapy team states he did well  Explained to pt that he had made progress since initial evaluation, but not enough progress to return home safely at this time  He then stated he did not have a home to return to and was looking into going to an ILF  Explained that pt needs rehab and that would give him more time to find housing  At end of session, patient seems agreeable to this  He is fatigued post session, left supine in bed, call bell within reach, and needs met  Patient continues to be functioning below baseline level, occupational performance remains limited secondary to factors listed above and increased risk for falls and injury  The patient's raw score on the AM-PAC Daily Activity inpatient short form is 17, standardized score is 37 26, less than 39 4  Patients at this level are likely to benefit from discharge to post-acute rehabilitation services  Please refer to the recommendation of the Occupational Therapist for safe discharge planning       OT Discharge Recommendation: Post acute rehabilitation services

## 2022-09-12 NOTE — PLAN OF CARE
Problem: PHYSICAL THERAPY ADULT  Goal: Performs mobility at highest level of function for planned discharge setting  See evaluation for individualized goals  Description: Treatment/Interventions: Functional transfer training, LE strengthening/ROM, Endurance training, Cognitive reorientation, Patient/family training, Equipment eval/education, Bed mobility, Gait training, Compensatory technique education, Spoke to nursing, OT          See flowsheet documentation for full assessment, interventions and recommendations  Outcome: Progressing  Note: Prognosis: Fair  Problem List: Decreased strength, Decreased endurance, Impaired balance, Decreased mobility, Impaired judgement, Decreased safety awareness, Obesity, Pain, Decreased skin integrity  Assessment: Pt seen for PT per POC  Improved mobility & activity tolerance noted this tx session  Pt progressed to minAx2 for sit to supine & stand to sit transitions; modAx2 for sit to stand transitions; minAx1 for amb w/ RW + cues for techniques & safety  Gait deviations as above, slow & unsteady w/ dec foot clearance & strides but no gross LOB noted  Inc HR from 106-140 bpm during mobility, pt asymptomatic  HR in the high 90's to low 100's once supine  Pt requested to defer thera  ex this tx session 2* to inc fatigue after mobility training  Please see flowsheet above for objective findings & levels of assistance required for all functional tasks during this tx session  Nsg staff most recent vital signs as follows: BP 92/51   Pulse 86   Temp 98 4 °F (36 9 °C)   Resp 19   SpO2 97%   Will continue PT per POC  At end of session, pt back in bed in stable condition, call bell & phone in reach, bed alarm activated  Fall precautions reinforced w/ good understanding  The patient's AM-PAC Basic Mobility Inpatient Short Form Raw Score is 14  A Raw score of less than or equal to 16 suggests the patient may benefit from discharge to post-acute rehabilitation services   Please also refer to the recommendation of the Physical Therapist for safe discharge planning  From PT standpoint, will continue to recommend inpt rehab at D/C  CM to follow  Nsg staff to continue to mobilized pt (OOB in chair for all meals & ambulate in room/unit) as tolerated to prevent decline in function  Nsg notified  Barriers to Discharge: Decreased caregiver support, Inaccessible home environment     PT Discharge Recommendation: Post acute rehabilitation services    See flowsheet documentation for full assessment

## 2022-09-12 NOTE — CASE MANAGEMENT
Case Management Discharge Planning Note    Patient name Pankaj Bocanegra  [place] [address] /E5 MS 18-* MRN [id number]  : 1975 Date 2022       Current Admission Date: 2022  Current Admission Diagnosis:Diffuse dermatitis   Patient Active Problem List    Diagnosis Date Noted    Tinea corporis 2022    Diffuse dermatitis 2022    Mild renal insufficiency 2022    Acute pulmonary insufficiency 2022    Weakness 2022    Cutaneous candidiasis 2022    Rash of body 07/15/2022    GERMANIA (acute kidney injury) ([facility] ) 2022    CORKY (generalized anxiety disorder) 2022    Pain and swelling of lower extremity 2022    Primary hypertension 2022    Dyslipidemia 2022    Crohn's disease ([facility] ) 2022    B[facility] 20[facility]    Morbid obesity ([facility] ) 2022    Hypothyroidism 2022    Multiple open wounds of lower leg 2022    Peripheral edema 2022    Anxiety 2022    Dietary noncompliance 2021    Ambulatory dysfunction 2021    Venous stasis dermatitis 2021    Venous insufficiency of lower extremity 2021    Financial difficulties 10/26/2020    Bilateral leg edema 2020    Binge eating     Folic acid deficiency     ALESSANDRA on CPAP 2019    Fatty liver 2019    History of MRSA infection 2019    Pre-diabetes 2019    Peripheral polyneuropathy 2017    Vitamin D deficiency 2015    Gastroesophageal reflux disease without esophagitis 2015    Crohn's disease of large intestine without complication ([facility] )     Cocaine dependence in remission ([facility] ) 2011      LOS (days): 5  Geometric Mean LOS (GMLOS) (days): 4 80  Days to GMLOS:0 3     OBJECTIVE:  Risk of Unplanned Readmission Score: 42 92         Current admission status: Inpatient   Preferred Pharmacy:     [facility] [address] [place] [address] [facility]  Phone: [phone] Fax: [phone]    Primary Care Provider: Majo Devi DO    Primary Insurance: MEDICARE  Secondary Insurance:     DISCHARGE DETAILS:     Per MD rounds, patient is not medically stable for discharge at this time  Discharge plan is HHC vs  STR  Patient does not want to go to rehab and would like to go home with home health services instead  Per bedside RN, patient has been getting up and using the bathroom  CM sent out referrals to home health agencies in [address] [place] [address]  STR referrals in [facility] as well-awaiting accepting facility-PT/OT recommending short term rehab at this time  CM will continue to follow patient for discharge needs  Possible neuropsych evaluation to be ordered for patient  CM made referral to [facility] outpatient CM program- [phone]

## 2022-09-12 NOTE — PROGRESS NOTES
Progress Note - Amrita Smith 50 y o  male MRN: [id number]    Unit/Bed#: E5 -01 Encounter: [id number]      Subjective: The patient is feeling somewhat better  He thinks that his skin is improving  He has no chest pain, shortness a breath, abdominal pain, nausea, or vomiting  He states that the reason that he fell around the time of admission is that the railing on his steps gave way  Physical Exam:   Temp:  [98 1 °F (36 7 °C)-98 4 °F (36 9 °C)] 98 4 °F (36 9 °C)  HR:  [] 86  Resp:  [19] 19  BP: ()/(48-75) 92/51    Gen:  Well-developed, severely obese, in no acute distress  Neck:  Supple  No lymphadenopathy, goiter, or bruit  Heart:  Regular rhythm  I heard no murmur, gallop, or rub  Lungs:  Clear to auscultation and percussion  Breath sounds are diminished bilaterally  I heard no wheezing, rales, or rhonchi  Abd:  Soft with active bowel sounds  No mass, tenderness, or organomegaly  Extremities:  No clubbing, cyanosis, or edema  No calf tenderness  Neuro:  Alert and conversant  No focal sign  Skin:  Warm and dry  LABS:   CMP:   Lab Results   Component Value Date    SODIUM 141 09/12/2022    K 4 8 09/12/2022     09/12/2022    CO2 34 (H) 09/12/2022    BUN 17 09/12/2022    CREATININE 1 35 (H) 09/12/2022    CALCIUM 8 8 09/12/2022    EGFR 61 09/12/2022             Assessment/Plan:  1  Ambulatory dysfunction, multifactorial  2  Diffuse fungal dermatitis, on fluconazole  3  Morbid obesity  4  Obstructive sleep apnea with nocturnal hypoxia  5  Chronic kidney disease stage 3 with acute kidney injury on admission, improved  6  Hypothyroidism  7  Bipolar affective disorder  8  Dyslipidemia  9  Venous insufficiency    The patient is insistent on returning home rather than going to rehab  I told him that we would discuss this further after he had a chance to work with physical and occupational therapy a bit more  He will continue fluconazole  He will continue CPAP at night  VTE Pharmacologic Prophylaxis: Heparin  VTE Mechanical Prophylaxis: sequential compression device

## 2022-09-12 NOTE — NURSING NOTE
Pt has been to bathroom twice on my shift and it is fairly difficult for him to get out of the bed and he is unable to perform own clensing of elizabeth area  Discussed patient's ability to care for self at home and he believes he is capable  After getting up for BM the second time he requested Immodium  Stool is soft but formed and I told him I would not request Immodium at this time because he doesn't have diarrhea

## 2022-09-12 NOTE — PHYSICAL THERAPY NOTE
PT PROGRESS NOTE    Name: Leny Willard  AGE: 50 y o  MRN: [id number]  LENGTH OF STAY: 5    Admitting Dx:  SOB (shortness of breath) [R06 02]  Hypoxia [R09 02]  Cellulitis of left lower extremity [L03 116]  Cellulitis of right lower extremity [L03 115]  [facility] ([facility] ) [A41 9, R65 20]  Multiple open wounds of lower leg, unspecified laterality, subsequent encounter [S88 829D]      Patient Active Problem List   Diagnosis    Pain and swelling of lower extremity    Primary hypertension    Dyslipidemia    Crohn's disease ([facility] )    Bipolar disorder ([facility] )    Morbid obesity ([place] [address] )    Hypothyroidism    Ambulatory dysfunction    Anxiety    Multiple open wounds of lower leg    Bilateral leg edema    Binge eating    Cocaine dependence in remission ([place] [address] )    Crohn's disease of large intestine without complication (HCC)    Dietary noncompliance    Fatty liver    Financial difficulties    CORKY (generalized anxiety disorder)    Gastroesophageal reflux disease without esophagitis    History of MRSA infection    ALESSANDRA on CPAP    Peripheral edema    Peripheral polyneuropathy    Pre-diabetes    Venous insufficiency of lower extremity    Venous stasis dermatitis    Folic acid deficiency    Vitamin D deficiency    GERMANIA (acute kidney injury) (HCC)    Rash of body    Cutaneous candidiasis    Weakness    Diffuse dermatitis    Mild renal insufficiency    Acute pulmonary insufficiency    Tinea corporis               09/12/22 1442   PT Last Visit   PT Visit Date 09/12/22   Note Type   Note Type Treatment   Pain Assessment   Pain Score 6   Pain Location/Orientation Orientation: Bilateral;Location: Knee; Location: Foot   Hospital Pain Intervention(s) Repositioned; Ambulation/increased activity; Emotional support; Rest   Restrictions/Precautions   Weight Bearing Precautions Per Order No   Other Precautions Cognitive; Chair Alarm; Bed Alarm; Fall Risk;Pain   General   Chart Reviewed Yes   Response to Previous Treatment Patient reporting fatigue but able to participate  Family/Caregiver Present No   Cognition   Overall Cognitive Status Impaired   Arousal/Participation Alert   Attention Attends with cues to redirect   Orientation Level Oriented to person;Oriented to place;Oriented to time   Following Commands Follows one step commands without difficulty   Comments pt irritable; require encouragement to participate in therapy; impaired insight to deficits; poor judgement   Subjective   Subjective Pt agreeable to therapy  Bed Mobility   Supine to Sit Unable to assess  (pt OOB in chair pre-session)   Sit to Supine 4  Minimal assistance   Additional items Assist x 2; Increased time required;Verbal cues;LE management   Additional Comments cues for techniques & safety   Transfers   Sit to Stand 3  Moderate assistance   Additional items Assist x 2;Armrests; Increased time required;Verbal cues   Stand to Sit 4  Minimal assistance   Additional items Assist x 2; Increased time required;Verbal cues   Additional Comments cues for hand placement & controlled descent; inc HR from 106-140bpm during mobility   Ambulation/Elevation   Gait pattern Wide VESNA; Decreased foot clearance;Shuffling; Short stride; Excessively slow   Gait Assistance 4  Minimal assist   Additional items Assist x 1;Verbal cues; Tactile cues   Assistive Device Bariatric Rolling walker   Distance 15'x1   Ambulation/Elevation Additional Comments unsteady gait w/ inc lateral displacement 2* to body habitus but no gross LOB noted   Balance   Static Sitting Fair +   Dynamic Sitting Fair   Static Standing Fair -   Dynamic Standing Poor +   Ambulatory Poor +   Endurance Deficit   Endurance Deficit Yes   Endurance Deficit Description pain; weakness; elevated HR   Activity Tolerance   Activity Tolerance Patient limited by fatigue;Patient limited by pain;Treatment limited secondary to medical complications (Comment)   Medical Staff Made Aware [facility]   Nurse Made Aware KATI Cowart   Assessment   Prognosis Fair   Problem List Decreased strength;Decreased endurance; Impaired balance;Decreased mobility; Impaired judgement;Decreased safety awareness; Obesity;Pain;Decreased skin integrity   Assessment Pt seen for PT per POC  Improved mobility & activity tolerance noted this tx session  Pt progressed to minAx2 for sit to supine & stand to sit transitions; modAx2 for sit to stand transitions; minAx1 for amb w/ RW + cues for techniques & safety  Gait deviations as above, slow & unsteady w/ dec foot clearance & strides but no gross LOB noted  Inc HR from 106-140 bpm during mobility, pt asymptomatic  HR in the high 90's to low 100's once supine  Pt requested to defer thera  ex this tx session 2* to inc fatigue after mobility training  Please see flowsheet above for objective findings & levels of assistance required for all functional tasks during this tx session  Nsg staff most recent vital signs as follows: BP 92/51   Pulse 86   Temp 98 4 °F (36 9 °C)   Resp 19   SpO2 97%   Will continue PT per POC  At end of session, pt back in bed in stable condition, call bell & phone in reach, bed alarm activated  Fall precautions reinforced w/ good understanding  The patient's AM-PAC Basic Mobility Inpatient Short Form Raw Score is 14  A Raw score of less than or equal to 16 suggests the patient may benefit from discharge to post-acute rehabilitation services  Please also refer to the recommendation of the Physical Therapist for safe discharge planning  From PT standpoint, will continue to recommend inpt rehab at D/C  CM to follow  Nsg staff to continue to mobilized pt (OOB in chair for all meals & ambulate in room/unit) as tolerated to prevent decline in function  Nsg notified     Goals   Patient Goals to go home   STG Expiration Date 09/26/22   Short Term Goal #1 Goals to be met in 14 days; pt will be able to: 1) inc strength & balance by 1/2 grade to improve overall functional mobility & dec fall risk; 2) inc bed mobility to S for pt to be able to get in/OOB safely w/ proper techniques 100% of the time, to dec caregiver burden & safely function at home; 3) inc transfers to minAx1 for pt to transition safely from one surface to another w/o % of the time, to dec caregiver burden & safely function at home; 4) inc amb w/ RW approx  >80' w/ S for pt to ambulate household distances w/o any % of the time, to dec caregiver burden & safely function at home; 5) negotiate stairs w/ minAx1 for inc safety during stair mgt inside/outside of home & dec caregiver burden; 6) pt/caregiver ed   PT Treatment Day 2   Plan   Treatment/Interventions Functional transfer training;LE strengthening/ROM; Elevations; Therapeutic exercise; Endurance training;Patient/family training;Gait training;Bed mobility;Spoke to nursing;OT   Progress Progressing toward goals   PT Frequency 3-5x/wk   Recommendation   PT Discharge Recommendation Post acute rehabilitation services   AM-PAC Basic Mobility Inpatient   Turning in Bed Without Bedrails 3   Lying on Back to Sitting on Edge of Flat Bed 2   Moving Bed to Chair 3   Standing Up From Chair 2   Walk in Room 3   Climb 3-5 Stairs 1   Basic Mobility Inpatient Raw Score 14   Basic Mobility Standardized Score 35 55   Turning Head Towards Sound 3   Follow Simple Instructions 3   Low Function Basic Mobility Raw Score 20   Low Function Basic Mobility Standardized Score 32 8   Highest Level Of Mobility   JH-HLM Goal 4: Move to chair/commode   JH-HLM Achieved 6: Walk 10 steps or more   Education   Education Provided Mobility training;Home exercise program;Assistive device   Patient Demonstrates acceptance/verbal understanding;Reinforcement needed   End of Consult   Patient Position at End of Consult Supine;Bed/Chair alarm activated; All needs within reach   End of Consult Comments Pt in stable condition  All needs in reach     Watt Pih, PT

## 2022-09-12 NOTE — OCCUPATIONAL THERAPY NOTE
Occupational Therapy Progress Note     Patient Name: Elvia CRUZ'S Date: 9/12/2022  Problem List  Principal Problem:    Diffuse dermatitis  Active Problems:    Bipolar disorder ([facility] [address] )    Morbid obesity ([facility] [address] )    Hypothyroidism    Ambulatory dysfunction    Crohn's disease of large intestine without complication (HCC)    CORKY (generalized anxiety disorder)    Gastroesophageal reflux disease without esophagitis    ALESSANDRA on CPAP    Pre-diabetes    Venous insufficiency of lower extremity    Mild renal insufficiency    Acute pulmonary insufficiency    Tinea corporis       09/12/22 1412   OT Last Visit   OT Visit Date 09/12/22   Note Type   Note Type Treatment   Restrictions/Precautions   Weight Bearing Precautions Per Order No   Other Precautions Cognitive; Bed Alarm;Multiple lines; Fall Risk;Pain   Pain Assessment   Pain Assessment Tool 0-10   Pain Score 6   Hospital Pain Intervention(s) Repositioned; Ambulation/increased activity   Bed Mobility   Sit to Supine 4  Minimal assistance   Additional items Assist x 2;Bedrails; Increased time required;Verbal cues;LE management   Additional Comments Cues for technique and safety   Transfers   Sit to Stand 3  Moderate assistance   Additional items Assist x 2;Armrests; Increased time required; Impulsive;Verbal cues   Stand to Sit 4  Minimal assistance   Additional items Assist x 2;Impulsive; Increased time required;Verbal cues   Additional Comments HR increased from 106 to 140 during functional mobility   Functional Mobility   Functional Mobility 4  Minimal assistance   Additional items Rolling walker   Cognition   Overall Cognitive Status Impaired   Arousal/Participation Alert; Responsive   Attention Attends with cues to redirect   Orientation Level Oriented X4   Memory Within functional limits   Following Commands Follows one step commands without difficulty   Activity Tolerance   Activity Tolerance Patient limited by fatigue;Treatment limited secondary to medical complications (Comment)   Medical Staff Made Aware Nursing, PT   Assessment   Assessment Patient participated in skilled OT session this date with interventions consisting of therapeutic activities to increase functional endurance/activity tolerance, static/dynamic sitting/standing balance, and overall safety awareness to increase (I) with ADLs/IADLs to return to PLOF  Provided education on energy conservation techniques, deep breathing techniques, fall prevention strategies, and overall safety awareness  Patient agreeable to OT treatment session, upon arrival patient was found seated OOB to Recliner  Patient adamantly refusing any sort of footwear, stating "I put socks on for no one!" Attempted to reposition chair for safer transfer with patient adamantly refusing and becoming frustrated  STS first attempt unable despite 2 person assist, trialed again with modAx2  Functional mobility to other side of bed with minAx2 and BFWW for safety  Stand to sit with minAx2 with VCs for controlled descent and B UE placement  Sit to supine with minAx2 for B LE management  Patient then became very upset re: rec for rehab, as prior session the therapy team states he did well  Explained to pt that he had made progress since initial evaluation, but not enough progress to return home safely at this time  He then stated he did not have a home to return to and was looking into going to an ILF  Explained that pt needs rehab and that would give him more time to find housing  At end of session, patient seems agreeable to this  He is fatigued post session, left supine in bed, call bell within reach, and needs met  Patient continues to be functioning below baseline level, occupational performance remains limited secondary to factors listed above and increased risk for falls and injury  The patient's raw score on the AM-PAC Daily Activity inpatient short form is 17, standardized score is 37 26, less than 39 4   Patients at this level are likely to benefit from discharge to post-acute rehabilitation services  Please refer to the recommendation of the Occupational Therapist for safe discharge planning  Plan   Treatment Interventions ADL retraining;Functional transfer training;UE strengthening/ROM; Endurance training;Cognitive reorientation;Patient/family training;Equipment evaluation/education; Neuromuscular reeducation; Compensatory technique education;Continued evaluation; Energy conservation; Activityengagement   Goal Expiration Date 09/22/22   OT Treatment Day 2   OT Frequency 3-5x/wk   Recommendation   OT Discharge Recommendation Post acute rehabilitation services   AM-PAC Daily Activity Inpatient   Lower Body Dressing 2   Bathing 2   Toileting 2   Upper Body Dressing 3   Grooming 4   Eating 4   Daily Activity Raw Score 17   Daily Activity Standardized Score (Calc for Raw Score >=11) 37 26   AM-PAC Applied Cognition Inpatient   Following a Speech/Presentation 4   Understanding Ordinary Conversation 4   Taking Medications 3   Remembering Where Things Are Placed or Put Away 3   Remembering List of 4-5 Errands 3   Taking Care of Complicated Tasks 3   Applied Cognition Raw Score 20   Applied Cognition Standardized Score 41 76     Aida Laser, OT

## 2022-09-12 NOTE — PROGRESS NOTES
Progress Note - Infectious Disease   Bere Gaines 50 y o  male MRN: [id number]  Unit/Bed#: E5 -01 Encounter: [id number]    Impression/Plan:  1  Extensive body rash  Concern for fungal yeast infection  Suspect sweat has been contributing factor given increased density of rash in skin folds  Patient's hygiene a significant concern  He has previously been on weekly Diflucan but dose not sufficient for this type of rash or body habitus  Patient already ordered for application of antifungal cream to the skin  He is currently receiving oral Fluconazole, 400mg daily, which he should continued to finish 14 days of antifungal treatment  He will need plan for skin care and routine skin evaluation in the outpatient setting    -topical antifungal treatment per SLIM  -would avoid application of topical bacitracin   -continue PO Fluconazole, 400mg daily, through 9/21/2022 for 14-days of antifungal treatment  -monitor CBCD and CMP  -monitor vitals  -serial skin exams  -continue follow up with wound management team     2  Acute hypoxia  Suspect this is multifactorial in morbidly obese patient who is severely deconditioned  Patient with near syncope while ambulating  Also drowsy and has ALESSANDRA  Patient temporarily required face mask O2 but his O2 saturation is now stable on 2L nasal cannula  Patient's CT PE study was negative for PE and showed cleared lungs  -monitor vitals  -monitor respiratory status  -O2 support per primary service     3  Venous insufficiency  Patient with extensive stasis dermatitis but I suspect his current dense erythema is fungal in origin  Patient would benefit from compression and elevation of the B/L LE  There are small areas of excoriation but patient has no active purulence output at this time suggestive of cellulitis   He has been formally assessed by podiatry and the wound management team   -serial skin exams  -recommend compression and elevation of B/L LE to promote venous return  -continue follow up with podiatry  -continue follow up with wound management      4  Morbid obesity  Patient with history of binge eating disorder  Patient's body habitus is risk factor for rash and wounds  Concern for metabolic syndrome which could further impede the immune system and ability to heal  Recommend weight loss  -recommend weight loss     Above plan was discussed in detail with patient at the bedside  Above plan was discussed in detail with SLIM  Antifungals:  Fluconazole 5  Antifungals 5    Subjective:  Patient reports he's having a lot of pain this morning  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  He tells me the pain is "everywhere" but worst in his legs and knees  Describes it as "achy" but also as "knives sticking into me " He has no fever, chills, sweats, shakes; no nausea, vomiting, abdominal pain, diarrhea, or dysuria; no cough, shortness of breath, or chest pain  No new symptoms  Objective:  Vitals:  Temp:  [97 7 °F (36 5 °C)-98 1 °F (36 7 °C)] 98 1 °F (36 7 °C)  HR:  [] 80  Resp:  [19] 19  BP: ()/(48-75) 93/48  SpO2:  [93 %-98 %] 97 %  Temp (24hrs), Av 9 °F (36 6 °C), Min:97 7 °F (36 5 °C), Max:98 1 °F (36 7 °C)  Current: Temperature: 98 1 °F (36 7 °C)    Physical Exam:   General Appearance:  Alert, interactive, nontoxic, in no acute distress  He appears comfortable sitting up in bed  He appears chronically ill and debilitated  Throat: Oropharynx moist without lesions  Lungs:   Clear to auscultation bilaterally; no wheezes, rhonchi or rales; respirations unlabored on nasal cannula O2  Heart:  RRR; no murmur, rub or gallop   Abdomen:   Soft, non-tender, non-distended, positive bowel sounds  Extremities: B/L LE with wraps and overlying ace wraps, no breakthrough drainage  Skin: Flaking of scalp and within beard  Extensive red flat rash covering the back, under arms and antecubital areas, knees, groin, and under the pannus, most dense without skin folds  Labs, Imaging, & Other studies:   All pertinent labs and imaging studies were personally reviewed  Results from last 7 days   Lab Units 09/10/22  0538 09/09/22  0537 09/08/22  0449   WBC Thousand/uL 3 77* 3 85* 6 10   HEMOGLOBIN g/dL 10 7* 10 7* 12 5   PLATELETS Thousands/uL 167 175 201     Results from last 7 days   Lab Units 09/12/22  0528 09/10/22  0538 09/09/22  0537 09/08/22  0449   POTASSIUM mmol/L 4 8 4 4 4 0 4 8   CHLORIDE mmol/L 104 107 105 103   CO2 mmol/L 34* 33* 33* 31   BUN mg/dL 17 18 22 22   CREATININE mg/dL 1 35* 1 03 1 36* 1 92*   EGFR ml/min/1 73sq m 61 84 60 39   CALCIUM mg/dL 8 8 8 2* 8 2* 8 2*   AST U/L  --  9 11 9   ALT U/L  --  8* 7* 6*   ALK PHOS U/L  --  50 51 52     Results from last 7 days   Lab Units 09/07/22 2135 09/07/22 2132   BLOOD CULTURE  No Growth at 72 hrs  No Growth at 72 hrs       Results from last 7 days   Lab Units 09/09/22  0537 09/07/22  1950   PROCALCITONIN ng/ml 0 56* 0 17

## 2022-09-12 NOTE — PLAN OF CARE
Problem: PAIN - ADULT  Goal: Verbalizes/displays adequate comfort level or baseline comfort level  Description: Interventions:  - Encourage patient to monitor pain and request assistance  - Assess pain using appropriate pain scale  - Administer analgesics based on type and severity of pain and evaluate response  - Implement non-pharmacological measures as appropriate and evaluate response  - Consider cultural and social influences on pain and pain management  - Notify physician/advanced practitioner if interventions unsuccessful or patient reports new pain  Outcome: Progressing     Problem: INFECTION - ADULT  Goal: Absence or prevention of progression during hospitalization  Description: INTERVENTIONS:  - Assess and monitor for signs and symptoms of infection  - Monitor lab/diagnostic results  - Monitor all insertion sites, i e  indwelling lines, tubes, and drains  - Monitor endotracheal if appropriate and nasal secretions for changes in amount and color  - Williamson appropriate cooling/warming therapies per order  - Administer medications as ordered  - Instruct and encourage patient and family to use good hand hygiene technique  - Identify and instruct in appropriate isolation precautions for identified infection/condition  Outcome: Progressing     Problem: SAFETY ADULT  Goal: Patient will remain free of falls  Description: INTERVENTIONS:  - Educate patient/family on patient safety including physical limitations  - Instruct patient to call for assistance with activity   - Consult OT/PT to assist with strengthening/mobility   - Keep Call bell within reach  - Keep bed low and locked with side rails adjusted as appropriate  - Keep care items and personal belongings within reach  - Initiate and maintain comfort rounds  - Make Fall Risk Sign visible to staff  - Apply yellow socks and bracelet for high fall risk patients  - Consider moving patient to room near nurses station  Outcome: Progressing  Goal: Maintain or return to baseline ADL function  Description: INTERVENTIONS:  -  Assess patient's ability to carry out ADLs; assess patient's baseline for ADL function and identify physical deficits which impact ability to perform ADLs (bathing, care of mouth/teeth, toileting, grooming, dressing, etc )  - Assess/evaluate cause of self-care deficits   - Assess range of motion  - Assess patient's mobility; develop plan if impaired  - Assess patient's need for assistive devices and provide as appropriate  - Encourage maximum independence but intervene and supervise when necessary  - Involve family in performance of ADLs  - Assess for home care needs following discharge   - Consider OT consult to assist with ADL evaluation and planning for discharge  - Provide patient education as appropriate  Outcome: Progressing  Goal: Maintains/Returns to pre admission functional level  Description: INTERVENTIONS:  - Perform BMAT or MOVE assessment daily    - Set and communicate daily mobility goal to care team and patient/family/caregiver     - Collaborate with rehabilitation services on mobility goals if consulted  - Out of bed for toileting  - Record patient progress and toleration of activity level   Outcome: Progressing     Problem: DISCHARGE PLANNING  Goal: Discharge to home or other facility with appropriate resources  Description: INTERVENTIONS:  - Identify barriers to discharge w/patient and caregiver  - Arrange for needed discharge resources and transportation as appropriate  - Identify discharge learning needs (meds, wound care, etc )  - Arrange for interpretive services to assist at discharge as needed  - Refer to Case Management Department for coordinating discharge planning if the patient needs post-hospital services based on physician/advanced practitioner order or complex needs related to functional status, cognitive ability, or social support system  Outcome: Progressing     Problem: Knowledge Deficit  Goal: Patient/family/caregiver demonstrates understanding of disease process, treatment plan, medications, and discharge instructions  Description: Complete learning assessment and assess knowledge base    Interventions:  - Provide teaching at level of understanding  - Provide teaching via preferred learning methods  Outcome: Progressing     Problem: Prexisting or High Potential for Compromised Skin Integrity  Goal: Skin integrity is maintained or improved  Description: INTERVENTIONS:  - Identify patients at risk for skin breakdown  - Assess and monitor skin integrity  - Assess and monitor nutrition and hydration status  - Monitor labs   - Assess for incontinence   - Turn and reposition patient  - Assist with mobility/ambulation  - Relieve pressure over bony prominences  - Avoid friction and shearing  - Provide appropriate hygiene as needed including keeping skin clean and dry  - Evaluate need for skin moisturizer/barrier cream  - Collaborate with interdisciplinary team   - Patient/family teaching  - Consider wound care consult   Outcome: Progressing     Problem: Potential for Falls  Goal: Patient will remain free of falls  Description: INTERVENTIONS:  - Educate patient/family on patient safety including physical limitations  - Instruct patient to call for assistance with activity   - Consult OT/PT to assist with strengthening/mobility   - Keep Call bell within reach  - Keep bed low and locked with side rails adjusted as appropriate  - Keep care items and personal belongings within reach  - Initiate and maintain comfort rounds  - Make Fall Risk Sign visible to staff  - Apply yellow socks and bracelet for high fall risk patients  - Consider moving patient to room near nurses station  Outcome: Progressing     Problem: MOBILITY - ADULT  Goal: Maintain or return to baseline ADL function  Description: INTERVENTIONS:  -  Assess patient's ability to carry out ADLs; assess patient's baseline for ADL function and identify physical deficits which impact ability to perform ADLs (bathing, care of mouth/teeth, toileting, grooming, dressing, etc )  - Assess/evaluate cause of self-care deficits   - Assess range of motion  - Assess patient's mobility; develop plan if impaired  - Assess patient's need for assistive devices and provide as appropriate  - Encourage maximum independence but intervene and supervise when necessary  - Involve family in performance of ADLs  - Assess for home care needs following discharge   - Consider OT consult to assist with ADL evaluation and planning for discharge  - Provide patient education as appropriate  Outcome: Progressing  Goal: Maintains/Returns to pre admission functional level  Description: INTERVENTIONS:  - Perform BMAT or MOVE assessment daily    - Set and communicate daily mobility goal to care team and patient/family/caregiver  - Collaborate with rehabilitation services on mobility goals if consulted  - Out of bed for toileting  - Record patient progress and toleration of activity level   Outcome: Progressing     Problem: Nutrition/Hydration-ADULT  Goal: Nutrient/Hydration intake appropriate for improving, restoring or maintaining nutritional needs  Description: Monitor and assess patient's nutrition/hydration status for malnutrition  Collaborate with interdisciplinary team and initiate plan and interventions as ordered  Monitor patient's weight and dietary intake as ordered or per policy  Utilize nutrition screening tool and intervene as necessary  Determine patient's food preferences and provide high-protein, high-caloric foods as appropriate       INTERVENTIONS:  - Monitor oral intake, urinary output, labs, and treatment plans  - Assess nutrition and hydration status and recommend course of action  - Evaluate amount of meals eaten  - Assist patient with eating if necessary   - Allow adequate time for meals  - Recommend/ encourage appropriate diets, oral nutritional supplements, and vitamin/mineral supplements  - Order, calculate, and assess calorie counts as needed  - Recommend, monitor, and adjust tube feedings and TPN/PPN based on assessed needs  - Assess need for intravenous fluids  - Provide specific nutrition/hydration education as appropriate  - Include patient/family/caregiver in decisions related to nutrition  Outcome: Progressing

## 2022-09-12 NOTE — PLAN OF CARE
Problem: PAIN - ADULT  Goal: Verbalizes/displays adequate comfort level or baseline comfort level  Description: Interventions:  - Encourage patient to monitor pain and request assistance  - Assess pain using appropriate pain scale  - Administer analgesics based on type and severity of pain and evaluate response  - Implement non-pharmacological measures as appropriate and evaluate response  - Consider cultural and social influences on pain and pain management  - Notify physician/advanced practitioner if interventions unsuccessful or patient reports new pain  9/11/2022 2058 by Amairani Kurtz RN  Outcome: Progressing  9/11/2022 2058 by Amairani Kurtz RN  Outcome: Progressing

## 2022-09-13 LAB
BACTERIA BLD CULT: NORMAL
BACTERIA BLD CULT: NORMAL

## 2022-09-13 PROCEDURE — 87389 HIV-1 AG W/HIV-1&-2 AB AG IA: CPT | Performed by: INTERNAL MEDICINE

## 2022-09-13 PROCEDURE — 99232 SBSQ HOSP IP/OBS MODERATE 35: CPT | Performed by: INTERNAL MEDICINE

## 2022-09-13 PROCEDURE — 94660 CPAP INITIATION&MGMT: CPT

## 2022-09-13 RX ADMIN — Medication 1000 UNITS: at 10:17

## 2022-09-13 RX ADMIN — FLUCONAZOLE 400 MG: 100 TABLET ORAL at 10:16

## 2022-09-13 RX ADMIN — MICONAZOLE NITRATE: 20 CREAM TOPICAL at 10:16

## 2022-09-13 RX ADMIN — OXYCODONE 5 MG: 5 TABLET ORAL at 10:17

## 2022-09-13 RX ADMIN — BUSPIRONE HYDROCHLORIDE 10 MG: 10 TABLET ORAL at 18:24

## 2022-09-13 RX ADMIN — LEVOTHYROXINE SODIUM 25 MCG: 25 TABLET ORAL at 05:38

## 2022-09-13 RX ADMIN — METOPROLOL TARTRATE 75 MG: 50 TABLET, FILM COATED ORAL at 18:24

## 2022-09-13 RX ADMIN — GABAPENTIN 100 MG: 100 CAPSULE ORAL at 21:19

## 2022-09-13 RX ADMIN — BUSPIRONE HYDROCHLORIDE 10 MG: 10 TABLET ORAL at 10:17

## 2022-09-13 RX ADMIN — CLONAZEPAM 1 MG: 1 TABLET ORAL at 10:16

## 2022-09-13 RX ADMIN — PALIPERIDONE 6 MG: 3 TABLET, EXTENDED RELEASE ORAL at 10:18

## 2022-09-13 RX ADMIN — CITALOPRAM HYDROBROMIDE 40 MG: 20 TABLET ORAL at 10:18

## 2022-09-13 RX ADMIN — HEPARIN SODIUM 7500 UNITS: 5000 INJECTION INTRAVENOUS; SUBCUTANEOUS at 13:39

## 2022-09-13 RX ADMIN — GABAPENTIN 100 MG: 100 CAPSULE ORAL at 18:24

## 2022-09-13 RX ADMIN — METOPROLOL TARTRATE 75 MG: 50 TABLET, FILM COATED ORAL at 10:16

## 2022-09-13 RX ADMIN — FUROSEMIDE 40 MG: 40 TABLET ORAL at 10:18

## 2022-09-13 RX ADMIN — PRAVASTATIN SODIUM 80 MG: 80 TABLET ORAL at 18:24

## 2022-09-13 RX ADMIN — MICONAZOLE NITRATE: 20 CREAM TOPICAL at 18:23

## 2022-09-13 RX ADMIN — MIRTAZAPINE 7.5 MG: 15 TABLET, FILM COATED ORAL at 21:19

## 2022-09-13 RX ADMIN — HEPARIN SODIUM 7500 UNITS: 5000 INJECTION INTRAVENOUS; SUBCUTANEOUS at 05:38

## 2022-09-13 RX ADMIN — GABAPENTIN 100 MG: 100 CAPSULE ORAL at 10:17

## 2022-09-13 RX ADMIN — HEPARIN SODIUM 7500 UNITS: 5000 INJECTION INTRAVENOUS; SUBCUTANEOUS at 21:19

## 2022-09-13 RX ADMIN — PANTOPRAZOLE SODIUM 40 MG: 40 TABLET, DELAYED RELEASE ORAL at 05:38

## 2022-09-13 RX ADMIN — CLONAZEPAM 1 MG: 1 TABLET ORAL at 18:24

## 2022-09-13 RX ADMIN — DIVALPROEX SODIUM 1500 MG: 500 TABLET, DELAYED RELEASE ORAL at 21:18

## 2022-09-13 RX ADMIN — OXYCODONE 5 MG: 5 TABLET ORAL at 18:32

## 2022-09-13 RX ADMIN — DIVALPROEX SODIUM 1000 MG: 500 TABLET, DELAYED RELEASE ORAL at 10:17

## 2022-09-13 RX ADMIN — BUSPIRONE HYDROCHLORIDE 10 MG: 10 TABLET ORAL at 21:19

## 2022-09-13 RX ADMIN — CLONAZEPAM 1 MG: 1 TABLET ORAL at 21:19

## 2022-09-13 NOTE — PLAN OF CARE
Problem: PAIN - ADULT  Goal: Verbalizes/displays adequate comfort level or baseline comfort level  Description: Interventions:  - Encourage patient to monitor pain and request assistance  - Assess pain using appropriate pain scale  - Administer analgesics based on type and severity of pain and evaluate response  - Implement non-pharmacological measures as appropriate and evaluate response  - Consider cultural and social influences on pain and pain management  - Notify physician/advanced practitioner if interventions unsuccessful or patient reports new pain  Outcome: Progressing     Problem: INFECTION - ADULT  Goal: Absence or prevention of progression during hospitalization  Description: INTERVENTIONS:  - Assess and monitor for signs and symptoms of infection  - Monitor lab/diagnostic results  - Monitor all insertion sites, i e  indwelling lines, tubes, and drains  - Monitor endotracheal if appropriate and nasal secretions for changes in amount and color  - [place] appropriate cooling/warming therapies per order  - Administer medications as ordered  - Instruct and encourage patient and family to use good hand hygiene technique  - Identify and instruct in appropriate isolation precautions for identified infection/condition  Outcome: Progressing     Problem: SAFETY ADULT  Goal: Patient will remain free of falls  Description: INTERVENTIONS:  - Educate patient/family on patient safety including physical limitations  - Instruct patient to call for assistance with activity   - Consult OT/PT to assist with strengthening/mobility   - Keep Call bell within reach  - Keep bed low and locked with side rails adjusted as appropriate  - Keep care items and personal belongings within reach  - Initiate and maintain comfort rounds  - Make Fall Risk Sign visible to staff  - Apply yellow socks and bracelet for high fall risk patients  - Consider moving patient to room near nurses station  Outcome: Progressing  Goal: Maintain or return to baseline ADL function  Description: INTERVENTIONS:  -  Assess patient's ability to carry out ADLs; assess patient's baseline for ADL function and identify physical deficits which impact ability to perform ADLs (bathing, care of mouth/teeth, toileting, grooming, dressing, etc )  - Assess/evaluate cause of self-care deficits   - Assess range of motion  - Assess patient's mobility; develop plan if impaired  - Assess patient's need for assistive devices and provide as appropriate  - Encourage maximum independence but intervene and supervise when necessary  - Involve family in performance of ADLs  - Assess for home care needs following discharge   - Consider OT consult to assist with ADL evaluation and planning for discharge  - Provide patient education as appropriate  Outcome: Progressing  Goal: Maintains/Returns to pre admission functional level  Description: INTERVENTIONS:  - Perform BMAT or MOVE assessment daily    - Set and communicate daily mobility goal to care team and patient/family/caregiver     - Collaborate with rehabilitation services on mobility goals if consulted  - Out of bed for toileting  - Record patient progress and toleration of activity level   Outcome: Progressing     Problem: DISCHARGE PLANNING  Goal: Discharge to home or other facility with appropriate resources  Description: INTERVENTIONS:  - Identify barriers to discharge w/patient and caregiver  - Arrange for needed discharge resources and transportation as appropriate  - Identify discharge learning needs (meds, wound care, etc )  - Arrange for interpretive services to assist at discharge as needed  - Refer to Case Management Department for coordinating discharge planning if the patient needs post-hospital services based on physician/advanced practitioner order or complex needs related to functional status, cognitive ability, or social support system  Outcome: Progressing     Problem: Knowledge Deficit  Goal: Patient/family/caregiver demonstrates understanding of disease process, treatment plan, medications, and discharge instructions  Description: Complete learning assessment and assess knowledge base    Interventions:  - Provide teaching at level of understanding  - Provide teaching via preferred learning methods  Outcome: Progressing     Problem: Prexisting or High Potential for Compromised Skin Integrity  Goal: Skin integrity is maintained or improved  Description: INTERVENTIONS:  - Identify patients at risk for skin breakdown  - Assess and monitor skin integrity  - Assess and monitor nutrition and hydration status  - Monitor labs   - Assess for incontinence   - Turn and reposition patient  - Assist with mobility/ambulation  - Relieve pressure over bony prominences  - Avoid friction and shearing  - Provide appropriate hygiene as needed including keeping skin clean and dry  - Evaluate need for skin moisturizer/barrier cream  - Collaborate with interdisciplinary team   - Patient/family teaching  - Consider wound care consult   Outcome: Progressing     Problem: Potential for Falls  Goal: Patient will remain free of falls  Description: INTERVENTIONS:  - Educate patient/family on patient safety including physical limitations  - Instruct patient to call for assistance with activity   - Consult OT/PT to assist with strengthening/mobility   - Keep Call bell within reach  - Keep bed low and locked with side rails adjusted as appropriate  - Keep care items and personal belongings within reach  - Initiate and maintain comfort rounds  - Make Fall Risk Sign visible to staff  - Apply yellow socks and bracelet for high fall risk patients  - Consider moving patient to room near nurses station  Outcome: Progressing     Problem: MOBILITY - ADULT  Goal: Maintain or return to baseline ADL function  Description: INTERVENTIONS:  -  Assess patient's ability to carry out ADLs; assess patient's baseline for ADL function and identify physical deficits which impact ability to perform ADLs (bathing, care of mouth/teeth, toileting, grooming, dressing, etc )  - Assess/evaluate cause of self-care deficits   - Assess range of motion  - Assess patient's mobility; develop plan if impaired  - Assess patient's need for assistive devices and provide as appropriate  - Encourage maximum independence but intervene and supervise when necessary  - Involve family in performance of ADLs  - Assess for home care needs following discharge   - Consider OT consult to assist with ADL evaluation and planning for discharge  - Provide patient education as appropriate  Outcome: Progressing  Goal: Maintains/Returns to pre admission functional level  Description: INTERVENTIONS:  - Perform BMAT or MOVE assessment daily    - Set and communicate daily mobility goal to care team and patient/family/caregiver  - Collaborate with rehabilitation services on mobility goals if consulted  - Out of bed for toileting  - Record patient progress and toleration of activity level   Outcome: Progressing     Problem: Nutrition/Hydration-ADULT  Goal: Nutrient/Hydration intake appropriate for improving, restoring or maintaining nutritional needs  Description: Monitor and assess patient's nutrition/hydration status for malnutrition  Collaborate with interdisciplinary team and initiate plan and interventions as ordered  Monitor patient's weight and dietary intake as ordered or per policy  Utilize nutrition screening tool and intervene as necessary  Determine patient's food preferences and provide high-protein, high-caloric foods as appropriate       INTERVENTIONS:  - Monitor oral intake, urinary output, labs, and treatment plans  - Assess nutrition and hydration status and recommend course of action  - Evaluate amount of meals eaten  - Assist patient with eating if necessary   - Allow adequate time for meals  - Recommend/ encourage appropriate diets, oral nutritional supplements, and vitamin/mineral supplements  - Order, calculate, and assess calorie counts as needed  - Recommend, monitor, and adjust tube feedings and TPN/PPN based on assessed needs  - Assess need for intravenous fluids  - Provide specific nutrition/hydration education as appropriate  - Include patient/family/caregiver in decisions related to nutrition  Outcome: Progressing

## 2022-09-13 NOTE — PLAN OF CARE
Problem: PAIN - ADULT  Goal: Verbalizes/displays adequate comfort level or baseline comfort level  Description: Interventions:  - Encourage patient to monitor pain and request assistance  - Assess pain using appropriate pain scale  - Administer analgesics based on type and severity of pain and evaluate response  - Implement non-pharmacological measures as appropriate and evaluate response  - Consider cultural and social influences on pain and pain management  - Notify physician/advanced practitioner if interventions unsuccessful or patient reports new pain  Outcome: Progressing     Problem: INFECTION - ADULT  Goal: Absence or prevention of progression during hospitalization  Description: INTERVENTIONS:  - Assess and monitor for signs and symptoms of infection  - Monitor lab/diagnostic results  - Monitor all insertion sites, i e  indwelling lines, tubes, and drains  - Monitor endotracheal if appropriate and nasal secretions for changes in amount and color  - [place] appropriate cooling/warming therapies per order  - Administer medications as ordered  - Instruct and encourage patient and family to use good hand hygiene technique  - Identify and instruct in appropriate isolation precautions for identified infection/condition  Outcome: Progressing     Problem: SAFETY ADULT  Goal: Patient will remain free of falls  Description: INTERVENTIONS:  - Educate patient/family on patient safety including physical limitations  - Instruct patient to call for assistance with activity   - Consult OT/PT to assist with strengthening/mobility   - Keep Call bell within reach  - Keep bed low and locked with side rails adjusted as appropriate  - Keep care items and personal belongings within reach  - Initiate and maintain comfort rounds  - Make Fall Risk Sign visible to staff  - Apply yellow socks and bracelet for high fall risk patients  - Consider moving patient to room near nurses station  Outcome: Progressing  Goal: Maintain or return to baseline ADL function  Description: INTERVENTIONS:  -  Assess patient's ability to carry out ADLs; assess patient's baseline for ADL function and identify physical deficits which impact ability to perform ADLs (bathing, care of mouth/teeth, toileting, grooming, dressing, etc )  - Assess/evaluate cause of self-care deficits   - Assess range of motion  - Assess patient's mobility; develop plan if impaired  - Assess patient's need for assistive devices and provide as appropriate  - Encourage maximum independence but intervene and supervise when necessary  - Involve family in performance of ADLs  - Assess for home care needs following discharge   - Consider OT consult to assist with ADL evaluation and planning for discharge  - Provide patient education as appropriate  Outcome: Progressing  Goal: Maintains/Returns to pre admission functional level  Description: INTERVENTIONS:  - Perform BMAT or MOVE assessment daily    - Set and communicate daily mobility goal to care team and patient/family/caregiver     - Collaborate with rehabilitation services on mobility goals if consulted  - Out of bed for toileting  - Record patient progress and toleration of activity level   Outcome: Progressing     Problem: DISCHARGE PLANNING  Goal: Discharge to home or other facility with appropriate resources  Description: INTERVENTIONS:  - Identify barriers to discharge w/patient and caregiver  - Arrange for needed discharge resources and transportation as appropriate  - Identify discharge learning needs (meds, wound care, etc )  - Arrange for interpretive services to assist at discharge as needed  - Refer to Case Management Department for coordinating discharge planning if the patient needs post-hospital services based on physician/advanced practitioner order or complex needs related to functional status, cognitive ability, or social support system  Outcome: Progressing     Problem: Knowledge Deficit  Goal: Patient/family/caregiver demonstrates understanding of disease process, treatment plan, medications, and discharge instructions  Description: Complete learning assessment and assess knowledge base    Interventions:  - Provide teaching at level of understanding  - Provide teaching via preferred learning methods  Outcome: Progressing     Problem: Prexisting or High Potential for Compromised Skin Integrity  Goal: Skin integrity is maintained or improved  Description: INTERVENTIONS:  - Identify patients at risk for skin breakdown  - Assess and monitor skin integrity  - Assess and monitor nutrition and hydration status  - Monitor labs   - Assess for incontinence   - Turn and reposition patient  - Assist with mobility/ambulation  - Relieve pressure over bony prominences  - Avoid friction and shearing  - Provide appropriate hygiene as needed including keeping skin clean and dry  - Evaluate need for skin moisturizer/barrier cream  - Collaborate with interdisciplinary team   - Patient/family teaching  - Consider wound care consult   Outcome: Progressing     Problem: Potential for Falls  Goal: Patient will remain free of falls  Description: INTERVENTIONS:  - Educate patient/family on patient safety including physical limitations  - Instruct patient to call for assistance with activity   - Consult OT/PT to assist with strengthening/mobility   - Keep Call bell within reach  - Keep bed low and locked with side rails adjusted as appropriate  - Keep care items and personal belongings within reach  - Initiate and maintain comfort rounds  - Make Fall Risk Sign visible to staff  - Apply yellow socks and bracelet for high fall risk patients  - Consider moving patient to room near nurses station  Outcome: Progressing     Problem: MOBILITY - ADULT  Goal: Maintain or return to baseline ADL function  Description: INTERVENTIONS:  -  Assess patient's ability to carry out ADLs; assess patient's baseline for ADL function and identify physical deficits which impact ability to perform ADLs (bathing, care of mouth/teeth, toileting, grooming, dressing, etc )  - Assess/evaluate cause of self-care deficits   - Assess range of motion  - Assess patient's mobility; develop plan if impaired  - Assess patient's need for assistive devices and provide as appropriate  - Encourage maximum independence but intervene and supervise when necessary  - Involve family in performance of ADLs  - Assess for home care needs following discharge   - Consider OT consult to assist with ADL evaluation and planning for discharge  - Provide patient education as appropriate  Outcome: Progressing  Goal: Maintains/Returns to pre admission functional level  Description: INTERVENTIONS:  - Perform BMAT or MOVE assessment daily    - Set and communicate daily mobility goal to care team and patient/family/caregiver  - Collaborate with rehabilitation services on mobility goals if consulted  - Out of bed for toileting  - Record patient progress and toleration of activity level   Outcome: Progressing     Problem: Nutrition/Hydration-ADULT  Goal: Nutrient/Hydration intake appropriate for improving, restoring or maintaining nutritional needs  Description: Monitor and assess patient's nutrition/hydration status for malnutrition  Collaborate with interdisciplinary team and initiate plan and interventions as ordered  Monitor patient's weight and dietary intake as ordered or per policy  Utilize nutrition screening tool and intervene as necessary  Determine patient's food preferences and provide high-protein, high-caloric foods as appropriate       INTERVENTIONS:  - Monitor oral intake, urinary output, labs, and treatment plans  - Assess nutrition and hydration status and recommend course of action  - Evaluate amount of meals eaten  - Assist patient with eating if necessary   - Allow adequate time for meals  - Recommend/ encourage appropriate diets, oral nutritional supplements, and vitamin/mineral supplements  - Order, calculate, and assess calorie counts as needed  - Recommend, monitor, and adjust tube feedings and TPN/PPN based on assessed needs  - Assess need for intravenous fluids  - Provide specific nutrition/hydration education as appropriate  - Include patient/family/caregiver in decisions related to nutrition  Outcome: Progressing

## 2022-09-13 NOTE — PROGRESS NOTES
Progress Note - Infectious Disease   Marlon Breaux 50 y o  male MRN: [id number]  Unit/Bed#: E5 -01 Encounter: [id number]    Impression/Plan:  1  Extensive body rash  Concern for fungal yeast infection  Suspect sweat has been contributing factor given increased density of rash in skin folds  Patient's hygiene a significant concern  He has previously been on weekly Diflucan but dose not sufficient for this type of rash or body habitus  Given extent of rash will check HIV screen  Patient already ordered for application of antifungal cream to the skin  He is currently receiving oral Fluconazole, 400mg daily, which he should continued to finish 14 days of antifungal treatment  He will need plan for skin care and routine skin evaluation in the outpatient setting    -topical antifungal treatment per SLIM  -would avoid application of topical bacitracin   -continue PO Fluconazole, 400mg daily, through 9/21/2022 for 14-days of antifungal treatment  -monitor CBCD and CMP  -follow up HIV screen  -monitor vitals  -serial skin exams  -continue follow up with wound management team     2  Acute hypoxia  Suspect this is multifactorial in morbidly obese patient who is severely deconditioned  Patient with near syncope while ambulating  Also drowsy and has ALESSANDRA  Patient temporarily required face mask O2 but his O2 saturation is now stable on 4L nasal cannula  Patient's CT PE study was negative for PE and showed cleared lungs  -monitor vitals  -monitor respiratory status  -O2 support per primary service     3  Venous insufficiency  Patient with extensive stasis dermatitis but I suspect his current dense erythema is fungal in origin  Patient would benefit from compression and elevation of the B/L LE  There are small areas of excoriation but patient has no active purulence output at this time suggestive of cellulitis   He has been formally assessed by podiatry and the wound management team   -serial skin exams  -recommend compression and elevation of B/L LE to promote venous return  -continue follow up with podiatry  -continue follow up with wound management      4  Morbid obesity  Patient with history of binge eating disorder  Patient's body habitus is risk factor for rash and wounds  Concern for metabolic syndrome which could further impede the immune system and ability to heal  Recommend weight loss  -recommend weight loss     Patient is stable for discharge from ID standpoint  Above plan was discussed in detail with patient at the bedside  Above plan was discussed in detail with SLIM  Antifungals:  Fluconazole 6  Antifungals 6    Subjective:  Patient reports he's extremely uncomfortable in the chair  Wants help getting repositioned and ideally wants to be back in bed  I did help move his pillow and reposition his legs but he continued to ask to go back to bed  He reports there is pain in his back which feels like knives stabbing him  Also reports his skin is sensitive to touch in areas of the rash on his back and under his arms  Reports his leg rash isn't bothering him as much because he was washed and his skin was care for yesterday  He has no fever, chills, sweats, shakes; no nausea, vomiting, abdominal pain, or diarrhea; no cough, shortness of breath, or chest pain  No new symptoms  Objective:  Vitals:  Temp:  [98 4 °F (36 9 °C)-98 5 °F (36 9 °C)] 98 4 °F (36 9 °C)  HR:  [69-90] 70  Resp:  [18-22] 18  BP: ()/(46-57) 101/50  SpO2:  [89 %-100 %] 98 %  Temp (24hrs), Av 5 °F (36 9 °C), Min:98 4 °F (36 9 °C), Max:98 5 °F (36 9 °C)  Current: Temperature: 98 4 °F (36 9 °C)    Physical Exam:   General Appearance:  Alert, interactive, nontoxic, no acute distress  Anxious  Appears uncomfortable sitting in the chair  Throat: Oropharynx moist without lesions  Lungs:   Clear to auscultation bilaterally; no wheezes, rhonchi or rales; respirations unlabored on room air  Heart:  RRR; no murmur, rub or gallop     Abdomen:   Soft, morbidly obese, non-tender, non-distended, positive bowel sounds  Back: Unable to visualize due to body habitus in chair, rash still appearing to wrap around both sides and under his arms   Extremities: B/L LE dressings intact with overlying ace wraps, no breakthrough drainage  Skin: No new lesions noted on exposed skin  Labs, Imaging, & Other studies:   All pertinent labs and imaging studies were personally reviewed  Results from last 7 days   Lab Units 09/10/22  0538 09/09/22 0537 09/08/22 0449   WBC Thousand/uL 3 77* 3 85* 6 10   HEMOGLOBIN g/dL 10 7* 10 7* 12 5   PLATELETS Thousands/uL 167 175 201     Results from last 7 days   Lab Units 09/12/22 0528 09/10/22  0538 09/09/22  0537 09/08/22  0449   POTASSIUM mmol/L 4 8 4 4 4 0 4 8   CHLORIDE mmol/L 104 107 105 103   CO2 mmol/L 34* 33* 33* 31   BUN mg/dL 17 18 22 22   CREATININE mg/dL 1 35* 1 03 1 36* 1 92*   EGFR ml/min/1 73sq m 61 84 60 39   CALCIUM mg/dL 8 8 8 2* 8 2* 8 2*   AST U/L  --  9 11 9   ALT U/L  --  8* 7* 6*   ALK PHOS U/L  --  50 51 52     Results from last 7 days   Lab Units 09/07/22 2135 09/07/22 2132   BLOOD CULTURE  No Growth After 4 Days  No Growth After 4 Days

## 2022-09-13 NOTE — PROGRESS NOTES
Progress Note - Toño Ramachandran 50 y o  male MRN: [id number]    Unit/Bed#: E5 -01 Encounter: [id number]      Subjective: The patient feels reasonably well  He denies chest pain, shortness of breath, abdominal pain, nausea, or vomiting  He does have some burning in his skin  His appetite is normal   He slept pretty well  Physical Exam:   Temp:  [97 7 °F (36 5 °C)-98 5 °F (36 9 °C)] 97 7 °F (36 5 °C)  HR:  [69-90] 87  Resp:  [18-22] 18  BP: (100-133)/(46-63) 133/50    Gen:  Well-developed, severely obese, in no distress  Neck:  Supple  No lymphadenopathy, goiter, or bruit  Heart:  Regular rhythm  I heard no murmur, gallop, or rub  Lungs:  Clear to auscultation percussion  No wheezing, rales, or rhonchi  Abd:  Soft with active bowel sounds  No mass, tenderness, or organomegaly  Extremities:  No clubbing, cyanosis, or edema  No calf tenderness  Neuro:  Alert and oriented  No focal sign  Skin:  Warm and dry  Extensive erythema is present related to the patient's cutaneous candidiasis  LABS:  No new labs    Assessment/Plan:  1  Ambulatory dysfunction, multifactorial  2  Diffuse fungal dermatitis  3  Morbid obesity  4  Obstructive sleep apnea  5  Chronic kidney disease stage 3 with acute kidney injury on admission, improved  6  Hypothyroidism  7  Bipolar affective disorder  8  Dyslipidemia  9  Venous insufficiency    The patient is on fluconazole for fungal dermatitis  He is receiving physical and occupational therapy  His functional status is borderline for him to return home  Today he said that he would be agreeable to going to acute rehab  Case management is investigating this  If he is not accepted at acute rehab, I will again encourage him to consider subacute rehab but I am not optimistic that he will agree        VTE Pharmacologic Prophylaxis: Heparin  VTE Mechanical Prophylaxis: sequential compression device

## 2022-09-13 NOTE — CASE MANAGEMENT
Case Management Discharge Planning Note    Patient name Donis [place] [address] /E5 MS 18-* MRN [id number]  : 1975 Date 2022       Current Admission Date: 2022  Current Admission Diagnosis:Diffuse dermatitis   Patient Active Problem List    Diagnosis Date Noted    Tinea corporis 2022    Diffuse dermatitis 2022    Mild renal insufficiency 2022    Acute pulmonary insufficiency 2022    Weakness 2022    Cutaneous candidiasis 2022    Rash of body 07/15/2022    GERMANIA (acute kidney injury) ([facility] ) 2022    CORKY (generalized anxiety disorder) 2022    Pain and swelling of lower extremity 2022    Primary hypertension 2022    Dyslipidemia 2022    Crohn's disease ([facility] ) 2022    [facility] ) 2022    Morbid obesity ([facility] ) 2022    Hypothyroidism 2022    Multiple open wounds of lower leg 2022    Peripheral edema 2022    Anxiety 2022    Dietary noncompliance 2021    Ambulatory dysfunction 2021    Venous stasis dermatitis 2021    Venous insufficiency of lower extremity 2021    Financial difficulties 10/26/2020    Bilateral leg edema 2020    Binge eating 95/15/1605    Folic acid deficiency     ALESSANDRA on CPAP 2019    Fatty liver 2019    History of MRSA infection 2019    Pre-diabetes 2019    Peripheral polyneuropathy 2017    Vitamin D deficiency 2015    Gastroesophageal reflux disease without esophagitis 2015    Crohn's disease of large intestine without complication ([facility] )     Cocaine dependence in remission ([facility] ) 2011      LOS (days): 6  Geometric Mean LOS (GMLOS) (days): 4 80  Days to GMLOS:-0 6     OBJECTIVE:  Risk of Unplanned Readmission Score: 40 7         Current admission status: Inpatient   Preferred Pharmacy:     [facility] [facility]  Phone: [phone] Fax: [phone]    Primary Care Provider: Asher Patel DO    Primary Insurance: MEDICARE  Secondary Insurance:     DISCHARGE DETAILS:     CM spoke with patient regarding Acute rehab vs  Skilled nursing facility  Patient is agreeable for CM to put in referrals for Acute Rehabs via [facility]- blanket referrals made  Patient believes he would benefit better from an Acute Rehab vs  SNF  He still prefers home health if he is strong enough to care for himself at home  CM will continue to follow patient for discharge needs  UPDATE- CM spoke with patient regarding d/c dispo  [facility] will not accept patient at this time  Patient is now agreeable to SNF  Referrals are in Aidin and awaiting an accepting facility

## 2022-09-13 NOTE — ARC ADMISSION
Received referral on patient for possible ARC placement  Upon review of patients case with [facility] physician, patient deemed not ARC appropriate at this time  Slower paced therapies recommended  CM made aware    Thank you, [address] Admissions

## 2022-09-14 LAB
GLUCOSE SERPL-MCNC: 130 MG/DL (ref 65–140)
HIV 1+2 AB+HIV1 P24 AG SERPL QL IA: NORMAL

## 2022-09-14 PROCEDURE — 97530 THERAPEUTIC ACTIVITIES: CPT

## 2022-09-14 PROCEDURE — 94660 CPAP INITIATION&MGMT: CPT

## 2022-09-14 PROCEDURE — 99232 SBSQ HOSP IP/OBS MODERATE 35: CPT | Performed by: INTERNAL MEDICINE

## 2022-09-14 PROCEDURE — 97116 GAIT TRAINING THERAPY: CPT

## 2022-09-14 PROCEDURE — 97535 SELF CARE MNGMENT TRAINING: CPT

## 2022-09-14 PROCEDURE — 82948 REAGENT STRIP/BLOOD GLUCOSE: CPT

## 2022-09-14 RX ORDER — CLOTRIMAZOLE AND BETAMETHASONE DIPROPIONATE 10; .64 MG/G; MG/G
CREAM TOPICAL 2 TIMES DAILY
Status: DISCONTINUED | OUTPATIENT
Start: 2022-09-14 | End: 2022-09-15 | Stop reason: HOSPADM

## 2022-09-14 RX ADMIN — FUROSEMIDE 40 MG: 40 TABLET ORAL at 09:19

## 2022-09-14 RX ADMIN — OXYCODONE 5 MG: 5 TABLET ORAL at 16:18

## 2022-09-14 RX ADMIN — GABAPENTIN 100 MG: 100 CAPSULE ORAL at 09:20

## 2022-09-14 RX ADMIN — MICONAZOLE NITRATE: 20 CREAM TOPICAL at 09:19

## 2022-09-14 RX ADMIN — FLUCONAZOLE 400 MG: 100 TABLET ORAL at 09:19

## 2022-09-14 RX ADMIN — HEPARIN SODIUM 7500 UNITS: 5000 INJECTION INTRAVENOUS; SUBCUTANEOUS at 13:37

## 2022-09-14 RX ADMIN — Medication 1000 UNITS: at 09:19

## 2022-09-14 RX ADMIN — OXYCODONE 5 MG: 5 TABLET ORAL at 09:25

## 2022-09-14 RX ADMIN — DIVALPROEX SODIUM 1000 MG: 500 TABLET, DELAYED RELEASE ORAL at 09:19

## 2022-09-14 RX ADMIN — CLONAZEPAM 1 MG: 1 TABLET ORAL at 21:25

## 2022-09-14 RX ADMIN — LEVOTHYROXINE SODIUM 25 MCG: 25 TABLET ORAL at 06:01

## 2022-09-14 RX ADMIN — GABAPENTIN 100 MG: 100 CAPSULE ORAL at 21:25

## 2022-09-14 RX ADMIN — PANTOPRAZOLE SODIUM 40 MG: 40 TABLET, DELAYED RELEASE ORAL at 06:01

## 2022-09-14 RX ADMIN — BUSPIRONE HYDROCHLORIDE 10 MG: 10 TABLET ORAL at 16:10

## 2022-09-14 RX ADMIN — BUSPIRONE HYDROCHLORIDE 10 MG: 10 TABLET ORAL at 09:20

## 2022-09-14 RX ADMIN — CLOTRIMAZOLE AND BETAMETHASONE DIPROPIONATE: 10; .64 CREAM TOPICAL at 13:37

## 2022-09-14 RX ADMIN — CLONAZEPAM 1 MG: 1 TABLET ORAL at 16:10

## 2022-09-14 RX ADMIN — MIRTAZAPINE 7.5 MG: 15 TABLET, FILM COATED ORAL at 21:25

## 2022-09-14 RX ADMIN — HEPARIN SODIUM 7500 UNITS: 5000 INJECTION INTRAVENOUS; SUBCUTANEOUS at 06:01

## 2022-09-14 RX ADMIN — CLONAZEPAM 1 MG: 1 TABLET ORAL at 09:19

## 2022-09-14 RX ADMIN — PRAVASTATIN SODIUM 80 MG: 80 TABLET ORAL at 16:10

## 2022-09-14 RX ADMIN — BUSPIRONE HYDROCHLORIDE 10 MG: 10 TABLET ORAL at 21:25

## 2022-09-14 RX ADMIN — DIVALPROEX SODIUM 1500 MG: 500 TABLET, DELAYED RELEASE ORAL at 21:25

## 2022-09-14 RX ADMIN — HEPARIN SODIUM 7500 UNITS: 5000 INJECTION INTRAVENOUS; SUBCUTANEOUS at 21:25

## 2022-09-14 RX ADMIN — GABAPENTIN 100 MG: 100 CAPSULE ORAL at 16:10

## 2022-09-14 RX ADMIN — MICONAZOLE NITRATE: 20 CREAM TOPICAL at 17:48

## 2022-09-14 RX ADMIN — CITALOPRAM HYDROBROMIDE 40 MG: 20 TABLET ORAL at 09:20

## 2022-09-14 RX ADMIN — PALIPERIDONE 6 MG: 3 TABLET, EXTENDED RELEASE ORAL at 09:19

## 2022-09-14 NOTE — OCCUPATIONAL THERAPY NOTE
Occupational Therapy Progress Note     Patient Name: Juany MORLEYIMAPEGGY Date: 9/14/2022  Problem List  Principal Problem:    Diffuse dermatitis  Active Problems:    Bipolar disorder ([facility] )    Morbid obesity ([facility] )    Hypothyroidism    Ambulatory dysfunction    Crohn's disease of large intestine without complication (HCC)    CORKY (generalized anxiety disorder)    Gastroesophageal reflux disease without esophagitis    ALESSANDRA on CPAP    Pre-diabetes    Venous insufficiency of lower extremity    Mild renal insufficiency    Acute pulmonary insufficiency    Tinea corporis       09/14/22 1334   OT Last Visit   OT Visit Date 09/14/22   Note Type   Note Type Treatment   Restrictions/Precautions   Weight Bearing Precautions Per Order Yes   RLE Weight Bearing Per Order WBAT   LLE Weight Bearing Per Order WBAT   Other Precautions O2;Chair Alarm; Bed Alarm; Fall Risk   Pain Assessment   Pain Assessment Tool 0-10   Pain Score No Pain   ADL   Where Assessed Edge of bed   UB Dressing Assistance 4  Minimal Assistance   UB Dressing Deficit Thread RUE; Thread LUE;Verbal cueing;Supervision/safety; Increased time to complete   LB Dressing Assistance 1  Total Assistance   LB Dressing Deficit Don/doff R shoe;Don/doff L shoe   Toileting Assistance  1  Total Assistance   Toileting Deficit Clothing management up;Clothing management down;Perineal hygiene   Bed Mobility   Supine to Sit 5  Supervision   Additional items Bedrails; Increased time required;Verbal cues   Sit to Supine 3  Moderate assistance   Additional items Assist x 2; Increased time required;Verbal cues;LE management   Transfers   Sit to Stand   (From EOB: supervision, From recliner: modAx2)   Additional items Assist x 2;Armrests; Bedrails; Increased time required;Verbal cues   Stand to Sit 5  Supervision   Additional items Assist x 2; Increased time required;Verbal cues  (Second assist to position chair)   Toilet transfer 5  Supervision   Additional items Assist x 1; Increased time required;Verbal cues;Standard toilet  (Grab bars)   Additional Comments VCs for B UE/LE placement and to scoot out in chair   Functional Mobility   Functional Mobility 4  Minimal assistance   Additional Comments x1 with chair follow, BFWW   Cognition   Overall Cognitive Status Impaired   Arousal/Participation Alert; Responsive; Cooperative   Attention Attends with cues to redirect   Orientation Level Oriented X4   Memory Within functional limits   Following Commands Follows one step commands without difficulty   Activity Tolerance   Activity Tolerance Treatment limited secondary to medical complications (Comment)  (HR)   Medical Staff Made Aware Nrsg, PT   Assessment   Assessment Patient participated in skilled OT session this date with interventions consisting of therapeutic activities and self-care/ADL training to increase functional endurance/activity tolerance, static/dynamic sitting/standing balance, and overall safety awareness to increase (I) with ADLs/IADLs to return to PLOF  Provided education on energy conservation techniques, deep breathing techniques, fall prevention strategies, and overall safety awareness  Patient agreeable to OT treatment session, upon arrival patient was found supine in bed   BPM, O2 on 4L at 98%  Supine>sit with supervision and VCs for use of bedrail  Pt completes STS from bed with S, requests to use bathroom to urinate - amb to bathroom with BFWW and CGA, attempted to urinate while standing but pt unable to go at this time  Returns to EOB with CGA ([id number])  Agreeable to longer stretch of mobility with BFWW x40 ft CGAx1 with chair follow, pt requiring seated rest break due to fatigue  Requests to return to room to have BM  Wheeled chair to door and pt STS from recliner with modAx2 and VCs to scoot forward and for BUE placement, to bathroom 12ft with CGA, toilet transfer with S and use of grab bar   During BM, pt HR increases from 152 to 172 BPM, educated pt not to push and to try and relax to decrease HR  Amb back to EOB with CGA, HR continues to be in 170s and decreases to 150s when sitting EOB  Cued pt on breathing/relaxation technique, decreases to 130s  Pt admits to being nervous with mobility  Throughout mobility, pt on RA and saturating between %  Pt returns to supine with modAx2 for LB management  Pt left supine in bed,  BPM, O2 100% on 4 L  Patient requiring verbal cues for safety and verbal cues for pacing through activity steps  Patient continues to be functioning below baseline level, occupational performance remains limited secondary to factors listed above and increased risk for falls and injury  The patient's raw score on the AM-PAC Daily Activity inpatient short form is 15, standardized score is 34 69, less than 39 4  Patients at this level are likely to benefit from discharge to post-acute rehabilitation services  Please refer to the recommendation of the Occupational Therapist for safe discharge planning  Plan   Treatment Interventions ADL retraining;Functional transfer training;UE strengthening/ROM; Endurance training;Cognitive reorientation;Patient/family training;Equipment evaluation/education; Neuromuscular reeducation; Compensatory technique education;Continued evaluation; Energy conservation; Activityengagement   Goal Expiration Date 09/22/22   OT Treatment Day 3   OT Frequency 3-5x/wk   Recommendation   OT Discharge Recommendation Post acute rehabilitation services   [facility] Daily Activity Inpatient   Lower Body Dressing 2   Bathing 2   Toileting 2   Upper Body Dressing 2   Grooming 3   Eating 4   Daily Activity Raw Score 15   Daily Activity Standardized Score (Calc for Raw Score >=11) 34 69   AM-PAC Applied Cognition Inpatient   Following a Speech/Presentation 4   Understanding Ordinary Conversation 4   Taking Medications 3   Remembering Where Things Are Placed or Put Away 3   Remembering List of 4-5 Errands 3   Taking Care of Complicated Tasks 3 Applied Cognition Raw Score 20   Applied Cognition Standardized Score 41 76     Travon Oates, OT

## 2022-09-14 NOTE — PLAN OF CARE
Problem: INFECTION - ADULT  Goal: Absence or prevention of progression during hospitalization  Description: INTERVENTIONS:  - Assess and monitor for signs and symptoms of infection  - Monitor lab/diagnostic results  - Monitor all insertion sites, i e  indwelling lines, tubes, and drains  - Monitor endotracheal if appropriate and nasal secretions for changes in amount and color  - [place] appropriate cooling/warming therapies per order  - Administer medications as ordered  - Instruct and encourage patient and family to use good hand hygiene technique  - Identify and instruct in appropriate isolation precautions for identified infection/condition  Outcome: Progressing

## 2022-09-14 NOTE — CASE MANAGEMENT
Case Management Discharge Planning Note    Patient name Richard Bonilla  Location [place] 5 /E5 MS 18-* MRN [id number]  : 1975 Date 2022       Current Admission Date: 2022  Current Admission Diagnosis:Diffuse dermatitis   Patient Active Problem List    Diagnosis Date Noted    Tinea corporis 2022    Diffuse dermatitis 2022    Mild renal insufficiency 2022    Acute pulmonary insufficiency 2022    Weakness 2022    Cutaneous candidiasis 2022    Rash of body 07/15/2022    [facility] ) 2022    CORKY (generalized anxiety disorder) 2022    Pain and swelling of lower extremity 2022    Primary hypertension 2022    Dyslipidemia 2022    [facility]'s disease [facility] ) 2022    Bipolar disorder ([facility] ) 2022    Morbid obesity ([facility] ) 2022    Hypothyroidism 2022    Multiple open wounds of lower leg 2022    Peripheral edema 2022    Anxiety 2022    Dietary noncompliance 2021    Ambulatory dysfunction 2021    Venous stasis dermatitis 2021    Venous insufficiency of lower extremity 2021    Financial difficulties 10/26/2020    Bilateral leg edema 2020    Binge eating     Folic acid deficiency     ALESSANDRA on CPAP 2019    Fatty liver 2019    History of MRSA infection 2019    Pre-diabetes 2019    Peripheral polyneuropathy 2017    Vitamin D deficiency 2015    Gastroesophageal reflux disease without esophagitis 2015    Crohn's disease of large intestine without complication ([facility] )     Cocaine dependence in remission ([facility] ) 2011      LOS (days): 7  Geometric Mean LOS (GMLOS) (days): 3 10  Days to GMLOS:-3 5     OBJECTIVE:  Risk of Unplanned Readmission Score: 41 19         Current admission status: Inpatient   Preferred Pharmacy:   [address] [facility]  Phone: [phone] Fax: [phone]    Primary Care Provider: Jr Heller DO    Primary Insurance: MEDICARE  Secondary Insurance:     DISCHARGE DETAILS:    Discharge planning discussed with[de-identified] patient     Treatment Team Recommendation: Short Term Rehab  Discharge Destination Plan[de-identified] Short Term Rehab ([facility])  Transport at Discharge : BLS Ambulance ([facility])           ETA of Transport (Date): 09/15/22, time: pending        Transfer Mode: Stretcher        IMM Given (Date):: 09/14/22  IMM Given to[de-identified] Patient       IMM reviewed with patient, patient agrees with discharge determination          Accepting Facility Name, [address] : [facility]  Receiving Facility/Agency Phone Number: [phone] (Please ask for sub-acute nurses station)  Facility/Agency Fax Number: [phone]

## 2022-09-14 NOTE — PROGRESS NOTES
Progress Note - Infectious Disease   Orbecky Hauser 50 y o  male MRN: [id number]  Unit/Bed#: E5 -01 Encounter: [id number]    Impression/Plan:  1  Extensive body rash  Concern for fungal yeast infection  Suspect sweat has been contributing factor given increased density of rash in skin folds  Patient's hygiene a significant concern  He has previously been on weekly Diflucan but dose not sufficient for this type of rash or body habitus  Given extent of rash, HIV screen is pending  Patient already ordered for application of antifungal cream to the skin  He is currently receiving oral Fluconazole, 400mg daily, which he should continued to finish 14 days of antifungal treatment  He will need plan for skin care and routine skin evaluation in the outpatient setting    -topical antifungal treatment per SLIM  -would avoid application of topical bacitracin   -continue PO Fluconazole, 400mg daily, through 9/21/2022 for 14-days of antifungal treatment  -monitor CBCD and CMP  -follow up HIV screen  -monitor vitals  -serial skin exams  -continue follow up with wound management team     2  Acute hypoxia  Suspect this is multifactorial in morbidly obese patient who is severely deconditioned  Patient with near syncope while ambulating  Also drowsy and has ALESSANDRA  Patient temporarily required face mask O2 but his O2 saturation is now stable on 4L nasal cannula  Patient's CT PE study was negative for PE and showed cleared lungs  -monitor vitals  -monitor respiratory status  -O2 support per primary service     3  Venous insufficiency  Patient with extensive stasis dermatitis but I suspect his current dense erythema is fungal in origin  Patient would benefit from compression and elevation of the B/L LE  There are small areas of excoriation but patient has no active purulence output at this time suggestive of cellulitis   He has been formally assessed by podiatry and the wound management team   -serial skin exams  -recommend compression and elevation of B/L LE to promote venous return  -continue follow up with podiatry  -continue follow up with wound management      4  Morbid obesity  Patient with history of binge eating disorder  Patient's body habitus is risk factor for rash and wounds  Concern for metabolic syndrome which could further impede the immune system and ability to heal  Recommend weight loss  -recommend weight loss    Above plan was discussed in detail with patient at the bedside  Above plan was discussed in detail with SLIM  Antibiotics:  Fluconazole 7  Antifungals 7    Subjective:  Patient reports he's in pain again today  Reports his legs/knees/back continue to bother him a lot  He also reports the skin on his back remains sensitive to touch  He has no fever, chills, sweats, shakes; no nausea, vomiting, abdominal pain; no cough, shortness of breath, or chest pain  No new symptoms  Objective:  Vitals:  Temp:  [97 7 °F (36 5 °C)-98 4 °F (36 9 °C)] 98 4 °F (36 9 °C)  HR:  [74-87] 74  Resp:  [18-22] 22  BP: ()/(44-63) 108/54  SpO2:  [88 %-98 %] 97 %  Temp (24hrs), Av 1 °F (36 7 °C), Min:97 7 °F (36 5 °C), Max:98 4 °F (36 9 °C)  Current: Temperature: 98 4 °F (36 9 °C)    Physical Exam:   General Appearance:  Alert, interactive, nontoxic, no acute distress  He appears comfortable sitting up in bed  Anxious  Throat: Oropharynx moist without lesions  Lungs:   Clear to auscultation bilaterally; no wheezes, rhonchi or rales; respirations unlabored on room air  Heart:  RRR; no murmur, rub or gallop  Abdomen:   Soft, obese, non-tender, non-distended, positive bowel sounds  Skin: No new lesions or draining wounds noted on exposed skin  Flat erythematous rash to back, skin folds, pannus, and extremities remains unchanged, slightly less dense, no weeping, flaky in some areas       Labs, Imaging, & Other studies:   All pertinent labs and imaging studies were personally reviewed  Results from last 7 days Lab Units 09/10/22  0538 09/09/22  0537 09/08/22  0449   WBC Thousand/uL 3 77* 3 85* 6 10   HEMOGLOBIN g/dL 10 7* 10 7* 12 5   PLATELETS Thousands/uL 167 175 201     Results from last 7 days   Lab Units 09/12/22  0528 09/10/22  0538 09/09/22  0537 09/08/22  0449   POTASSIUM mmol/L 4 8 4 4 4 0 4 8   CHLORIDE mmol/L 104 107 105 103   CO2 mmol/L 34* 33* 33* 31   BUN mg/dL 17 18 22 22   CREATININE mg/dL 1 35* 1 03 1 36* 1 92*   EGFR ml/min/1 73sq m 61 84 60 39   CALCIUM mg/dL 8 8 8 2* 8 2* 8 2*   AST U/L  --  9 11 9   ALT U/L  --  8* 7* 6*   ALK PHOS U/L  --  50 51 52     Results from last 7 days   Lab Units 09/07/22  2135 09/07/22  2132   BLOOD CULTURE  No Growth After 5 Days  No Growth After 5 Days       Results from last 7 days   Lab Units 09/09/22  0537 09/07/22  1950   PROCALCITONIN ng/ml 0 56* 0 17

## 2022-09-14 NOTE — PROGRESS NOTES
[facility] Podiatry - Progress Note  Patient: Felicita Loera 50 y o  male   MRN: [id number]  PCP: Brian Mercer DO  Unit/Bed#: E5 -01 Encounter: [id number]  Date Of Visit: 22  Hospital Stay Days: 7    ASSESSMENT:    Felicita Loera is a 50 y o  male with:      1  B/l lower extremity venous stasis dermatitis with possible superimposed tinea corporis with skin abrasions  2  Lymphedema     PLAN:     · Minor improvement from baseline: Will add lotrisone  · Recommend dermatology consultation inpatient vs outpatient  · Wound care instructions placed  · Rest of care per primary team      Weightbearing status: Weightbearing as tolerated      SUBJECTIVE:     The patient was seen, evaluated, and assessed at bedside today  The patient was awake, alert, and in no acute distress  No acute events overnight  The patient reports feeling okay  Patient denies N/V/F/chills/SOB/CP  OBJECTIVE:     Vitals:   BP (!) 99/46   Pulse 74   Temp 97 6 °F (36 4 °C)   Resp 18   SpO2 96%     Temp (24hrs), Av °F (36 7 °C), Min:97 6 °F (36 4 °C), Max:98 4 °F (36 9 °C)      Physical Exam :     General:  Alert, cooperative, and in no distress  Lower extremity exam:  Neurologic, Vascular, musculoskeletal status at baseline    Dermatologic: dry scaly atrophic skin from foot to groin  There are some areas exposed dermis bilaterally      Clinical Images 22: Additional Data:     Labs:    Results from last 7 days   Lab Units 09/10/22  0538 22  0537 22  0449   WBC Thousand/uL 3 77*   < > 6 10   HEMOGLOBIN g/dL 10 7*   < > 12 5   HEMATOCRIT % 34 0*   < > 38 1   PLATELETS Thousands/uL 167   < > 201   NEUTROS PCT %  --   --  48   LYMPHS PCT %  --   --  20   MONOS PCT %  --   --  30*   EOS PCT %  --   --  0    < > = values in this interval not displayed       Results from last 7 days   Lab Units 22  0528 09/10/22  0538   POTASSIUM mmol/L 4 8 4 4   CHLORIDE mmol/L 104 107   CO2 mmol/L 34* 33*   BUN mg/dL 17 18   CREATININE mg/dL 1 35* 1 03   CALCIUM mg/dL 8 8 8 2*   ALK PHOS U/L  --  50   ALT U/L  --  8*   AST U/L  --  9           * I Have Reviewed All Lab Data Listed Above  Recent Cultures (last 7 days):     Results from last 7 days   Lab Units 09/07/22 2135 09/07/22 2132   BLOOD CULTURE  No Growth After 5 Days  No Growth After 5 Days  Imaging: I have personally reviewed pertinent films in PACS  XR chest 1 view portable    Result Date: 9/8/2022  Impression: No acute cardiopulmonary disease  Workstation performed: [id number]     CTA ED chest PE study    Result Date: 9/7/2022  Impression: No evidence of pulmonary embolus  No evidence of acute intrathoracic pathology Workstation performed: [id number]     EKG, Pathology, and Other Studies: I have personally reviewed pertinent reports      Active medications:  Current Facility-Administered Medications   Medication Dose Route Frequency    acetaminophen (TYLENOL) tablet 975 mg  975 mg Oral Q6H PRN    aluminum-magnesium hydroxide-simethicone (MYLANTA) oral suspension 30 mL  30 mL Oral Q6H PRN    bisacodyl (DULCOLAX) EC tablet 10 mg  10 mg Oral Daily PRN    busPIRone (BUSPAR) tablet 10 mg  10 mg Oral TID    cholecalciferol (VITAMIN D3) tablet 1,000 Units  1,000 Units Oral Daily    citalopram (CeleXA) tablet 40 mg  40 mg Oral Daily    clonazePAM (KlonoPIN) tablet 1 mg  1 mg Oral TID    clotrimazole-betamethasone (LOTRISONE) 1-0 05 % cream   Topical BID    diphenhydrAMINE (BENADRYL) tablet 50 mg  50 mg Oral Q6H PRN    divalproex sodium (DEPAKOTE) EC tablet 1,000 mg  1,000 mg Oral Daily    divalproex sodium (DEPAKOTE) EC tablet 1,500 mg  1,500 mg Oral HS    fluconazole (DIFLUCAN) tablet 400 mg  400 mg Oral Daily    furosemide (LASIX) tablet 40 mg  40 mg Oral Daily    gabapentin (NEURONTIN) capsule 100 mg  100 mg Oral TID    heparin (porcine) subcutaneous injection 7,500 Units  7,500 Units Subcutaneous Q8H [address]    levothyroxine tablet 25 mcg  25 mcg Oral Early Morning    metoprolol tartrate (LOPRESSOR) tablet 75 mg  75 mg Oral BID    miconazole 2 % cream   Topical BID    mirtazapine (REMERON) tablet 7 5 mg  7 5 mg Oral HS    oxyCODONE (ROXICODONE) IR tablet 5 mg  5 mg Oral Q4H PRN    paliperidone (INVEGA) 24 hr tablet 6 mg  6 mg Oral Daily    pantoprazole (PROTONIX) EC tablet 40 mg  40 mg Oral Early Morning    pravastatin (PRAVACHOL) tablet 80 mg  80 mg Oral Daily With Dinner         ** Please Note: Portions of the record may have been created with voice recognition software  Occasional wrong word or "sound a like" substitutions may have occurred due to the inherent limitations of voice recognition software  Read the chart carefully and recognize, using context, where substitutions have occurred   **

## 2022-09-14 NOTE — PLAN OF CARE
Problem: OCCUPATIONAL THERAPY ADULT  Goal: Performs self-care activities at highest level of function for planned discharge setting  See evaluation for individualized goals  Description: Treatment Interventions: ADL retraining, Functional transfer training, UE strengthening/ROM, Endurance training, Cognitive reorientation, Patient/family training, Equipment evaluation/education, Neuromuscular reeducation, Compensatory technique education, Continued evaluation, Energy conservation, Activityengagement     See flowsheet documentation for full assessment, interventions and recommendations  Outcome: Progressing  Note: Limitation: Decreased ADL status, Decreased UE ROM, Decreased UE strength, Decreased Safe judgement during ADL, Decreased cognition, Decreased endurance, Decreased sensation, Decreased self-care trans, Decreased high-level ADLs  Prognosis: Fair  Assessment: Patient participated in skilled OT session this date with interventions consisting of therapeutic activities and self-care/ADL training to increase functional endurance/activity tolerance, static/dynamic sitting/standing balance, and overall safety awareness to increase (I) with ADLs/IADLs to return to PLOF  Provided education on energy conservation techniques, deep breathing techniques, fall prevention strategies, and overall safety awareness  Patient agreeable to OT treatment session, upon arrival patient was found supine in bed   BPM, O2 on 4L at 98%  Supine>sit with supervision and VCs for use of bedrail  Pt completes STS from bed with S, requests to use bathroom to urinate - amb to bathroom with BFWW and CGA, attempted to urinate while standing but pt unable to go at this time  Returns to EOB with CGA ([id number])  Agreeable to longer stretch of mobility with BFWW x40 ft CGAx1 with chair follow, pt requiring seated rest break due to fatigue  Requests to return to room to have BM   Wheeled chair to door and pt STS from recliner with modAx2 and VCs to scoot forward and for BUE placement, to bathroom 12ft with CGA, toilet transfer with S and use of grab bar  During BM, pt HR increases from 152 to 172 BPM, educated pt not to push and to try and relax to decrease HR  Amb back to EOB with CGA, HR continues to be in 170s and decreases to 150s when sitting EOB  Cued pt on breathing/relaxation technique, decreases to 130s  Pt admits to being nervous with mobility  Throughout mobility, pt on RA and saturating between %  Pt returns to supine with modAx2 for LB management  Pt left supine in bed,  BPM, O2 100% on 4 L  Patient requiring verbal cues for safety and verbal cues for pacing through activity steps  Patient continues to be functioning below baseline level, occupational performance remains limited secondary to factors listed above and increased risk for falls and injury  The patient's raw score on the AM-PAC Daily Activity inpatient short form is 15, standardized score is 34 69, less than 39 4  Patients at this level are likely to benefit from discharge to post-acute rehabilitation services  Please refer to the recommendation of the Occupational Therapist for safe discharge planning       OT Discharge Recommendation: Post acute rehabilitation services

## 2022-09-14 NOTE — CASE MANAGEMENT
Case Management Discharge Planning Note    Patient name Toño Ramachandran  [place] [address] /E5 MS 18-* MRN [id number]  : 1975 Date 2022       Current Admission Date: 2022  Current Admission Diagnosis:Diffuse dermatitis   Patient Active Problem List    Diagnosis Date Noted    Tinea corporis 2022    Diffuse dermatitis 2022    Mild renal insufficiency 2022    Acute pulmonary insufficiency 2022    Weakness 2022    Cutaneous candidiasis 2022    Rash of body 07/15/2022    [facility] 2022    CORKY (generalized anxiety disorder) 2022    Pain and swelling of lower extremity 2022    Primary hypertension 2022    Dyslipidemia 2022    Crohn's disease ([facility] ) 2022    [facility]ipolar disorder ([facility] ) 2022    Morbid obesity ([facility] ) 2022    Hypothyroidism 2022    Multiple open wounds of lower leg 2022    Peripheral edema 2022    Anxiety 2022    Dietary noncompliance 2021    Ambulatory dysfunction 2021    Venous stasis dermatitis 2021    Venous insufficiency of lower extremity 2021    Financial difficulties 10/26/2020    Bilateral leg edema 2020    Binge eating     Folic acid deficiency     ALESSANDRA on CPAP 2019    Fatty liver 2019    History of MRSA infection 2019    Pre-diabetes 2019    Peripheral polyneuropathy 2017    Vitamin D deficiency 2015    Gastroesophageal reflux disease without esophagitis 2015    [facility]'s disease of large intestine without complication [facility] )     Cocaine dependence in remission ([facility] ) 2011      LOS (days): 7  Geometric Mean LOS (GMLOS) (days): 3 10  Days to GMLOS:-3 4     OBJECTIVE:  Risk of Unplanned Readmission Score: 41 12         Current admission status: Inpatient   Preferred Pharmacy:     [place]  [place] - [address]  Phone: [phone] Fax: [phone]    Primary Care Provider: Te Foster DO    Primary Insurance: MEDICARE  Secondary Insurance:     DISCHARGE DETAILS:     Per MD rounds, patient is medically stable for discharge  No accepting facilities at this time  CM has extended search to 60 miles in [address] [place] [address]  IF patient chooses to go home with home health  [facility] in [address] [place] [address]  CM will continue to follow patient

## 2022-09-14 NOTE — PROGRESS NOTES
Progress Note - Felicita Loera 50 y o  male MRN: [id number]    Unit/Bed#: E5 -01 Encounter: [id number]      Subjective: The patient is feeling reasonably well  He slept better  He denies any chest pain, shortness of breath, abdominal pain, nausea, or vomiting  Physical Exam:   Temp:  [97 6 °F (36 4 °C)-98 4 °F (36 9 °C)] 97 9 °F (36 6 °C)  HR:  [74-92] 92  Resp:  [18-22] 18  BP: ()/(46-54) 109/49    Gen:  Well-developed, severely obese, in no acute distress  Neck:  Supple  No lymphadenopathy, goiter, or bruit  Heart:  Regular rhythm  I heard no murmur, gallop, or rub  Lungs:  Clear to auscultation percussion  No wheezing, rales, or rhonchi  Abd:  Soft with active bowel sounds  No mass, tenderness, or organomegaly  Extremities:  No clubbing, cyanosis, or edema  No calf tenderness  Neuro:  Alert and oriented  No focal sign  Skin:  Extensive erythema persists  LABS:  No new labs  Assessment/Plan:  1  Ambulatory dysfunction, multifactorial  2  Extensive fungal dermatitis  3  Morbid obesity  4  Obstructive sleep apnea  5  Chronic kidney disease stage 3 with acute kidney injury on admission, now improved  6  Hypothyroidism  7  Bipolar affective disorder  8  Dyslipidemia  9  Venous insufficiency    The patient remains on fluconazole for fungal dermatitis  He is receiving local care  He will remain on fluconazole for a total of 2 weeks  His ambulatory dysfunction is multifactorial but a significant factor is severe obesity  He has agreed to rehab  Arrangements have been put in place for tomorrow morning  VTE Pharmacologic Prophylaxis: Heparin  VTE Mechanical Prophylaxis:  None secondary to bilateral leg lesions

## 2022-09-14 NOTE — PLAN OF CARE
Problem: PHYSICAL THERAPY ADULT  Goal: Performs mobility at highest level of function for planned discharge setting  See evaluation for individualized goals  Description: Treatment/Interventions: Functional transfer training, LE strengthening/ROM, Endurance training, Cognitive reorientation, Patient/family training, Equipment eval/education, Bed mobility, Gait training, Compensatory technique education, Spoke to nursing, OT          See flowsheet documentation for full assessment, interventions and recommendations  Outcome: Progressing  Note: Prognosis: Fair  Problem List: Decreased endurance, Decreased range of motion, Impaired balance, Decreased mobility, Obesity, Decreased skin integrity, Pain, Decreased strength  Assessment: pt  noted to have tremors to which patient reported hes cold  Possible anxiety  Noted HR elevated to 172 bpm with ambulation  Pt  performed transfers with S and use of bed rail  However STS transfer from recliner pt  needed Mod A x 2 and cues for technique  Pt  noted to be at 99% post ambulation on RA however elevated HR with 172 bpm being the highest  once pt  seated and resting HR decreased to the 90s  Pt  needed total A for pericare  Pt  back in bed supine post session with all needs within reach  Continue to follow pt  per PT POC  pt  continues to require assist for functional mobility and is a high risk of fall with decreased endurance and gross deconditioning  Barriers to Discharge: Decreased caregiver support, Inaccessible home environment     PT Discharge Recommendation: Post acute rehabilitation services    See flowsheet documentation for full assessment

## 2022-09-14 NOTE — PHYSICAL THERAPY NOTE
Physical Therapy Treatment Note     09/14/22 1426   PT Last Visit   PT Visit Date 09/14/22   Note Type   Note Type Treatment   Pain Assessment   Pain Assessment Tool 0-10   Pain Score No Pain   Restrictions/Precautions   Weight Bearing Precautions Per Order Yes   RLE Weight Bearing Per Order WBAT   LLE Weight Bearing Per Order WBAT   Other Precautions Fall Risk;O2;Telemetry; Bed Alarm   General   Chart Reviewed Yes   Subjective   Subjective Pt  agreeable to PT  pt  in bed upon entry  Bed Mobility   Supine to Sit 5  Supervision   Additional items Bedrails; Increased time required   Sit to Supine 3  Moderate assistance   Additional items Assist x 2; Increased time required;LE management; Bedrails   Transfers   Sit to Stand   (from EOB - S, Recliner Mod A 2)   Additional items Assist x 2;Armrests; Increased time required;Verbal cues; Bedrails   Stand to Sit 5  Supervision   Additional items Increased time required;Verbal cues; Assist x 2;Armrests   Stand pivot 5  Supervision   Additional items Increased time required;Verbal cues; Assist x 2   Toilet transfer 5  Supervision   Additional items Assist x 1; Increased time required;Standard toilet;Verbal cues  (Grab bar)   Ambulation/Elevation   Gait pattern Improper Weight shift; Wide VESNA; Decreased foot clearance; Inconsistent darya; Excessively slow; Short stride;Decreased heel strike;Decreased toe off   Gait Assistance 4  Minimal assist   Additional items Assist x 1;Assist x 2;Verbal cues  (2nd A for chair follow only)   Assistive Device Bariatric Rolling walker   Distance 10ft x 3, 40ft, 12ft   Ambulation/Elevation Additional Comments unsteady gait w/ inc lateral displacement 2* to body habitus   Balance   Static Sitting Fair +   Dynamic Sitting Fair   Static Standing Fair   Dynamic Standing Fair -   Ambulatory Fair -   Endurance Deficit   Endurance Deficit Yes   Endurance Deficit Description Weakness   Activity Tolerance   Activity Tolerance Patient tolerated treatment well;Patient limited by fatigue   Nurse Made Aware Yes   Assessment   Prognosis Fair   Problem List Decreased endurance;Decreased range of motion; Impaired balance;Decreased mobility;Obesity; Decreased skin integrity;Pain;Decreased strength   Assessment pt  noted to have tremors to which patient reported hes cold  Possible anxiety  Noted HR elevated to 172 bpm with ambulation  Pt  performed transfers with S and use of bed rail  However STS transfer from recliner pt  needed Mod A x 2 and cues for technique  Pt  noted to be at 99% post ambulation on RA however elevated HR with 172 bpm being the highest  once pt  seated and resting HR decreased to the 90s  Pt  needed total A for pericare  Pt  back in bed supine post session with all needs within reach  Continue to follow pt  per PT POC  pt  continues to require assist for functional mobility and is a high risk of fall with decreased endurance and gross deconditioning  Barriers to Discharge Decreased caregiver support; Inaccessible home environment   Goals   Patient Goals Go to rehab   STG Expiration Date 09/26/22   PT Treatment Day 3   Plan   Treatment/Interventions Functional transfer training;Bed mobility;Gait training;Equipment eval/education;Patient/family training;Spoke to nursing;OT   Progress Progressing toward goals   PT Frequency 3-5x/wk   Recommendation   PT Discharge Recommendation Post acute rehabilitation services   AM-PAC Basic Mobility Inpatient   Turning in Bed Without Bedrails 3   Lying on Back to Sitting on Edge of Flat Bed 3   Moving Bed to Chair 3   Standing Up From Chair 2   Walk in Room 3   Climb 3-5 Stairs 1   Basic Mobility Inpatient Raw Score 15   Basic Mobility Standardized Score 36 97   Turning Head Towards Sound 4   Follow Simple Instructions 4   Low Function Basic Mobility Raw Score 23   Low Function Basic Mobility Standardized Score 37 22   Highest Level Of Mobility   -[facility] Goal 4: Move to chair/commode   -HL Achieved 7: Walk 25 feet or more   End of Consult   Patient Position at End of Consult Supine; All needs within reach;Bed/Chair alarm activated         Lala Mari PTA    An AM-PAC basic mobility standardized score less than 42 9 suggest the patient may benefit from discharge to post-acute rehab services

## 2022-09-15 VITALS
HEART RATE: 83 BPM | SYSTOLIC BLOOD PRESSURE: 102 MMHG | DIASTOLIC BLOOD PRESSURE: 47 MMHG | RESPIRATION RATE: 18 BRPM | TEMPERATURE: 97.9 F | OXYGEN SATURATION: 96 %

## 2022-09-15 PROCEDURE — 94660 CPAP INITIATION&MGMT: CPT

## 2022-09-15 PROCEDURE — 99232 SBSQ HOSP IP/OBS MODERATE 35: CPT | Performed by: INTERNAL MEDICINE

## 2022-09-15 PROCEDURE — 99239 HOSP IP/OBS DSCHRG MGMT >30: CPT | Performed by: INTERNAL MEDICINE

## 2022-09-15 RX ORDER — OXYCODONE HYDROCHLORIDE 5 MG/1
5 TABLET ORAL EVERY 4 HOURS PRN
Qty: 10 TABLET | Refills: 0 | Status: SHIPPED | OUTPATIENT
Start: 2022-09-15 | End: 2022-09-25

## 2022-09-15 RX ORDER — ACETAMINOPHEN 325 MG/1
975 TABLET ORAL EVERY 6 HOURS PRN
Refills: 0
Start: 2022-09-15

## 2022-09-15 RX ORDER — FLUCONAZOLE 200 MG/1
400 TABLET ORAL DAILY
Refills: 0
Start: 2022-09-15 | End: 2022-09-22

## 2022-09-15 RX ORDER — CLONAZEPAM 1 MG/1
1 TABLET ORAL 3 TIMES DAILY
Qty: 15 TABLET | Refills: 0 | Status: SHIPPED | OUTPATIENT
Start: 2022-09-15

## 2022-09-15 RX ADMIN — BUSPIRONE HYDROCHLORIDE 10 MG: 10 TABLET ORAL at 08:18

## 2022-09-15 RX ADMIN — Medication 1000 UNITS: at 08:18

## 2022-09-15 RX ADMIN — HEPARIN SODIUM 7500 UNITS: 5000 INJECTION INTRAVENOUS; SUBCUTANEOUS at 05:32

## 2022-09-15 RX ADMIN — CLONAZEPAM 1 MG: 1 TABLET ORAL at 08:18

## 2022-09-15 RX ADMIN — FUROSEMIDE 40 MG: 40 TABLET ORAL at 08:20

## 2022-09-15 RX ADMIN — FLUCONAZOLE 400 MG: 100 TABLET ORAL at 08:18

## 2022-09-15 RX ADMIN — CLOTRIMAZOLE AND BETAMETHASONE DIPROPIONATE: 10; .64 CREAM TOPICAL at 08:24

## 2022-09-15 RX ADMIN — GABAPENTIN 100 MG: 100 CAPSULE ORAL at 08:18

## 2022-09-15 RX ADMIN — PALIPERIDONE 6 MG: 3 TABLET, EXTENDED RELEASE ORAL at 08:18

## 2022-09-15 RX ADMIN — MICONAZOLE NITRATE: 20 CREAM TOPICAL at 08:18

## 2022-09-15 RX ADMIN — DIVALPROEX SODIUM 1000 MG: 500 TABLET, DELAYED RELEASE ORAL at 08:18

## 2022-09-15 RX ADMIN — LEVOTHYROXINE SODIUM 25 MCG: 25 TABLET ORAL at 05:32

## 2022-09-15 RX ADMIN — PANTOPRAZOLE SODIUM 40 MG: 40 TABLET, DELAYED RELEASE ORAL at 05:32

## 2022-09-15 RX ADMIN — CITALOPRAM HYDROBROMIDE 40 MG: 20 TABLET ORAL at 08:18

## 2022-09-15 NOTE — CASE MANAGEMENT
Case Management Discharge Planning Note    Patient name Juany Villaret  Location [address] [place] [address] /E5 MS [phone] MRN [id number]  : 1975 Date 9/15/2022       Current Admission Date: 2022  Current Admission Diagnosis:Diffuse dermatitis   Patient Active Problem List    Diagnosis Date Noted    Tinea corporis 2022    Diffuse dermatitis 2022    Mild renal insufficiency 2022    Acute pulmonary insufficiency 2022    Weakness 2022    Cutaneous candidiasis 2022    Rash of body 07/15/2022    GERMANIA (acute kidney injury) [facility] ) 2022    CORKY (generalized anxiety disorder) 2022    Pain and swelling of lower extremity 2022    Primary hypertension 2022    Dyslipidemia 2022    Crohn's disease ([facility] ) 2022    Bipolar disorder ([place] [address] ) 2022    Morbid obesity ([place] [address] ) 2022    Hypothyroidism 2022    Multiple open wounds of lower leg 2022    Peripheral edema 2022    Anxiety 2022    Dietary noncompliance 2021    Ambulatory dysfunction 2021    Venous stasis dermatitis 2021    Venous insufficiency of lower extremity 2021    Financial difficulties 10/26/2020    Bilateral leg edema 2020    Binge eating     Folic acid deficiency     ALESSANDRA on CPAP 2019    Fatty liver 2019    History of MRSA infection 2019    Pre-diabetes 2019    Peripheral polyneuropathy 2017    Vitamin D deficiency 2015    Gastroesophageal reflux disease without esophagitis 2015    Crohn's disease of large intestine without complication ([facility] )     Cocaine dependence in remission ([facility] ) 2011      LOS (days): 8  Geometric Mean LOS (GMLOS) (days): 3 10  Days to GMLOS:-4 3     OBJECTIVE:  Risk of Unplanned Readmission Score: 39 67         Current admission status: Inpatient   Preferred Pharmacy:   [facility] [address] [place] [address] [address] [facility]  Phone: [phone] Fax: [phone]    Primary Care Provider: Sydnee Fernandez DO    Primary Insurance: MEDICARE  Secondary Insurance:     DISCHARGE DETAILS:     Patient to be [facility] today via BLS at 13:00  MD and bedside RN made aware

## 2022-09-15 NOTE — PROGRESS NOTES
Progress Note - Infectious Disease   Nanda Funez 50 y o  male MRN: [id number]  Unit/Bed#: E5 -01 Encounter: [id number]    Impression/Plan:  1  Extensive body rash  Concern for fungal yeast infection  Suspect sweat has been contributing factor given increased density of rash in skin folds  Patient's hygiene a significant concern  He has previously been on weekly Diflucan but dose not sufficient for this type of rash or body habitus  Given extent of rash, HIV screen was checked and is negative  Patient already ordered for application of antifungal cream to the skin  He is currently receiving oral Fluconazole, 400mg daily, which he should continued to finish 14 days of antifungal treatment  He will need plan for skin care and routine skin evaluation in the outpatient setting    -topical antifungal treatment per SLIM  -would avoid application of topical bacitracin   -continue PO Fluconazole, 400mg daily, through 9/21/2022 for 14-days of antifungal treatment  -monitor CBCD and CMP  -monitor vitals  -serial skin exams  -continue follow up with wound management team     2  Acute hypoxia  Suspect this is multifactorial in morbidly obese patient who is severely deconditioned  Patient with near syncope while ambulating  Also drowsy and has ALESSANDRA  Patient temporarily required face mask O2 but his O2 saturation is now stable on 3L nasal cannula  Patient's CT PE study was negative for PE and showed cleared lungs  -monitor vitals  -monitor respiratory status  -O2 support per primary service     3  Venous insufficiency  Patient with extensive stasis dermatitis but I suspect his current dense erythema is fungal in origin  Patient would benefit from compression and elevation of the B/L LE  There are small areas of excoriation but patient has no active purulence output at this time suggestive of cellulitis   He has been formally assessed by podiatry and the wound management team   -serial skin exams  -recommend compression and elevation of B/L LE to promote venous return  -continue follow up with podiatry  -continue follow up with wound management      4  Morbid obesity  Patient with history of binge eating disorder  Patient's body habitus is risk factor for rash and wounds  Concern for metabolic syndrome which could further impede the immune system and ability to heal  Recommend weight loss  -recommend weight loss    Above plan was discussed in detail with patient at the bedside  Above plan was discussed in detail with SLIM  Antifungals:  Fluconazole 8  Antifungals 8    Subjective:  Patient reports he's about the same today  Remains concerned about pain although he tells me this morning he's pleased with how his legs are doing  He has no fever, chills, sweats, shakes; no nausea, vomiting, abdominal pain; no cough, shortness of breath, or chest pain  No new symptoms  Objective:  Vitals:  Temp:  [97 6 °F (36 4 °C)-98 9 °F (37 2 °C)] 98 9 °F (37 2 °C)  HR:  [74-93] 93  Resp:  [18-19] 19  BP: ()/(46-52) 111/52  SpO2:  [92 %-96 %] 93 %  Temp (24hrs), Av 1 °F (36 7 °C), Min:97 6 °F (36 4 °C), Max:98 9 °F (37 2 °C)  Current: Temperature: 98 9 °F (37 2 °C)    Physical Exam:   General Appearance:  Alert, interactive, nontoxic, no acute distress  He appears chronically debilitated  Less anxious  Throat: Oropharynx moist without lesions  Lungs:   Clear to auscultation bilaterally; no wheezes, rhonchi or rales; respirations unlabored on room air  Heart:  Tachycardic; no murmur, rub or gallop  Abdomen:   Soft, morbidly obese, non-tender, non-distended, positive bowel sounds  Extremities: No clubbing or cyanosis of the feet  Skin: Flat erythematous body rash becoming less dense, still flaking in areas, no active bleeding/weeping       Labs, Imaging, & Other studies:   All pertinent labs and imaging studies were personally reviewed  Results from last 7 days   Lab Units 09/10/22  0538 22  0537   WBC Thousand/uL 3 77* 3 85*   HEMOGLOBIN g/dL 10 7* 10 7*   PLATELETS Thousands/uL 167 175     Results from last 7 days   Lab Units 09/12/22  0528 09/10/22  0538 09/09/22  0537   POTASSIUM mmol/L 4 8 4 4 4 0   CHLORIDE mmol/L 104 107 105   CO2 mmol/L 34* 33* 33*   BUN mg/dL 17 18 22   CREATININE mg/dL 1 35* 1 03 1 36*   EGFR ml/min/1 73sq m 61 84 60   CALCIUM mg/dL 8 8 8 2* 8 2*   AST U/L  --  9 11   ALT U/L  --  8* 7*   ALK PHOS U/L  --  50 51

## 2022-09-15 NOTE — DISCHARGE SUMMARY
Discharge Summary - Sonido Mcginnis 1975, [id number]        Admission Date: 9/7/2022  Discharge Date:  9/15/2022      Discharge Diagnosis:   1  Ambulatory dysfunction, multifactorial  2  Extensive fungal dermatitis  3  Morbid obesity  4  Obstructive sleep apnea/obesity hypoventilation syndrome  5  Chronic kidney disease stage 3 with acute kidney injury on admission  6  Hypothyroidism  7  Bipolar affective disorder  8  Dyslipidemia  9  Venous insufficiency     Consulting Physicians:  1  Dr Kevin Mendoza, infectious Disease  2  Dr Femi Miller, podiatry     Procedures Performed:   None    HPI:  The patient is a 78-year-old man who came to the emergency room after suffering a fall  He was just discharged from rehab  He was walking up the steps into his home  Unfortunately the railing gave way and he fell  911 was called and the patient was brought to the emergency room  The patient has chronic lower limb lymphedema and venous insufficiency  Was a lot of redness and open areas noted at the time of his arrival   The possibility of sepsis was entertained the patient was admitted for further care  Hospital Course: The patient was admitted to the hospital and hydrated with intravenous fluid  Antibiotics were begun  Infectious Disease was consulted  Ultimately, there was no evidence for sepsis  Was thought that the patient's skin issues were related to extensive cutaneous candidiasis  The patient was started on fluconazole and local measures  He was evaluated by podiatry  Compression and elevation of his legs were recommended  The patient will continue on fluconazole for a total of 2 weeks  The patient was noted to be hypoxic at the time of admission  He has obstructive sleep apnea and obesity hypoventilation syndrome related to his severe obesity  No support for any alternate diagnoses was found  The patient is continued on BiPAP and supplemental oxygen as needed    He was advised to lose weight though this is likely to be quite difficult  The patient had mild acute kidney injury at the time of admission which resolved with hydration  The patient's other medical issues remained stable during his hospitalization  At the time of discharge she was feeling pretty well  Vital signs were stable  Lungs were clear with diminished breath sounds  Cardiac exam revealed regular rhythm  The abdomen was soft and nontender  Both legs were wrapped  Neurologic examination revealed the patient to be alert and oriented  No focal sign was noted  Disposition:  The patient was transferred to short-term rehab on September 15th  Diet and activity will be as tolerated  He will be under the care of the physicians at his rehab center to his discharge  Thereafter, he will resume primary care with Dr Perry Reading  Discharge instructions/Information to patient and family:   See after visit summary for information provided to patient and family  Provisions for Follow-Up Care:  See after visit summary for information related to follow-up care and any pertinent home health orders  Planned Readmission: No    Discharge Statement   I spent 40 minutes discharging the patient  This time was spent on the day of discharge  I had direct contact with the patient on the day of discharge  Discharge Medications:  See after visit summary for reconciled discharge medications provided to patient and family

## 2022-09-15 NOTE — PLAN OF CARE
Problem: PAIN - ADULT  Goal: Verbalizes/displays adequate comfort level or baseline comfort level  Description: Interventions:  - Encourage patient to monitor pain and request assistance  - Assess pain using appropriate pain scale  - Administer analgesics based on type and severity of pain and evaluate response  - Implement non-pharmacological measures as appropriate and evaluate response  - Consider cultural and social influences on pain and pain management  - Notify physician/advanced practitioner if interventions unsuccessful or patient reports new pain  Outcome: Progressing     Problem: INFECTION - ADULT  Goal: Absence or prevention of progression during hospitalization  Description: INTERVENTIONS:  - Assess and monitor for signs and symptoms of infection  - Monitor lab/diagnostic results  - Monitor all insertion sites, i e  indwelling lines, tubes, and drains  - Monitor endotracheal if appropriate and nasal secretions for changes in amount and color  - [place] appropriate cooling/warming therapies per order  - Administer medications as ordered  - Instruct and encourage patient and family to use good hand hygiene technique  - Identify and instruct in appropriate isolation precautions for identified infection/condition  Outcome: Progressing     Problem: SAFETY ADULT  Goal: Patient will remain free of falls  Description: INTERVENTIONS:  - Educate patient/family on patient safety including physical limitations  - Instruct patient to call for assistance with activity   - Consult OT/PT to assist with strengthening/mobility   - Keep Call bell within reach  - Keep bed low and locked with side rails adjusted as appropriate  - Keep care items and personal belongings within reach  - Initiate and maintain comfort rounds  - Make Fall Risk Sign visible to staff  - Apply yellow socks and bracelet for high fall risk patients  - Consider moving patient to room near nurses station  Outcome: Progressing  Goal: Maintain or return to baseline ADL function  Description: INTERVENTIONS:  -  Assess patient's ability to carry out ADLs; assess patient's baseline for ADL function and identify physical deficits which impact ability to perform ADLs (bathing, care of mouth/teeth, toileting, grooming, dressing, etc )  - Assess/evaluate cause of self-care deficits   - Assess range of motion  - Assess patient's mobility; develop plan if impaired  - Assess patient's need for assistive devices and provide as appropriate  - Encourage maximum independence but intervene and supervise when necessary  - Involve family in performance of ADLs  - Assess for home care needs following discharge   - Consider OT consult to assist with ADL evaluation and planning for discharge  - Provide patient education as appropriate  Outcome: Progressing  Goal: Maintains/Returns to pre admission functional level  Description: INTERVENTIONS:  - Perform BMAT or MOVE assessment daily    - Set and communicate daily mobility goal to care team and patient/family/caregiver     - Collaborate with rehabilitation services on mobility goals if consulted  - Out of bed for toileting  - Record patient progress and toleration of activity level   Outcome: Progressing     Problem: DISCHARGE PLANNING  Goal: Discharge to home or other facility with appropriate resources  Description: INTERVENTIONS:  - Identify barriers to discharge w/patient and caregiver  - Arrange for needed discharge resources and transportation as appropriate  - Identify discharge learning needs (meds, wound care, etc )  - Arrange for interpretive services to assist at discharge as needed  - Refer to Case Management Department for coordinating discharge planning if the patient needs post-hospital services based on physician/advanced practitioner order or complex needs related to functional status, cognitive ability, or social support system  Outcome: Progressing     Problem: Knowledge Deficit  Goal: Patient/family/caregiver demonstrates understanding of disease process, treatment plan, medications, and discharge instructions  Description: Complete learning assessment and assess knowledge base    Interventions:  - Provide teaching at level of understanding  - Provide teaching via preferred learning methods  Outcome: Progressing     Problem: Prexisting or High Potential for Compromised Skin Integrity  Goal: Skin integrity is maintained or improved  Description: INTERVENTIONS:  - Identify patients at risk for skin breakdown  - Assess and monitor skin integrity  - Assess and monitor nutrition and hydration status  - Monitor labs   - Assess for incontinence   - Turn and reposition patient  - Assist with mobility/ambulation  - Relieve pressure over bony prominences  - Avoid friction and shearing  - Provide appropriate hygiene as needed including keeping skin clean and dry  - Evaluate need for skin moisturizer/barrier cream  - Collaborate with interdisciplinary team   - Patient/family teaching  - Consider wound care consult   Outcome: Progressing     Problem: Potential for Falls  Goal: Patient will remain free of falls  Description: INTERVENTIONS:  - Educate patient/family on patient safety including physical limitations  - Instruct patient to call for assistance with activity   - Consult OT/PT to assist with strengthening/mobility   - Keep Call bell within reach  - Keep bed low and locked with side rails adjusted as appropriate  - Keep care items and personal belongings within reach  - Initiate and maintain comfort rounds  - Make Fall Risk Sign visible to staff  - Apply yellow socks and bracelet for high fall risk patients  - Consider moving patient to room near nurses station  Outcome: Progressing     Problem: MOBILITY - ADULT  Goal: Maintain or return to baseline ADL function  Description: INTERVENTIONS:  -  Assess patient's ability to carry out ADLs; assess patient's baseline for ADL function and identify physical deficits which impact ability to perform ADLs (bathing, care of mouth/teeth, toileting, grooming, dressing, etc )  - Assess/evaluate cause of self-care deficits   - Assess range of motion  - Assess patient's mobility; develop plan if impaired  - Assess patient's need for assistive devices and provide as appropriate  - Encourage maximum independence but intervene and supervise when necessary  - Involve family in performance of ADLs  - Assess for home care needs following discharge   - Consider OT consult to assist with ADL evaluation and planning for discharge  - Provide patient education as appropriate  Outcome: Progressing  Goal: Maintains/Returns to pre admission functional level  Description: INTERVENTIONS:  - Perform BMAT or MOVE assessment daily    - Set and communicate daily mobility goal to care team and patient/family/caregiver  - Collaborate with rehabilitation services on mobility goals if consulted  - Out of bed for toileting  - Record patient progress and toleration of activity level   Outcome: Progressing     Problem: Nutrition/Hydration-ADULT  Goal: Nutrient/Hydration intake appropriate for improving, restoring or maintaining nutritional needs  Description: Monitor and assess patient's nutrition/hydration status for malnutrition  Collaborate with interdisciplinary team and initiate plan and interventions as ordered  Monitor patient's weight and dietary intake as ordered or per policy  Utilize nutrition screening tool and intervene as necessary  Determine patient's food preferences and provide high-protein, high-caloric foods as appropriate       INTERVENTIONS:  - Monitor oral intake, urinary output, labs, and treatment plans  - Assess nutrition and hydration status and recommend course of action  - Evaluate amount of meals eaten  - Assist patient with eating if necessary   - Allow adequate time for meals  - Recommend/ encourage appropriate diets, oral nutritional supplements, and vitamin/mineral supplements  - Order, calculate, and assess calorie counts as needed  - Recommend, monitor, and adjust tube feedings and TPN/PPN based on assessed needs  - Assess need for intravenous fluids  - Provide specific nutrition/hydration education as appropriate  - Include patient/family/caregiver in decisions related to nutrition  Outcome: Progressing

## 2022-09-15 NOTE — NURSING NOTE
Discharge instructions reviewed with receiving facility, spoke to Leonela at [phone]  Discharge instructions given to transport team  Anil removed and discharged  Patient discharged via stretcher with transport team and belongings at side  Medications returned from pharmacy

## 2023-09-12 NOTE — CONSULTS
Darling Singh from [facility] wanted to speak to Dr. Lizzy Nicholas nurse regarding a prior authorization for Ozempic.   Darling Singh said to please call [phone]  Ref - [id number] [phone] Podiatry - Consultation    Patient Information:   Nanda Funez 48 y o  male MRN: [id number]  Unit/Bed#: [facility] 87 214-01 Encounter: [id number]  PCP: Ramirez Rapp DO  Date of Admission:  7/14/2022  Date of Consultation: 07/15/22  Requesting Physician: Eddi Ortega MD      ASSESSMENT:    Nanda Funez is a 48 y o  male with:    1  Bilateral LE venous stasis dermatitis with open wounds  2  Lymphedema    PLAN:    · No acute intervention  Patient's stasis changes and open wounds appear stable  Less likely that the erythema is cellulitic given bilateral appearance that is unchanged from last podiatry evaluation in June and lack of SIRS criteria  · Local wound care consisting of dermagran to right leg plaque, calamine lotion, compressive dressing  Wound care instructions placed  · Discussed the venous stasis and lymphedema in detail  Stressed the importance of compression, elevation, and outpatient follow-up on proper edema control and to prevent ongoing progression of stasis changes  · Elevation and offloading on green foam wedges or pillows when non-ambulatory  · Rest of care per primary team   · Will discuss this plan with my attending and update as needed  Weightbearing status: Weightbearing as tolerated    SUBJECTIVE:    History of Present Illness:    Nanda Funez is a 48 y o  male who is originally admitted 7/14/2022 due to multiple open wounds of lower leg  Patient has a past medical history of chrons disease, chronic venous stasis dermatitis with wounds  We are consulted for evaluation of patient's LE wounds  The patient states these have been present for a long time but they began to get worse which started as "a softball on my left ankle " He does have home nursing come 3x week to assist with dressing changes and is supposed to see Dr Iam Perea in wound care at [address] [place] [address] on 7/19/22  He was told in the past that he has lymphedema but was not prescribed compression or pumps   He does not use compression at home due to difficulty applying wraps himself  He has noticed progressive changes to the wounds appearance and the amount of skin changes/edema over the past few months  He denies any purulent drainage  He denies any recent acute changes in LE pain  He denies nausea, vomiting, fever chills, cough, SOB, chest pain  Review of Systems:    Constitutional: Negative  HENT: Negative  Eyes: Negative  Respiratory: Negative  Cardiovascular: Negative  Gastrointestinal: Negative  Musculoskeletal: Negative  Skin: b/l LE wounds, vesicular rash to the arms, trunk, neck and stomach  Neurological: Negative  Psych: Negative  Past Medical and Surgical History:     Past Medical History:   Diagnosis Date    Bipolar affective disorder ([facility] [address] )     Cellulitis     Hyperlipidemia     Hypertension        History reviewed  No pertinent surgical history      Meds/Allergies:    Medications Prior to Admission   Medication    busPIRone (BUSPAR) 10 mg tablet    Cholecalciferol (Vitamin D3) 50 MCG (2000 UT) capsule    citalopram (CeleXA) 40 mg tablet    clonazePAM (KlonoPIN) 1 mg tablet    divalproex sodium (DEPAKOTE) 500 mg EC tablet    divalproex sodium (DEPAKOTE) 500 mg EC tablet    furosemide (LASIX) 40 mg tablet    gabapentin (NEURONTIN) 100 mg capsule    hydrochlorothiazide (HYDRODIURIL) 12 5 mg tablet    levothyroxine 25 mcg tablet    loperamide (IMODIUM) 2 mg capsule    Melatonin 10 MG TABS    metoprolol tartrate (LOPRESSOR) 50 mg tablet    mirtazapine (REMERON) 7 5 MG tablet    mupirocin (BACTROBAN) 2 % ointment    nystatin (MYCOSTATIN) powder    paliperidone (INVEGA) 6 MG 24 hr tablet    pantoprazole (PROTONIX) 40 mg tablet    potassium chloride (K-DUR,KLOR-CON) 20 mEq tablet    rosuvastatin (CRESTOR) 10 MG tablet    traZODone (DESYREL) 50 mg tablet       Allergies   Allergen Reactions    Mesalamine GI Intolerance     Pt has had severe diarrhea and abdominal pain on mESALAMINE Social History:     Marital Status:     Substance Use History:   Social History     Substance and Sexual Activity   Alcohol Use Never     Social History     Tobacco Use   Smoking Status Former Smoker   Smokeless Tobacco Never Used     Social History     Substance and Sexual Activity   Drug Use Never       Family History:    History reviewed  No pertinent family history  OBJECTIVE:    Vitals:   Blood Pressure: 93/53 (07/15/22 0732)  Pulse: 71 (07/15/22 0732)  Temperature: (!) 97 2 °F (36 2 °C) (07/15/22 0732)  Temp Source: Oral (07/14/22 2255)  Respirations: 18 (07/15/22 0732)  Weight - Scale: (!) 209 kg (461 lb 10 3 oz) (07/15/22 0531)  SpO2: 90 % (07/15/22 0732)    Physical Exam:    General Appearance: Alert, cooperative, no distress  HEENT: Head normocephalic, atraumatic, without obvious abnormality  Heart: Normal rate and rhythm  Lungs: Non-labored breathing  No respiratory distress  Abdomen: Without distension  Psychiatric: AAOx3  Lower Extremity:    Vascular:   DP: Right: 2+ Left: 2+  PT: Right: doppler signal Left: doppler signal  CRT < 3 seconds at the digits  +3/4 edema noted at bilateral lower extremities  Pedal hair is absent  Skin temperature is WNL bilaterally  Musculoskeletal:  MMT is 5/5 in all muscle compartments bilaterally  ROM at the 1st MPJ and ankle joint are wnl bilaterally with the leg extended  No Pain on palpation of bilateral LE  No gross deformities noted  Dermatological:  Scaly, shiny, erythematous changes to the bilateral LE consistent with chronic stasis dermatitis unchanged from pictures in 06/2022  On the right anterior leg there is a large papillomatous plaque that is well-adhered without any underlying fluctuance or bogginess  Vesicular rash to the arms, thighs, trunk, neck and stomach  Neurological:  Gross sensation is intact  Light touch is intact  Protective sensation is intact      Clinical Images 07/15/22:              Additional data:     Lab Results: I have personally reviewed pertinent labs including:    Results from last 7 days   Lab Units 07/15/22  0455   WBC Thousand/uL 4 92   HEMOGLOBIN g/dL 11 2*   HEMATOCRIT % 36 5   PLATELETS Thousands/uL 255   NEUTROS PCT % 39*   LYMPHS PCT % 32   MONOS PCT % 19*   EOS PCT % 4     Results from last 7 days   Lab Units 07/15/22  0455 07/14/22  1846   POTASSIUM mmol/L 3 7 5 0   CHLORIDE mmol/L 105 105   CO2 mmol/L 29 29   BUN mg/dL 12 12   CREATININE mg/dL 1 54* 1 42*   CALCIUM mg/dL 7 5* 7 9*   ALK PHOS U/L  --  87   ALT U/L  --  13   AST U/L  --  26     Results from last 7 days   Lab Units 07/14/22  1947   INR  1 03       Cultures: I have personally reviewed pertinent cultures including:    Results from last 7 days   Lab Units 07/14/22  1948 07/14/22  1846   BLOOD CULTURE  Received in Microbiology Lab  Culture in Progress  Received in Microbiology Lab  Culture in Progress  Imaging: I have personally reviewed pertinent reports in PACS  EKG, Pathology, and Other Studies: I have personally reviewed pertinent reports  Time Spent for Care: 30 minutes  More than 50% of total time spent on counseling and coordination of care as described above  ** Please Note: Portions of the record may have been created with voice recognition software  Occasional wrong word or "sound a like" substitutions may have occurred due to the inherent limitations of voice recognition software  Read the chart carefully and recognize, using context, where substitutions have occurred   **

## 2023-09-18 NOTE — EMTALA/ACUTE CARE TRANSFER
[facility]  [address] [facility] [address]  Dept: [phone]      EMTALA TRANSFER CONSENT    NAME Felicita Loera                                         1975                              MRN [id number]    I have been informed of my rights regarding examination, treatment, and transfer   by Dr Meribeth Gottron, DO    Benefits: Specialized equipment and/or services available at the receiving facility (Include comment)________________________    Risks: Potential deterioration of medical condition, Loss of IV, Increased discomfort during transfer, Possible worsening of condition or death during transfer      Consent for Transfer:  I acknowledge that my medical condition has been evaluated and explained to me by the emergency department physician or other qualified medical person and/or my attending physician, who has recommended that I be transferred to the service of  Accepting Physician: Dr Nathan  at [address] [place] [address] Name, [address] : [address] [facility]  The above potential benefits of such transfer, the potential risks associated with such transfer, and the probable risks of not being transferred have been explained to me, and I fully understand them  The doctor has explained that, in my case, the benefits of transfer outweigh the risks  I agree to be transferred  I authorize the performance of emergency medical procedures and treatments upon me in both transit and upon arrival at the receiving facility  Additionally, I authorize the release of any and all medical records to the receiving facility and request they be transported with me, if possible  I understand that the safest mode of transportation during a medical emergency is an ambulance and that the Hospital advocates the use of this mode of transport   Risks of traveling to the receiving facility by car, including absence of medical control, life sustaining equipment, such as oxygen, and medical personnel has been explained to me and I fully understand them  (SADAF CORRECT BOX BELOW)  [  ]  I consent to the stated transfer and to be transported by ambulance/helicopter  [  ]  I consent to the stated transfer, but refuse transportation by ambulance and accept full responsibility for my transportation by car  I understand the risks of non-ambulance transfers and I exonerate the Hospital and its staff from any deterioration in my condition that results from this refusal     X___________________________________________    DATE  22  TIME________  Signature of patient or legally responsible individual signing on patient behalf           RELATIONSHIP TO PATIENT_________________________          Provider Certification    NAME Sammy Muñoz                                         1975                              MRN [id number]    A medical screening exam was performed on the above named patient  Based on the examination:    Condition Necessitating Transfer The primary encounter diagnosis was Bilateral cellulitis of lower leg  Diagnoses of Weakness and Acute kidney injury [facility]) were also pertinent to this visit      Patient Condition: The patient has been stabilized such that within reasonable medical probability, no material deterioration of the patient condition or the condition of the unborn child(james) is likely to result from the transfer    Reason for Transfer: No bed available at level of patient's needs    Transfer Requirements:  [address]   · Space available and qualified personnel available for treatment as acknowledged by    · Agreed to accept transfer and to provide appropriate medical treatment as acknowledged by       Dr Tiffanie West  · Appropriate medical records of the examination and treatment of the patient are provided at the time of transfer   [address] _______  · Transfer will be performed by qualified personnel from    and appropriate transfer equipment as required, including the use of necessary and appropriate life support measures  Provider Certification: I have examined the patient and explained the following risks and benefits of being transferred/refusing transfer to the patient/family:  General risk, such as traffic hazards, adverse weather conditions, rough terrain or turbulence, possible failure of equipment (including vehicle or aircraft), or consequences of actions of persons outside the control of the transport personnel, Unanticipated needs of medical equipment and personnel during transport, Risk of worsening condition, The possibility of a transport vehicle being unavailable      Based on these reasonable risks and benefits to the patient and/or the unborn child(james), and based upon the information available at the time of the patients examination, I certify that the medical benefits reasonably to be expected from the provision of appropriate medical treatments at another medical facility outweigh the increasing risks, if any, to the individuals medical condition, and in the case of labor to the unborn child, from effecting the transfer      X____________________________________________ DATE 07/19/22        TIME_______      ORIGINAL - SEND TO MEDICAL RECORDS   COPY - SEND WITH PATIENT DURING TRANSFER No